# Patient Record
Sex: MALE | Race: BLACK OR AFRICAN AMERICAN | Employment: FULL TIME | URBAN - METROPOLITAN AREA
[De-identification: names, ages, dates, MRNs, and addresses within clinical notes are randomized per-mention and may not be internally consistent; named-entity substitution may affect disease eponyms.]

---

## 2017-08-19 LAB — DIABETIC RETINOPATHY: ABNORMAL

## 2018-02-14 ENCOUNTER — OFFICE VISIT (OUTPATIENT)
Dept: PRIMARY CARE CLINIC | Age: 53
End: 2018-02-14

## 2018-02-14 VITALS
BODY MASS INDEX: 37.73 KG/M2 | TEMPERATURE: 97.2 F | RESPIRATION RATE: 16 BRPM | DIASTOLIC BLOOD PRESSURE: 78 MMHG | WEIGHT: 286 LBS | HEART RATE: 96 BPM | SYSTOLIC BLOOD PRESSURE: 122 MMHG | OXYGEN SATURATION: 94 %

## 2018-02-14 DIAGNOSIS — G89.29 CHRONIC LEFT SHOULDER PAIN: ICD-10-CM

## 2018-02-14 DIAGNOSIS — M25.512 CHRONIC LEFT SHOULDER PAIN: ICD-10-CM

## 2018-02-14 DIAGNOSIS — Z12.11 SCREENING FOR COLON CANCER: ICD-10-CM

## 2018-02-14 DIAGNOSIS — G47.37 CENTRAL SLEEP APNEA DUE TO MEDICAL CONDITION: ICD-10-CM

## 2018-02-14 DIAGNOSIS — I10 ESSENTIAL HYPERTENSION: Primary | ICD-10-CM

## 2018-02-14 DIAGNOSIS — Z79.4 TYPE 2 DIABETES MELLITUS WITH OTHER CIRCULATORY COMPLICATION, WITH LONG-TERM CURRENT USE OF INSULIN (HCC): ICD-10-CM

## 2018-02-14 DIAGNOSIS — I69.30 LATE EFFECT OF CEREBROVASCULAR ACCIDENT (CVA): ICD-10-CM

## 2018-02-14 DIAGNOSIS — E11.59 TYPE 2 DIABETES MELLITUS WITH OTHER CIRCULATORY COMPLICATION, WITH LONG-TERM CURRENT USE OF INSULIN (HCC): ICD-10-CM

## 2018-02-14 PROBLEM — E11.9 DIABETES MELLITUS (HCC): Status: ACTIVE | Noted: 2018-02-14

## 2018-02-14 PROCEDURE — 99203 OFFICE O/P NEW LOW 30 MIN: CPT | Performed by: INTERNAL MEDICINE

## 2018-02-14 RX ORDER — LIRAGLUTIDE 6 MG/ML
INJECTION SUBCUTANEOUS
Qty: 9 PEN | Refills: 3 | Status: SHIPPED | OUTPATIENT
Start: 2018-02-14 | End: 2018-02-14 | Stop reason: SDUPTHER

## 2018-02-14 RX ORDER — PIOGLITAZONEHYDROCHLORIDE 30 MG/1
30 TABLET ORAL DAILY
COMMUNITY
End: 2018-02-19

## 2018-02-14 RX ORDER — HYDRALAZINE HYDROCHLORIDE 50 MG/1
TABLET, FILM COATED ORAL
Qty: 360 TABLET | Refills: 3 | Status: SHIPPED | OUTPATIENT
Start: 2018-02-14 | End: 2018-10-08 | Stop reason: DRUGHIGH

## 2018-02-14 RX ORDER — ATENOLOL 50 MG/1
50 TABLET ORAL DAILY
COMMUNITY
End: 2018-04-10 | Stop reason: SDUPTHER

## 2018-02-14 RX ORDER — AMLODIPINE BESYLATE 5 MG/1
5 TABLET ORAL DAILY
Qty: 90 TABLET | Refills: 3 | Status: SHIPPED | OUTPATIENT
Start: 2018-02-14 | End: 2019-02-19 | Stop reason: SDUPTHER

## 2018-02-14 RX ORDER — LIRAGLUTIDE 6 MG/ML
INJECTION SUBCUTANEOUS
Qty: 9 PEN | Refills: 3 | Status: SHIPPED | OUTPATIENT
Start: 2018-02-14 | End: 2018-10-08 | Stop reason: DRUGHIGH

## 2018-02-14 ASSESSMENT — ENCOUNTER SYMPTOMS
RESPIRATORY NEGATIVE: 1
EYE DISCHARGE: 0
GASTROINTESTINAL NEGATIVE: 1
EYES NEGATIVE: 1
BLURRED VISION: 0

## 2018-02-14 NOTE — PROGRESS NOTES
2/14/18 encounter (Office Visit) with Clif Garcia MD   Medication Sig Dispense Refill    pioglitazone (ACTOS) 30 MG tablet Take 30 mg by mouth daily      hydrALAZINE (APRESOLINE) 50 MG tablet One pill in the AM and at noon and 2 in the evening. 360 tablet 3    amLODIPine (NORVASC) 5 MG tablet Take 1 tablet by mouth daily 90 tablet 3    empagliflozin (JARDIANCE) 25 MG tablet Take 25 mg by mouth daily 90 tablet 2    atenolol (TENORMIN) 50 MG tablet Take 50 mg by mouth daily One pill every morning.  VICTOZA 18 MG/3ML SOPN SC injection 1.8 mg daily subcutaneously 9 pen 3    aspirin 81 MG chewable tablet Take 1 tablet by mouth daily 30 tablet 3    ibuprofen (ADVIL;MOTRIN) 400 MG tablet Take 1 tablet by mouth every 6 hours as needed for Pain 120 tablet 1    metFORMIN (GLUCOPHAGE) 1000 MG tablet Take 1 tablet by mouth 2 times daily (with meals) 60 tablet 3    insulin glargine (LANTUS) 100 UNIT/ML injection vial Inject 12 Units into the skin nightly 1 vial 3    insulin lispro (HUMALOG) 100 UNIT/ML injection vial Inject 4 Units into the skin 3 times daily (with meals) 1 vial 3    atorvastatin (LIPITOR) 80 MG tablet Take 1 tablet by mouth nightly 30 tablet 3    lisinopril (PRINIVIL;ZESTRIL) 40 MG tablet Take 1 tablet by mouth daily 30 tablet 3       Immunization History   Administered Date(s) Administered    Tdap (Boostrix, Adacel) 05/08/2015       Past Medical History:   Diagnosis Date    Diabetes mellitus ([facility] [address]     Hypertension      Past Surgical History:   Procedure Laterality Date    KNEE SURGERY      right knee      No family history on file. Review of Systems:  Review of Systems   Constitutional: Negative for activity change, appetite change and malaise/fatigue. HENT: Negative. Eyes: Negative. Negative for blurred vision and discharge. Respiratory: Negative. Gastrointestinal: Negative. Genitourinary: Negative. Negative for difficulty urinating.    Musculoskeletal: ACCU-CHEK     POC Glucose 09/09/2016 145*    Performed on 09/09/2016 ACCU-CHEK     POC Glucose 09/09/2016 111*    Performed on 09/09/2016 ACCU-CHEK     POC Glucose 09/09/2016 104*    Performed on 09/09/2016 ACCU-CHEK     POC Glucose 09/10/2016 217*    Performed on 09/10/2016 ACCU-CHEK     POC Glucose 09/10/2016 141*    Performed on 09/10/2016 ACCU-CHEK     POC Glucose 09/10/2016 143*    Performed on 09/10/2016 ACCU-CHEK     POC Glucose 09/10/2016 85     Performed on 09/10/2016 ACCU-CHEK     POC Glucose 09/10/2016 100*    Performed on 09/10/2016 ACCU-CHEK     POC Glucose 09/11/2016 123*    Performed on 09/11/2016 ACCU-CHEK     POC Glucose 09/11/2016 114*    Performed on 09/11/2016 ACCU-CHEK     POC Glucose 09/11/2016 141*    Performed on 09/11/2016 ACCU-CHEK     POC Glucose 09/11/2016 92     Performed on 09/11/2016 ACCU-CHEK     POC Glucose 09/11/2016 114*    Performed on 09/11/2016 ACCU-CHEK     POC Glucose 09/12/2016 166*    Performed on 09/12/2016 ACCU-CHEK     WBC 09/12/2016 4.7     RBC 09/12/2016 5.06     Hemoglobin 09/12/2016 13.6     Hematocrit 09/12/2016 41.0     MCV 09/12/2016 81.0     MCH 09/12/2016 27.0     MCHC 09/12/2016 33.3     RDW 09/12/2016 14.1     Platelets 77/15/2263 200     MPV 09/12/2016 9.0     Sodium 09/12/2016 136     Potassium 09/12/2016 3.5     Chloride 09/12/2016 98*    CO2 09/12/2016 24     Anion Gap 09/12/2016 14     Glucose 09/12/2016 170*    BUN 09/12/2016 26*    CREATININE 09/12/2016 1.4*    GFR Non- 09/12/2016 54*    GFR  09/12/2016 >60     Calcium 09/12/2016 9.5     POC Glucose 09/12/2016 91     Performed on 09/12/2016 ACCU-CHEK     POC Glucose 09/12/2016 72     Performed on 09/12/2016 ACCU-CHEK     POC Glucose 09/12/2016 178*    Performed on 09/12/2016 ACCU-CHEK     POC Glucose 09/13/2016 136*    Performed on 09/13/2016 ACCU-CHEK     POC Glucose 09/13/2016 142*    Performed on 09/13/2016 ACCU-CHEK          Health Maintenance   Topic Date Due    Diabetic foot exam  10/27/1977    Diabetic retinal exam  10/27/1977    HIV screen  10/27/1982    Diabetic microalbuminuria test  10/27/1985    Pneumococcal med risk (1 of 1 - PPSV23) 10/27/1986    A1C test (Diabetic or Prediabetic)  08/26/2017    Lipid screen  08/27/2017    Flu vaccine (1) 09/01/2017    Potassium monitoring  09/12/2017    Creatinine monitoring  09/12/2017    Colon cancer screen colonoscopy  10/27/2017    DTaP/Tdap/Td vaccine (2 - Td) 05/08/2025          Assessment/Plan:    Essential hypertension  Stable     Late effect of cerebrovascular accident (CVA)  Remote CVA. Still needs some assistance during the interview from his wife. Diabetes mellitus ([address]  Seen previously by endocrine and will refer to endocrine at [address] [facility] left shoulder pain  Refer to orthopedics. Central sleep apnea due to medical condition  Refer to sleep lab      1. Essential hypertension    - hydrALAZINE (APRESOLINE) 50 MG tablet; One pill in the AM and at noon and 2 in the evening. Dispense: 360 tablet; Refill: 3  - amLODIPine (NORVASC) 5 MG tablet; Take 1 tablet by mouth daily  Dispense: 90 tablet; Refill: 3    2. Type 2 diabetes mellitus with other circulatory complication, with long-term current use of insulin (HCC)    - pioglitazone (ACTOS) 30 MG tablet; Take 30 mg by mouth daily  - Yohan Brooks MD  - empagliflozin (JARDIANCE) 25 MG tablet; Take 25 mg by mouth daily  Dispense: 90 tablet; Refill: 2  - [facility] FOOT EXAM    3. Central sleep apnea due to medical condition    - [facility]    4. Chronic left shoulder pain    - Alison Gallegos MD (Orthopedic Surgery)    5. Screening for colon cancer    - [facility] - Chichi Quezada MD ([facility])    6. Late effect of cerebrovascular accident (CVA)         Return in about 3 months (around 5/14/2018).

## 2018-02-19 ENCOUNTER — OFFICE VISIT (OUTPATIENT)
Dept: ENDOCRINOLOGY | Age: 53
End: 2018-02-19

## 2018-02-19 VITALS
OXYGEN SATURATION: 97 % | HEIGHT: 73 IN | DIASTOLIC BLOOD PRESSURE: 90 MMHG | SYSTOLIC BLOOD PRESSURE: 127 MMHG | BODY MASS INDEX: 38.17 KG/M2 | WEIGHT: 288 LBS | HEART RATE: 86 BPM

## 2018-02-19 DIAGNOSIS — E11.69 DYSLIPIDEMIA ASSOCIATED WITH TYPE 2 DIABETES MELLITUS (HCC): ICD-10-CM

## 2018-02-19 DIAGNOSIS — I10 ESSENTIAL HYPERTENSION: ICD-10-CM

## 2018-02-19 DIAGNOSIS — E11.59 TYPE 2 DIABETES MELLITUS WITH OTHER CIRCULATORY COMPLICATION, WITH LONG-TERM CURRENT USE OF INSULIN (HCC): Primary | ICD-10-CM

## 2018-02-19 DIAGNOSIS — E13.319 RETINOPATHY DUE TO SECONDARY DIABETES (HCC): ICD-10-CM

## 2018-02-19 DIAGNOSIS — Z79.4 TYPE 2 DIABETES MELLITUS WITH OTHER CIRCULATORY COMPLICATION, WITH LONG-TERM CURRENT USE OF INSULIN (HCC): Primary | ICD-10-CM

## 2018-02-19 DIAGNOSIS — E78.5 DYSLIPIDEMIA ASSOCIATED WITH TYPE 2 DIABETES MELLITUS (HCC): ICD-10-CM

## 2018-02-19 PROBLEM — E11.9 DIABETES MELLITUS (HCC): Status: RESOLVED | Noted: 2018-02-14 | Resolved: 2018-02-19

## 2018-02-19 LAB
A/G RATIO: 1.6 (ref 1.1–2.2)
ALBUMIN SERPL-MCNC: 4.4 G/DL (ref 3.4–5)
ALP BLD-CCNC: 70 U/L (ref 40–129)
ALT SERPL-CCNC: 19 U/L (ref 10–40)
ANION GAP SERPL CALCULATED.3IONS-SCNC: 15 MMOL/L (ref 3–16)
AST SERPL-CCNC: 16 U/L (ref 15–37)
BILIRUB SERPL-MCNC: 0.5 MG/DL (ref 0–1)
BUN BLDV-MCNC: 27 MG/DL (ref 7–20)
CALCIUM SERPL-MCNC: 9.5 MG/DL (ref 8.3–10.6)
CHLORIDE BLD-SCNC: 100 MMOL/L (ref 99–110)
CHOLESTEROL, TOTAL: 114 MG/DL (ref 0–199)
CO2: 26 MMOL/L (ref 21–32)
CREAT SERPL-MCNC: 1.7 MG/DL (ref 0.9–1.3)
CREATININE URINE: 208 MG/DL (ref 39–259)
GFR AFRICAN AMERICAN: 52
GFR NON-AFRICAN AMERICAN: 43
GLOBULIN: 2.7 G/DL
GLUCOSE BLD-MCNC: 121 MG/DL (ref 70–99)
HDLC SERPL-MCNC: 49 MG/DL (ref 40–60)
LDL CHOLESTEROL CALCULATED: 54 MG/DL
MICROALBUMIN UR-MCNC: <1.2 MG/DL
MICROALBUMIN/CREAT UR-RTO: NORMAL MG/G (ref 0–30)
POTASSIUM SERPL-SCNC: 4.4 MMOL/L (ref 3.5–5.1)
SODIUM BLD-SCNC: 141 MMOL/L (ref 136–145)
TOTAL PROTEIN: 7.1 G/DL (ref 6.4–8.2)
TRIGL SERPL-MCNC: 55 MG/DL (ref 0–150)
TSH REFLEX FT4: 1.14 UIU/ML (ref 0.27–4.2)
VLDLC SERPL CALC-MCNC: 11 MG/DL

## 2018-02-19 PROCEDURE — 99244 OFF/OP CNSLTJ NEW/EST MOD 40: CPT | Performed by: INTERNAL MEDICINE

## 2018-02-19 ASSESSMENT — PATIENT HEALTH QUESTIONNAIRE - PHQ9
2. FEELING DOWN, DEPRESSED OR HOPELESS: 0
1. LITTLE INTEREST OR PLEASURE IN DOING THINGS: 0
SUM OF ALL RESPONSES TO PHQ9 QUESTIONS 1 & 2: 0
SUM OF ALL RESPONSES TO PHQ QUESTIONS 1-9: 0

## 2018-02-19 NOTE — PROGRESS NOTES
mellitus with diabetic nephropathy, with long-term current use of insulin (HCC)  -     insulin glargine (LANTUS SOLOSTAR) 100 UNIT/ML injection pen; Inject 18 Units into the skin nightly  -     empagliflozin (JARDIANCE) 25 MG tablet; Take 25 mg by mouth daily  -     TSH WITH REFLEX TO FT4; Future  -     Hemoglobin A1C; Future  -     Lipid Panel; Future  -     Comprehensive Metabolic Panel; Future  -     Microalbumin / Creatinine Urine Ratio; Future    Retinopathy due to secondary diabetes ([facility]          1: Type 2 DM complicated with macrovascular disease, nephropathy, retinopathy   Uncontrolled A1C 9.6% Oct 2017   Cr 1.44, GFR 66  Jan 2017   A1C of <8 would be acceptable because of microvascular and macrovascular complications     Stop Pioglitazone 30mg daily     Start Jardiance 25m daily in am     Metformin 1000mg bid   Victoza 1.8mg daily in am     Lantus 18 units daily     Humalog Not taking stop it. All instructions provided in written. Check Blood sugars 3 times per day. Log them along with insulin and send them every 2 weeks. Call for blood sugars less than 60 or more than 400. Eye exam: Last exam in Feb 2017. Reports retinopathy, due for eye exam.    Foot exam:  Due Feb 2019   Deformity/amputation: absent  Skin lesions/pre-ulcerative calluses: absent  Edema: right- negative, left- negative  Sensory exam: Monofilament sensation: normal  Pulses: normal, Vibration (128 Hz):  Intact    Renal screen: later     TSH screen: June 2013     2: HTN   Controlled     3: Hyperlipidemia   LDL: 46 , HDL: 40 , TGs: 46 Oct 2016    Atorvastatin 80mg     Check A1C, lipids, MACR, TSH     EDUCATION:   Greater than 50% of this follow-up visit was spent in general counseling regarding   obesity, diet, exercise, importance of adherence to insulin regime, recognition and treatment of hypo and hyperglycemia,  glucose logging, proper diabetes management, diabetic complications with poor management and the importance of

## 2018-02-20 LAB
ESTIMATED AVERAGE GLUCOSE: 240.3 MG/DL
HBA1C MFR BLD: 10 %

## 2018-03-02 ENCOUNTER — OFFICE VISIT (OUTPATIENT)
Dept: ORTHOPEDIC SURGERY | Age: 53
End: 2018-03-02

## 2018-03-02 VITALS — BODY MASS INDEX: 37.11 KG/M2 | RESPIRATION RATE: 16 BRPM | HEIGHT: 73 IN | WEIGHT: 280 LBS

## 2018-03-02 DIAGNOSIS — M75.02 ADHESIVE CAPSULITIS OF LEFT SHOULDER: Primary | ICD-10-CM

## 2018-03-02 DIAGNOSIS — M75.82 TENDINITIS OF LEFT ROTATOR CUFF: ICD-10-CM

## 2018-03-02 PROCEDURE — 20610 DRAIN/INJ JOINT/BURSA W/O US: CPT | Performed by: ORTHOPAEDIC SURGERY

## 2018-03-02 PROCEDURE — 99244 OFF/OP CNSLTJ NEW/EST MOD 40: CPT | Performed by: ORTHOPAEDIC SURGERY

## 2018-03-02 NOTE — PROGRESS NOTES
oriented to place, person, and situation  Cardiovascular  RRR, rad pulse intact  Respiratory  respirations even and unlabored  Gastrointestinal  protuberant belly  Skin  no rashes, wounds, or lesions seen  Neck/Spine - mildly decreased cervical ROM, no TTP of spinous processes, no TTP of paraspinal musculature,  equivocal Spurling's to left  Neurological - SILT M/U/R/A nerve distributions; AIN/PIN/IO intact  Right Upper Extremity:  Examination of the right upper extremity does not show any tenderness, deformity or injury. Range of motion is unremarkable. There is no gross instability. There are no rashes, ulcerations or lesions. Strength and tone are normal.  Left shoulder:   No obvious deformity/swelling/ecchymosis   No atrophy seen, of the infraspinatus fossa, of the supraspinatus fossa and deltoid    moderate TTP over bicipital groove, mild greater tuboersity   Range of Motion: Forward flexion:  180 dysrhythmia    Abduction:  100 with pain    External rotation with arm at side:  20 (50 on right)    Internal rotation:  T9   Strength:    Abduction:  5-/5     External rotation:  5-/5     Special tests:    negative lift-off sign    negative belly-press test    positive Hawkin's test    positive Speed's test    Imaging:  Images were personally reviewed by myself and discussed with the patient  Left shoulder 4 views performed today in clinic   - glenohumeral articulation is mildly to moderately narrowed with small osteophytes seen, there are no loose bodies appreciated. The humeral head is slightly elevated, with a minimally reduced acromiohumeral distance. On axillary view, the humeral head is well-centered within the glenoid. The acromioclavicular joint demonstrates significant degenerative changes. The tuberosities are sclerotic with chronic cystic changes. Calcific tendonitis is absent. Assessment & Plan:  50 y.o. male who presents with   1.  Adhesive capsulitis of left shoulder  XR SHOULDER

## 2018-03-06 ENCOUNTER — TELEPHONE (OUTPATIENT)
Dept: ENDOCRINOLOGY | Age: 53
End: 2018-03-06

## 2018-03-06 NOTE — TELEPHONE ENCOUNTER
Left a message for Mr. Adeel Warner to contact the office for message below:    Roel Jensen MD Physician Signed  Creation Time: 03/06/2018 1:30 PM         Please inform his blood sugars are good   Continue same meds and keep working on diet and exercise.    Thank you

## 2018-03-14 ENCOUNTER — HOSPITAL ENCOUNTER (OUTPATIENT)
Dept: PHYSICAL THERAPY | Age: 53
Discharge: HOME | End: 2018-03-31
Admitting: ORTHOPAEDIC SURGERY

## 2018-03-19 ENCOUNTER — HOSPITAL ENCOUNTER (OUTPATIENT)
Dept: PHYSICAL THERAPY | Age: 53
Discharge: HOME | End: 2018-03-20
Admitting: ORTHOPAEDIC SURGERY

## 2018-03-19 NOTE — FLOWSHEET NOTE
pain relief\"      Subjective:  Notes not feeling too bad today. Doing ok with HEP    Objective:    Test measurements:    ROM:  Date      Shldr flexion    Shldr abd  Shldr IR         Shldr ER   A P A P A P A P   Eval 3/14 112 165 w/pain at end range 72 165 w/pain at end range  60 78 85                Strength:  Date Shoulder flexion Shoulder abduction Shoulder IR Shoulder  ER Bicep   Eval 3/14 3-/5 3-/5 4/5 3+/5 4-/5               Exercises:  Exercise/Equipment Resistance/Repetitions Other comments   UBE f/b x3 mins     OH pulley flexion 10x    UE ranger flexion                    scaption 10x   10x         Tband rows             lat Add  add         Supine COOPER IR/ER at 30/60/90 5x 5 sec holds each Verbal cues to keep resistance pain free             Glenohumeral joint mobs A/P, Inf Grade II 5x each    Shoulder PROM 4-way 5x each          IFC with CP  x15 mins  L shoulder      Other Therapeutic Activities:      Home Exercise Program:  3/14/18: given shoulder isometrics (abd, IR, ER) and scap retraction. The patient demonstrated good tolerance to and understanding of the HEP. Written instructions have been issued.     Manual Treatments:      Modalities: estim with CP as stated    Timed Code Treatment Minutes:  30    Total Treatment Minutes:  45    Treatment/Activity Tolerance:  [] Patient tolerated treatment well [] Patient limited by fatigue  [x] Patient limited by pain  [] Patient limited by other medical complications  [] Other:     Prognosis: [] Good [x] Fair  [] Poor    Patient Requires Follow-up: [x] Yes  [] No    Plan:   [x] Continue per plan of care [] Alter current plan (see comments)  [] Plan of care initiated [] Hold pending MD visit [] Discharge     Plan for Next Session: add above as stated    Electronically signed by:  Adarsh Layne, [id number] [address]

## 2018-03-22 ENCOUNTER — HOSPITAL ENCOUNTER (OUTPATIENT)
Dept: OTHER | Age: 53
Discharge: HOME | End: 2018-03-29
Attending: ORTHOPAEDIC SURGERY | Admitting: ORTHOPAEDIC SURGERY

## 2018-03-24 LAB — DIABETIC RETINOPATHY: NORMAL

## 2018-03-26 ENCOUNTER — HOSPITAL ENCOUNTER (OUTPATIENT)
Dept: PHYSICAL THERAPY | Age: 53
Discharge: HOME | End: 2018-03-27
Admitting: ORTHOPAEDIC SURGERY

## 2018-03-26 ENCOUNTER — OFFICE VISIT (OUTPATIENT)
Dept: ENDOCRINOLOGY | Age: 53
End: 2018-03-26

## 2018-03-26 VITALS
HEIGHT: 73 IN | OXYGEN SATURATION: 95 % | BODY MASS INDEX: 36.84 KG/M2 | WEIGHT: 278 LBS | SYSTOLIC BLOOD PRESSURE: 137 MMHG | DIASTOLIC BLOOD PRESSURE: 96 MMHG | HEART RATE: 86 BPM

## 2018-03-26 DIAGNOSIS — I10 ESSENTIAL HYPERTENSION: ICD-10-CM

## 2018-03-26 DIAGNOSIS — E11.3299 TYPE 2 DIABETES MELLITUS WITH BACKGROUND RETINOPATHY (HCC): ICD-10-CM

## 2018-03-26 DIAGNOSIS — E11.69 DYSLIPIDEMIA ASSOCIATED WITH TYPE 2 DIABETES MELLITUS (HCC): ICD-10-CM

## 2018-03-26 DIAGNOSIS — I10 ESSENTIAL HYPERTENSION: Primary | ICD-10-CM

## 2018-03-26 DIAGNOSIS — E78.5 DYSLIPIDEMIA ASSOCIATED WITH TYPE 2 DIABETES MELLITUS (HCC): ICD-10-CM

## 2018-03-26 DIAGNOSIS — E11.59 TYPE 2 DIABETES MELLITUS WITH VASCULAR DISEASE (HCC): ICD-10-CM

## 2018-03-26 PROBLEM — Z79.4 TYPE 2 DIABETES MELLITUS WITHOUT COMPLICATION, WITH LONG-TERM CURRENT USE OF INSULIN (HCC): Status: ACTIVE | Noted: 2018-02-19

## 2018-03-26 PROBLEM — E11.9 TYPE 2 DIABETES MELLITUS WITHOUT COMPLICATION, WITH LONG-TERM CURRENT USE OF INSULIN (HCC): Status: ACTIVE | Noted: 2018-02-19

## 2018-03-26 PROBLEM — E13.319 RETINOPATHY DUE TO SECONDARY DIABETES (HCC): Status: RESOLVED | Noted: 2018-02-19 | Resolved: 2018-03-26

## 2018-03-26 LAB
ANION GAP SERPL CALCULATED.3IONS-SCNC: 19 MMOL/L (ref 3–16)
BUN BLDV-MCNC: 36 MG/DL (ref 7–20)
CALCIUM SERPL-MCNC: 9.9 MG/DL (ref 8.3–10.6)
CHLORIDE BLD-SCNC: 99 MMOL/L (ref 99–110)
CO2: 21 MMOL/L (ref 21–32)
CREAT SERPL-MCNC: 1.7 MG/DL (ref 0.9–1.3)
GFR AFRICAN AMERICAN: 52
GFR NON-AFRICAN AMERICAN: 43
GLUCOSE BLD-MCNC: 147 MG/DL (ref 70–99)
POTASSIUM SERPL-SCNC: 4.7 MMOL/L (ref 3.5–5.1)
SODIUM BLD-SCNC: 139 MMOL/L (ref 136–145)

## 2018-03-26 PROCEDURE — 99214 OFFICE O/P EST MOD 30 MIN: CPT | Performed by: INTERNAL MEDICINE

## 2018-03-26 RX ORDER — CHLORTHALIDONE 25 MG/1
25 TABLET ORAL DAILY
Qty: 30 TABLET | Refills: 5 | Status: SHIPPED | OUTPATIENT
Start: 2018-03-26 | End: 2018-08-15 | Stop reason: ALTCHOICE

## 2018-03-26 ASSESSMENT — PATIENT HEALTH QUESTIONNAIRE - PHQ9
SUM OF ALL RESPONSES TO PHQ9 QUESTIONS 1 & 2: 0
2. FEELING DOWN, DEPRESSED OR HOPELESS: 0
SUM OF ALL RESPONSES TO PHQ QUESTIONS 1-9: 0
1. LITTLE INTEREST OR PLEASURE IN DOING THINGS: 0

## 2018-03-26 NOTE — PATIENT INSTRUCTIONS
Patient Education        Diabetes Foot Health: Care Instructions  Your Care Instructions    When you have diabetes, your feet need extra care and attention. Diabetes can damage the nerve endings and blood vessels in your feet, making you less likely to notice when your feet are injured. Diabetes also limits your body's ability to fight infection and get blood to areas that need it. If you get a minor foot injury, it could become an ulcer or a serious infection. With good foot care, you can prevent most of these problems. Caring for your feet can be quick and easy. Most of the care can be done when you are bathing or getting ready for bed. Follow-up care is a key part of your treatment and safety. Be sure to make and go to all appointments, and call your doctor if you are having problems. It's also a good idea to know your test results and keep a list of the medicines you take. How can you care for yourself at home? · Keep your blood sugar close to normal by watching what and how much you eat, monitoring blood sugar, taking medicines if prescribed, and getting regular exercise. · Do not smoke. Smoking affects blood flow and can make foot problems worse. If you need help quitting, talk to your doctor about stop-smoking programs and medicines. These can increase your chances of quitting for good. · Eat a diet that is low in fats. High fat intake can cause fat to build up in your blood vessels and decrease blood flow. · Inspect your feet daily for blisters, cuts, cracks, or sores. If you cannot see well, use a mirror or have someone help you. · Take care of your feet:  [facility] your feet every day. Use warm (not hot) water. Check the water temperature with your wrists or other part of your body, not your feet. ¨ Dry your feet well. Pat them dry. Do not rub the skin on your feet too hard. Dry well between your toes.  If the skin on your feet stays moist, bacteria or a fungus can grow, which can lead to for early. When should you call for help? Call your doctor now or seek immediate medical care if:  ? · You have a foot sore, an ulcer or break in the skin that is not healing after 4 days, bleeding corns or calluses, or an ingrown toenail. ? · You have blue or black areas, which can mean bruising or blood flow problems. ? · You have peeling skin or tiny blisters between your toes or cracking or oozing of the skin. ? · You have a fever for more than 24 hours and a foot sore. ? · You have new numbness or tingling in your feet that does not go away after you move your feet or change positions. ? · You have unexplained or unusual swelling of the foot or ankle. ? Watch closely for changes in your health, and be sure to contact your doctor if:  ? · You cannot do proper foot care. Where can you learn more? Go to https://[employer]. org and sign in to your [employer] account. Enter A739 in the [employer] box to learn more about \"Diabetes Foot Health: Care Instructions. \"     If you do not have an account, please click on the \"Sign Up Now\" link. Current as of: March 13, 2017  Content Version: 11.5  © 9686-9121 Healthwise, Incorporated. Care instructions adapted under license by [facility] ([facility]). If you have questions about a medical condition or this instruction, always ask your healthcare professional. [place] [address] any warranty or liability for your use of this information.

## 2018-03-26 NOTE — PROGRESS NOTES
Foot exam:  Due Feb 2019   Deformity/amputation: absent  Skin lesions/pre-ulcerative calluses: absent  Edema: right- negative, left- negative  Sensory exam: Monofilament sensation: normal  Pulses: normal, Vibration (128 Hz): Intact    Renal screen: Feb 2018     TSH screen: Feb 2018     2: HTN   Slightly high   Did not take meds yet     3: Hyperlipidemia   LDL: 54 , HDL: 49, TGs: 55 Feb 2018     Atorvastatin 80mg     Check BMP today   RTC in 2 months     EDUCATION:   Greater than 50% of this follow-up visit was spent in general counseling regarding   obesity, diet, exercise, importance of adherence to insulin regime, recognition and treatment of hypo and hyperglycemia,  glucose logging, proper diabetes management, diabetic complications with poor management and the importance of glycemic control in order to avoid the complications of diabetes. Risks and potential complications of diabetes were reviewed with the patient. Diabetes health maintenance plan and follow-up were discussed and understood by the patient. We reviewed the importance of medication compliance and regular follow-up. Aggressive lifestyle modification was encouraged. Exercise Counselling: This patient is a candidate for regular physical exercise. Instructions to perform the following types of exercise:  Swimming or water aerobic exercise  Brisk walking  Playing tennis  Stationary bicycle or elliptical indoor  Low impact aerobic exercise    Instructions given to exercise for the following duration:  30 minutes a day for five-seven days per week.     Following instructions for being active throughout the day in addition to formal exercise:  Walk instead of drive whenever possible  Take the stairs instead of the elevator  Work in the garden  Park to the far end of the parking lot to add more walking steps to destination      Electronically signed by Sumi Hooker MD on 3/26/2018 at 4:53 PM

## 2018-03-29 ENCOUNTER — HOSPITAL ENCOUNTER (OUTPATIENT)
Dept: PHYSICAL THERAPY | Age: 53
Discharge: HOME | End: 2018-03-30
Admitting: ORTHOPAEDIC SURGERY

## 2018-03-30 RX ORDER — BLOOD-GLUCOSE METER
EACH MISCELLANEOUS
Qty: 1 DEVICE | Refills: 1 | Status: SHIPPED | OUTPATIENT
Start: 2018-03-30 | End: 2019-03-04

## 2018-03-30 RX ORDER — LANCETS 33 GAUGE
EACH MISCELLANEOUS
Qty: 200 EACH | Refills: 3 | Status: SHIPPED | OUTPATIENT
Start: 2018-03-30 | End: 2019-07-01

## 2018-03-30 NOTE — TELEPHONE ENCOUNTER
Mr. Witt Rekha is covered by [facility].  Plan that offers diabetic supplies at a zero co-pay for the following products:      [employer] Agamatrix Presto monitor  [employer] presto test strips  [employer] ultra thin lancets 33 gauge

## 2018-04-01 ENCOUNTER — HOSPITAL ENCOUNTER (OUTPATIENT)
Dept: OTHER | Age: 53
Discharge: HOME | End: 2018-04-30
Attending: ORTHOPAEDIC SURGERY | Admitting: ORTHOPAEDIC SURGERY

## 2018-04-02 ENCOUNTER — HOSPITAL ENCOUNTER (OUTPATIENT)
Dept: PHYSICAL THERAPY | Age: 53
Discharge: HOME | End: 2018-04-03
Admitting: ORTHOPAEDIC SURGERY

## 2018-04-02 NOTE — FLOWSHEET NOTE
pain relief\"    Subjective:   Shoulder is doing well. Only really having issues when trying to sleep at night. Pain at night can be 7-8/10 when sleeping on it. Objective:    Test measurements:    ROM:  Date      Shldr flexion    Shldr abd  Shldr IR         Shldr ER   A P A P A P A P   Eval 3/14 112 165 w/pain at end range 72 165 w/pain at end range  60 78 85                Strength:  Date Shoulder flexion Shoulder abduction Shoulder IR Shoulder  ER Bicep   Eval 3/14 3-/5 3-/5 4/5 3+/5 4-/5               Exercises:  Exercise/Equipment Resistance/Repetitions Other comments   UBE f/b x3 mins     OH pulley flexion 10x    UE ranger flexion                    scaption 10x   10x         Tband rows             Lat             IR             ER  Red x 10   Red x 10   Red x 10  Red x 10 Increase reps            Supine COOPER IR/ER at 30/60/90 5x 5 sec holds each Verbal cues to keep resistance pain free   Sleeper stretch 5x10 secs hold    Prone row             ext Add  add         Glenohumeral joint mobs A/P, Inf Grade II 5x each    Shoulder PROM 4-way 5x each          IFC with CP  x15 mins  L shoulder      Other Therapeutic Activities:      Home Exercise Program:  3/14/18: given shoulder isometrics (abd, IR, ER) and scap retraction. The patient demonstrated good tolerance to and understanding of the HEP. Written instructions have been issued.     Manual Treatments:      Modalities: estim with CP as stated    Timed Code Treatment Minutes:  25    Total Treatment Minutes:  40    Treatment/Activity Tolerance:  [x] Patient tolerated treatment well [] Patient limited by fatigue  [] Patient limited by pain  [] Patient limited by other medical complications  [] Other:     Prognosis: [] Good [x] Fair  [] Poor    Patient Requires Follow-up: [x] Yes  [] No    Plan:   [x] Continue per plan of care [] Alter current plan (see comments)   [] Plan of care initiated [] Hold pending MD visit [] Discharge     Plan for Next Session: add above

## 2018-04-05 ENCOUNTER — HOSPITAL ENCOUNTER (OUTPATIENT)
Dept: PHYSICAL THERAPY | Age: 53
Discharge: HOME | End: 2018-04-06
Admitting: ORTHOPAEDIC SURGERY

## 2018-04-05 NOTE — FLOWSHEET NOTE
Physical Therapy Daily Treatment Note  Date:  2018    Patient Name:  Fatimah Falk    :  1965  MRN: [id number]     Pertinent Medical History:type 2 diabetes, CVA 2015, HTN    Medical/Treatment Diagnosis Information:  · Diagnosis: Tendinitis of left rotator cuff (M75.82 ICD-10-.10 ICD-9-CM); Adhesive capsulitis of left shoulder (M75.02 ICD-10-.0 ICD-9-CM)  · Treatment Diagnosis: Left shoulder and UE dysfunction due to weakness and pain that correlate to rotator cuff injury    Insurance/Certification information:  PT Insurance Information: MMO  Physician Information:  Referring Practitioner: Dr Smith Servant of care signed (Y/N): routed 3/14/18 (received)    Visit# / total visits:   Pain level: 1-2/10     G-Code (if applicable):      Date / Visit # G-Code Applied:  /  PT G-Codes  Functional Assessment Tool Used: UE Functional scale  Score: 40  Functional Limitation: Carrying, moving and handling objects  Carrying, Moving and Handling Objects Current Status (): At least 40 percent but less than 60 percent impaired, limited or restricted  Carrying, Moving and Handling Objects Goal Status ():  At least 1 percent but less than 20 percent impaired, limited or restricted    Progress Note: []  Yes  [x]  No  Next due by: Visit #10      History of Injury:Pt reports on Oct. 27, 2017 he injured his left shoulder when attempting to climb on to a [employer] 50 year sign to celebrate is birthday, he did not fall, he works in an [employer] department full time, activities include working out in a gym and playing some  basketball, he is right hand dominant  C/O constant left shoulder pain and occasional neck stiffness, -9/10, Increased left shoulder pain with reaching and lifting with Left shoulder,  decreased with \" nothing\",  Sleep is disturbed nightly due to left shoulder pain, he reports oral meds and a cortizone injection have not helped to ease pain  Patient goals : \"Better rom and pain relief\"    Subjective:   Shoulder is doing well. Only really having issues when trying to sleep at night. Pain at night can be 7-8/10 when sleeping on it. Objective:    Test measurements:    ROM:  Date      Shldr flexion    Shldr abd  Shldr IR         Shldr ER   A P A P A P A P   Eval 3/14 112 165 w/pain at end range 72 165 w/pain at end range  60 78 85                Strength:  Date Shoulder flexion Shoulder abduction Shoulder IR Shoulder  ER Bicep   Eval 3/14 3-/5 3-/5 4/5 3+/5 4-/5               Exercises:  Exercise/Equipment Resistance/Repetitions Other comments   UBE f/b x3 mins  L shoulder   OH pulley flexion 10x    UE ranger flexion                    scaption 10x   10x         Tband rows             Lat             IR             ER  Red 2 x 10   Red 2 x 10   Red 2 x 10  Red 2 x 10             Supine COOPER IR/ER at 30/60/90  RS at 90 flex 3 x 15 sec Verbal cues to keep resistance pain free   SA punch  add   Sleeper stretch 5x10 secs hold    Prone row             ext 0# x 10   0# x 10          Glenohumeral joint mobs A/P, Inf Grade II 5x each    Shoulder PROM 4-way 5x each          IFC with CP  x15 mins  L shoulder      Other Therapeutic Activities:      Home Exercise Program:  3/14/18: given shoulder isometrics (abd, IR, ER) and scap retraction. The patient demonstrated good tolerance to and understanding of the HEP. Written instructions have been issued.     Manual Treatments:      Modalities: estim with CP as stated    Timed Code Treatment Minutes:  30    Total Treatment Minutes:  45    Treatment/Activity Tolerance:  [x] Patient tolerated treatment well [] Patient limited by fatigue  [] Patient limited by pain  [] Patient limited by other medical complications  [] Other:     Prognosis: [] Good [x] Fair  [] Poor    Patient Requires Follow-up: [x] Yes  [] No    Plan:   [x] Continue per plan of care [] Alter current plan (see comments)   [] Plan of care initiated [] Hold pending MD visit [] Discharge

## 2018-04-09 ENCOUNTER — HOSPITAL ENCOUNTER (OUTPATIENT)
Dept: PHYSICAL THERAPY | Age: 53
Discharge: HOME | End: 2018-04-10
Admitting: ORTHOPAEDIC SURGERY

## 2018-04-10 RX ORDER — ATENOLOL 50 MG/1
50 TABLET ORAL DAILY
Qty: 30 TABLET | Refills: 5 | Status: SHIPPED | OUTPATIENT
Start: 2018-04-10 | End: 2018-10-17 | Stop reason: SDUPTHER

## 2018-04-10 RX ORDER — LISINOPRIL 40 MG/1
40 TABLET ORAL DAILY
Qty: 30 TABLET | Refills: 5 | Status: SHIPPED | OUTPATIENT
Start: 2018-04-10 | End: 2018-10-17 | Stop reason: SDUPTHER

## 2018-04-10 RX ORDER — ATORVASTATIN CALCIUM 80 MG/1
80 TABLET, FILM COATED ORAL NIGHTLY
Qty: 30 TABLET | Refills: 5 | Status: SHIPPED | OUTPATIENT
Start: 2018-04-10 | End: 2018-10-17 | Stop reason: SDUPTHER

## 2018-04-23 ENCOUNTER — HOSPITAL ENCOUNTER (OUTPATIENT)
Dept: PHYSICAL THERAPY | Age: 53
Discharge: HOME | End: 2018-04-24
Admitting: ORTHOPAEDIC SURGERY

## 2018-04-23 NOTE — FLOWSHEET NOTE
pain  [] Patient limited by other medical complications  [] Other:     Prognosis: [] Good [x] Fair  [] Poor    Patient Requires Follow-up: [] Yes  [x] No    Plan:   [] Continue per plan of care [] Alter current plan (see comments)   [] Plan of care initiated [] Hold pending MD visit [x] Discharge     Plan for Next Session: D/C with HEP    Electronically signed by:  Anshul Farrar, [address]

## 2018-04-30 ENCOUNTER — CLINICAL DOCUMENTATION (OUTPATIENT)
Dept: PHARMACY | Facility: CLINIC | Age: 53
End: 2018-04-30

## 2018-05-24 ENCOUNTER — TELEPHONE (OUTPATIENT)
Dept: PHARMACY | Facility: CLINIC | Age: 53
End: 2018-05-24

## 2018-06-11 ENCOUNTER — OFFICE VISIT (OUTPATIENT)
Dept: ENDOCRINOLOGY | Age: 53
End: 2018-06-11

## 2018-06-11 VITALS
SYSTOLIC BLOOD PRESSURE: 156 MMHG | HEIGHT: 73 IN | HEART RATE: 70 BPM | DIASTOLIC BLOOD PRESSURE: 108 MMHG | WEIGHT: 267 LBS | OXYGEN SATURATION: 100 % | BODY MASS INDEX: 35.39 KG/M2

## 2018-06-11 DIAGNOSIS — E11.69 DYSLIPIDEMIA ASSOCIATED WITH TYPE 2 DIABETES MELLITUS (HCC): ICD-10-CM

## 2018-06-11 DIAGNOSIS — E11.59 TYPE 2 DIABETES MELLITUS WITH VASCULAR DISEASE (HCC): ICD-10-CM

## 2018-06-11 DIAGNOSIS — E11.3299 TYPE 2 DIABETES MELLITUS WITH BACKGROUND RETINOPATHY (HCC): ICD-10-CM

## 2018-06-11 DIAGNOSIS — I10 ESSENTIAL HYPERTENSION: ICD-10-CM

## 2018-06-11 DIAGNOSIS — E78.5 DYSLIPIDEMIA ASSOCIATED WITH TYPE 2 DIABETES MELLITUS (HCC): ICD-10-CM

## 2018-06-11 PROBLEM — E11.9 TYPE 2 DIABETES MELLITUS WITHOUT COMPLICATION, WITH LONG-TERM CURRENT USE OF INSULIN (HCC): Status: RESOLVED | Noted: 2018-02-19 | Resolved: 2018-06-11

## 2018-06-11 PROBLEM — Z79.4 TYPE 2 DIABETES MELLITUS WITHOUT COMPLICATION, WITH LONG-TERM CURRENT USE OF INSULIN (HCC): Status: RESOLVED | Noted: 2018-02-19 | Resolved: 2018-06-11

## 2018-06-11 PROCEDURE — 99214 OFFICE O/P EST MOD 30 MIN: CPT | Performed by: INTERNAL MEDICINE

## 2018-06-11 ASSESSMENT — PATIENT HEALTH QUESTIONNAIRE - PHQ9
1. LITTLE INTEREST OR PLEASURE IN DOING THINGS: 1
2. FEELING DOWN, DEPRESSED OR HOPELESS: 1
SUM OF ALL RESPONSES TO PHQ9 QUESTIONS 1 & 2: 2
SUM OF ALL RESPONSES TO PHQ QUESTIONS 1-9: 2

## 2018-06-20 DIAGNOSIS — E11.59 TYPE 2 DIABETES MELLITUS WITH VASCULAR DISEASE (HCC): ICD-10-CM

## 2018-06-20 LAB
ANION GAP SERPL CALCULATED.3IONS-SCNC: 17 MMOL/L (ref 3–16)
BUN BLDV-MCNC: 49 MG/DL (ref 7–20)
CALCIUM SERPL-MCNC: 10.2 MG/DL (ref 8.3–10.6)
CHLORIDE BLD-SCNC: 100 MMOL/L (ref 99–110)
CO2: 23 MMOL/L (ref 21–32)
CREAT SERPL-MCNC: 2.4 MG/DL (ref 0.9–1.3)
GFR AFRICAN AMERICAN: 35
GFR NON-AFRICAN AMERICAN: 29
GLUCOSE BLD-MCNC: 163 MG/DL (ref 70–99)
POTASSIUM SERPL-SCNC: 4.7 MMOL/L (ref 3.5–5.1)
SODIUM BLD-SCNC: 140 MMOL/L (ref 136–145)

## 2018-06-21 DIAGNOSIS — I10 ESSENTIAL HYPERTENSION: ICD-10-CM

## 2018-06-21 LAB
ESTIMATED AVERAGE GLUCOSE: 180 MG/DL
HBA1C MFR BLD: 7.9 %

## 2018-06-28 ENCOUNTER — CLINICAL DOCUMENTATION (OUTPATIENT)
Dept: PHARMACY | Facility: CLINIC | Age: 53
End: 2018-06-28

## 2018-06-28 ENCOUNTER — TELEPHONE (OUTPATIENT)
Dept: PHARMACY | Facility: CLINIC | Age: 53
End: 2018-06-28

## 2018-07-19 ENCOUNTER — TELEPHONE (OUTPATIENT)
Dept: PHARMACY | Facility: CLINIC | Age: 53
End: 2018-07-19

## 2018-08-15 DIAGNOSIS — I10 ESSENTIAL HYPERTENSION: ICD-10-CM

## 2018-08-15 RX ORDER — CHLORTHALIDONE 25 MG/1
25 TABLET ORAL DAILY
Qty: 30 TABLET | Refills: 5 | Status: SHIPPED | OUTPATIENT
Start: 2018-08-15 | End: 2019-02-18 | Stop reason: SDUPTHER

## 2018-09-12 ENCOUNTER — TELEPHONE (OUTPATIENT)
Dept: PHARMACY | Facility: CLINIC | Age: 53
End: 2018-09-12

## 2018-09-28 DIAGNOSIS — I10 ESSENTIAL HYPERTENSION: ICD-10-CM

## 2018-09-28 LAB
ANION GAP SERPL CALCULATED.3IONS-SCNC: 13 MMOL/L (ref 3–16)
BUN BLDV-MCNC: 27 MG/DL (ref 7–20)
CALCIUM SERPL-MCNC: 9.7 MG/DL (ref 8.3–10.6)
CHLORIDE BLD-SCNC: 99 MMOL/L (ref 99–110)
CO2: 25 MMOL/L (ref 21–32)
CREAT SERPL-MCNC: 1.7 MG/DL (ref 0.9–1.3)
GFR AFRICAN AMERICAN: 52
GFR NON-AFRICAN AMERICAN: 43
GLUCOSE BLD-MCNC: 127 MG/DL (ref 70–99)
POTASSIUM SERPL-SCNC: 4.3 MMOL/L (ref 3.5–5.1)
SODIUM BLD-SCNC: 137 MMOL/L (ref 136–145)

## 2018-09-29 LAB
ESTIMATED AVERAGE GLUCOSE: 200.1 MG/DL
HBA1C MFR BLD: 8.6 %

## 2018-10-01 ENCOUNTER — OFFICE VISIT (OUTPATIENT)
Dept: ENDOCRINOLOGY | Age: 53
End: 2018-10-01
Payer: COMMERCIAL

## 2018-10-01 VITALS
OXYGEN SATURATION: 94 % | HEIGHT: 73 IN | DIASTOLIC BLOOD PRESSURE: 89 MMHG | BODY MASS INDEX: 35.52 KG/M2 | SYSTOLIC BLOOD PRESSURE: 124 MMHG | WEIGHT: 268 LBS | HEART RATE: 88 BPM

## 2018-10-01 DIAGNOSIS — E11.3299 TYPE 2 DIABETES MELLITUS WITH BACKGROUND RETINOPATHY (HCC): ICD-10-CM

## 2018-10-01 DIAGNOSIS — E11.59 TYPE 2 DIABETES MELLITUS WITH VASCULAR DISEASE (HCC): ICD-10-CM

## 2018-10-01 DIAGNOSIS — E78.5 DYSLIPIDEMIA ASSOCIATED WITH TYPE 2 DIABETES MELLITUS (HCC): ICD-10-CM

## 2018-10-01 DIAGNOSIS — E11.69 DYSLIPIDEMIA ASSOCIATED WITH TYPE 2 DIABETES MELLITUS (HCC): ICD-10-CM

## 2018-10-01 DIAGNOSIS — I10 ESSENTIAL HYPERTENSION: ICD-10-CM

## 2018-10-01 PROCEDURE — 99214 OFFICE O/P EST MOD 30 MIN: CPT | Performed by: INTERNAL MEDICINE

## 2018-10-01 ASSESSMENT — PATIENT HEALTH QUESTIONNAIRE - PHQ9
SUM OF ALL RESPONSES TO PHQ9 QUESTIONS 1 & 2: 0
2. FEELING DOWN, DEPRESSED OR HOPELESS: 0
1. LITTLE INTEREST OR PLEASURE IN DOING THINGS: 0
SUM OF ALL RESPONSES TO PHQ QUESTIONS 1-9: 0
SUM OF ALL RESPONSES TO PHQ QUESTIONS 1-9: 0

## 2018-10-01 NOTE — PATIENT INSTRUCTIONS
Patient Education            Current as of: December 7, 2017  Content Version: 11.7  © 7895-1026 [employer]. Care instructions adapted under license by [facility] ([facility]). If you have questions about a medical condition or this instruction, always ask your healthcare professional. [address] any warranty or liability for your use of this information.

## 2018-10-01 NOTE — PROGRESS NOTES
Endocrine: Negative for cold intolerance, heat intolerance, polydipsia, polyphagia and polyuria. Genitourinary: Negative for dysuria, urgency, frequency, hematuria and flank pain. Musculoskeletal: Negative for myalgias, back pain, arthralgias and neck pain. Skin/Nail: Negative for rash, itching. Normal nails. Neurological: Negative for seizures, weakness, light-headedness, numbness and headaches. Hematological/ Lymph nodes: Negative for adenopathy. Does not bruise/bleed easily. Psychiatric/Behavioral: Negative for suicidal ideas, depression, anxiety, sleep disturbance and decreased concentration. Objective:   Physical Exam:  BP (!) 128/91 (Site: Left Upper Arm, Position: Sitting, Cuff Size: Large Adult)   Pulse 88   Ht 6' 1\" (1.854 m)   Wt 268 lb (121.6 kg)   SpO2 94%   BMI 35.36 kg/m²   Constitutional: Patient is oriented to person, place, and time. Patient appears well-developed and well-nourished. HENT:    Head: Normocephalic and atraumatic. Eyes: Conjunctivae and EOM are normal. Pupils are equal, round, and reactive to light. Neck: Normal range of motion. Cardiovascular: Normal rate, regular rhythm and normal heart sounds. Pulmonary/Chest: Effort normal and breath sounds normal.   Abdominal: Soft. Bowel sounds are normal.   Musculoskeletal: Normal range of motion. Neurological: Patient is alert and oriented to person, place, and time. Patient has normal reflexes. Skin: Skin is warm and dry. Psychiatric: Patient has a normal mood and affect.  Patient behavior is normal.     Lab Review:    Orders Only on 09/28/2018   Component Date Value Ref Range Status    Hemoglobin A1C 09/28/2018 8.6  See comment % Final    eAG 09/28/2018 200.1  mg/dL Final    Sodium 09/28/2018 137  136 - 145 mmol/L Final    Potassium 09/28/2018 4.3  3.5 - 5.1 mmol/L Final    Chloride 09/28/2018 99  99 - 110 mmol/L Final    CO2 09/28/2018 25  21 - 32 mmol/L Final    Anion Gap 09/28/2018 13

## 2018-10-08 ENCOUNTER — TELEPHONE (OUTPATIENT)
Dept: PHARMACY | Facility: CLINIC | Age: 53
End: 2018-10-08

## 2018-10-08 RX ORDER — IBUPROFEN 200 MG
200 TABLET ORAL EVERY 6 HOURS PRN
Status: ON HOLD | COMMUNITY
End: 2019-04-12 | Stop reason: HOSPADM

## 2018-10-08 RX ORDER — HYDRALAZINE HYDROCHLORIDE 50 MG/1
50 TABLET, FILM COATED ORAL 3 TIMES DAILY
COMMUNITY
End: 2019-05-06 | Stop reason: SDUPTHER

## 2018-10-08 RX ORDER — B-COMPLEX WITH VITAMIN C
1 TABLET ORAL DAILY
COMMUNITY
End: 2019-08-30 | Stop reason: ALTCHOICE

## 2018-10-10 RX ORDER — ASPIRIN 81 MG/1
TABLET ORAL
Qty: 100 TABLET | Refills: 3 | Status: SHIPPED | OUTPATIENT
Start: 2018-10-10 | End: 2019-03-04

## 2018-10-10 RX ORDER — ASPIRIN 81 MG/1
81 TABLET ORAL DAILY
COMMUNITY
End: 2019-10-07

## 2018-10-17 ENCOUNTER — OFFICE VISIT (OUTPATIENT)
Dept: PRIMARY CARE CLINIC | Age: 53
End: 2018-10-17
Payer: COMMERCIAL

## 2018-10-17 VITALS
RESPIRATION RATE: 18 BRPM | HEIGHT: 73 IN | DIASTOLIC BLOOD PRESSURE: 89 MMHG | OXYGEN SATURATION: 96 % | WEIGHT: 270.8 LBS | SYSTOLIC BLOOD PRESSURE: 138 MMHG | BODY MASS INDEX: 35.89 KG/M2 | HEART RATE: 84 BPM

## 2018-10-17 DIAGNOSIS — I10 ESSENTIAL HYPERTENSION: ICD-10-CM

## 2018-10-17 DIAGNOSIS — E78.5 HYPERLIPIDEMIA, UNSPECIFIED HYPERLIPIDEMIA TYPE: Primary | ICD-10-CM

## 2018-10-17 DIAGNOSIS — Z11.4 SCREENING FOR HIV (HUMAN IMMUNODEFICIENCY VIRUS): ICD-10-CM

## 2018-10-17 DIAGNOSIS — Z23 NEED FOR PROPHYLACTIC VACCINATION AGAINST STREPTOCOCCUS PNEUMONIAE (PNEUMOCOCCUS): ICD-10-CM

## 2018-10-17 DIAGNOSIS — Z23 NEED FOR PROPHYLACTIC VACCINATION AND INOCULATION AGAINST VARICELLA: ICD-10-CM

## 2018-10-17 DIAGNOSIS — Z12.11 SCREEN FOR COLON CANCER: ICD-10-CM

## 2018-10-17 DIAGNOSIS — E11.59 TYPE 2 DIABETES MELLITUS WITH VASCULAR DISEASE (HCC): ICD-10-CM

## 2018-10-17 DIAGNOSIS — Z12.11 SCREENING FOR COLON CANCER: ICD-10-CM

## 2018-10-17 PROCEDURE — 90471 IMMUNIZATION ADMIN: CPT | Performed by: INTERNAL MEDICINE

## 2018-10-17 PROCEDURE — 90472 IMMUNIZATION ADMIN EACH ADD: CPT | Performed by: INTERNAL MEDICINE

## 2018-10-17 PROCEDURE — 99214 OFFICE O/P EST MOD 30 MIN: CPT | Performed by: INTERNAL MEDICINE

## 2018-10-17 PROCEDURE — 90732 PPSV23 VACC 2 YRS+ SUBQ/IM: CPT | Performed by: INTERNAL MEDICINE

## 2018-10-17 PROCEDURE — 90686 IIV4 VACC NO PRSV 0.5 ML IM: CPT | Performed by: INTERNAL MEDICINE

## 2018-10-17 RX ORDER — LISINOPRIL 40 MG/1
40 TABLET ORAL DAILY
Qty: 30 TABLET | Refills: 5 | Status: SHIPPED | OUTPATIENT
Start: 2018-10-17 | End: 2019-04-08 | Stop reason: SDUPTHER

## 2018-10-17 RX ORDER — ATENOLOL 50 MG/1
50 TABLET ORAL DAILY
Qty: 30 TABLET | Refills: 5 | Status: SHIPPED | OUTPATIENT
Start: 2018-10-17 | End: 2019-05-20 | Stop reason: SDUPTHER

## 2018-10-17 RX ORDER — ATORVASTATIN CALCIUM 80 MG/1
80 TABLET, FILM COATED ORAL NIGHTLY
Qty: 30 TABLET | Refills: 5 | Status: SHIPPED | OUTPATIENT
Start: 2018-10-17 | End: 2019-04-22 | Stop reason: SDUPTHER

## 2018-10-17 ASSESSMENT — ENCOUNTER SYMPTOMS
EYE DISCHARGE: 0
EYES NEGATIVE: 1
RESPIRATORY NEGATIVE: 1

## 2018-10-17 NOTE — PROGRESS NOTES
patient is not nervous/anxious. All other systems reviewed and are negative. Vitals:    10/17/18 1447   BP: 138/89   Site: Left Upper Arm   Position: Sitting   Cuff Size: Medium Adult   Pulse: 84   Resp: 18   SpO2: 96%   Weight: 270 lb 12.8 oz (122.8 kg)   Height: 6' 1\" (1.854 m)     Body mass index is 35.73 kg/m². Wt Readings from Last 3 Encounters:   10/17/18 270 lb 12.8 oz (122.8 kg)   10/01/18 268 lb (121.6 kg)   06/11/18 267 lb (121.1 kg)     BP Readings from Last 3 Encounters:   10/17/18 138/89   10/01/18 124/89   06/11/18 (!) 156/108         Physical Exam   Constitutional: He is oriented to person, place, and time. He appears well-developed and well-nourished. HENT:   Head: Normocephalic and atraumatic. Eyes: Pupils are equal, round, and reactive to light. Conjunctivae and EOM are normal.   Neck: Normal range of motion. Neck supple. Cardiovascular: Normal rate, regular rhythm and normal heart sounds. Pulmonary/Chest: Effort normal and breath sounds normal.   Musculoskeletal: Normal range of motion. Neurological: He is alert and oriented to person, place, and time. Skin: Skin is warm and dry. Psychiatric: He has a normal mood and affect.  His behavior is normal. Thought content normal.       Lab Review   Orders Only on 09/28/2018   Component Date Value    Hemoglobin A1C 09/28/2018 8.6     eAG 09/28/2018 200.1     Sodium 09/28/2018 137     Potassium 09/28/2018 4.3     Chloride 09/28/2018 99     CO2 09/28/2018 25     Anion Gap 09/28/2018 13     Glucose 09/28/2018 127*    BUN 09/28/2018 27*    CREATININE 09/28/2018 1.7*    GFR Non- 09/28/2018 43*    GFR  09/28/2018 52*    Calcium 09/28/2018 9.7    Orders Only on 06/20/2018   Component Date Value    Sodium 06/20/2018 140     Potassium 06/20/2018 4.7     Chloride 06/20/2018 100     CO2 06/20/2018 23     Anion Gap 06/20/2018 17*    Glucose 06/20/2018 163*    BUN 06/20/2018 49*   

## 2018-10-18 LAB
HIV AG/AB: NORMAL
HIV ANTIGEN: NORMAL
HIV-1 ANTIBODY: NORMAL
HIV-2 AB: NORMAL

## 2018-11-26 ENCOUNTER — TELEPHONE (OUTPATIENT)
Dept: PHARMACY | Facility: CLINIC | Age: 53
End: 2018-11-26

## 2018-11-26 NOTE — TELEPHONE ENCOUNTER
Patient returned call. He would like to watch [employer] educational videos to complete the requirement.   Educational videos have been sent to [email or web address].

## 2018-12-18 RX ORDER — INSULIN GLARGINE 100 [IU]/ML
18 INJECTION, SOLUTION SUBCUTANEOUS NIGHTLY
Qty: 5 PEN | Refills: 0 | Status: SHIPPED | OUTPATIENT
Start: 2018-12-18 | End: 2019-02-18 | Stop reason: SDUPTHER

## 2018-12-21 ENCOUNTER — TELEPHONE (OUTPATIENT)
Dept: ENDOCRINOLOGY | Age: 53
End: 2018-12-21

## 2018-12-28 RX ORDER — BLOOD-GLUCOSE METER
EACH MISCELLANEOUS
Qty: 1 KIT | Refills: 1 | Status: SHIPPED | OUTPATIENT
Start: 2018-12-28 | End: 2018-12-31 | Stop reason: SDUPTHER

## 2018-12-28 RX ORDER — BLOOD-GLUCOSE CONTROL, LOW
3 EACH MISCELLANEOUS DAILY
Qty: 100 EACH | Refills: 3 | Status: SHIPPED | OUTPATIENT
Start: 2018-12-28 | End: 2018-12-31 | Stop reason: SDUPTHER

## 2018-12-31 RX ORDER — BLOOD-GLUCOSE CONTROL, LOW
3 EACH MISCELLANEOUS DAILY
Qty: 100 EACH | Refills: 3 | Status: SHIPPED | OUTPATIENT
Start: 2018-12-31

## 2018-12-31 RX ORDER — BLOOD-GLUCOSE METER
EACH MISCELLANEOUS
Qty: 1 KIT | Refills: 1 | Status: SHIPPED | OUTPATIENT
Start: 2018-12-31 | End: 2020-07-29 | Stop reason: SDUPTHER

## 2019-02-15 ENCOUNTER — TELEPHONE (OUTPATIENT)
Dept: PRIMARY CARE CLINIC | Age: 54
End: 2019-02-15

## 2019-02-16 ENCOUNTER — PATIENT MESSAGE (OUTPATIENT)
Dept: ENDOCRINOLOGY | Age: 54
End: 2019-02-16

## 2019-02-18 ENCOUNTER — TELEPHONE (OUTPATIENT)
Dept: PRIMARY CARE CLINIC | Age: 54
End: 2019-02-18

## 2019-02-18 DIAGNOSIS — I10 ESSENTIAL HYPERTENSION: ICD-10-CM

## 2019-02-19 ENCOUNTER — TELEPHONE (OUTPATIENT)
Dept: BARIATRICS/WEIGHT MGMT | Age: 54
End: 2019-02-19

## 2019-02-19 DIAGNOSIS — I10 ESSENTIAL HYPERTENSION: ICD-10-CM

## 2019-02-19 RX ORDER — INSULIN GLARGINE 100 [IU]/ML
18 INJECTION, SOLUTION SUBCUTANEOUS NIGHTLY
Qty: 15 ML | Refills: 1 | Status: SHIPPED | OUTPATIENT
Start: 2019-02-19 | End: 2019-04-15 | Stop reason: SDUPTHER

## 2019-02-19 RX ORDER — AMLODIPINE BESYLATE 5 MG/1
5 TABLET ORAL DAILY
Qty: 90 TABLET | Refills: 3 | Status: SHIPPED | OUTPATIENT
Start: 2019-02-19 | End: 2019-03-08 | Stop reason: SDUPTHER

## 2019-02-19 RX ORDER — CHLORTHALIDONE 25 MG/1
TABLET ORAL
Qty: 30 TABLET | Refills: 5 | Status: SHIPPED | OUTPATIENT
Start: 2019-02-19 | End: 2019-05-20 | Stop reason: SDUPTHER

## 2019-02-26 ENCOUNTER — OFFICE VISIT (OUTPATIENT)
Dept: BARIATRICS/WEIGHT MGMT | Age: 54
End: 2019-02-26
Payer: COMMERCIAL

## 2019-02-26 VITALS
SYSTOLIC BLOOD PRESSURE: 119 MMHG | HEIGHT: 73 IN | RESPIRATION RATE: 18 BRPM | OXYGEN SATURATION: 98 % | WEIGHT: 277.5 LBS | BODY MASS INDEX: 36.78 KG/M2 | HEART RATE: 84 BPM | DIASTOLIC BLOOD PRESSURE: 81 MMHG

## 2019-02-26 DIAGNOSIS — I63.02 CEREBROVASCULAR ACCIDENT (CVA) DUE TO THROMBOSIS OF BASILAR ARTERY (HCC): ICD-10-CM

## 2019-02-26 DIAGNOSIS — E66.01 SEVERE OBESITY (BMI 35.0-39.9) WITH COMORBIDITY (HCC): Primary | ICD-10-CM

## 2019-02-26 DIAGNOSIS — I10 ESSENTIAL HYPERTENSION: ICD-10-CM

## 2019-02-26 DIAGNOSIS — G47.37 CENTRAL SLEEP APNEA DUE TO MEDICAL CONDITION: ICD-10-CM

## 2019-02-26 DIAGNOSIS — K21.9 CHRONIC GERD: ICD-10-CM

## 2019-02-26 PROBLEM — E66.9 DIABETES MELLITUS TYPE 2 IN OBESE (HCC): Status: ACTIVE | Noted: 2018-06-11

## 2019-02-26 PROCEDURE — 99205 OFFICE O/P NEW HI 60 MIN: CPT | Performed by: SURGERY

## 2019-02-27 DIAGNOSIS — I10 ESSENTIAL HYPERTENSION: ICD-10-CM

## 2019-03-04 ENCOUNTER — OFFICE VISIT (OUTPATIENT)
Dept: ENDOCRINOLOGY | Age: 54
End: 2019-03-04
Payer: COMMERCIAL

## 2019-03-04 VITALS
HEIGHT: 73 IN | SYSTOLIC BLOOD PRESSURE: 176 MMHG | HEART RATE: 82 BPM | BODY MASS INDEX: 37.11 KG/M2 | WEIGHT: 280 LBS | DIASTOLIC BLOOD PRESSURE: 116 MMHG | OXYGEN SATURATION: 95 %

## 2019-03-04 DIAGNOSIS — E11.59 TYPE 2 DIABETES MELLITUS WITH VASCULAR DISEASE (HCC): ICD-10-CM

## 2019-03-04 DIAGNOSIS — E11.3299 TYPE 2 DIABETES MELLITUS WITH BACKGROUND RETINOPATHY (HCC): ICD-10-CM

## 2019-03-04 DIAGNOSIS — I10 ESSENTIAL HYPERTENSION: ICD-10-CM

## 2019-03-04 DIAGNOSIS — E66.01 CLASS 2 SEVERE OBESITY DUE TO EXCESS CALORIES WITH SERIOUS COMORBIDITY AND BODY MASS INDEX (BMI) OF 36.0 TO 36.9 IN ADULT (HCC): ICD-10-CM

## 2019-03-04 PROCEDURE — 99214 OFFICE O/P EST MOD 30 MIN: CPT | Performed by: INTERNAL MEDICINE

## 2019-03-07 ENCOUNTER — OFFICE VISIT (OUTPATIENT)
Dept: SLEEP MEDICINE | Age: 54
End: 2019-03-07
Payer: COMMERCIAL

## 2019-03-07 VITALS
DIASTOLIC BLOOD PRESSURE: 60 MMHG | SYSTOLIC BLOOD PRESSURE: 100 MMHG | WEIGHT: 272 LBS | BODY MASS INDEX: 36.05 KG/M2 | HEART RATE: 86 BPM | HEIGHT: 73 IN | TEMPERATURE: 98.8 F | OXYGEN SATURATION: 97 %

## 2019-03-07 DIAGNOSIS — Z86.73 H/O: STROKE: ICD-10-CM

## 2019-03-07 DIAGNOSIS — G47.33 OBSTRUCTIVE SLEEP APNEA: Primary | ICD-10-CM

## 2019-03-07 DIAGNOSIS — I10 ESSENTIAL HYPERTENSION: ICD-10-CM

## 2019-03-07 DIAGNOSIS — E66.01 SEVERE OBESITY (BMI 35.0-39.9) WITH COMORBIDITY (HCC): ICD-10-CM

## 2019-03-07 PROCEDURE — 99204 OFFICE O/P NEW MOD 45 MIN: CPT | Performed by: PSYCHIATRY & NEUROLOGY

## 2019-03-07 ASSESSMENT — SLEEP AND FATIGUE QUESTIONNAIRES
ESS TOTAL SCORE: 0
HOW LIKELY ARE YOU TO NOD OFF OR FALL ASLEEP IN A CAR, WHILE STOPPED FOR A FEW MINUTES IN TRAFFIC: 0
NECK CIRCUMFERENCE (INCHES): 18
HOW LIKELY ARE YOU TO NOD OFF OR FALL ASLEEP WHEN YOU ARE A PASSENGER IN A CAR FOR AN HOUR WITHOUT A BREAK: 0
HOW LIKELY ARE YOU TO NOD OFF OR FALL ASLEEP WHILE WATCHING TV: 0
HOW LIKELY ARE YOU TO NOD OFF OR FALL ASLEEP WHILE LYING DOWN TO REST IN THE AFTERNOON WHEN CIRCUMSTANCES PERMIT: 0
HOW LIKELY ARE YOU TO NOD OFF OR FALL ASLEEP WHILE SITTING INACTIVE IN A PUBLIC PLACE: 0
HOW LIKELY ARE YOU TO NOD OFF OR FALL ASLEEP WHILE SITTING AND READING: 0
HOW LIKELY ARE YOU TO NOD OFF OR FALL ASLEEP WHILE SITTING AND TALKING TO SOMEONE: 0
HOW LIKELY ARE YOU TO NOD OFF OR FALL ASLEEP WHILE SITTING QUIETLY AFTER LUNCH WITHOUT ALCOHOL: 0

## 2019-03-07 ASSESSMENT — ENCOUNTER SYMPTOMS
GASTROINTESTINAL NEGATIVE: 1
EYES NEGATIVE: 1
COUGH: 1
ALLERGIC/IMMUNOLOGIC NEGATIVE: 1

## 2019-03-08 ENCOUNTER — TELEPHONE (OUTPATIENT)
Dept: PHARMACY | Facility: CLINIC | Age: 54
End: 2019-03-08

## 2019-03-08 RX ORDER — AMLODIPINE BESYLATE 5 MG/1
5 TABLET ORAL DAILY
Qty: 90 TABLET | Refills: 3 | Status: SHIPPED | OUTPATIENT
Start: 2019-03-08 | End: 2019-08-18 | Stop reason: SDUPTHER

## 2019-03-10 ENCOUNTER — APPOINTMENT (OUTPATIENT)
Dept: CT IMAGING | Age: 54
End: 2019-03-10
Payer: COMMERCIAL

## 2019-03-10 ENCOUNTER — HOSPITAL ENCOUNTER (EMERGENCY)
Age: 54
Discharge: HOME | End: 2019-03-11
Attending: EMERGENCY MEDICINE
Payer: COMMERCIAL

## 2019-03-10 VITALS
TEMPERATURE: 98.3 F | OXYGEN SATURATION: 98 % | SYSTOLIC BLOOD PRESSURE: 176 MMHG | DIASTOLIC BLOOD PRESSURE: 112 MMHG | HEART RATE: 76 BPM

## 2019-03-10 DIAGNOSIS — M54.2 NECK PAIN: Primary | ICD-10-CM

## 2019-03-10 PROCEDURE — 72125 CT NECK SPINE W/O DYE: CPT

## 2019-03-10 PROCEDURE — 6370000000 HC RX 637 (ALT 250 FOR IP): Performed by: EMERGENCY MEDICINE

## 2019-03-10 PROCEDURE — 99284 EMERGENCY DEPT VISIT MOD MDM: CPT

## 2019-03-10 PROCEDURE — 70450 CT HEAD/BRAIN W/O DYE: CPT

## 2019-03-10 RX ORDER — CYCLOBENZAPRINE HCL 10 MG
10 TABLET ORAL 3 TIMES DAILY PRN
Qty: 20 TABLET | Refills: 0 | Status: SHIPPED | OUTPATIENT
Start: 2019-03-10 | End: 2019-08-30 | Stop reason: ALTCHOICE

## 2019-03-10 RX ORDER — OXYCODONE HYDROCHLORIDE AND ACETAMINOPHEN 5; 325 MG/1; MG/1
1 TABLET ORAL ONCE
Status: COMPLETED | OUTPATIENT
Start: 2019-03-10 | End: 2019-03-10

## 2019-03-10 RX ADMIN — OXYCODONE HYDROCHLORIDE AND ACETAMINOPHEN 1 TABLET: 5; 325 TABLET ORAL at 16:32

## 2019-03-10 ASSESSMENT — PAIN DESCRIPTION - PAIN TYPE: TYPE: ACUTE PAIN

## 2019-03-10 ASSESSMENT — PAIN DESCRIPTION - FREQUENCY: FREQUENCY: CONTINUOUS

## 2019-03-10 ASSESSMENT — PAIN DESCRIPTION - DESCRIPTORS: DESCRIPTORS: HEADACHE;TENDER

## 2019-03-10 ASSESSMENT — PAIN DESCRIPTION - LOCATION: LOCATION: NECK;HEAD

## 2019-03-10 ASSESSMENT — PAIN SCALES - GENERAL
PAINLEVEL_OUTOF10: 8
PAINLEVEL_OUTOF10: 7

## 2019-03-10 ASSESSMENT — PAIN DESCRIPTION - ONSET: ONSET: SUDDEN

## 2019-03-10 ASSESSMENT — PAIN DESCRIPTION - PROGRESSION: CLINICAL_PROGRESSION: NOT CHANGED

## 2019-03-10 ASSESSMENT — PAIN DESCRIPTION - ORIENTATION: ORIENTATION: ANTERIOR;POSTERIOR

## 2019-03-11 ENCOUNTER — HOSPITAL ENCOUNTER (OUTPATIENT)
Dept: SLEEP CENTER | Age: 54
Discharge: HOME | End: 2019-03-11
Payer: COMMERCIAL

## 2019-03-11 DIAGNOSIS — G47.33 OBSTRUCTIVE SLEEP APNEA: ICD-10-CM

## 2019-03-11 PROCEDURE — 95806 SLEEP STUDY UNATT&RESP EFFT: CPT

## 2019-03-12 PROCEDURE — 95806 SLEEP STUDY UNATT&RESP EFFT: CPT | Performed by: PSYCHIATRY & NEUROLOGY

## 2019-03-14 ENCOUNTER — TELEPHONE (OUTPATIENT)
Dept: PULMONOLOGY | Age: 54
End: 2019-03-14

## 2019-03-19 ENCOUNTER — OFFICE VISIT (OUTPATIENT)
Dept: BARIATRICS/WEIGHT MGMT | Age: 54
End: 2019-03-19
Payer: COMMERCIAL

## 2019-03-19 VITALS
RESPIRATION RATE: 18 BRPM | HEART RATE: 81 BPM | OXYGEN SATURATION: 98 % | BODY MASS INDEX: 36.45 KG/M2 | DIASTOLIC BLOOD PRESSURE: 103 MMHG | WEIGHT: 275 LBS | SYSTOLIC BLOOD PRESSURE: 147 MMHG | HEIGHT: 73 IN

## 2019-03-19 DIAGNOSIS — G47.37 CENTRAL SLEEP APNEA DUE TO MEDICAL CONDITION: ICD-10-CM

## 2019-03-19 DIAGNOSIS — I63.02 CEREBROVASCULAR ACCIDENT (CVA) DUE TO THROMBOSIS OF BASILAR ARTERY (HCC): ICD-10-CM

## 2019-03-19 DIAGNOSIS — I10 ESSENTIAL HYPERTENSION: ICD-10-CM

## 2019-03-19 DIAGNOSIS — K21.9 CHRONIC GERD: ICD-10-CM

## 2019-03-19 DIAGNOSIS — E66.01 SEVERE OBESITY (BMI 35.0-39.9) WITH COMORBIDITY (HCC): Primary | ICD-10-CM

## 2019-03-19 PROCEDURE — 99213 OFFICE O/P EST LOW 20 MIN: CPT | Performed by: SURGERY

## 2019-03-19 RX ORDER — THIAMINE MONONITRATE (VIT B1) 100 MG
100 TABLET ORAL DAILY
Qty: 30 TABLET | Refills: 3 | Status: SHIPPED | OUTPATIENT
Start: 2019-03-19 | End: 2019-08-15 | Stop reason: ALTCHOICE

## 2019-03-19 RX ORDER — FERROUS SULFATE 325(65) MG
325 TABLET ORAL 2 TIMES DAILY WITH MEALS
Qty: 60 TABLET | Refills: 3 | Status: SHIPPED | OUTPATIENT
Start: 2019-03-19 | End: 2019-07-22 | Stop reason: SDUPTHER

## 2019-03-20 ENCOUNTER — TELEPHONE (OUTPATIENT)
Dept: BARIATRICS/WEIGHT MGMT | Age: 54
End: 2019-03-20

## 2019-03-20 ENCOUNTER — OFFICE VISIT (OUTPATIENT)
Dept: PRIMARY CARE CLINIC | Age: 54
End: 2019-03-20
Payer: COMMERCIAL

## 2019-03-20 VITALS
HEART RATE: 93 BPM | BODY MASS INDEX: 36.71 KG/M2 | DIASTOLIC BLOOD PRESSURE: 87 MMHG | TEMPERATURE: 98.3 F | OXYGEN SATURATION: 98 % | HEIGHT: 73 IN | WEIGHT: 277 LBS | SYSTOLIC BLOOD PRESSURE: 132 MMHG

## 2019-03-20 DIAGNOSIS — E11.59 TYPE 2 DIABETES MELLITUS WITH VASCULAR DISEASE (HCC): ICD-10-CM

## 2019-03-20 DIAGNOSIS — I69.30 LATE EFFECT OF CEREBROVASCULAR ACCIDENT (CVA): ICD-10-CM

## 2019-03-20 DIAGNOSIS — I10 ESSENTIAL HYPERTENSION: ICD-10-CM

## 2019-03-20 DIAGNOSIS — E66.01 SEVERE OBESITY (BMI 35.0-39.9) WITH COMORBIDITY (HCC): ICD-10-CM

## 2019-03-20 PROCEDURE — 99214 OFFICE O/P EST MOD 30 MIN: CPT | Performed by: INTERNAL MEDICINE

## 2019-03-20 ASSESSMENT — PATIENT HEALTH QUESTIONNAIRE - PHQ9
SUM OF ALL RESPONSES TO PHQ QUESTIONS 1-9: 0
1. LITTLE INTEREST OR PLEASURE IN DOING THINGS: 0
SUM OF ALL RESPONSES TO PHQ9 QUESTIONS 1 & 2: 0
SUM OF ALL RESPONSES TO PHQ QUESTIONS 1-9: 0
2. FEELING DOWN, DEPRESSED OR HOPELESS: 0

## 2019-03-20 ASSESSMENT — ENCOUNTER SYMPTOMS
EYE DISCHARGE: 0
RESPIRATORY NEGATIVE: 1
EYES NEGATIVE: 1

## 2019-03-26 NOTE — PROGRESS NOTES
Patient not reached. Preop instructions left on voice mail.  [id number]_[phone]______________    -Date__4/12/19_masc____time__1130_____arrival__0930__________  -Nothing to eat or drink after midnight  -Responsible adult 25 or older to stay on site while you are here and drive you home and stay with you after  -Follow any instructions your doctors office has given you  -Bring a complete list of all your medications and supplements  -If you normally take the following medications in the morning please do so with a small    sip of water-heart,blood pressure,seizure,breathing or thyroid-avoid water pillls and any blood pressure medications ending in \"lakshmi\" or \"pril\"  -You may use your inhalers  -Take half of your normal dose of any long acting insulins the night before-do not take    any diabetic medications in the morning  -Follow your doctors instructions regarding blood thinners  -Any questions call your surgeons office  Anesthesia attempts to review all Endo charts prior to surgery and will place any PAT orders,Surgery patients will have orders placed based on history in chart which may not be complete

## 2019-03-27 ENCOUNTER — ANESTHESIA EVENT (OUTPATIENT)
Dept: ENDOSCOPY | Age: 54
End: 2019-03-27
Payer: COMMERCIAL

## 2019-04-01 ENCOUNTER — HOSPITAL ENCOUNTER (OUTPATIENT)
Dept: SLEEP CENTER | Age: 54
Discharge: HOME | End: 2019-04-01
Payer: COMMERCIAL

## 2019-04-01 DIAGNOSIS — G47.33 OBSTRUCTIVE SLEEP APNEA: ICD-10-CM

## 2019-04-01 PROCEDURE — 95811 POLYSOM 6/>YRS CPAP 4/> PARM: CPT

## 2019-04-01 PROCEDURE — 95811 POLYSOM 6/>YRS CPAP 4/> PARM: CPT | Performed by: PSYCHIATRY & NEUROLOGY

## 2019-04-04 ENCOUNTER — PATIENT MESSAGE (OUTPATIENT)
Dept: PHARMACY | Facility: CLINIC | Age: 54
End: 2019-04-04

## 2019-04-05 ENCOUNTER — TELEPHONE (OUTPATIENT)
Dept: PULMONOLOGY | Age: 54
End: 2019-04-05

## 2019-04-05 NOTE — TELEPHONE ENCOUNTER
Message left for patient to return our call regarding his titration study results. Cpap with pressure of 9 controlled his events. He will need to choose another DME since [facility]'s doesn't take [facility] [place].  [facility] does take it so that may be an option. Orders on Dr. Kevon Cartwright desk to sign.

## 2019-04-08 RX ORDER — LISINOPRIL 40 MG/1
TABLET ORAL
Qty: 30 TABLET | Refills: 5 | Status: ON HOLD | OUTPATIENT
Start: 2019-04-08 | End: 2019-09-26 | Stop reason: HOSPADM

## 2019-04-12 ENCOUNTER — ANESTHESIA (OUTPATIENT)
Dept: ENDOSCOPY | Age: 54
End: 2019-04-12
Payer: COMMERCIAL

## 2019-04-12 ENCOUNTER — HOSPITAL ENCOUNTER (OUTPATIENT)
Age: 54
Setting detail: OUTPATIENT SURGERY
Discharge: HOME | End: 2019-04-12
Attending: SURGERY | Admitting: SURGERY
Payer: COMMERCIAL

## 2019-04-12 VITALS
BODY MASS INDEX: 35.92 KG/M2 | DIASTOLIC BLOOD PRESSURE: 80 MMHG | WEIGHT: 271 LBS | OXYGEN SATURATION: 95 % | TEMPERATURE: 97.2 F | HEART RATE: 88 BPM | RESPIRATION RATE: 18 BRPM | HEIGHT: 73 IN | SYSTOLIC BLOOD PRESSURE: 97 MMHG

## 2019-04-12 VITALS — OXYGEN SATURATION: 99 % | DIASTOLIC BLOOD PRESSURE: 74 MMHG | SYSTOLIC BLOOD PRESSURE: 116 MMHG

## 2019-04-12 LAB
GLUCOSE BLD-MCNC: 96 MG/DL (ref 70–99)
GLUCOSE BLD-MCNC: 98 MG/DL (ref 70–99)
PERFORMED ON: NORMAL
PERFORMED ON: NORMAL

## 2019-04-12 PROCEDURE — 88342 IMHCHEM/IMCYTCHM 1ST ANTB: CPT

## 2019-04-12 PROCEDURE — 7100000001 HC PACU RECOVERY - ADDTL 15 MIN: Performed by: SURGERY

## 2019-04-12 PROCEDURE — 7100000011 HC PHASE II RECOVERY - ADDTL 15 MIN: Performed by: SURGERY

## 2019-04-12 PROCEDURE — 3700000001 HC ADD 15 MINUTES (ANESTHESIA): Performed by: SURGERY

## 2019-04-12 PROCEDURE — 3609012400 HC EGD TRANSORAL BIOPSY SINGLE/MULTIPLE: Performed by: SURGERY

## 2019-04-12 PROCEDURE — 6360000002 HC RX W HCPCS: Performed by: NURSE ANESTHETIST, CERTIFIED REGISTERED

## 2019-04-12 PROCEDURE — 7100000010 HC PHASE II RECOVERY - FIRST 15 MIN: Performed by: SURGERY

## 2019-04-12 PROCEDURE — 43239 EGD BIOPSY SINGLE/MULTIPLE: CPT | Performed by: SURGERY

## 2019-04-12 PROCEDURE — 2709999900 HC NON-CHARGEABLE SUPPLY: Performed by: SURGERY

## 2019-04-12 PROCEDURE — 3700000000 HC ANESTHESIA ATTENDED CARE: Performed by: SURGERY

## 2019-04-12 PROCEDURE — 7100000000 HC PACU RECOVERY - FIRST 15 MIN: Performed by: SURGERY

## 2019-04-12 PROCEDURE — 88305 TISSUE EXAM BY PATHOLOGIST: CPT

## 2019-04-12 PROCEDURE — 2580000003 HC RX 258: Performed by: ANESTHESIOLOGY

## 2019-04-12 PROCEDURE — 2500000003 HC RX 250 WO HCPCS: Performed by: NURSE ANESTHETIST, CERTIFIED REGISTERED

## 2019-04-12 RX ORDER — SODIUM CHLORIDE 0.9 % (FLUSH) 0.9 %
10 SYRINGE (ML) INJECTION EVERY 12 HOURS SCHEDULED
Status: CANCELLED | OUTPATIENT
Start: 2019-04-12

## 2019-04-12 RX ORDER — PROPOFOL 10 MG/ML
INJECTION, EMULSION INTRAVENOUS PRN
Status: DISCONTINUED | OUTPATIENT
Start: 2019-04-12 | End: 2019-04-12 | Stop reason: SDUPTHER

## 2019-04-12 RX ORDER — OMEPRAZOLE 20 MG/1
20 CAPSULE, DELAYED RELEASE ORAL DAILY
Qty: 30 CAPSULE | Refills: 3 | Status: SHIPPED | OUTPATIENT
Start: 2019-04-12 | End: 2019-09-04 | Stop reason: SDUPTHER

## 2019-04-12 RX ORDER — SODIUM CHLORIDE 9 MG/ML
INJECTION, SOLUTION INTRAVENOUS CONTINUOUS
Status: CANCELLED | OUTPATIENT
Start: 2019-04-12

## 2019-04-12 RX ORDER — SODIUM CHLORIDE 0.9 % (FLUSH) 0.9 %
10 SYRINGE (ML) INJECTION PRN
Status: CANCELLED | OUTPATIENT
Start: 2019-04-12

## 2019-04-12 RX ORDER — SODIUM CHLORIDE 9 MG/ML
INJECTION, SOLUTION INTRAVENOUS CONTINUOUS
Status: DISCONTINUED | OUTPATIENT
Start: 2019-04-12 | End: 2019-04-12 | Stop reason: HOSPADM

## 2019-04-12 RX ORDER — LIDOCAINE HYDROCHLORIDE 20 MG/ML
INJECTION, SOLUTION EPIDURAL; INFILTRATION; INTRACAUDAL; PERINEURAL PRN
Status: DISCONTINUED | OUTPATIENT
Start: 2019-04-12 | End: 2019-04-12 | Stop reason: SDUPTHER

## 2019-04-12 RX ADMIN — PROPOFOL 40 MG: 10 INJECTION, EMULSION INTRAVENOUS at 10:45

## 2019-04-12 RX ADMIN — PROPOFOL 20 MG: 10 INJECTION, EMULSION INTRAVENOUS at 10:50

## 2019-04-12 RX ADMIN — SODIUM CHLORIDE: 9 INJECTION, SOLUTION INTRAVENOUS at 10:41

## 2019-04-12 RX ADMIN — PROPOFOL 100 MG: 10 INJECTION, EMULSION INTRAVENOUS at 10:44

## 2019-04-12 RX ADMIN — LIDOCAINE HYDROCHLORIDE 100 MG: 20 INJECTION, SOLUTION EPIDURAL; INFILTRATION; INTRACAUDAL; PERINEURAL at 10:44

## 2019-04-12 RX ADMIN — SODIUM CHLORIDE: 9 INJECTION, SOLUTION INTRAVENOUS at 10:03

## 2019-04-12 RX ADMIN — PROPOFOL 20 MG: 10 INJECTION, EMULSION INTRAVENOUS at 10:54

## 2019-04-12 RX ADMIN — PROPOFOL 20 MG: 10 INJECTION, EMULSION INTRAVENOUS at 10:47

## 2019-04-12 ASSESSMENT — PULMONARY FUNCTION TESTS
PIF_VALUE: 2
PIF_VALUE: 1
PIF_VALUE: 54
PIF_VALUE: 24
PIF_VALUE: 33
PIF_VALUE: 1
PIF_VALUE: 2
PIF_VALUE: 1

## 2019-04-12 ASSESSMENT — PAIN - FUNCTIONAL ASSESSMENT: PAIN_FUNCTIONAL_ASSESSMENT: 0-10

## 2019-04-12 NOTE — PROGRESS NOTES
Pt arrived from ENDO to PACU via cart in stable condition. Drowsy, nasal trumpet in place. sats wnl on 10L simple mask. abd rotund, soft, non-distended. VSS. Report obtained from [address] [place] [address]. Will continue to monitor.      Electronically signed by DELFIN DesirN, RN, CAPA on 4/12/19 at [phone]

## 2019-04-12 NOTE — ANESTHESIA PRE PROCEDURE
tablet Take 1 tablet by mouth nightly 10/17/18  Yes Liborio Salas MD   aspirin 81 MG EC tablet Take 81 mg by mouth daily   Yes Historical Provider, MD   hydrALAZINE (APRESOLINE) 50 MG tablet Take 50 mg by mouth 3 times daily   Yes Historical Provider, MD   B Complex Vitamins (VITAMIN B COMPLEX) TABS Take 1 tablet by mouth daily   Yes Historical Provider, MD   Blood Glucose Monitoring Suppl (PRODIGY POCKET BLOOD GLUCOSE) w/Device KIT Use daily to monitor blood sugar level 12/31/18   Rose Asher MD   blood glucose test strips (ASCENSIA AUTODISC VI;ONE TOUCH ULTRA TEST VI) strip 3 each by In Vitro route daily As needed. 12/31/18   Rose Asher MD   PRODIGY LANCETS 28G MISC 3 each by Does not apply route daily 12/31/18   Rose Asher MD   zoster recombinant adjuvanted vaccine [facility]) 50 MCG SUSR injection 50 MCG IM then repeat 2-6 months. 10/17/18 10/17/19  Liborio Salas MD   ibuprofen (ADVIL;MOTRIN) 200 MG tablet Take 200 mg by mouth every 6 hours as needed for Pain    Historical Provider, MD David Mckeon ULTRA-THIN LANCETS MISC 33 gauge - test blood sugar 4 times daily 3/30/18   Rose Asher MD       Current medications:    No current facility-administered medications for this encounter. 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    Chronic left shoulder pain M25.512, G89.29    Central sleep apnea due to medical condition G47.37    Abnormal EKG R94.31    Cerebrovascular accident (CVA) due to thrombosis of basilar artery (HCC) I63.02    Contact dermatitis and other eczema, due to unspecified cause L25.9    Family history of malignant neoplasm of gastrointestinal tract Z80.0    Noncompliance Z91.19    Obesity due to excess calories E66.09 GLUCOSE 201 02/28/2019    PROT 7.4 02/28/2019    CALCIUM 9.8 02/28/2019    BILITOT 0.6 02/28/2019    ALKPHOS 94 02/28/2019    AST 15 02/28/2019    ALT 19 02/28/2019       POC Tests: No results for input(s): POCGLU, POCNA, POCK, POCCL, POCBUN, POCHEMO, POCHCT in the last 72 hours. Coags: No results found for: PROTIME, INR, APTT    HCG (If Applicable): No results found for: PREGTESTUR, PREGSERUM, HCG, HCGQUANT     ABGs: No results found for: PHART, PO2ART, SGK0MIX, PVQ3ZCV, BEART, L7DCUMPG     Type & Screen (If Applicable):  No results found for: LABABO, LABRH    Anesthesia Evaluation    Airway: Mallampati: II  TM distance: >3 FB   Neck ROM: full  Mouth opening: > = 3 FB Dental:          Pulmonary:   (+) sleep apnea:                             Cardiovascular:    (+) hypertension:,         Rhythm: regular  Rate: normal                    Neuro/Psych:   (+) CVA:, neuromuscular disease:,             GI/Hepatic/Renal:   (+) GERD:,           Endo/Other:    (+) Diabetes, . Abdominal:           Vascular:                                        Anesthesia Plan      MAC     ASA 3       Induction: intravenous. Anesthetic plan and risks discussed with patient. Plan discussed with CRNA.                   Barrington Mccormick MD   4/12/2019

## 2019-04-12 NOTE — PROGRESS NOTES
Pt resting in bed, alert and oriented. VSS. sats wnl on RA. Denies pain or needs. Pt has met criteria to be discharged from PACU phase 1, will prepare for discharge home in phase 2.      Electronically signed by JEMAL Vela, RN, CAPA on 4/12/19 at 9674

## 2019-04-12 NOTE — OP NOTE
[facility]) Physicians   [facility]  [address] [place] [address], [address] [place] [id number] . Phone: [phone]  Fax: [phone]         Esophagogastroduodenoscopy     Indications: Pre-op gastric Surgery, history of / rule out Gastroesophageal reflux disease. Pre-operative Diagnosis: Obesity/pre-op bariatric surgery . Post-operative Diagnosis: same. Surgeon: Farshad Argueta MD    Anesthesia: General endotracheal anesthesia    ASA Class: 3    Procedure Details   The patient was seen in the Holding Room. The risks, benefits, complications, treatment options, and expected outcomes were discussed with the patient. Pre-op Endoscopy recommended to rule any intragastric pathology that would interfere with proposed procedure /  And or due to current conditions. The possibilities of reaction to medication, pulmonary aspiration, perforation of viscus, bleeding, recurrent infection, the need for additional procedures, failure to diagnose a condition, and creating a complication requiring transfusion or operation were discussed with the patient. The patient concurred with the proposed plan, giving informed consent. The site of surgery properly noted/marked. The patient was taken to Endoscopy Suite, identified as Kojo Garza and the procedure verified as  Esophagogastroduodenoscopy  A Time Out was held and the above information confirmed. Upper Endoscopy: An endoscope was inserted through the oropharynx into the upper esophagus. The endoscope was passed into the stomach to the level of the pylorus and passed to the duodenum where the ampulla was visualized and duodenum was normal. Then the scope was retracted back to the stomach and it was abnormal , some bile reflux and mild gastritis biopsy of the antrum obtained, then retroflexed and the gastroesophageal junction was inspected . There was no hiatal hernia, no clear evidence of Low's.       Findings:  Esophageal findings include: Upper: normal Esophageal Mucosa             Mid: normal Esophageal Mucosa             Distal: normal Esophageal Mucosa    Gastric findings include: abnormal Gastric Mucosa       Duodenal Findings: normal Duodenal Mucosa      Estimated Blood Loss:  Minimal      Biopsy:  Antrum     Complications:  None; patient tolerated the procedure well. Disposition: PACU - hemodynamically stable. Condition: stable    Attending Attestation: I was present and scrubbed for the entire procedure.

## 2019-04-12 NOTE — PROGRESS NOTES
Pt and wife given discharge instructions, verbalized understanding. IV removed, site wnl. Pt home with wife in stable condition with all belongings.      Electronically signed by Robert Kawasaki, BSN, RN, CAPA on 4/12/19 at 6048

## 2019-04-18 ENCOUNTER — TELEPHONE (OUTPATIENT)
Dept: ENDOCRINOLOGY | Age: 54
End: 2019-04-18

## 2019-04-18 NOTE — TELEPHONE ENCOUNTER
Left a message for Mr. Trav Lira to contact the office to discuss recent BS log received. Per Dr. Abimbola Garcia cut down lantus from 24 units to 22 units. Continue to send in BS logs.

## 2019-04-22 DIAGNOSIS — E78.5 HYPERLIPIDEMIA, UNSPECIFIED HYPERLIPIDEMIA TYPE: ICD-10-CM

## 2019-04-22 RX ORDER — ATORVASTATIN CALCIUM 80 MG/1
TABLET, FILM COATED ORAL
Qty: 30 TABLET | Refills: 5 | Status: SHIPPED | OUTPATIENT
Start: 2019-04-22 | End: 2019-11-04 | Stop reason: SDUPTHER

## 2019-04-25 ENCOUNTER — OFFICE VISIT (OUTPATIENT)
Dept: BARIATRICS/WEIGHT MGMT | Age: 54
End: 2019-04-25
Payer: COMMERCIAL

## 2019-04-25 VITALS
HEIGHT: 73 IN | SYSTOLIC BLOOD PRESSURE: 135 MMHG | OXYGEN SATURATION: 98 % | HEART RATE: 79 BPM | DIASTOLIC BLOOD PRESSURE: 81 MMHG | WEIGHT: 280.2 LBS | RESPIRATION RATE: 18 BRPM | BODY MASS INDEX: 37.14 KG/M2

## 2019-04-25 DIAGNOSIS — G47.37 CENTRAL SLEEP APNEA DUE TO MEDICAL CONDITION: ICD-10-CM

## 2019-04-25 DIAGNOSIS — N18.30 CKD (CHRONIC KIDNEY DISEASE) STAGE 3, GFR 30-59 ML/MIN (HCC): ICD-10-CM

## 2019-04-25 DIAGNOSIS — I63.02 CEREBROVASCULAR ACCIDENT (CVA) DUE TO THROMBOSIS OF BASILAR ARTERY (HCC): ICD-10-CM

## 2019-04-25 DIAGNOSIS — I10 ESSENTIAL HYPERTENSION: ICD-10-CM

## 2019-04-25 DIAGNOSIS — E66.01 SEVERE OBESITY (BMI 35.0-39.9) WITH COMORBIDITY (HCC): Primary | ICD-10-CM

## 2019-04-25 DIAGNOSIS — K21.9 CHRONIC GERD: ICD-10-CM

## 2019-04-25 DIAGNOSIS — A04.8 HELICOBACTER PYLORI (H. PYLORI) INFECTION: ICD-10-CM

## 2019-04-25 PROCEDURE — 99214 OFFICE O/P EST MOD 30 MIN: CPT | Performed by: SURGERY

## 2019-04-25 RX ORDER — TETRACYCLINE HYDROCHLORIDE 500 MG/1
500 CAPSULE ORAL 4 TIMES DAILY
Qty: 56 CAPSULE | Refills: 0 | Status: SHIPPED | OUTPATIENT
Start: 2019-04-25 | End: 2019-05-09

## 2019-04-25 RX ORDER — METRONIDAZOLE 250 MG/1
250 TABLET ORAL 4 TIMES DAILY
Qty: 56 TABLET | Refills: 0 | Status: SHIPPED | OUTPATIENT
Start: 2019-04-25 | End: 2019-05-09

## 2019-04-25 NOTE — PROGRESS NOTES
Shanique Garcia gained 5.2 lbs over past 5 weeks. Pt is allergic to coconut. Breakfast: slimfast advanced/ whey protein isolate shake made with bananas/light yogurt, strawberries with almond/diet cranberry juice (5 kcal)     Snack: nothing    Lunch: salad with neville/carrots/sometimes peas or corn/cheese/salad dressing with chicken/ham OR turkey/ham sandwich with small bag of chips     Snack: pure protein bar     Dinner: lightly fried fish with small amount of potatoes and green beans     Snack: nothing     Fluids: water with water flavorings, sobe water zero kcal, vitamin water zero, occasional soda     Is pt consuming smaller portions? yes    Is pt consuming at least 64 oz of fluids per day? yes    Is pt consuming carbonated, caffeinated, or sugary beverages? Yes- soda     Has pt sampled Unjury and/or Nectar protein? Tried and tolerated but did not like flavors     Exercise: Walking but nothing structured     Plan/Recommendations:   Eliminate chips   Avoid fried foods - discussed utilizing air fryer  Eliminate soda     During visit, pt expressed that he is not consuming these foods that often. Informed pt that the goal is to not consume these foods in smaller portions, but to be eliminated. Try protein powder to find additional flavors that he likes - Pt asking if he can use the protein powder he is currently using for pre-op diet. Explained to patient that he can use current protein shake until pre-op diet, but that he needs to use either Nectar/Unjury during pre-op diet. Pt inquiring why he cannot use his protein shakes and asking what our parameters are for our shakes. Reviewed parameters of protein shakes, and then pt reports his protein shakes meet requirement and is frustrated he cannot use his shakes. Re-iterated that is our protocol that he needs to use the protein shakes in the office.      Handouts: none     Tan Malhotra

## 2019-04-25 NOTE — PROGRESS NOTES
[facility]) Physicians   [facility]  Herson Barnard MD, [address] [facility], [address] [place] [address]    [place]  [address] . Phone: [phone]  Fax: [phone]          Chief Complaint   Patient presents with    Obesity     3rd presurg weigh         HPI:     Mando Koch is a very pleasant 48 y.o. male with Body mass index is 36.97 kg/m². / Chronic Obesity. Regina Warner has been struggling for several years now with obesity. Regina Warner feels the weight is an obstacle to achieve and perform things in daily living as well risk on health. Patient  is very determined to lose weight and be healthy, and is working towards  surgical weight loss to achieve this goal. Pre-operative clearance and work up pending. Working hard to keep good dietary habits as well level of activity. Patient denies any nausea, vomiting, fevers, chills, shortness of breath, chest pain, cough, constipation or difficulty urinating. Pain Assessment   Denies any abdominal pain     Past Medical History:   Diagnosis Date    Diabetes mellitus ([address]     GERD (gastroesophageal reflux disease)     Hypertension      Past Surgical History:   Procedure Laterality Date    KNEE SURGERY      right knee     UPPER GASTROINTESTINAL ENDOSCOPY N/A 4/12/2019    EGD BIOPSY performed by Silvana Mendoza MD at [address] [place] [address]     Family History   Problem Relation Age of Onset    Heart Attack Mother     Diabetes Mother     High Blood Pressure Mother     Colon Cancer Sister      Social History     Tobacco Use    Smoking status: Never Smoker    Smokeless tobacco: Never Used   Substance Use Topics    Alcohol use: Yes     Alcohol/week: 0.6 oz     Types: 1 Cans of beer per week     Comment: socially      I counseled the patient on the importance of not smoking and risks of ETOH.    Allergies   Allergen Reactions    Hctz [Hydrochlorothiazide]     Penicillins      Vitals:    04/25/19 0838   BP: 135/81   Pulse: 79   Resp: 18   SpO2: 98%   Weight: 280 tablet, TAKE ONE TABLET BY MOUTH NIGHTLY, Disp: 30 tablet, Rfl: 5    metFORMIN (GLUCOPHAGE) 1000 MG tablet, TAKE ONE TABLET BY MOUTH TWICE A DAY WITH MEALS, Disp: 60 tablet, Rfl: 4    insulin glargine (LANTUS SOLOSTAR) 100 UNIT/ML injection pen, Inject 24 Units into the skin nightly, Disp: 3 pen, Rfl: 3    omeprazole (PRILOSEC) 20 MG delayed release capsule, Take 1 capsule by mouth Daily, Disp: 30 capsule, Rfl: 3    lisinopril (PRINIVIL;ZESTRIL) 40 MG tablet, TAKE 1 TABLET BY MOUTH ONE TIME A DAY, Disp: 30 tablet, Rfl: 5    ferrous sulfate 325 (65 Fe) MG tablet, Take 1 tablet by mouth 2 times daily (with meals), Disp: 60 tablet, Rfl: 3    vitamin B-1 (THIAMINE) 100 MG tablet, Take 1 tablet by mouth daily, Disp: 30 tablet, Rfl: 3    vitamin D (CHOLECALCIFEROL) 83400 UNIT CAPS, Take 1 capsule by mouth once a week, Disp: 12 capsule, Rfl: 0    Cholecalciferol 2000 units TABS, Take 1 tablet by mouth daily Take 1 tablet by mouth daily. Start this medication after you finish the weekly regimen, Disp: 90 tablet, Rfl: 2    cyclobenzaprine (FLEXERIL) 10 MG tablet, Take 1 tablet by mouth 3 times daily as needed for Muscle spasms, Disp: 20 tablet, Rfl: 0    amLODIPine (NORVASC) 5 MG tablet, Take 1 tablet by mouth daily, Disp: 90 tablet, Rfl: 3    chlorthalidone (HYGROTON) 25 MG tablet, TAKE 1 TABLET BY MOUTH ONE TIME A DAY, Disp: 30 tablet, Rfl: 5    Blood Glucose Monitoring Suppl (PRODIGY POCKET BLOOD GLUCOSE) w/Device KIT, Use daily to monitor blood sugar level, Disp: 1 kit, Rfl: 1    blood glucose test strips (ASCENSIA AUTODISC VI;ONE TOUCH ULTRA TEST VI) strip, 3 each by In Vitro route daily As needed. , Disp: 100 each, Rfl: 3    PRODIGY LANCETS 28G MISC, 3 each by Does not apply route daily, Disp: 100 each, Rfl: 3    Liraglutide (VICTOZA) 18 MG/3ML SOPN SC injection, Inject 1.8 mg into the skin daily, Disp: 3 pen, Rfl: 5    zoster recombinant adjuvanted vaccine (SHINGRIX) 50 MCG SUSR injection, 50 MCG IM then repeat 2-6 months., Disp: 0.5 mL, Rfl: 1    atenolol (TENORMIN) 50 MG tablet, Take 1 tablet by mouth daily One pill every morning., Disp: 30 tablet, Rfl: 5    aspirin 81 MG EC tablet, Take 81 mg by mouth daily, Disp: , Rfl:     hydrALAZINE (APRESOLINE) 50 MG tablet, Take 50 mg by mouth 3 times daily, Disp: , Rfl:     B Complex Vitamins (VITAMIN B COMPLEX) TABS, Take 1 tablet by mouth daily, Disp: , Rfl:     AGAMATRIX ULTRA-THIN LANCETS MISC, 33 gauge - test blood sugar 4 times daily, Disp: 200 each, Rfl: 3    Review of Systems - History obtained from the patient  General ROS: negative  Psychological ROS: negative  Ophthalmic ROS: negative  Neurological ROS: negative  ENT ROS: negative  Allergy and Immunology ROS: negative  Hematological and Lymphatic ROS: negative  Endocrine ROS: negative  Breast ROS: negative  Respiratory ROS: negative  Cardiovascular ROS: negative  Gastrointestinal ROS:negative  Genito-Urinary ROS: negative  Musculoskeletal ROS: negative   Skin ROS: negative    Physical Exam   Vitals Reviewed   Constitutional: Patient is oriented to person, place, and time. Patient appears well-developed and well-nourished. Patient is active and cooperative. Non-toxic appearance. No distress. HENT:   Head: Normocephalic and atraumatic. Head is without abrasion and without laceration. Hair is normal.   Right Ear: External ear normal. No lacerations. No drainage, swelling . Left Ear: External ear normal. No lacerations. No drainage, swelling. Nose: Nose normal. No nose lacerations or nasal deformity. Eyes: Conjunctivae, EOM and lids are normal. Right eye exhibits no discharge. No foreign body present in the right eye. Left eye exhibits no discharge. No foreign body present in the left eye. No scleral icterus. Neck: Trachea normal and normal range of motion. No JVD present. Pulmonary/Chest: Effort normal. No accessory muscle usage or stridor. No apnea. No respiratory distress.    Cardiovascular:

## 2019-05-06 ENCOUNTER — OFFICE VISIT (OUTPATIENT)
Dept: PRIMARY CARE CLINIC | Age: 54
End: 2019-05-06
Payer: COMMERCIAL

## 2019-05-06 VITALS
TEMPERATURE: 98.5 F | WEIGHT: 280 LBS | HEART RATE: 84 BPM | BODY MASS INDEX: 37.11 KG/M2 | SYSTOLIC BLOOD PRESSURE: 142 MMHG | HEIGHT: 73 IN | OXYGEN SATURATION: 96 % | DIASTOLIC BLOOD PRESSURE: 92 MMHG

## 2019-05-06 DIAGNOSIS — N18.30 CKD (CHRONIC KIDNEY DISEASE) STAGE 3, GFR 30-59 ML/MIN (HCC): ICD-10-CM

## 2019-05-06 DIAGNOSIS — E66.09 OBESITY DUE TO EXCESS CALORIES, UNSPECIFIED CLASSIFICATION, UNSPECIFIED WHETHER SERIOUS COMORBIDITY PRESENT: ICD-10-CM

## 2019-05-06 DIAGNOSIS — Z12.11 SCREENING FOR COLON CANCER: ICD-10-CM

## 2019-05-06 DIAGNOSIS — I10 ESSENTIAL HYPERTENSION: Primary | ICD-10-CM

## 2019-05-06 DIAGNOSIS — Z23 NEED FOR PROPHYLACTIC VACCINATION AND INOCULATION AGAINST VARICELLA: ICD-10-CM

## 2019-05-06 DIAGNOSIS — E11.3299 TYPE 2 DIABETES MELLITUS WITH BACKGROUND RETINOPATHY (HCC): ICD-10-CM

## 2019-05-06 PROCEDURE — 99214 OFFICE O/P EST MOD 30 MIN: CPT | Performed by: INTERNAL MEDICINE

## 2019-05-06 RX ORDER — HYDRALAZINE HYDROCHLORIDE 50 MG/1
50 TABLET, FILM COATED ORAL 3 TIMES DAILY
Qty: 270 TABLET | Refills: 0 | Status: SHIPPED | OUTPATIENT
Start: 2019-05-06 | End: 2019-07-08 | Stop reason: SDUPTHER

## 2019-05-06 ASSESSMENT — ENCOUNTER SYMPTOMS
RESPIRATORY NEGATIVE: 1
EYES NEGATIVE: 1
EYE DISCHARGE: 0

## 2019-05-06 NOTE — PROGRESS NOTES
medication after you finish the weekly regimen 90 tablet 2    cyclobenzaprine (FLEXERIL) 10 MG tablet Take 1 tablet by mouth 3 times daily as needed for Muscle spasms 20 tablet 0    amLODIPine (NORVASC) 5 MG tablet Take 1 tablet by mouth daily 90 tablet 3    chlorthalidone (HYGROTON) 25 MG tablet TAKE 1 TABLET BY MOUTH ONE TIME A DAY 30 tablet 5    Blood Glucose Monitoring Suppl (PRODIGY POCKET BLOOD GLUCOSE) w/Device KIT Use daily to monitor blood sugar level 1 kit 1    blood glucose test strips (ASCENSIA AUTODISC VI;ONE TOUCH ULTRA TEST VI) strip 3 each by In Vitro route daily As needed. 100 each 3    PRODIGY LANCETS 28G MISC 3 each by Does not apply route daily 100 each 3    Liraglutide (VICTOZA) 18 MG/3ML SOPN SC injection Inject 1.8 mg into the skin daily 3 pen 5    zoster recombinant adjuvanted vaccine (SHINGRIX) 50 MCG SUSR injection 50 MCG IM then repeat 2-6 months. 0.5 mL 1    atenolol (TENORMIN) 50 MG tablet Take 1 tablet by mouth daily One pill every morning.  30 tablet 5    aspirin 81 MG EC tablet Take 81 mg by mouth daily      B Complex Vitamins (VITAMIN B COMPLEX) TABS Take 1 tablet by mouth daily      AGAMATRIX ULTRA-THIN LANCETS MISC 33 gauge - test blood sugar 4 times daily 200 each 3       Immunization History   Administered Date(s) Administered    Influenza, Quadv, 3 yrs and older, IM, PF (Fluzone 3 yrs and older or Afluria 5 yrs and older) 10/17/2018    Pneumococcal Polysaccharide ([id number]) 10/17/2018    Tdap (Boostrix, Adacel) 05/08/2015       Past Medical History:   Diagnosis Date    Diabetes mellitus (HCC)     GERD (gastroesophageal reflux disease)     Hypertension      Past Surgical History:   Procedure Laterality Date    KNEE SURGERY      right knee     UPPER GASTROINTESTINAL ENDOSCOPY N/A 4/12/2019    EGD BIOPSY performed by Katy Chambers MD at [address] [place] [address]     Family History   Problem Relation Age of Onset    Heart Attack Mother     Diabetes Mother    Elissa Arriola High Blood Pressure Mother     Colon Cancer Sister        Review of Systems:  Review of Systems   Constitutional: Negative for activity change and appetite change. HENT: Negative. Eyes: Negative. Negative for discharge. Respiratory: Negative. Genitourinary: Negative for difficulty urinating. Musculoskeletal: Negative for arthralgias. Skin: Negative for rash. Neurological: Negative. Psychiatric/Behavioral: Negative. Negative for agitation and confusion. The patient is not nervous/anxious. All other systems reviewed and are negative. Vitals:    05/06/19 1557 05/06/19 1605   BP: (!) 143/93 (!) 142/92   Pulse: 84    Temp: 98.5 °F (36.9 °C)    TempSrc: Oral    SpO2: 96%    Weight: 280 lb (127 kg)    Height: 6' 1\" (1.854 m)      Body mass index is 36.94 kg/m². Wt Readings from Last 3 Encounters:   05/06/19 280 lb (127 kg)   04/25/19 280 lb 3.2 oz (127.1 kg)   04/12/19 271 lb (122.9 kg)     BP Readings from Last 3 Encounters:   05/06/19 (!) 142/92   04/25/19 135/81   04/12/19 116/74         Physical Exam   Constitutional: He is oriented to person, place, and time. He appears well-developed and well-nourished. HENT:   Head: Normocephalic and atraumatic. Eyes: Pupils are equal, round, and reactive to light. Conjunctivae and EOM are normal.   Neck: Normal range of motion. Neck supple. Cardiovascular: Normal rate, regular rhythm and normal heart sounds. Pulmonary/Chest: Effort normal and breath sounds normal.   Musculoskeletal: Normal range of motion. Neurological: He is alert and oriented to person, place, and time. Skin: Skin is warm and dry. Psychiatric: He has a normal mood and affect.  His behavior is normal. Thought content normal.       Lab Review   Admission on 04/12/2019, Discharged on 04/12/2019   Component Date Value    POC Glucose 04/12/2019 98     Performed on 04/12/2019 ACCU-CHEK     POC Glucose 04/12/2019 96     Performed on 04/12/2019 ACCU-CHEK      Admission on 04/12/2019, Discharged on 04/12/2019   Component Date Value    POC Glucose 04/12/2019 98     Performed on 04/12/2019 ACCU-CHEK     POC Glucose 04/12/2019 96     Performed on 04/12/2019 ACCU-CHEK    Orders Only on 02/28/2019   Component Date Value    Hemoglobin A1C 02/28/2019 11.2     eAG 02/28/2019 274.7     WBC 02/28/2019 6.1     RBC 02/28/2019 4.95     Hemoglobin 02/28/2019 12.8*    Hematocrit 02/28/2019 40.6     MCV 02/28/2019 81.9     MCH 02/28/2019 25.8*    MCHC 02/28/2019 31.5     RDW 02/28/2019 15.6*    Platelets 74/69/4016 234     MPV 02/28/2019 9.5     Neutrophils % 02/28/2019 42.7     Lymphocytes % 02/28/2019 49.4     Monocytes % 02/28/2019 6.1     Eosinophils % 02/28/2019 1.1     Basophils % 02/28/2019 0.7     Neutrophils # 02/28/2019 2.6     Lymphocytes # 02/28/2019 3.0     Monocytes # 02/28/2019 0.4     Eosinophils # 02/28/2019 0.1     Basophils # 02/28/2019 0.0     Sodium 02/28/2019 136     Potassium 02/28/2019 4.3     Chloride 02/28/2019 99     CO2 02/28/2019 22     Anion Gap 02/28/2019 15     Glucose 02/28/2019 201*    BUN 02/28/2019 38*    CREATININE 02/28/2019 1.4*    GFR Non- 02/28/2019 53*    GFR  02/28/2019 >60     Calcium 02/28/2019 9.8     Total Protein 02/28/2019 7.4     Alb 02/28/2019 4.3     Albumin/Globulin Ratio 02/28/2019 1.4     Total Bilirubin 02/28/2019 0.6     Alkaline Phosphatase 02/28/2019 94     ALT 02/28/2019 19     AST 02/28/2019 15     Globulin 02/28/2019 3.1     Iron 02/28/2019 58*    TIBC 02/28/2019 258*    Iron Saturation 02/28/2019 22     Cholesterol, Total 02/28/2019 96     Triglycerides 02/28/2019 77     HDL 02/28/2019 32*    LDL Calculated 02/28/2019 49     VLDL Cholesterol Calcula* 02/28/2019 15     VITAMIN K1 02/28/2019 0.62     Alpha-Tocopherol 02/28/2019 5.9     Gamma-Tocopherol 02/28/2019 1.2     Vit D, 25-Hydroxy 02/28/2019 13.8*    Vitamin B-12 02/28/2019 >2000*    Folate 02/28/2019 6.75     Vitamin B1,Whole Blood 02/28/2019 62*    Vitamin A 02/28/2019 0.70     Vitamin A, Interp 02/28/2019 Normal     RETINYL PALMITATE 02/28/2019 <0.02     TSH 02/28/2019 9.81      notapplicable      Health Maintenance   Topic Date Due    Hepatitis B Vaccine (1 of 3 - Risk 3-dose series) 10/27/1986    Shingles Vaccine (1 of 2) 10/27/2017    Colon cancer screen colonoscopy  10/27/2017    Diabetic retinal exam  03/24/2019    A1C test (Diabetic or Prediabetic)  05/28/2019    Lipid screen  02/28/2020    Potassium monitoring  02/28/2020    Creatinine monitoring  02/28/2020    Diabetic foot exam  03/04/2020    DTaP/Tdap/Td vaccine (2 - Td) 05/08/2025    Flu vaccine  Completed    Pneumococcal 0-64 years Vaccine  Completed    HIV screen  Completed          Assessment/Plan:    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 (HCC)  Stable     CKD (chronic kidney disease) stage 3, GFR 30-59 ml/min (HCC)  Stable     Obesity due to excess calories  Referred to cardiology for required follow up prior to gastric sleeve     Screening for colon cancer  Referred for colonoscopy      1. Essential hypertension    - hydrALAZINE (APRESOLINE) 50 MG tablet; Take 1 tablet by mouth 3 times daily  Dispense: 270 tablet; Refill: 0    2. Obesity due to excess calories, unspecified classification, unspecified whether serious comorbidity present    - Cyndie Bumpers, MD, Cardiology, [facility]    3. Screening for colon cancer    - Emilie Davalos MD, Gastroenterology, [facility]    4. 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 ([facility] [address]      5. CKD (chronic kidney disease) stage 3, GFR 30-59 ml/min (HCC)         No follow-ups on file.

## 2019-05-06 NOTE — PATIENT INSTRUCTIONS
See the gastroenterologist for the colonoscopy. See the cardiologist as discussed prior to the gastric sleeve. Continue your current medications. See me in 3 months.

## 2019-05-17 ENCOUNTER — NURSE TRIAGE (OUTPATIENT)
Dept: OTHER | Facility: CLINIC | Age: 54
End: 2019-05-17

## 2019-05-17 NOTE — TELEPHONE ENCOUNTER
Reason for Disposition   General information question, no triage required and triager able to answer question    Protocols used: INFORMATION ONLY CALL-ADULT-    Caller asking for Urgent Care locations close to her home. Her  is having back pain. [facility] located under 2 miles from home address. If any further needs, please call back.

## 2019-05-20 DIAGNOSIS — I10 ESSENTIAL HYPERTENSION: ICD-10-CM

## 2019-05-20 RX ORDER — ATENOLOL 50 MG/1
TABLET ORAL
Qty: 30 TABLET | Refills: 5 | Status: ON HOLD | OUTPATIENT
Start: 2019-05-20 | End: 2019-09-26 | Stop reason: HOSPADM

## 2019-05-20 RX ORDER — CHLORTHALIDONE 25 MG/1
TABLET ORAL
Qty: 30 TABLET | Refills: 2 | Status: SHIPPED | OUTPATIENT
Start: 2019-05-20 | End: 2019-08-18 | Stop reason: SDUPTHER

## 2019-05-21 ASSESSMENT — ENCOUNTER SYMPTOMS
RESPIRATORY NEGATIVE: 1
ALLERGIC/IMMUNOLOGIC NEGATIVE: 1
EYES NEGATIVE: 1
GASTROINTESTINAL NEGATIVE: 1

## 2019-05-21 NOTE — PROGRESS NOTES
[facility]) Physicians   [employer]    Subjective:      Patient ID: Shanique Garcia is a 48 y.o. male    HPI    The patient is here for their bariatric surgery presurgical visit. He has made several attempts at weight loss in the past without success and now wishes to pursue bariatric surgery. He is working to change his dietary behaviors and lose weight to improve comorbid conditions such as hypertension, diabetes mellitus, obstructive sleep apnea and GERD. Shanique Garcia is a very pleasant 48 y.o. male with Body mass index is 36.18 kg/m². Past Medical History:   Diagnosis Date    Diabetes mellitus ([address]     GERD (gastroesophageal reflux disease)     Hypertension      Past Surgical History:   Procedure Laterality Date    KNEE SURGERY      right knee     UPPER GASTROINTESTINAL ENDOSCOPY N/A 4/12/2019    EGD BIOPSY performed by Jessee Reyes MD at [address]     Family History   Problem Relation Age of Onset    Heart Attack Mother     Diabetes Mother     High Blood Pressure Mother     Colon Cancer Sister      Social History     Tobacco Use    Smoking status: Never Smoker    Smokeless tobacco: Never Used   Substance Use Topics    Alcohol use: Yes     Alcohol/week: 0.6 oz     Types: 1 Cans of beer per week     Comment: socially      I counseled the patient on the importance of not smoking and risks of ETOH. Allergies   Allergen Reactions    Hctz [Hydrochlorothiazide]     Penicillins      Vitals:    05/28/19 0811 05/28/19 0832   BP: (!) 156/113 (!) 148/104   Pulse: 99 101   SpO2: 98%    Weight: 274 lb 3.2 oz (124.4 kg)    Height: 6' 1\" (1.854 m)      Body mass index is 36.18 kg/m².     Current Outpatient Medications:     atenolol (TENORMIN) 50 MG tablet, TAKE 1 TABLET BY MOUTH ONE TIME A DAY IN THE MORNING, Disp: 30 tablet, Rfl: 5    chlorthalidone (HYGROTON) 25 MG tablet, TAKE 1 TABLET BY MOUTH ONE TIME A DAY, Disp: 30 tablet, Rfl: 2    hydrALAZINE (APRESOLINE) 50 MG tablet, Take 1 tablet by mouth 3 times daily, Disp: 270 tablet, Rfl: 0    atorvastatin (LIPITOR) 80 MG tablet, TAKE ONE TABLET BY MOUTH NIGHTLY, Disp: 30 tablet, Rfl: 5    metFORMIN (GLUCOPHAGE) 1000 MG tablet, TAKE ONE TABLET BY MOUTH TWICE A DAY WITH MEALS, Disp: 60 tablet, Rfl: 4    insulin glargine (LANTUS SOLOSTAR) 100 UNIT/ML injection pen, Inject 24 Units into the skin nightly, Disp: 3 pen, Rfl: 3    omeprazole (PRILOSEC) 20 MG delayed release capsule, Take 1 capsule by mouth Daily, Disp: 30 capsule, Rfl: 3    lisinopril (PRINIVIL;ZESTRIL) 40 MG tablet, TAKE 1 TABLET BY MOUTH ONE TIME A DAY, Disp: 30 tablet, Rfl: 5    ferrous sulfate 325 (65 Fe) MG tablet, Take 1 tablet by mouth 2 times daily (with meals), Disp: 60 tablet, Rfl: 3    vitamin B-1 (THIAMINE) 100 MG tablet, Take 1 tablet by mouth daily, Disp: 30 tablet, Rfl: 3    vitamin D (CHOLECALCIFEROL) 76970 UNIT CAPS, Take 1 capsule by mouth once a week, Disp: 12 capsule, Rfl: 0    Cholecalciferol 2000 units TABS, Take 1 tablet by mouth daily Take 1 tablet by mouth daily. Start this medication after you finish the weekly regimen, Disp: 90 tablet, Rfl: 2    cyclobenzaprine (FLEXERIL) 10 MG tablet, Take 1 tablet by mouth 3 times daily as needed for Muscle spasms, Disp: 20 tablet, Rfl: 0    amLODIPine (NORVASC) 5 MG tablet, Take 1 tablet by mouth daily, Disp: 90 tablet, Rfl: 3    Blood Glucose Monitoring Suppl (PRODIGY POCKET BLOOD GLUCOSE) w/Device KIT, Use daily to monitor blood sugar level, Disp: 1 kit, Rfl: 1    blood glucose test strips (ASCENSIA AUTODISC VI;ONE TOUCH ULTRA TEST VI) strip, 3 each by In Vitro route daily As needed. , Disp: 100 each, Rfl: 3    PRODIGY LANCETS 28G MISC, 3 each by Does not apply route daily, Disp: 100 each, Rfl: 3    Liraglutide (VICTOZA) 18 MG/3ML SOPN SC injection, Inject 1.8 mg into the skin daily, Disp: 3 pen, Rfl: 5    zoster recombinant adjuvanted vaccine (SHINGRIX) 50 MCG SUSR injection, 50 MCG IM then repeat 2-6 months., Disp: 0.5 mL, Rfl: 1    aspirin 81 MG EC tablet, Take 81 mg by mouth daily, Disp: , Rfl:     B Complex Vitamins (VITAMIN B COMPLEX) TABS, Take 1 tablet by mouth daily, Disp: , Rfl:     AGAMATRIX ULTRA-THIN LANCETS MISC, 33 gauge - test blood sugar 4 times daily, Disp: 200 each, Rfl: 3    Lab Results   Component Value Date    WBC 6.1 02/28/2019    RBC 4.95 02/28/2019    HGB 12.8 02/28/2019    HCT 40.6 02/28/2019    MCV 81.9 02/28/2019    MCH 25.8 02/28/2019    MCHC 31.5 02/28/2019    MPV 9.5 02/28/2019    NEUTOPHILPCT 42.7 02/28/2019    LYMPHOPCT 49.4 02/28/2019    MONOPCT 6.1 02/28/2019    EOSRELPCT 1.1 02/28/2019    BASOPCT 0.7 02/28/2019    NEUTROABS 2.6 02/28/2019    LYMPHSABS 3.0 02/28/2019    MONOSABS 0.4 02/28/2019    EOSABS 0.1 02/28/2019     Lab Results   Component Value Date     02/28/2019    K 4.3 02/28/2019    CL 99 02/28/2019    CO2 22 02/28/2019    ANIONGAP 15 02/28/2019    GLUCOSE 201 02/28/2019    BUN 38 02/28/2019    CREATININE 1.4 02/28/2019    LABGLOM 53 02/28/2019    GFRAA >60 02/28/2019    CALCIUM 9.8 02/28/2019    PROT 7.4 02/28/2019    LABALBU 4.3 02/28/2019    AGRATIO 1.4 02/28/2019    BILITOT 0.6 02/28/2019    ALKPHOS 94 02/28/2019    ALT 19 02/28/2019    AST 15 02/28/2019    GLOB 3.1 02/28/2019     Lab Results   Component Value Date    CHOL 96 02/28/2019    TRIG 77 02/28/2019    HDL 32 02/28/2019    LDLCALC 49 02/28/2019    LABVLDL 15 02/28/2019     Lab Results   Component Value Date    TSHREFLEX 0.75 02/28/2019     Lab Results   Component Value Date    IRON 58 02/28/2019    TIBC 258 02/28/2019    LABIRON 22 02/28/2019     Lab Results   Component Value Date    [id number] >2000 02/28/2019    FOLATE 6.75 02/28/2019     Lab Results   Component Value Date    VITD25 13.8 02/28/2019     Lab Results   Component Value Date    LABA1C 11.2 02/28/2019    .7 02/28/2019     Review of Systems   Constitutional: Negative. HENT: Negative. Eyes: Negative.     Respiratory:

## 2019-05-28 ENCOUNTER — OFFICE VISIT (OUTPATIENT)
Dept: BARIATRICS/WEIGHT MGMT | Age: 54
End: 2019-05-28
Payer: COMMERCIAL

## 2019-05-28 VITALS
DIASTOLIC BLOOD PRESSURE: 104 MMHG | HEART RATE: 101 BPM | OXYGEN SATURATION: 98 % | BODY MASS INDEX: 36.34 KG/M2 | SYSTOLIC BLOOD PRESSURE: 148 MMHG | WEIGHT: 274.2 LBS | HEIGHT: 73 IN

## 2019-05-28 DIAGNOSIS — K21.9 CHRONIC GERD: ICD-10-CM

## 2019-05-28 DIAGNOSIS — I10 ESSENTIAL HYPERTENSION: Primary | ICD-10-CM

## 2019-05-28 DIAGNOSIS — E11.3299 TYPE 2 DIABETES MELLITUS WITH BACKGROUND RETINOPATHY (HCC): ICD-10-CM

## 2019-05-28 DIAGNOSIS — E66.01 SEVERE OBESITY (BMI 35.0-39.9) WITH COMORBIDITY (HCC): ICD-10-CM

## 2019-05-28 DIAGNOSIS — G47.33 OBSTRUCTIVE SLEEP APNEA: ICD-10-CM

## 2019-05-28 PROCEDURE — 99213 OFFICE O/P EST LOW 20 MIN: CPT | Performed by: NURSE PRACTITIONER

## 2019-05-28 NOTE — PROGRESS NOTES
Daxa Gross lost 6 lb over the past month. States he completely stopped pop. Pt is allergic to coconut.      Breakfast: protein shake (mott whey OR slimfast advanced)     Snack: protein bar (pure protein)     Lunch: salad with neville/carrots/sometimes peas or corn/cheese/ lite ranch dressing with chicken/ham or turkey    Snack: pure protein bar      Dinner: grilled chicken & broccoli      Snack: none     Fluids: water w/ cassie / gatorade zero / vitamin water zero      Is pt consuming smaller portions? yes - mindful & listening to his body when he is satisfied     Is pt consuming at least 64 oz of fluids per day? yes     Is pt consuming carbonated, caffeinated, or sugary beverages?  no      Has pt sampled Unjury and/or Nectar protein? tried & tolerated / didn't love     Exercise: a lot of walking     Plan/Recommendations:   Continue plan  Try additional protein powder flavors     Handouts: none     Kuldip Rosales

## 2019-05-28 NOTE — PATIENT INSTRUCTIONS
Goals in preparing for bariatric surgery  You should be giving up all beverages that have carbonation, sugar, and caffeine. (Refer to the approved liquids list provided at initial visit)   You should be drinking 64 ounces of calorie free fluids per day. Suggestions include:  o Water (you may add fresh lemon or lime)  o Crystal Light  o [place] Liquid Water Enhancer  o Propel Zero  o Powerade Zero  o Isopure  o [id number]  o SOBE Lifewater Zero  o Vitamin Water Zero  o Sugar Free Zach-Aid  You should be eating 4-6 times per day.  Three small meals plus 1-2 snacks per day is your goal. This balances your calories and nutrients evenly throughout the day and helps to boost your metabolism. Snacking is a good thing if you are choosing healthful options. Refer to the snack list provided at your initial visit. Aim for a protein at every snack, plus a fruit, vegetable or starch. You should be eating protein at every meal and snack.  Protein is typically found in animal sources, i.e. chicken, beef, pork, fish and seafood, eggs. It is also found in low-fat dairy sources such as skim or 1% milk, low-fat yogurt, low-fat cheese, and low-fat cottage cheese. Plant based sources of protein include peanut butter, beans, and soy. You should be utilizing the 9-inch plate method.  Eating on a smaller plate will help you control portion size. But what you put on your plate counts also. Make ¼ of your plate protein, ¼ starch and ½ the plate non-starchy vegetables. You should be eliminating caffeine.  Caffeine is dehydrating. After surgery, its very important to stay hydrated. Giving up caffeine before surgery will help you focus on the changes necessary to be successful after surgery. There are many decaffeinated coffee and tea products available in grocery stores. You should be reducing added fat and sugar in your diet.  Frying foods adds too much fat and calories.  Baking, broiling, or grilling meats add flavor without unhealthy fats. Using cooking oil spray and spray butter products are also healthful changes that will aid in your weight loss. Foods high in sugar are often also high in calories and low in nutrients. Eating habits after surgery will have to be a permanent and long-term change. Eating habits are so ingrained that it can be difficult to change. It is important to practice new eating habits prior to surgery to mentally prepare yourself for the challenge ahead. Also remember that overall health, age, and genetics make each persons weight loss progress different. Do not compare your progress (pre or post-operatively), the amount you eat, or exercise to other patients. In addition, it is the responsibility of the patient to schedule and follow up on labs and tests completed during the presurgical period. Results will be reviewed at each visit. Patient received dietary handouts and education.     Plan/Recommendations:   Continue plan  Try additional protein powder flavors

## 2019-05-29 PROBLEM — H33.21 SEROUS RETINAL DETACHMENT, RIGHT EYE: Status: ACTIVE | Noted: 2019-05-29

## 2019-05-29 PROBLEM — H25.013 CORTICAL SENILE CATARACT, BILATERAL: Status: ACTIVE | Noted: 2019-05-29

## 2019-05-29 PROBLEM — H25.13 NUCLEAR SCLEROTIC CATARACT, BILATERAL: Status: ACTIVE | Noted: 2019-05-29

## 2019-05-29 PROBLEM — H35.411 LATTICE DEGENERATION, RIGHT: Status: ACTIVE | Noted: 2019-05-29

## 2019-05-29 PROBLEM — H33.321: Status: ACTIVE | Noted: 2019-05-29

## 2019-05-29 PROBLEM — H35.033 HYPERTENSIVE RETINOPATHY, BILATERAL: Status: ACTIVE | Noted: 2019-05-29

## 2019-06-05 ENCOUNTER — TELEPHONE (OUTPATIENT)
Dept: PHARMACY | Facility: CLINIC | Age: 54
End: 2019-06-05

## 2019-06-05 PROBLEM — Z12.11 SCREENING FOR COLON CANCER: Status: RESOLVED | Noted: 2019-05-06 | Resolved: 2019-06-05

## 2019-06-05 NOTE — LETTER
[address]  [facility] [address]           06/05/19     Dear Amilcar Mohr,    Thanks so much for taking the first step towards better health. According to our records, you are missing the following requirement that must be completed by July 1st, 2019:     Meet with a [facility]) clinical pharmacist by phone   Please call [phone] and select option #7 to schedule a telephone appointment. Telephone appointments are available Monday thru Friday from 7:30 AM till 5:30 PM.     **This is a courtesy reminder. If you have your appointment scheduled prior to the July 1st dead-line please disregard the above information**       You will have to submit documentation of completion of requirements if your Physician does not use the [employer] charting system or if you have your lab/urine tests done outside of [facility]. Return the documentation to [email or web address] or by fax at [phone]       Just a reminder of the requirements to be completed between July 1 2019 and Dec. 31 2019   Diabetes Visit with your physician in 2019 (Second yearly visit)   Second A1C in 2019   Flu vaccination (once yearly)   Take diabetes medication as prescribed as well as cholesterol (Statin) or high blood pressure (ACE/ARB) medications, if needed. 70% adherence is required of diabetes medications   Provide documentation if cholesterol and/or high blood pressure medications are not needed. Lipid panel (once yearly)   Urine albumin (once yearly)   Pneumonia Vaccination (once or as indicated by physician)     Requirements if A1C is greater than 8 percent:   Engage with a [facility] diabetes educator at one of our hospital locations or an ambulatory care coordinator by phone. If requirements(s) are not met by the date listed above you will be disqualified from the program and the credit valued at $600 towards your diabetic medications and supplies will be revoked.  You will be able to reapply the following calendar year. [address] [place] [address],[address] Team   [phone] Option #7   Email: [email or web address]   Fax Number: [phone]

## 2019-06-07 ENCOUNTER — HOSPITAL ENCOUNTER (OUTPATIENT)
Dept: ULTRASOUND IMAGING | Age: 54
Discharge: HOME | End: 2019-06-07
Payer: COMMERCIAL

## 2019-06-07 DIAGNOSIS — K21.9 CHRONIC GERD: ICD-10-CM

## 2019-06-07 DIAGNOSIS — I10 ESSENTIAL HYPERTENSION: ICD-10-CM

## 2019-06-07 DIAGNOSIS — I63.02 CEREBROVASCULAR ACCIDENT (CVA) DUE TO THROMBOSIS OF BASILAR ARTERY (HCC): ICD-10-CM

## 2019-06-07 DIAGNOSIS — G47.37 CENTRAL SLEEP APNEA DUE TO MEDICAL CONDITION: ICD-10-CM

## 2019-06-07 DIAGNOSIS — E66.01 SEVERE OBESITY (BMI 35.0-39.9) WITH COMORBIDITY (HCC): ICD-10-CM

## 2019-06-07 PROCEDURE — 76705 ECHO EXAM OF ABDOMEN: CPT

## 2019-06-13 ENCOUNTER — TELEPHONE (OUTPATIENT)
Dept: PHARMACY | Facility: CLINIC | Age: 54
End: 2019-06-13

## 2019-06-13 NOTE — TELEPHONE ENCOUNTER
[facility]) Employee Diabetes Program    According to our records, you are missing one or more of the following requirements that must be completed by July 1st, 2019:     Patient is still missing the following requirement for the DM Program that is due by July 1st, 2019:  Meet with a [facility]) clinical pharmacist in person at a hospital location or by phone     Called patient regarding missing requirements for the Diabetes Management Program.   No answer. Left VM on  TAD: Please call back at [phone] Option #7 to retrieve the above message. Letter mailed     [facility] Team   [phone] Option #7   Email: [email or web address]   Fax Number: [phone]

## 2019-06-21 PROBLEM — K76.0 FATTY LIVER: Status: ACTIVE | Noted: 2019-06-21

## 2019-06-21 ASSESSMENT — ENCOUNTER SYMPTOMS
RESPIRATORY NEGATIVE: 1
GASTROINTESTINAL NEGATIVE: 1
ALLERGIC/IMMUNOLOGIC NEGATIVE: 1
EYES NEGATIVE: 1

## 2019-06-21 NOTE — PROGRESS NOTES
[facility]) Physicians   [employer]    Subjective:      Patient ID: Brenda Barry is a 48 y.o. male    HPI    The patient is here for their bariatric surgery presurgical visit. He has made several attempts at weight loss in the past without success and now wishes to pursue bariatric surgery. He is working to change his dietary behaviors and lose weight to improve comorbid conditions such as hypertension, diabetes mellitus, fatty liver, obstructive sleep apnea and GERD. Brenda Barry is a very pleasant 48 y.o. male with Body mass index is 36.07 kg/m². Past Medical History:   Diagnosis Date    Diabetes mellitus ([address]     GERD (gastroesophageal reflux disease)     Hypertension      Past Surgical History:   Procedure Laterality Date    KNEE SURGERY      right knee     UPPER GASTROINTESTINAL ENDOSCOPY N/A 4/12/2019    EGD BIOPSY performed by Adamaris Garces MD at [address] [place] [address]     Family History   Problem Relation Age of Onset    Heart Attack Mother     Diabetes Mother     High Blood Pressure Mother     Colon Cancer Sister      Social History     Tobacco Use    Smoking status: Never Smoker    Smokeless tobacco: Never Used   Substance Use Topics    Alcohol use: Yes     Alcohol/week: 0.6 oz     Types: 1 Cans of beer per week     Comment: socially      I counseled the patient on the importance of not smoking and risks of ETOH. Allergies   Allergen Reactions    Hctz [Hydrochlorothiazide]     Penicillins      Vitals:    06/26/19 0713 06/26/19 0731   BP: (!) 160/100 (!) 140/38   Site:  Left Upper Arm   Position:  Sitting   Cuff Size:  Large Adult   Pulse: 86    Resp: 16    SpO2: 96%    Weight: 273 lb 6.4 oz (124 kg)    Height: 6' 1\" (1.854 m)      Body mass index is 36.07 kg/m².     Current Outpatient Medications:     atenolol (TENORMIN) 50 MG tablet, TAKE 1 TABLET BY MOUTH ONE TIME A DAY IN THE MORNING, Disp: 30 tablet, Rfl: 5    chlorthalidone (HYGROTON) 25 MG tablet, TAKE 1 TABLET BY MOUTH ONE TIME A DAY, Disp: 30 tablet, Rfl: 2    hydrALAZINE (APRESOLINE) 50 MG tablet, Take 1 tablet by mouth 3 times daily, Disp: 270 tablet, Rfl: 0    atorvastatin (LIPITOR) 80 MG tablet, TAKE ONE TABLET BY MOUTH NIGHTLY, Disp: 30 tablet, Rfl: 5    metFORMIN (GLUCOPHAGE) 1000 MG tablet, TAKE ONE TABLET BY MOUTH TWICE A DAY WITH MEALS, Disp: 60 tablet, Rfl: 4    insulin glargine (LANTUS SOLOSTAR) 100 UNIT/ML injection pen, Inject 24 Units into the skin nightly, Disp: 3 pen, Rfl: 3    omeprazole (PRILOSEC) 20 MG delayed release capsule, Take 1 capsule by mouth Daily, Disp: 30 capsule, Rfl: 3    lisinopril (PRINIVIL;ZESTRIL) 40 MG tablet, TAKE 1 TABLET BY MOUTH ONE TIME A DAY, Disp: 30 tablet, Rfl: 5    ferrous sulfate 325 (65 Fe) MG tablet, Take 1 tablet by mouth 2 times daily (with meals), Disp: 60 tablet, Rfl: 3    vitamin B-1 (THIAMINE) 100 MG tablet, Take 1 tablet by mouth daily, Disp: 30 tablet, Rfl: 3    Cholecalciferol 2000 units TABS, Take 1 tablet by mouth daily Take 1 tablet by mouth daily. Start this medication after you finish the weekly regimen, Disp: 90 tablet, Rfl: 2    cyclobenzaprine (FLEXERIL) 10 MG tablet, Take 1 tablet by mouth 3 times daily as needed for Muscle spasms, Disp: 20 tablet, Rfl: 0    amLODIPine (NORVASC) 5 MG tablet, Take 1 tablet by mouth daily, Disp: 90 tablet, Rfl: 3    Blood Glucose Monitoring Suppl (PRODIGY POCKET BLOOD GLUCOSE) w/Device KIT, Use daily to monitor blood sugar level, Disp: 1 kit, Rfl: 1    blood glucose test strips (ASCENSIA AUTODISC VI;ONE TOUCH ULTRA TEST VI) strip, 3 each by In Vitro route daily As needed. , Disp: 100 each, Rfl: 3    PRODIGY LANCETS 28G MISC, 3 each by Does not apply route daily, Disp: 100 each, Rfl: 3    Liraglutide (VICTOZA) 18 MG/3ML SOPN SC injection, Inject 1.8 mg into the skin daily, Disp: 3 pen, Rfl: 5    zoster recombinant adjuvanted vaccine (SHINGRIX) 50 MCG SUSR injection, 50 MCG IM then repeat 2-6 months., Disp: 0.5 mL, Rfl: 1    aspirin 81 MG EC tablet, Take 81 mg by mouth daily, Disp: , Rfl:     B Complex Vitamins (VITAMIN B COMPLEX) TABS, Take 1 tablet by mouth daily, Disp: , Rfl:     AGAMATRIX ULTRA-THIN LANCETS MISC, 33 gauge - test blood sugar 4 times daily, Disp: 200 each, Rfl: 3    vitamin D (CHOLECALCIFEROL) 98049 UNIT CAPS, Take 1 capsule by mouth once a week, Disp: 12 capsule, Rfl: 0    Lab Results   Component Value Date    WBC 6.1 02/28/2019    RBC 4.95 02/28/2019    HGB 12.8 02/28/2019    HCT 40.6 02/28/2019    MCV 81.9 02/28/2019    MCH 25.8 02/28/2019    MCHC 31.5 02/28/2019    MPV 9.5 02/28/2019    NEUTOPHILPCT 42.7 02/28/2019    LYMPHOPCT 49.4 02/28/2019    MONOPCT 6.1 02/28/2019    EOSRELPCT 1.1 02/28/2019    BASOPCT 0.7 02/28/2019    NEUTROABS 2.6 02/28/2019    LYMPHSABS 3.0 02/28/2019    MONOSABS 0.4 02/28/2019    EOSABS 0.1 02/28/2019     Lab Results   Component Value Date     02/28/2019    K 4.3 02/28/2019    CL 99 02/28/2019    CO2 22 02/28/2019    ANIONGAP 15 02/28/2019    GLUCOSE 201 02/28/2019    BUN 38 02/28/2019    CREATININE 1.4 02/28/2019    LABGLOM 53 02/28/2019    GFRAA >60 02/28/2019    CALCIUM 9.8 02/28/2019    PROT 7.4 02/28/2019    LABALBU 4.3 02/28/2019    AGRATIO 1.4 02/28/2019    BILITOT 0.6 02/28/2019    ALKPHOS 94 02/28/2019    ALT 19 02/28/2019    AST 15 02/28/2019    GLOB 3.1 02/28/2019     Lab Results   Component Value Date    CHOL 96 02/28/2019    TRIG 77 02/28/2019    HDL 32 02/28/2019    LDLCALC 49 02/28/2019    LABVLDL 15 02/28/2019     Lab Results   Component Value Date    TSHREFLEX 0.75 02/28/2019     Lab Results   Component Value Date    IRON 58 02/28/2019    TIBC 258 02/28/2019    LABIRON 22 02/28/2019     Lab Results   Component Value Date    WOUZCYTY18 >2000 02/28/2019    FOLATE 6.75 02/28/2019     Lab Results   Component Value Date    VITD25 13.8 02/28/2019     Lab Results   Component Value Date    LABA1C 11.2 02/28/2019    .7 02/28/2019     Review of Systems   Constitutional: Negative. HENT: Negative. Eyes: Negative. Respiratory: Negative. Cardiovascular: Negative. Gastrointestinal: Negative. Endocrine: Negative. Genitourinary: Negative. Musculoskeletal: Negative. Skin: Negative. Allergic/Immunologic: Negative. Neurological: Negative. Hematological: Negative. Psychiatric/Behavioral: Negative. Objective:   Physical Exam   Constitutional: He is oriented to person, place, and time. He appears well-developed and well-nourished. HENT:   Head: Normocephalic and atraumatic. Eyes: Pupils are equal, round, and reactive to light. Conjunctivae are normal.   Neck: Normal range of motion. Neck supple. Cardiovascular: Normal rate. Pulmonary/Chest: Effort normal.   Abdominal: Soft. Musculoskeletal: Normal range of motion. Neurological: He is alert and oriented to person, place, and time. Skin: Skin is warm and dry. Psychiatric: He has a normal mood and affect. His behavior is normal. Judgment and thought content normal.   Vitals reviewed. Assessment and Plan:   Patient is here for their 5th presurgery visit for sleeve, down 0.8 lbs. The patient's current Body mass index is 36.07 kg/m². (6/26/19). He is making much better dietary and behavior modifications. He did meet with the registered dietitian for continued follow up. I agree with recommendations and plan. He is exercising with some walking. Encouraged continued physical activity. Spoke with patient about his GBUS. Discussed that their gallbladder was normal, but there was some fatty liver disease noted by the radiologist. Explained that with dietary changes and weight loss she should see improvement in that regard. Based on the size and appearance of the liver during their bariatric surgery Dr. Christal Stoddard may elect to do a liver biopsy to evaluate the liver.  Discussed preop work up which still needs sleep clearance (mellisaled and did have letter in media from company that he has met compliance per insurance requirements), cardiac clearance (scheduled), HgbA1c <10 (reprinted order from Dr. Lillie Narvaez and patient to have drawn today) and support group. If the patient is able to maintain consistency with his dietary behaviors and has completed the remainder of his preoperative requirements by his next visit he should be ready for a surgery date. We will see him back in 1 month for continued follow up. A total of 15 minutes was spent face-to-face with the patient and over half of that time was spent counseling the patient on proper dietary behaviors, exercise and preoperative work-up.

## 2019-06-26 ENCOUNTER — TELEPHONE (OUTPATIENT)
Dept: PHARMACY | Facility: CLINIC | Age: 54
End: 2019-06-26

## 2019-06-26 ENCOUNTER — OFFICE VISIT (OUTPATIENT)
Dept: BARIATRICS/WEIGHT MGMT | Age: 54
End: 2019-06-26
Payer: COMMERCIAL

## 2019-06-26 VITALS
DIASTOLIC BLOOD PRESSURE: 38 MMHG | HEIGHT: 73 IN | HEART RATE: 86 BPM | OXYGEN SATURATION: 96 % | WEIGHT: 273.4 LBS | BODY MASS INDEX: 36.23 KG/M2 | RESPIRATION RATE: 16 BRPM | SYSTOLIC BLOOD PRESSURE: 140 MMHG

## 2019-06-26 DIAGNOSIS — E66.01 SEVERE OBESITY (BMI 35.0-39.9) WITH COMORBIDITY (HCC): ICD-10-CM

## 2019-06-26 DIAGNOSIS — K21.9 CHRONIC GERD: ICD-10-CM

## 2019-06-26 DIAGNOSIS — K76.0 FATTY LIVER: ICD-10-CM

## 2019-06-26 DIAGNOSIS — G47.33 OBSTRUCTIVE SLEEP APNEA: ICD-10-CM

## 2019-06-26 DIAGNOSIS — I10 ESSENTIAL HYPERTENSION: Primary | ICD-10-CM

## 2019-06-26 PROCEDURE — 99213 OFFICE O/P EST LOW 20 MIN: CPT | Performed by: NURSE PRACTITIONER

## 2019-06-26 NOTE — TELEPHONE ENCOUNTER
Patient is still missing the following requirement for the DM Program that is due by July 1st, 2019:  Meet with a [facility] clinical pharmacist by phone     Called patient regarding missing requirements for the Diabetes Management Program.   No answer. Left VM on home/cell TAD: Please call back at [phone] Option #7 to retrieve the above message.     Sergio Hood, [id number] [address]   Department, toll free: [phone], option 7

## 2019-06-26 NOTE — PATIENT INSTRUCTIONS
Renaldo has been febrile within the past 24 hours after being exposed to meningitis in a  setting. He is fully immunized to preventable causes of meningitis.   He needs to be seen by pediatrician today.     grilling meats add flavor without unhealthy fats. Using cooking oil spray and spray butter products are also healthy options that will aid in your weight loss. Foods high in added sugars are often also high in calories and low in nutrients. Eating habits after surgery need to be a long-term change. Eating habits are often so ingrained that it can be difficult to change. It is important to practice new eating habits prior to surgery to mentally prepare yourself for the challenge ahead. Also, remember that overall health, age, and genetics make each person's weight loss progress different. Do not compare your progress (pre- or post-operatively), the amount you eat, or your exercise to other patients. In addition, it is the responsibility of the patient to schedule and follow up on labs and tests completed during the pre-surgical period. Results will be reviewed at each visit. **IT IS IMPORTANT TO KNOW THAT YOU MUST COMPLETE ALL REQUIRED DIETARY CHANGES, TESTS, CLEARANCES AND ACTIVITIES BEFORE A SURGERY DATE CAN BE GIVEN. IF YOU HAVE NOT MET ANY OF THESE REQUIREMENTS THEN YOU WILL NOT BE GIVEN A SURGERY DATE UNTIL THEY HAVE BEEN MET TO OUR SATISFACTION. THIS IS NOT ONLY DONE TO HELP YOU BE SUCCESSFUL AFTER SURGERY, BUT TO BE SAFE AS WELL.**    Patient received dietary handouts and education.       Plan/Recommendations: Continue with healthy eating, Increase activity by adding in more walking at work

## 2019-06-27 LAB
ESTIMATED AVERAGE GLUCOSE: 200.1 MG/DL
HBA1C MFR BLD: 8.6 %

## 2019-07-01 ENCOUNTER — TELEPHONE (OUTPATIENT)
Dept: PHARMACY | Facility: CLINIC | Age: 54
End: 2019-07-01

## 2019-07-01 ENCOUNTER — OFFICE VISIT (OUTPATIENT)
Dept: ENDOCRINOLOGY | Age: 54
End: 2019-07-01
Payer: COMMERCIAL

## 2019-07-01 VITALS
DIASTOLIC BLOOD PRESSURE: 107 MMHG | HEIGHT: 73 IN | OXYGEN SATURATION: 98 % | WEIGHT: 274 LBS | BODY MASS INDEX: 36.31 KG/M2 | SYSTOLIC BLOOD PRESSURE: 157 MMHG | HEART RATE: 88 BPM

## 2019-07-01 DIAGNOSIS — E66.01 SEVERE OBESITY (BMI 35.0-39.9) WITH COMORBIDITY (HCC): ICD-10-CM

## 2019-07-01 DIAGNOSIS — E78.5 DYSLIPIDEMIA ASSOCIATED WITH TYPE 2 DIABETES MELLITUS (HCC): ICD-10-CM

## 2019-07-01 DIAGNOSIS — I10 ESSENTIAL HYPERTENSION: ICD-10-CM

## 2019-07-01 DIAGNOSIS — E11.59 TYPE 2 DIABETES MELLITUS WITH VASCULAR DISEASE (HCC): ICD-10-CM

## 2019-07-01 DIAGNOSIS — E11.69 DYSLIPIDEMIA ASSOCIATED WITH TYPE 2 DIABETES MELLITUS (HCC): ICD-10-CM

## 2019-07-01 DIAGNOSIS — E11.3299 TYPE 2 DIABETES MELLITUS WITH BACKGROUND RETINOPATHY (HCC): ICD-10-CM

## 2019-07-01 PROCEDURE — 99214 OFFICE O/P EST MOD 30 MIN: CPT | Performed by: INTERNAL MEDICINE

## 2019-07-01 RX ORDER — ERGOCALCIFEROL (VITAMIN D2) 1250 MCG
50000 CAPSULE ORAL WEEKLY
COMMUNITY
End: 2019-10-07

## 2019-07-01 NOTE — PATIENT INSTRUCTIONS
good, quick way to make sure that you have a balanced meal. It also helps you spread carbs throughout the day. ? Divide your plate by types of foods. Put non-starchy vegetables on half the plate, meat or other protein food on one-quarter of the plate, and a grain or starchy vegetable in the final quarter of the plate. To this you can add a small piece of fruit and 1 cup of milk or yogurt, depending on how many carbs you are supposed to eat at a meal.  · Try to eat about the same amount of carbs at each meal. Do not \"save up\" your daily allowance of carbs to eat at one meal.  · Proteins have very little or no carbs per serving. Examples of proteins are beef, chicken, turkey, fish, eggs, tofu, cheese, cottage cheese, and peanut butter. A serving size of meat is 3 ounces, which is about the size of a deck of cards. Examples of meat substitute serving sizes (equal to 1 ounce of meat) are 1/4 cup of cottage cheese, 1 egg, 1 tablespoon of peanut butter, and ½ cup of tofu. How can you eat out and still eat healthy? · Learn to estimate the serving sizes of foods that have carbohydrate. If you measure food at home, it will be easier to estimate the amount in a serving of restaurant food. · If the meal you order has too much carbohydrate (such as potatoes, corn, or baked beans), ask to have a low-carbohydrate food instead. Ask for a salad or green vegetables. · If you use insulin, check your blood sugar before and after eating out to help you plan how much to eat in the future. · If you eat more carbohydrate at a meal than you had planned, take a walk or do other exercise. This will help lower your blood sugar. What else should you know? · Limit saturated fat, such as the fat from meat and dairy products. This is a healthy choice because people who have diabetes are at higher risk of heart disease. So choose lean cuts of meat and nonfat or low-fat dairy products.  Use olive or canola oil instead of butter or shortening when cooking. · Don't skip meals. Your blood sugar may drop too low if you skip meals and take insulin or certain medicines for diabetes. · Check with your doctor before you drink alcohol. Alcohol can cause your blood sugar to drop too low. Alcohol can also cause a bad reaction if you take certain diabetes medicines. Follow-up care is a key part of your treatment and safety. Be sure to make and go to all appointments, and call your doctor if you are having problems. It's also a good idea to know your test results and keep a list of the medicines you take. Where can you learn more? Go to https://Divvyshotpepiceweb.[email or web address]. org and sign in to your [employer]fm account. Enter W914 in the Newforma box to learn more about \"Learning About Diabetes Food Guidelines. \"     If you do not have an account, please click on the \"Sign Up Now\" link. Current as of: July 25, 2018  Content Version: 12.0  © 5387-9384 Healthwise, Incorporated. Care instructions adapted under license by [facility] ([facility]). If you have questions about a medical condition or this instruction, always ask your healthcare professional. [place] [address] any warranty or liability for your use of this information.

## 2019-07-01 NOTE — PROGRESS NOTES
Genitourinary: Negative for dysuria, urgency, frequency, hematuria and flank pain. Musculoskeletal: Negative for myalgias, back pain, arthralgias and neck pain. Skin/Nail: Negative for rash, itching. Normal nails. Neurological: Negative for seizures, weakness, light-headedness, numbness and headaches. Hematological/ Lymph nodes: Negative for adenopathy. Does not bruise/bleed easily. Psychiatric/Behavioral: Negative for suicidal ideas, depression, anxiety, sleep disturbance and decreased concentration. Objective:   Physical Exam:  BP (!) 149/106 (Site: Right Upper Arm, Position: Sitting, Cuff Size: Large Adult)   Pulse 88   Ht 6' 1\" (1.854 m)   Wt 274 lb (124.3 kg)   SpO2 98%   BMI 36.15 kg/m²   Constitutional: Patient is oriented to person, place, and time. Patient appears well-developed and well-nourished. HENT:    Head: Normocephalic and atraumatic. Eyes: Conjunctivae and EOM are normal.     Neck: Normal range of motion. Cardiovascular: Normal rate, regular rhythm and normal heart sounds. Pulmonary/Chest: Effort normal and breath sounds normal.   Abdominal: Soft. Bowel sounds are normal.   Musculoskeletal: Normal range of motion. Neurological: Patient is alert and oriented to person, place, and time. Patient has normal reflexes. Skin: Skin is warm and dry. Psychiatric: Patient has a normal mood and affect.  Patient behavior is normal.     Lab Review:    Orders Only on 06/26/2019   Component Date Value Ref Range Status    Hemoglobin A1C 06/26/2019 8.6  See comment % Final    eAG 06/26/2019 200.1  mg/dL Final   Admission on 04/12/2019, Discharged on 04/12/2019   Component Date Value Ref Range Status    POC Glucose 04/12/2019 98  70 - 99 mg/dl Final    Performed on 04/12/2019 ACCU-CHEK   Final    POC Glucose 04/12/2019 96  70 - 99 mg/dl Final    Performed on 04/12/2019 ACCU-CHEK   Final   Orders Only on 02/28/2019   Component Date Value Ref Range Status    Hemoglobin

## 2019-07-01 NOTE — TELEPHONE ENCOUNTER
supplies/frequency: [employer] - tests twice daily. Pen needles: generic Leader    Allergies: Allergies   Allergen Reactions    Hctz [Hydrochlorothiazide]     Penicillins       Vitals/Labs:  BP Readings from Last 3 Encounters:   06/26/19 (!) 140/38   05/28/19 (!) 148/104   05/06/19 (!) 142/92     Lab Results   Component Value Date    LABMICR <1.20 02/19/2018     Lab Results   Component Value Date    LABA1C 8.6 06/26/2019    LABA1C 11.2 02/28/2019    LABA1C 8.6 09/28/2018     Lab Results   Component Value Date    CHOL 96 02/28/2019    TRIG 77 02/28/2019    HDL 32 (L) 02/28/2019    LDLCALC 49 02/28/2019     ALT   Date Value Ref Range Status   02/28/2019 19 10 - 40 U/L Final     AST   Date Value Ref Range Status   02/28/2019 15 15 - 37 U/L Final     The ASCVD Risk score (Harini Burk, et al., 2013) failed to calculate for the following reasons: The patient has a prior MI or stroke diagnosis     Lab Results   Component Value Date    CREATININE 1.4 (H) 02/28/2019     CrCl cannot be calculated (Patient's most recent lab result is older than the maximum 120 days allowed. ). eGFR: > 60 mL/min/1.73 m^2    Immunizations:  Immunization History   Administered Date(s) Administered    Influenza, Quadv, 3 yrs and older, IM, PF (Fluzone 3 yrs and older or Afluria 5 yrs and older) 10/17/2018    Pneumococcal Polysaccharide ([id number]) 10/17/2018    Tdap (Boostrix, Adacel) 05/08/2015      Social History:  Social History     Tobacco Use    Smoking status: Never Smoker    Smokeless tobacco: Never Used   Substance Use Topics    Alcohol use: Yes     Alcohol/week: 0.6 oz     Types: 1 Cans of beer per week     Comment: socially      ASSESSMENT:  Initial Program Requirements (Y indicates has completed for the year, N indicates needs to be completed by 7/1/19):   Yes - OV with provider for DM (1st) - 2/26/19  Yes - A1c (1st) - 2/28/19  Yes - On statin - atorvastatin   Yes - On ACEi/ARB - lisinopril      Ongoing Program Requirements

## 2019-07-08 ENCOUNTER — OFFICE VISIT (OUTPATIENT)
Dept: CARDIOLOGY CLINIC | Age: 54
End: 2019-07-08
Payer: COMMERCIAL

## 2019-07-08 VITALS
SYSTOLIC BLOOD PRESSURE: 119 MMHG | BODY MASS INDEX: 35.97 KG/M2 | DIASTOLIC BLOOD PRESSURE: 80 MMHG | HEART RATE: 98 BPM | WEIGHT: 272.6 LBS

## 2019-07-08 DIAGNOSIS — R06.02 SOB (SHORTNESS OF BREATH): ICD-10-CM

## 2019-07-08 DIAGNOSIS — R94.31 EKG ABNORMALITIES: ICD-10-CM

## 2019-07-08 DIAGNOSIS — I10 ESSENTIAL HYPERTENSION: ICD-10-CM

## 2019-07-08 DIAGNOSIS — I10 ESSENTIAL HYPERTENSION: Primary | ICD-10-CM

## 2019-07-08 DIAGNOSIS — R07.9 CHEST PAIN, UNSPECIFIED TYPE: ICD-10-CM

## 2019-07-08 PROCEDURE — 93000 ELECTROCARDIOGRAM COMPLETE: CPT | Performed by: INTERNAL MEDICINE

## 2019-07-08 PROCEDURE — 99204 OFFICE O/P NEW MOD 45 MIN: CPT | Performed by: NURSE PRACTITIONER

## 2019-07-08 NOTE — PROGRESS NOTES
[address]  Office Visit           Sue Miranda MD,  [address] APRN [address] [facility]       Cardiology           Louisa Parekh  1965    July 8, 2019    CC: cc for gastric sleeve / Dr. Dov Milligan    HPI:  The patient is 48 y.o. male with no c/o cp sob edema   Hx DMT2 / hx CVA 3 years denies any residual / HTN / CRUZITO wears CPAP / HLD on statin   denies tobacco use / illicit drugs     8014 neg stress test     EKG 7/8/19 NSR /  Diffuse T wave changes   Reviewed most recent test with pt. Review of Systems:  Constitutional: Denies  fatigue, weakness, night sweats or fever. HEENT: Denies new visual changes, ringing in ears, nosebleeds,nasal congestion  Respiratory: Denies new or change in SOB, PND, orthopnea or cough. Cardiovascular: see HPI  GI: Denies N/V, diarrhea, constipation, abdominal pain, change in bowel habits, melena or hematochezia  : Denies urinary frequency, urgency, incontinence, hematuria or dysuria. Skin: Denies rash, hives, or cyanosis  Musculoskeletal: Denies joint or muscle aches/pain  Neurological: Denies syncope or TIA-like symptoms. Psychiatric: Denies anxiety, insomnia or depression     Past Medical History:   Diagnosis Date    Diabetes mellitus ([address]     GERD (gastroesophageal reflux disease)     Hypertension      Past Surgical History:   Procedure Laterality Date    KNEE SURGERY      right knee     UPPER GASTROINTESTINAL ENDOSCOPY N/A 4/12/2019    EGD BIOPSY performed by Teodoro Figueroa MD at [address] [place] [address]     Family History   Problem Relation Age of Onset    Heart Attack Mother     Diabetes Mother     High Blood Pressure Mother     Colon Cancer Sister      Social History     Tobacco Use    Smoking status: Never Smoker    Smokeless tobacco: Never Used   Substance Use Topics    Alcohol use:  Yes     Alcohol/week: 0.6 oz     Types: 1 Cans of beer per week     Comment: socially     Drug use: No       Allergies   Allergen 02/28/2019    HCT 40.6 02/28/2019    MCV 81.9 02/28/2019     02/28/2019       I have reviewed any available labs, images, any stress test, [facility] on this pt       Assessment:    CC for gastric sleeve   no c/o cp sob edema     2013 stress test   Resting EKG:  Sinus rhythm, non-specific ST-T changes IVCD. ^ The exercise test was stopped due to Hypertension; short of breath, crap in leg. ^ Symptoms/Comments:  No chest pain. ^ EKG Response:  Sinus tachycardia, incomplete left   bundle branch block, premature ventricular contractions, ST changes less  than 1 mm. ^ Duke Score:  7.57-0-0=7 ^  ^ EKG Interpretation:  NORMAL STRESS TEST. ^    Hx DMT2   Controlled     hx CVA 3 years   denies any residua    HTN  optimal    CRUZITO   wears CPAP     HLD  on statin   denies tobacco use / illicit drugs     EKG 7/8/31 NSR /  Diffuse T wave changes     Pl  cc for gastric sleeve / Dr. Fallon Denton  GXT nuc / echo    Fu in 2-3 weeks     Thanks for allowing me to participate in the care of this patient.       Von HENSON,CVNP

## 2019-07-09 RX ORDER — HYDRALAZINE HYDROCHLORIDE 50 MG/1
50 TABLET, FILM COATED ORAL 3 TIMES DAILY
Qty: 270 TABLET | Refills: 0 | Status: SHIPPED | OUTPATIENT
Start: 2019-07-09 | End: 2019-09-19 | Stop reason: SDUPTHER

## 2019-07-11 ENCOUNTER — OFFICE VISIT (OUTPATIENT)
Dept: SLEEP MEDICINE | Age: 54
End: 2019-07-11
Payer: COMMERCIAL

## 2019-07-11 VITALS
WEIGHT: 272 LBS | SYSTOLIC BLOOD PRESSURE: 125 MMHG | RESPIRATION RATE: 16 BRPM | BODY MASS INDEX: 36.05 KG/M2 | OXYGEN SATURATION: 94 % | HEART RATE: 93 BPM | HEIGHT: 73 IN | DIASTOLIC BLOOD PRESSURE: 89 MMHG

## 2019-07-11 DIAGNOSIS — Z99.89 OSA ON CPAP: Primary | ICD-10-CM

## 2019-07-11 DIAGNOSIS — Z99.89 DEPENDENCE ON OTHER ENABLING MACHINES AND DEVICES: ICD-10-CM

## 2019-07-11 DIAGNOSIS — G47.39 TREATMENT-EMERGENT CENTRAL SLEEP APNEA: ICD-10-CM

## 2019-07-11 DIAGNOSIS — G47.33 OSA ON CPAP: Primary | ICD-10-CM

## 2019-07-11 PROCEDURE — 99213 OFFICE O/P EST LOW 20 MIN: CPT | Performed by: PSYCHIATRY & NEUROLOGY

## 2019-07-11 ASSESSMENT — ENCOUNTER SYMPTOMS
CHOKING: 0
APNEA: 0

## 2019-07-11 ASSESSMENT — SLEEP AND FATIGUE QUESTIONNAIRES
HOW LIKELY ARE YOU TO NOD OFF OR FALL ASLEEP IN A CAR, WHILE STOPPED FOR A FEW MINUTES IN TRAFFIC: 0
HOW LIKELY ARE YOU TO NOD OFF OR FALL ASLEEP WHILE SITTING QUIETLY AFTER LUNCH WITHOUT ALCOHOL: 0
HOW LIKELY ARE YOU TO NOD OFF OR FALL ASLEEP WHILE SITTING AND TALKING TO SOMEONE: 0
HOW LIKELY ARE YOU TO NOD OFF OR FALL ASLEEP WHEN YOU ARE A PASSENGER IN A CAR FOR AN HOUR WITHOUT A BREAK: 1
HOW LIKELY ARE YOU TO NOD OFF OR FALL ASLEEP WHILE WATCHING TV: 1
HOW LIKELY ARE YOU TO NOD OFF OR FALL ASLEEP WHILE SITTING INACTIVE IN A PUBLIC PLACE: 1
ESS TOTAL SCORE: 5
HOW LIKELY ARE YOU TO NOD OFF OR FALL ASLEEP WHILE SITTING AND READING: 1
HOW LIKELY ARE YOU TO NOD OFF OR FALL ASLEEP WHILE LYING DOWN TO REST IN THE AFTERNOON WHEN CIRCUMSTANCES PERMIT: 1

## 2019-07-11 NOTE — PROGRESS NOTES
TAKE ONE TABLET BY MOUTH NIGHTLY 4/22/19   Darleen Medel MD   metFORMIN (GLUCOPHAGE) 1000 MG tablet TAKE ONE TABLET BY MOUTH TWICE A DAY WITH MEALS 4/15/19   Milly Maddox MD   omeprazole (PRILOSEC) 20 MG delayed release capsule Take 1 capsule by mouth Daily 4/12/19   Margarita Raymond MD   lisinopril (PRINIVIL;ZESTRIL) 40 MG tablet TAKE 1 TABLET BY MOUTH ONE TIME A DAY 4/8/19   Darleen Medel MD   ferrous sulfate 325 (65 Fe) MG tablet Take 1 tablet by mouth 2 times daily (with meals) 3/19/19   Margarita Raymond MD   vitamin B-1 (THIAMINE) 100 MG tablet Take 1 tablet by mouth daily 3/19/19   Margarita Raymond MD   cyclobenzaprine (FLEXERIL) 10 MG tablet Take 1 tablet by mouth 3 times daily as needed for Muscle spasms 3/10/19   Jasno Everett MD   amLODIPine (NORVASC) 5 MG tablet Take 1 tablet by mouth daily 3/8/19   Darleen Medel MD   Blood Glucose Monitoring Suppl (PRODIGY POCKET BLOOD GLUCOSE) w/Device KIT Use daily to monitor blood sugar level 12/31/18   Milly Maddox MD   blood glucose test strips (ASCENSIA AUTODISC VI;ONE TOUCH ULTRA TEST VI) strip 3 each by In Vitro route daily As needed.  12/31/18   MD ANGELLA SilvaIGY LANCETS 28G MISC 3 each by Does not apply route daily 12/31/18   Milly Maddox MD   Liraglutide (VICTOZA) 18 MG/3ML SOPN SC injection Inject 1.8 mg into the skin daily 10/22/18   Milly Maddox MD   aspirin 81 MG EC tablet Take 81 mg by mouth daily    Historical Provider, MD   B Complex Vitamins (VITAMIN B COMPLEX) TABS Take 1 tablet by mouth daily    Historical Provider, MD       Allergies as of 07/11/2019 - Review Complete 07/11/2019   Allergen Reaction Noted    Hctz [hydrochlorothiazide]  02/14/2018    Penicillins  03/10/2019       Patient Active Problem List   Diagnosis    Essential hypertension    Late effect of cerebrovascular accident (CVA)    Chronic left shoulder pain    Central sleep apnea due to 07/08/19 272 lb 9.6 oz (123.7 kg)   07/01/19 274 lb (124.3 kg)    Body mass index is 35.89 kg/m². BP HR SaO2   BP Readings from Last 3 Encounters:   07/11/19 125/89   07/08/19 119/80   07/01/19 (!) 157/107    Pulse Readings from Last 3 Encounters:   07/11/19 93   07/08/19 98   07/01/19 88    SpO2 Readings from Last 3 Encounters:   07/11/19 94%   07/01/19 98%   06/26/19 96%      Themandibular molar Class :   [x]1 []2 []3      Mallampati I Airway Classification:   []1 []2 []3 [x]4      Physical Exam   Constitutional: No distress. HENT:   Nose: Nose normal.   Eyes: EOM are normal.   Neck: Neck supple. Cardiovascular: Normal rate, regular rhythm and normal heart sounds. Pulmonary/Chest: Effort normal and breath sounds normal.   Musculoskeletal: He exhibits edema (trace LE). Neurological: He is alert. Skin: Skin is warm. Psychiatric: He has a normal mood and affect. Nursing note and vitals reviewed. Assessment:   Severe Obstructive Sleep Apnea/Hypopnea Syndrome under good control on PAP at 9 cmwp. Diagnosis Orders   1. CRUZITO on CPAP     2. Dependence on other enabling machines and devices     3. Treatment-emergent central sleep apnea       Plan:       I adjusted the PAP pressure to the APAP pressure between 8 and 12 cmwp with C-Check  I will order PAP supplies, mask, filters. ... Will consider ASV titration as needed next visit. Most likely the patient will benefit from the gastric sleeve surgery in treating of the obstructive sleep apnea. In 2013, in a study published in Obesity Surgery Journal  A total of 69 studies with 13,900 patients were included and revealed that all the procedures achieved profound effects on CRUZITO, as over 75 % of patients saw at least an improvement in their sleep apnea, bariatric surgery is a definitive treatment for obstructive sleep apnea, regardless of the specific type of surgery. We discussed the proportionality between weight and AHI.   With 10% weight change, the AHI has a 27% proportionate change. With 20% weight change, the AHI has a 45-50% proportionate change. Patient is cleared for gastric sleeve surgery from CRUZITO standpoint       No orders of the defined types were placed in this encounter. Return in about 3 months (around 10/11/2019) for Reveiwing CPAP usage and compliance report and tro, will consider ASV titration as needed. Brooklyn Barbosa MD  Medical Director - [facility]

## 2019-07-12 PROBLEM — E51.9 THIAMIN DEFICIENCY: Status: ACTIVE | Noted: 2019-07-12

## 2019-07-12 ASSESSMENT — ENCOUNTER SYMPTOMS
EYES NEGATIVE: 1
ALLERGIC/IMMUNOLOGIC NEGATIVE: 1
RESPIRATORY NEGATIVE: 1
GASTROINTESTINAL NEGATIVE: 1

## 2019-07-13 ENCOUNTER — HOSPITAL ENCOUNTER (OUTPATIENT)
Dept: NON INVASIVE DIAGNOSTICS | Age: 54
Discharge: HOME | End: 2019-07-13
Payer: COMMERCIAL

## 2019-07-13 DIAGNOSIS — R06.02 SOB (SHORTNESS OF BREATH): ICD-10-CM

## 2019-07-13 DIAGNOSIS — R07.9 CHEST PAIN, UNSPECIFIED TYPE: ICD-10-CM

## 2019-07-13 DIAGNOSIS — I10 ESSENTIAL HYPERTENSION: ICD-10-CM

## 2019-07-13 DIAGNOSIS — R94.31 EKG ABNORMALITIES: ICD-10-CM

## 2019-07-13 LAB
LV EF: 63 %
LV EF: 71 %
LVEF MODALITY: NORMAL
LVEF MODALITY: NORMAL

## 2019-07-13 PROCEDURE — 3430000000 HC RX DIAGNOSTIC RADIOPHARMACEUTICAL: Performed by: NURSE PRACTITIONER

## 2019-07-13 PROCEDURE — 93017 CV STRESS TEST TRACING ONLY: CPT

## 2019-07-13 PROCEDURE — 2580000003 HC RX 258: Performed by: NURSE PRACTITIONER

## 2019-07-13 PROCEDURE — A9502 TC99M TETROFOSMIN: HCPCS | Performed by: NURSE PRACTITIONER

## 2019-07-13 PROCEDURE — 78452 HT MUSCLE IMAGE SPECT MULT: CPT

## 2019-07-13 PROCEDURE — 6360000002 HC RX W HCPCS: Performed by: INTERNAL MEDICINE

## 2019-07-13 PROCEDURE — 93306 TTE W/DOPPLER COMPLETE: CPT

## 2019-07-13 RX ORDER — SODIUM CHLORIDE 0.9 % (FLUSH) 0.9 %
10 SYRINGE (ML) INJECTION PRN
Status: DISCONTINUED | OUTPATIENT
Start: 2019-07-13 | End: 2019-07-14 | Stop reason: HOSPADM

## 2019-07-13 RX ADMIN — TETROFOSMIN 10 MILLICURIE: 1.38 INJECTION, POWDER, LYOPHILIZED, FOR SOLUTION INTRAVENOUS at 08:42

## 2019-07-13 RX ADMIN — Medication 10 ML: at 08:42

## 2019-07-13 RX ADMIN — REGADENOSON 0.4 MG: 0.08 INJECTION, SOLUTION INTRAVENOUS at 10:09

## 2019-07-13 RX ADMIN — TETROFOSMIN 30 MILLICURIE: 1.38 INJECTION, POWDER, LYOPHILIZED, FOR SOLUTION INTRAVENOUS at 10:09

## 2019-07-13 RX ADMIN — Medication 10 ML: at 10:09

## 2019-07-15 ENCOUNTER — OFFICE VISIT (OUTPATIENT)
Dept: CARDIOLOGY CLINIC | Age: 54
End: 2019-07-15
Payer: COMMERCIAL

## 2019-07-15 VITALS
HEART RATE: 88 BPM | WEIGHT: 272 LBS | BODY MASS INDEX: 35.89 KG/M2 | DIASTOLIC BLOOD PRESSURE: 74 MMHG | SYSTOLIC BLOOD PRESSURE: 116 MMHG

## 2019-07-15 DIAGNOSIS — R07.9 CHEST PAIN, UNSPECIFIED TYPE: ICD-10-CM

## 2019-07-15 DIAGNOSIS — I10 ESSENTIAL HYPERTENSION: Primary | ICD-10-CM

## 2019-07-15 PROCEDURE — 99214 OFFICE O/P EST MOD 30 MIN: CPT | Performed by: INTERNAL MEDICINE

## 2019-07-15 NOTE — PROGRESS NOTES
[address]   Dr Deandre Solomon. Agustin Lo MD, [address] [facility]    Outpatient Follow Up Note    7/15/2019,3:06 PM  Subjective:   CHIEF COMPLAINT / HPI:  Follow Up secondary to was recently in office for evaluation. Studies have been offered and results available and stable. Bernita Spurling is 48 y.o. male who presents today for a recent results and need for cardiac clearance for a bariatric gastric sleeve procedure. The patient is clinically stable and not having any current chest pains. He did have a work-up including a stress test that was negative. Echocardiogram was unremarkable. Past Medical History:    Past Medical History:   Diagnosis Date    Diabetes mellitus ([address]     GERD (gastroesophageal reflux disease)     Hypertension      Past Surgical History  Past Surgical History:   Procedure Laterality Date    KNEE SURGERY      right knee     UPPER GASTROINTESTINAL ENDOSCOPY N/A 4/12/2019    EGD BIOPSY performed by John Payne MD at [address],  [place] History:       Social History     Tobacco Use   Smoking Status Never Smoker   Smokeless Tobacco Never Used     Current Medications:  Prior to Visit Medications    Medication Sig Taking?  Authorizing Provider   hydrALAZINE (APRESOLINE) 50 MG tablet Take 1 tablet by mouth 3 times daily Yes David Raines MD   ergocalciferol (ERGOCALCIFEROL) 74136 units capsule Take 50,000 Units by mouth once a week Takes on Monday Yes Historical Provider, MD   insulin glargine (LANTUS SOLOSTAR) 100 UNIT/ML injection pen Inject 24 Units into the skin every morning Yes Jamia Schultz MD   atenolol (TENORMIN) 50 MG tablet TAKE 1 TABLET BY MOUTH ONE TIME A DAY IN THE MORNING Yes Annemarie Craig MD   chlorthalidone (HYGROTON) 25 MG tablet TAKE 1 TABLET BY MOUTH ONE TIME A DAY Yes Annemarie Craig MD   atorvastatin (LIPITOR) 80 MG tablet TAKE ONE TABLET BY MOUTH NIGHTLY Yes David Raines MD   metFORMIN (GLUCOPHAGE) 1000 vomiting, diarrhea, constipation, abdominal pain or changes in bowel habits. : No urinary frequency, urgency, incontinence hematuria or dysuria. SKIN: No cyanosis or skin lesions. MUSCULOSKELETAL: No new muscle or joint pain. NEUROLOGICAL: No syncope or TIA-like symptoms. PSYCHIATRIC: No anxiety, pain, insomnia or depression    Objective:   PHYSICAL EXAM:        VITALS:    Wt Readings from Last 3 Encounters:   07/15/19 272 lb (123.4 kg)   07/11/19 272 lb (123.4 kg)   07/08/19 272 lb 9.6 oz (123.7 kg)     BP Readings from Last 3 Encounters:   07/15/19 116/74   07/11/19 125/89   07/08/19 119/80     Pulse Readings from Last 3 Encounters:   07/15/19 88   07/11/19 93   07/08/19 98       CONSTITUTIONAL: Cooperative, no apparent distress, and appears well nourished / developed  NEUROLOGIC:  Awake and orientated to person, place and time. PSYCH: Calm affect. SKIN: Warm and dry. HEENT: Sclera non-icteric, normocephalic, neck supple, no elevation of JVP, normal carotid pulses with no bruits and thyroid normal size. LUNGS:  No increased work of breathing and clear to auscultation, no crackles or wheezing  CARDIOVASCULAR:  Regular rate and rhythm with no murmurs, gallops, rubs, or abnormal heart sounds, normal PMI. The apical impulses not displaced  Heart tones are crisp and normal  Cervical veins are not engorged  The carotid upstroke is normal in amplitude and contour without delay or bruit  JVP is not elevated  ABDOMEN:  Normal bowel sounds, non-distended and non-tender to palpation  EXT: No edema, no calf tenderness. Pulses are present bilaterally.     DATA:    Lab Results   Component Value Date    ALT 19 02/28/2019    AST 15 02/28/2019    ALKPHOS 94 02/28/2019    BILITOT 0.6 02/28/2019     Lab Results   Component Value Date    CREATININE 1.4 (H) 02/28/2019    BUN 38 (H) 02/28/2019     02/28/2019    K 4.3 02/28/2019    CL 99 02/28/2019    CO2 22 02/28/2019     No results found for: TSH, [id number], Q6PWERH,

## 2019-07-17 ENCOUNTER — OFFICE VISIT (OUTPATIENT)
Dept: BARIATRICS/WEIGHT MGMT | Age: 54
End: 2019-07-17
Payer: COMMERCIAL

## 2019-07-17 VITALS
OXYGEN SATURATION: 99 % | WEIGHT: 272.2 LBS | HEART RATE: 88 BPM | DIASTOLIC BLOOD PRESSURE: 90 MMHG | HEIGHT: 73 IN | BODY MASS INDEX: 36.08 KG/M2 | SYSTOLIC BLOOD PRESSURE: 140 MMHG | RESPIRATION RATE: 16 BRPM

## 2019-07-17 DIAGNOSIS — E51.9 THIAMIN DEFICIENCY: ICD-10-CM

## 2019-07-17 DIAGNOSIS — G47.33 OBSTRUCTIVE SLEEP APNEA: ICD-10-CM

## 2019-07-17 DIAGNOSIS — I10 ESSENTIAL HYPERTENSION: Primary | ICD-10-CM

## 2019-07-17 DIAGNOSIS — K21.9 CHRONIC GERD: ICD-10-CM

## 2019-07-17 DIAGNOSIS — E66.01 SEVERE OBESITY (BMI 35.0-39.9) WITH COMORBIDITY (HCC): ICD-10-CM

## 2019-07-17 PROCEDURE — 99213 OFFICE O/P EST LOW 20 MIN: CPT | Performed by: NURSE PRACTITIONER

## 2019-07-17 NOTE — PATIENT INSTRUCTIONS
Goals in preparing for bariatric surgery  You should be giving up all beverages that have carbonation, sugar, and caffeine (Refer to the approved liquids list provided at initial visit).  You should be drinking 64 ounces of low calorie (5 calories or less per serving) fluids per day. Suggestions include:  o Water (you may add fresh lemon or lime)  o Crystal Light  o Esequiel Liquid Water Enhancer  o Propel Zero  o Powerade Zero/Gatorade Zero  o Isopure  o [id number]  o SOBE Lifewater Zero  o Vitamin Water Zero  o Sugar Free Zach-Aid  You should be eating 4-6 times per day.  Three small meals plus 1-2 snacks per day is your goal. This balances your calories and nutrients evenly throughout the day and helps to boost your metabolism. Refer to the snack list provided at your initial visit. Aim for a protein at every snack, plus a fruit, vegetable or starch. You should be eating protein at every meal and snack.  Protein is typically found in animal sources, i.e. chicken, lean beef, lean pork, fish and seafood, eggs. It is also found in low-fat dairy sources such as skim or 1% milk, low-fat yogurt, low-fat cheese, and low-fat cottage cheese. Plant based sources of protein include peanut butter, beans, and soy. You should be utilizing the 9-inch plate method.  Eating on a smaller plate will help you control portion size, but what you put on your plate counts also. Make ¼ of your plate lean protein, ¼ carbohydrate (fruit, grain or starchy vegetables) and ½ the plate non-starchy vegetables. You should eliminate caffeine.  Caffeine is dehydrating. After surgery, it's very important to stay hydrated. Giving up caffeine before surgery will help you focus on the changes necessary to be successful after surgery. There are many decaffeinated coffee and tea products available in grocery stores. You should be reducing added fat and sugar in your diet.  Frying foods adds too much fat and calories.  Baking, broiling, or

## 2019-07-22 RX ORDER — FERROUS SULFATE 325(65) MG
TABLET ORAL
Qty: 60 TABLET | Refills: 3 | Status: SHIPPED | OUTPATIENT
Start: 2019-07-22 | End: 2019-10-07

## 2019-07-23 ENCOUNTER — TELEPHONE (OUTPATIENT)
Dept: CARDIOLOGY CLINIC | Age: 54
End: 2019-07-23

## 2019-08-08 DIAGNOSIS — E51.9 THIAMIN DEFICIENCY: ICD-10-CM

## 2019-08-12 ENCOUNTER — TELEPHONE (OUTPATIENT)
Dept: PHARMACY | Facility: CLINIC | Age: 54
End: 2019-08-12

## 2019-08-12 NOTE — TELEPHONE ENCOUNTER
[facility]) Employee Diabetes Program  =================================================================  Vinnie Chino is a 48 y.o. male enrolled in the [address] [place] [address] Diabetes Program.    Patient identified for medication follow up - A1c > 8%    Current diabetes regimen:   Metformin 1000 mg BID   Victoza 1.8 mg daily   Lantus 24 units qAM    A1c results:  Component      Latest Ref Rng & Units 6/26/2019 2/28/2019 9/28/2018 6/20/2018           7:54 AM  1:23 PM  4:17 PM  7:54 AM   Hemoglobin A1C      See comment % 8.6 11.2 8.6 7.9     Per 7/1/19 [facility] visit:  - Glycemic Goal: <7.0%. Is not at blood glucose goal but is working with bariatric surgeon to get approved for Lap Band procedure. .Patient's A1c dropped from 11.2 to 8.6 in 4 months. Patient states that he is dieting and working to reduce his sugar/carbohydrate consumption. Last [facility] visit (Dr. Chris Mcadams) 7/1/2019:  Previously followed by  endocrine and physician moved out. Records reviewed from care everywhere   Pioglitazone 30mg daily - stopped in Feb 2018   Jeannine Vineet stopped in summer 2018 for worsening CKD   Metformin 1000mg bid   Victoza 1.8mg daily in am   Lantus 22 units daily at night   . .. A1C of <8 would be acceptable because of microvascular and macrovascular complications   Reported blood sugars good in am and higher in the morning    Metformin 1000mg bid   Victoza 1.8mg daily in am   Change Lantus 24 units daily in am    All instructions provided in written. Check Blood sugars 3 times per day. Log them along with insulin and send them every 2 weeks. Call for blood sugars less than 60 or more than 400.      Program Requirements that need to be completed by 12/31/19 to remain eligible for program:  [] Urine microalbumin - has active order  [] Influenza vaccination for [id number]  [x] Medication adherence over 70% - n/a since is on insulin   [x] On statin or contraindication(s) atorvastatin (confirmed fill in 2019)  [x] On ACEi/ARB or

## 2019-08-13 ENCOUNTER — OFFICE VISIT (OUTPATIENT)
Dept: BARIATRICS/WEIGHT MGMT | Age: 54
End: 2019-08-13
Payer: COMMERCIAL

## 2019-08-13 VITALS
HEIGHT: 73 IN | SYSTOLIC BLOOD PRESSURE: 120 MMHG | RESPIRATION RATE: 18 BRPM | HEART RATE: 64 BPM | WEIGHT: 272.2 LBS | DIASTOLIC BLOOD PRESSURE: 60 MMHG | BODY MASS INDEX: 36.08 KG/M2

## 2019-08-13 DIAGNOSIS — G47.37 CENTRAL SLEEP APNEA DUE TO MEDICAL CONDITION: ICD-10-CM

## 2019-08-13 DIAGNOSIS — E66.01 SEVERE OBESITY (BMI 35.0-39.9) WITH COMORBIDITY (HCC): ICD-10-CM

## 2019-08-13 DIAGNOSIS — K21.9 CHRONIC GERD: ICD-10-CM

## 2019-08-13 DIAGNOSIS — A04.8 HELICOBACTER PYLORI (H. PYLORI) INFECTION: ICD-10-CM

## 2019-08-13 DIAGNOSIS — I10 ESSENTIAL HYPERTENSION: ICD-10-CM

## 2019-08-13 DIAGNOSIS — I63.02 CEREBROVASCULAR ACCIDENT (CVA) DUE TO THROMBOSIS OF BASILAR ARTERY (HCC): ICD-10-CM

## 2019-08-13 PROCEDURE — 99213 OFFICE O/P EST LOW 20 MIN: CPT | Performed by: SURGERY

## 2019-08-13 NOTE — PATIENT INSTRUCTIONS
Patient received dietary handouts and education.       Plan/Recommendations: Continue with healthy eating and aim 3 walks a week at least 15 min

## 2019-08-15 LAB — VITAMIN B1 WHOLE BLOOD: 115 NMOL/L (ref 70–180)

## 2019-08-15 ASSESSMENT — ENCOUNTER SYMPTOMS
RESPIRATORY NEGATIVE: 1
ALLERGIC/IMMUNOLOGIC NEGATIVE: 1
GASTROINTESTINAL NEGATIVE: 1
EYES NEGATIVE: 1

## 2019-08-15 NOTE — PROGRESS NOTES
62590 units capsule, Take 50,000 Units by mouth once a week Takes on Monday, Disp: , Rfl:     insulin glargine (LANTUS SOLOSTAR) 100 UNIT/ML injection pen, Inject 24 Units into the skin every morning, Disp: 5 pen, Rfl: 3    atenolol (TENORMIN) 50 MG tablet, TAKE 1 TABLET BY MOUTH ONE TIME A DAY IN THE MORNING, Disp: 30 tablet, Rfl: 5    chlorthalidone (HYGROTON) 25 MG tablet, TAKE 1 TABLET BY MOUTH ONE TIME A DAY, Disp: 30 tablet, Rfl: 2    atorvastatin (LIPITOR) 80 MG tablet, TAKE ONE TABLET BY MOUTH NIGHTLY, Disp: 30 tablet, Rfl: 5    metFORMIN (GLUCOPHAGE) 1000 MG tablet, TAKE ONE TABLET BY MOUTH TWICE A DAY WITH MEALS, Disp: 60 tablet, Rfl: 4    omeprazole (PRILOSEC) 20 MG delayed release capsule, Take 1 capsule by mouth Daily, Disp: 30 capsule, Rfl: 3    lisinopril (PRINIVIL;ZESTRIL) 40 MG tablet, TAKE 1 TABLET BY MOUTH ONE TIME A DAY, Disp: 30 tablet, Rfl: 5    vitamin B-1 (THIAMINE) 100 MG tablet, Take 1 tablet by mouth daily, Disp: 30 tablet, Rfl: 3    cyclobenzaprine (FLEXERIL) 10 MG tablet, Take 1 tablet by mouth 3 times daily as needed for Muscle spasms, Disp: 20 tablet, Rfl: 0    amLODIPine (NORVASC) 5 MG tablet, Take 1 tablet by mouth daily, Disp: 90 tablet, Rfl: 3    Blood Glucose Monitoring Suppl (PRODIGY POCKET BLOOD GLUCOSE) w/Device KIT, Use daily to monitor blood sugar level, Disp: 1 kit, Rfl: 1    blood glucose test strips (ASCENSIA AUTODISC VI;ONE TOUCH ULTRA TEST VI) strip, 3 each by In Vitro route daily As needed. , Disp: 100 each, Rfl: 3    PRODIGY LANCETS 28G MISC, 3 each by Does not apply route daily, Disp: 100 each, Rfl: 3    Liraglutide (VICTOZA) 18 MG/3ML SOPN SC injection, Inject 1.8 mg into the skin daily, Disp: 3 pen, Rfl: 5    aspirin 81 MG EC tablet, Take 81 mg by mouth daily, Disp: , Rfl:     B Complex Vitamins (VITAMIN B COMPLEX) TABS, Take 1 tablet by mouth daily, Disp: , Rfl:       Review of Systems - History obtained from the patient  General ROS: negative  Psychological ROS: negative  Ophthalmic ROS: negative  Neurological ROS: negative  ENT ROS: negative  Allergy and Immunology ROS: negative  Hematological and Lymphatic ROS: negative  Endocrine ROS: negative  Breast ROS: negative  Respiratory ROS: negative  Cardiovascular ROS: negative  Gastrointestinal ROS:negative  Genito-Urinary ROS: negative  Musculoskeletal ROS: negative   Skin ROS: negative    Physical Exam   Vitals Reviewed   Constitutional: Patient is oriented to person, place, and time. Patient appears well-developed and well-nourished. Patient is active and cooperative. Non-toxic appearance. No distress. HENT:   Head: Normocephalic and atraumatic. Head is without abrasion and without laceration. Hair is normal.   Right Ear: External ear normal. No lacerations. No drainage, swelling . Left Ear: External ear normal. No lacerations. No drainage, swelling. Nose: Nose normal. No nose lacerations or nasal deformity. Eyes: Conjunctivae, EOM and lids are normal. Right eye exhibits no discharge. No foreign body present in the right eye. Left eye exhibits no discharge. No foreign body present in the left eye. No scleral icterus. Neck: Trachea normal and normal range of motion. No JVD present. Pulmonary/Chest: Effort normal. No accessory muscle usage or stridor. No apnea. No respiratory distress. Cardiovascular: Normal rate and no JVD. Abdominal: Normal appearance. Patient exhibits no distension. Abdomen is soft, obese, non tender. Musculoskeletal: Normal range of motion. Patient exhibits no edema. Neurological: Patient is alert and oriented to person, place, and time. Patient has normal strength. GCS eye subscore is 4. GCS verbal subscore is 5. GCS motor subscore is 6. Skin: Skin is warm and dry. No abrasion and no rash noted. Patient is not diaphoretic. No cyanosis or erythema. Psychiatric: Patient has a normal mood and affect.  Speech is normal and behavior is normal. Cognition and memory are normal.       A/P       Riya Black is 48 y.o. male, Body mass index is 35.91 kg/m². pre surgery. The patient underwent dietary counseling with registered dietician. I have reviewed, discussed and agree with the dietary plan. Patient is trying hard to keep good dietary and behavior modifications. Patient is monitoring portion sizes, food choices and liquid calories. Patient is trying to exercise regularly as much as possible. We discussed how his weight affects his overall health including:  Patient Active Problem List   Diagnosis    Essential hypertension    Late effect of cerebrovascular accident (CVA)    Chronic left shoulder pain    Central sleep apnea due to medical condition    Abnormal EKG    Cerebrovascular accident (CVA) due to thrombosis of basilar artery ([address]    Contact dermatitis and other eczema, due to unspecified cause    Family history of malignant neoplasm of gastrointestinal tract    Noncompliance    Obesity due to excess calories    Sciatica    Uncontrolled type 2 diabetes mellitus with diabetic nephropathy, with long-term current use of insulin (HCC)    Type 2 diabetes mellitus with vascular disease (HCC)    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 ([address]    Severe obesity (BMI 35.0-39. 9) with comorbidity ([address]    Chronic GERD    Obstructive sleep apnea    Helicobacter pylori (H. pylori) infection    CKD (chronic kidney disease) stage 3, GFR 30-59 ml/min ([facility])    Cortical senile cataract, bilateral    Hypertensive retinopathy, bilateral    Lattice degeneration, right    Nuclear sclerotic cataract, bilateral    Round hole, right    Serous retinal detachment, right eye    Fatty liver    Dyslipidemia associated with type 2 diabetes mellitus ([address]    EKG abnormalities    Thiamin deficiency    and importance of weight loss to alleviate those co morbid conditions.    I encouraged the patient to continue exercise and keeping healthy eating habits. Discussed pre-op labs and work up till now. Also counseled the patient extensively on Surgery. I spent a total of 15 minutes face to face with the patient and greater than 50% of the time was spent in counseling, answering questions, addressing concerns, reviewing labs and/or other studies with the patient as well as coordinating care. Gerson Jason is a 48 y.o. male with Body mass index is 35.91 kg/m². ,  patient is deemed appropriate candidate for weight loss surgery by our multidisciplinary team.     Both open and laparoscopic approach were explained in details. Benefits and risks explained. Informed consent obtained. Risks including but not limited to; Infection, bleeding, gastric leak or obstruction, persistent nausea, vomiting, or reflux, injury to surrounding structures, risks of anesthesia, stricture, delayed gastric emptying, staple line leak, incisional hernia, malnutrition , hair loss, and/ or Conversion to Open surgery may be necessary. Failure to lose or maintain weight loss, Gallstones or Kidney Stones, Deep Venous Thrombosis , pulmonary embolism and / or death. With obesity and/or Fatty liver , I may elect to perform liver core biopsy to have  Baseline idea on liver pathology if there is abnormal Liver Functions or if there is hepatomegaly &/or lesion, risks include but not limited to bile leak, liver hematoma or failure to diagnose a condition. I did explain thoroughly to the patient that compliance with pre- and post op diet and other recommendations are integral part to improve the chances of successful weight loss and also not following it could end with serious health complications. We discussed that our goal is to ameliorate the medical problems and not to obtain a specific body mass index. Patient understands the risks and benefits and wishes to proceed with the procedure.   Also understands if BMI is lower than 40 without significant co morbid conditions, concerns

## 2019-08-15 NOTE — PROGRESS NOTES
Objective:     Physical Exam   Constitutional: He is oriented to person, place, and time. He appears well-developed and well-nourished. HENT:   Head: Normocephalic and atraumatic. Eyes: Pupils are equal, round, and reactive to light. Conjunctivae are normal.   Neck: Normal range of motion. Neck supple. Cardiovascular: Normal rate and normal heart sounds. Pulmonary/Chest: Effort normal and breath sounds normal. No stridor. No respiratory distress. He has no wheezes. Abdominal: Soft. Bowel sounds are normal. He exhibits no distension. There is no tenderness. There is no guarding. Musculoskeletal: Normal range of motion. Neurological: He is alert and oriented to person, place, and time. Skin: Skin is warm and dry. Psychiatric: He has a normal mood and affect. His behavior is normal. Judgment and thought content normal.   Vitals reviewed. Assessment and Plan:   Patient is here for their preoperative education group visit for sleeve gastrectomy. The patient is 3.4 lbs today. The patient's current Body mass index is 36.36 kg/m². (8/20/19). He is making good dietary and behavior modifications and is considered to be a good surgical candidate. Patient has a diagnosis of hypertension. Reviewed the importance of checking blood pressure preoperatively and postoperatively. In addition, following up with their PCP for medication adjustments as needed. Discussed the fact that they will be required to crush or open their medications for the first two weeks after surgery and reviewed those medications that can not be crushed. Patient has a diagnosis of diabetes. Reviewed the importance of checking blood sugars preoperatively and postoperatively. In addition, following up with their PCP or endocrinologist for medication adjustments as needed.  Discussed the fact that they will be required to crush or open their medications for the first two weeks after surgery and reviewed those medications that can

## 2019-08-16 NOTE — TELEPHONE ENCOUNTER
Another attempt made to reach patient. No answer, LM. Will also send [employer] message.     CLINICAL PHARMACY CONSULT: MED RECONCILIATION/REVIEW ADDENDUM    For Pharmacy Admin Tracking Only    PHSO: Yes  Total # of Interventions Recommended: 0  Recommended intervention potential cost savings: 0  Total Interventions Accepted: 0  Time Spent (min): Dana 22, PharmD  [address]

## 2019-08-18 DIAGNOSIS — I10 ESSENTIAL HYPERTENSION: ICD-10-CM

## 2019-08-20 ENCOUNTER — OFFICE VISIT (OUTPATIENT)
Dept: BARIATRICS/WEIGHT MGMT | Age: 54
End: 2019-08-20
Payer: COMMERCIAL

## 2019-08-20 ENCOUNTER — TELEPHONE (OUTPATIENT)
Dept: BARIATRICS/WEIGHT MGMT | Age: 54
End: 2019-08-20

## 2019-08-20 VITALS
WEIGHT: 275.6 LBS | DIASTOLIC BLOOD PRESSURE: 87 MMHG | HEART RATE: 88 BPM | SYSTOLIC BLOOD PRESSURE: 131 MMHG | RESPIRATION RATE: 18 BRPM | BODY MASS INDEX: 36.53 KG/M2 | HEIGHT: 73 IN | OXYGEN SATURATION: 98 %

## 2019-08-20 DIAGNOSIS — I10 ESSENTIAL HYPERTENSION: Primary | ICD-10-CM

## 2019-08-20 DIAGNOSIS — G47.33 OBSTRUCTIVE SLEEP APNEA: ICD-10-CM

## 2019-08-20 DIAGNOSIS — E66.01 SEVERE OBESITY (BMI 35.0-39.9) WITH COMORBIDITY (HCC): ICD-10-CM

## 2019-08-20 DIAGNOSIS — K76.0 FATTY LIVER: ICD-10-CM

## 2019-08-20 DIAGNOSIS — K21.9 CHRONIC GERD: ICD-10-CM

## 2019-08-20 PROCEDURE — 99213 OFFICE O/P EST LOW 20 MIN: CPT | Performed by: NURSE PRACTITIONER

## 2019-08-20 RX ORDER — AMLODIPINE BESYLATE 5 MG/1
5 TABLET ORAL DAILY
Qty: 90 TABLET | Refills: 3 | Status: ON HOLD | OUTPATIENT
Start: 2019-08-20 | End: 2019-09-27 | Stop reason: HOSPADM

## 2019-08-20 ASSESSMENT — ENCOUNTER SYMPTOMS
SHORTNESS OF BREATH: 0
NAUSEA: 0
CONSTIPATION: 0
DIARRHEA: 0
COUGH: 0

## 2019-08-21 RX ORDER — CHLORTHALIDONE 25 MG/1
25 TABLET ORAL DAILY
Qty: 30 TABLET | Refills: 5 | Status: ON HOLD | OUTPATIENT
Start: 2019-08-21 | End: 2019-09-26 | Stop reason: HOSPADM

## 2019-08-27 ENCOUNTER — OFFICE VISIT (OUTPATIENT)
Dept: PRIMARY CARE CLINIC | Age: 54
End: 2019-08-27
Payer: COMMERCIAL

## 2019-08-27 VITALS
WEIGHT: 277 LBS | RESPIRATION RATE: 18 BRPM | HEIGHT: 73 IN | DIASTOLIC BLOOD PRESSURE: 90 MMHG | SYSTOLIC BLOOD PRESSURE: 148 MMHG | HEART RATE: 89 BPM | TEMPERATURE: 98 F | BODY MASS INDEX: 36.71 KG/M2

## 2019-08-27 DIAGNOSIS — E11.59 TYPE 2 DIABETES MELLITUS WITH VASCULAR DISEASE (HCC): ICD-10-CM

## 2019-08-27 DIAGNOSIS — Z01.818 PRE-OP EXAM: Primary | ICD-10-CM

## 2019-08-27 DIAGNOSIS — I10 ESSENTIAL HYPERTENSION: ICD-10-CM

## 2019-08-27 PROCEDURE — 99243 OFF/OP CNSLTJ NEW/EST LOW 30: CPT | Performed by: INTERNAL MEDICINE

## 2019-08-27 NOTE — PROGRESS NOTES
difficulty urinating. Musculoskeletal: Negative for arthralgias. Skin: Negative for rash. Neurological: Negative. Psychiatric/Behavioral: Negative. Negative for agitation and confusion. The patient is not nervous/anxious. All other systems reviewed and are negative. Vitals:    08/27/19 1612   BP: (!) 151/102   Pulse: 89   Resp: 18   Temp: 98 °F (36.7 °C)   TempSrc: Oral   Weight: 277 lb (125.6 kg)   Height: 6' 1\" (1.854 m)     Body mass index is 36.55 kg/m². Wt Readings from Last 3 Encounters:   08/27/19 277 lb (125.6 kg)   08/20/19 275 lb 9.6 oz (125 kg)   08/13/19 272 lb 3.2 oz (123.5 kg)     BP Readings from Last 3 Encounters:   08/27/19 (!) 151/102   08/20/19 131/87   08/13/19 120/60         Physical Exam   Constitutional: He is oriented to person, place, and time. He appears well-developed and well-nourished. HENT:   Head: Normocephalic and atraumatic. Eyes: Pupils are equal, round, and reactive to light. Conjunctivae and EOM are normal.   Neck: Normal range of motion. Neck supple. Cardiovascular: Normal rate, regular rhythm and normal heart sounds. Exam reveals no gallop and no friction rub. No murmur heard. Pulmonary/Chest: Effort normal and breath sounds normal. No stridor. No respiratory distress. He has no wheezes. He has no rales. He exhibits no tenderness. Musculoskeletal: Normal range of motion. Neurological: He is alert and oriented to person, place, and time. Skin: Skin is warm and dry. Psychiatric: He has a normal mood and affect.  His behavior is normal. Judgment and thought content normal.       Lab Review   Orders Only on 08/08/2019   Component Date Value    Vitamin B1,Whole Blood 08/08/2019 [facility] Outpatient Visit on 07/13/2019   Component Date Value    Left Ventricular Ejectio* 07/13/2019 71     LVEF MODALITY 07/13/2019 [facility] Outpatient Visit on 07/13/2019   Component Date Value    Left Ventricular Ejectio* 07/13/2019 63     LVEF

## 2019-08-28 PROBLEM — Z01.818 PRE-OP EXAM: Status: ACTIVE | Noted: 2019-08-28

## 2019-08-28 ASSESSMENT — ENCOUNTER SYMPTOMS
RESPIRATORY NEGATIVE: 1
EYES NEGATIVE: 1
EYE DISCHARGE: 0

## 2019-09-04 RX ORDER — OMEPRAZOLE 20 MG/1
CAPSULE, DELAYED RELEASE ORAL
Qty: 30 CAPSULE | Refills: 3 | Status: SHIPPED | OUTPATIENT
Start: 2019-09-04 | End: 2019-12-02 | Stop reason: SDUPTHER

## 2019-09-07 ENCOUNTER — HOSPITAL ENCOUNTER (OUTPATIENT)
Age: 54
Discharge: HOME | End: 2019-09-07
Payer: COMMERCIAL

## 2019-09-07 DIAGNOSIS — Z01.818 PRE-OP EXAM: ICD-10-CM

## 2019-09-07 DIAGNOSIS — Z01.818 PREOP TESTING: ICD-10-CM

## 2019-09-07 LAB
A/G RATIO: 1.3 (ref 1.1–2.2)
ABO/RH: NORMAL
ALBUMIN SERPL-MCNC: 4 G/DL (ref 3.4–5)
ALP BLD-CCNC: 76 U/L (ref 40–129)
ALT SERPL-CCNC: 25 U/L (ref 10–40)
ANION GAP SERPL CALCULATED.3IONS-SCNC: 12 MMOL/L (ref 3–16)
ANTIBODY SCREEN: NORMAL
AST SERPL-CCNC: 22 U/L (ref 15–37)
BASOPHILS ABSOLUTE: 0.1 K/UL (ref 0–0.2)
BASOPHILS RELATIVE PERCENT: 1 %
BILIRUB SERPL-MCNC: 0.3 MG/DL (ref 0–1)
BUN BLDV-MCNC: 37 MG/DL (ref 7–20)
CALCIUM SERPL-MCNC: 9.8 MG/DL (ref 8.3–10.6)
CHLORIDE BLD-SCNC: 106 MMOL/L (ref 99–110)
CO2: 23 MMOL/L (ref 21–32)
CREAT SERPL-MCNC: 1.6 MG/DL (ref 0.9–1.3)
EOSINOPHILS ABSOLUTE: 0.1 K/UL (ref 0–0.6)
EOSINOPHILS RELATIVE PERCENT: 1.3 %
GFR AFRICAN AMERICAN: 55
GFR NON-AFRICAN AMERICAN: 46
GLOBULIN: 3.1 G/DL
GLUCOSE BLD-MCNC: 208 MG/DL (ref 70–99)
HCT VFR BLD CALC: 39.8 % (ref 40.5–52.5)
HEMOGLOBIN: 12.3 G/DL (ref 13.5–17.5)
LYMPHOCYTES ABSOLUTE: 2.3 K/UL (ref 1–5.1)
LYMPHOCYTES RELATIVE PERCENT: 40.4 %
MCH RBC QN AUTO: 26.8 PG (ref 26–34)
MCHC RBC AUTO-ENTMCNC: 31 G/DL (ref 31–36)
MCV RBC AUTO: 86.4 FL (ref 80–100)
MONOCYTES ABSOLUTE: 0.4 K/UL (ref 0–1.3)
MONOCYTES RELATIVE PERCENT: 6.4 %
NEUTROPHILS ABSOLUTE: 3 K/UL (ref 1.7–7.7)
NEUTROPHILS RELATIVE PERCENT: 50.9 %
PDW BLD-RTO: 16.2 % (ref 12.4–15.4)
PLATELET # BLD: 188 K/UL (ref 135–450)
PMV BLD AUTO: 9.7 FL (ref 5–10.5)
POTASSIUM SERPL-SCNC: 4.8 MMOL/L (ref 3.5–5.1)
RBC # BLD: 4.6 M/UL (ref 4.2–5.9)
SODIUM BLD-SCNC: 141 MMOL/L (ref 136–145)
TOTAL PROTEIN: 7.1 G/DL (ref 6.4–8.2)
WBC # BLD: 5.8 K/UL (ref 4–11)

## 2019-09-07 PROCEDURE — 36415 COLL VENOUS BLD VENIPUNCTURE: CPT

## 2019-09-07 PROCEDURE — 86900 BLOOD TYPING SEROLOGIC ABO: CPT

## 2019-09-07 PROCEDURE — 86901 BLOOD TYPING SEROLOGIC RH(D): CPT

## 2019-09-07 PROCEDURE — 85025 COMPLETE CBC W/AUTO DIFF WBC: CPT

## 2019-09-07 PROCEDURE — 80053 COMPREHEN METABOLIC PANEL: CPT

## 2019-09-07 PROCEDURE — 86850 RBC ANTIBODY SCREEN: CPT

## 2019-09-10 ENCOUNTER — TELEPHONE (OUTPATIENT)
Dept: PRIMARY CARE CLINIC | Age: 54
End: 2019-09-10

## 2019-09-16 ENCOUNTER — TELEPHONE (OUTPATIENT)
Dept: BARIATRICS/WEIGHT MGMT | Age: 54
End: 2019-09-16

## 2019-09-18 ENCOUNTER — PATIENT MESSAGE (OUTPATIENT)
Dept: PRIMARY CARE CLINIC | Age: 54
End: 2019-09-18

## 2019-09-19 DIAGNOSIS — I10 ESSENTIAL HYPERTENSION: ICD-10-CM

## 2019-09-19 RX ORDER — HYDRALAZINE HYDROCHLORIDE 50 MG/1
TABLET, FILM COATED ORAL
Qty: 270 TABLET | Refills: 0 | Status: ON HOLD | OUTPATIENT
Start: 2019-09-19 | End: 2019-09-26 | Stop reason: HOSPADM

## 2019-09-19 NOTE — TELEPHONE ENCOUNTER
Called patient left message to reduce chlorthalidone to one half pill daily increase fluids repeat test before surgery

## 2019-09-25 ENCOUNTER — ANESTHESIA EVENT (OUTPATIENT)
Dept: OPERATING ROOM | Age: 54
DRG: 620 | End: 2019-09-25
Payer: COMMERCIAL

## 2019-09-25 ENCOUNTER — HOSPITAL ENCOUNTER (INPATIENT)
Age: 54
LOS: 1 days | Discharge: HOME | DRG: 620 | End: 2019-09-27
Attending: SURGERY | Admitting: SURGERY
Payer: COMMERCIAL

## 2019-09-25 ENCOUNTER — APPOINTMENT (OUTPATIENT)
Dept: ULTRASOUND IMAGING | Age: 54
DRG: 620 | End: 2019-09-25
Attending: SURGERY
Payer: COMMERCIAL

## 2019-09-25 ENCOUNTER — ANESTHESIA (OUTPATIENT)
Dept: OPERATING ROOM | Age: 54
DRG: 620 | End: 2019-09-25
Payer: COMMERCIAL

## 2019-09-25 VITALS
OXYGEN SATURATION: 100 % | RESPIRATION RATE: 22 BRPM | SYSTOLIC BLOOD PRESSURE: 130 MMHG | TEMPERATURE: 96.1 F | DIASTOLIC BLOOD PRESSURE: 66 MMHG

## 2019-09-25 DIAGNOSIS — Z98.84 S/P LAPAROSCOPIC SLEEVE GASTRECTOMY: ICD-10-CM

## 2019-09-25 DIAGNOSIS — Z01.818 PREOP TESTING: Primary | ICD-10-CM

## 2019-09-25 PROBLEM — N17.9 ACUTE RENAL FAILURE SUPERIMPOSED ON STAGE 4 CHRONIC KIDNEY DISEASE (HCC): Status: ACTIVE | Noted: 2019-09-25

## 2019-09-25 PROBLEM — N18.4 CKD (CHRONIC KIDNEY DISEASE) STAGE 4, GFR 15-29 ML/MIN (HCC): Status: ACTIVE | Noted: 2019-09-25

## 2019-09-25 PROBLEM — N18.4 ACUTE RENAL FAILURE SUPERIMPOSED ON STAGE 4 CHRONIC KIDNEY DISEASE (HCC): Status: ACTIVE | Noted: 2019-09-25

## 2019-09-25 LAB
ABO/RH: NORMAL
ANION GAP SERPL CALCULATED.3IONS-SCNC: 16 MMOL/L (ref 3–16)
ANION GAP SERPL CALCULATED.3IONS-SCNC: 17 MMOL/L (ref 3–16)
ANTIBODY SCREEN: NORMAL
BASE EXCESS VENOUS: -18 (ref -3–3)
BASE EXCESS VENOUS: -8 (ref -3–3)
BETA-HYDROXYBUTYRATE: 3.2 MMOL/L (ref 0–0.27)
BILIRUBIN URINE: NEGATIVE
BLOOD, URINE: ABNORMAL
BUN BLDV-MCNC: 51 MG/DL (ref 7–20)
BUN BLDV-MCNC: 52 MG/DL (ref 7–20)
CALCIUM IONIZED: 1.13 MMOL/L (ref 1.12–1.32)
CALCIUM IONIZED: 1.15 MMOL/L (ref 1.12–1.32)
CALCIUM SERPL-MCNC: 10 MG/DL (ref 8.3–10.6)
CALCIUM SERPL-MCNC: 8.6 MG/DL (ref 8.3–10.6)
CHLORIDE BLD-SCNC: 100 MMOL/L (ref 99–110)
CHLORIDE BLD-SCNC: 102 MMOL/L (ref 99–110)
CLARITY: ABNORMAL
CO2: 20 MMOL/L (ref 21–32)
CO2: 22 MMOL/L (ref 21–32)
COLOR: YELLOW
CREAT SERPL-MCNC: 2.3 MG/DL (ref 0.9–1.3)
CREAT SERPL-MCNC: 2.4 MG/DL (ref 0.9–1.3)
CREATININE URINE: 62.7 MG/DL (ref 39–259)
EPITHELIAL CELLS, UA: 1 /HPF (ref 0–5)
GFR AFRICAN AMERICAN: 35
GFR AFRICAN AMERICAN: 36
GFR NON-AFRICAN AMERICAN: 29
GFR NON-AFRICAN AMERICAN: 30
GLUCOSE BLD-MCNC: 154 MG/DL (ref 70–99)
GLUCOSE BLD-MCNC: 160 MG/DL (ref 70–99)
GLUCOSE BLD-MCNC: 164 MG/DL (ref 70–99)
GLUCOSE BLD-MCNC: 175 MG/DL (ref 70–99)
GLUCOSE BLD-MCNC: 70 MG/DL (ref 70–99)
GLUCOSE BLD-MCNC: 75 MG/DL (ref 70–99)
GLUCOSE BLD-MCNC: 81 MG/DL (ref 70–99)
GLUCOSE URINE: NEGATIVE MG/DL
HCO3 VENOUS: 19.1 MMOL/L (ref 23–29)
HCO3 VENOUS: 9.6 MMOL/L (ref 23–29)
HEMOGLOBIN: 12.1 GM/DL (ref 13.5–17.5)
HEMOGLOBIN: 4.7 GM/DL (ref 13.5–17.5)
HYALINE CASTS: 4 /LPF (ref 0–8)
KETONES, URINE: 15 MG/DL
LACTATE: 1.2 MMOL/L (ref 0.4–2)
LACTATE: 12.32 MMOL/L (ref 0.4–2)
LEUKOCYTE ESTERASE, URINE: NEGATIVE
MICROSCOPIC EXAMINATION: YES
NITRITE, URINE: NEGATIVE
O2 SAT, VEN: 71 %
O2 SAT, VEN: 74 %
PCO2, VEN: 22.3 MM HG (ref 40–50)
PCO2, VEN: 44 MM HG (ref 40–50)
PERFORMED ON: ABNORMAL
PERFORMED ON: NORMAL
PH UA: 5.5 (ref 5–8)
PH VENOUS: 7.24 (ref 7.35–7.45)
PH VENOUS: 7.25 (ref 7.35–7.45)
PO2, VEN: 43 MM HG
PO2, VEN: 46 MM HG
POC ANION GAP: 11
POC ANION GAP: 18
POC CHLORIDE: 104 MMOL/L (ref 99–110)
POC CHLORIDE: 106 MMOL/L (ref 99–110)
POC HEMATOCRIT: 14 % (ref 40.5–52.5)
POC HEMATOCRIT: 36 % (ref 40.5–52.5)
POC POTASSIUM: 3.8 MMOL/L (ref 3.5–5.1)
POC POTASSIUM: 3.8 MMOL/L (ref 3.5–5.1)
POC SAMPLE TYPE: ABNORMAL
POC SAMPLE TYPE: ABNORMAL
POC SODIUM: 132 MMOL/L (ref 136–145)
POC SODIUM: 136 MMOL/L (ref 136–145)
POTASSIUM SERPL-SCNC: 3.9 MMOL/L (ref 3.5–5.1)
POTASSIUM SERPL-SCNC: 4 MMOL/L (ref 3.5–5.1)
PROTEIN UA: NEGATIVE MG/DL
RBC UA: 424 /HPF (ref 0–4)
SODIUM BLD-SCNC: 138 MMOL/L (ref 136–145)
SODIUM BLD-SCNC: 139 MMOL/L (ref 136–145)
SODIUM URINE: 88 MMOL/L
SPECIFIC GRAVITY UA: 1.01 (ref 1–1.03)
TCO2 CALC VENOUS: 10 MMOL/L
TCO2 CALC VENOUS: 21 MMOL/L
URINE REFLEX TO CULTURE: ABNORMAL
URINE TYPE: ABNORMAL
UROBILINOGEN, URINE: 0.2 E.U./DL
WBC UA: 4 /HPF (ref 0–5)

## 2019-09-25 PROCEDURE — 7100000000 HC PACU RECOVERY - FIRST 15 MIN: Performed by: SURGERY

## 2019-09-25 PROCEDURE — 6360000002 HC RX W HCPCS: Performed by: SURGERY

## 2019-09-25 PROCEDURE — 85014 HEMATOCRIT: CPT

## 2019-09-25 PROCEDURE — 82435 ASSAY OF BLOOD CHLORIDE: CPT

## 2019-09-25 PROCEDURE — 43775 LAP SLEEVE GASTRECTOMY: CPT | Performed by: SURGERY

## 2019-09-25 PROCEDURE — 7100000001 HC PACU RECOVERY - ADDTL 15 MIN: Performed by: SURGERY

## 2019-09-25 PROCEDURE — 94761 N-INVAS EAR/PLS OXIMETRY MLT: CPT

## 2019-09-25 PROCEDURE — P9045 ALBUMIN (HUMAN), 5%, 250 ML: HCPCS

## 2019-09-25 PROCEDURE — 3600000005 HC SURGERY LEVEL 5 BASE: Performed by: SURGERY

## 2019-09-25 PROCEDURE — 86900 BLOOD TYPING SEROLOGIC ABO: CPT

## 2019-09-25 PROCEDURE — 6370000000 HC RX 637 (ALT 250 FOR IP): Performed by: SURGERY

## 2019-09-25 PROCEDURE — 3700000001 HC ADD 15 MINUTES (ANESTHESIA): Performed by: SURGERY

## 2019-09-25 PROCEDURE — 88307 TISSUE EXAM BY PATHOLOGIST: CPT

## 2019-09-25 PROCEDURE — 83605 ASSAY OF LACTIC ACID: CPT

## 2019-09-25 PROCEDURE — 6370000000 HC RX 637 (ALT 250 FOR IP)

## 2019-09-25 PROCEDURE — 0DB64Z3 EXCISION OF STOMACH, PERCUTANEOUS ENDOSCOPIC APPROACH, VERTICAL: ICD-10-PCS | Performed by: SURGERY

## 2019-09-25 PROCEDURE — 2580000003 HC RX 258: Performed by: NURSE ANESTHETIST, CERTIFIED REGISTERED

## 2019-09-25 PROCEDURE — 82947 ASSAY GLUCOSE BLOOD QUANT: CPT

## 2019-09-25 PROCEDURE — 6370000000 HC RX 637 (ALT 250 FOR IP): Performed by: NURSE ANESTHETIST, CERTIFIED REGISTERED

## 2019-09-25 PROCEDURE — 94770 HC ETCO2 MONITOR DAILY: CPT

## 2019-09-25 PROCEDURE — 80048 BASIC METABOLIC PNL TOTAL CA: CPT

## 2019-09-25 PROCEDURE — 2500000003 HC RX 250 WO HCPCS: Performed by: SURGERY

## 2019-09-25 PROCEDURE — 86850 RBC ANTIBODY SCREEN: CPT

## 2019-09-25 PROCEDURE — 84300 ASSAY OF URINE SODIUM: CPT

## 2019-09-25 PROCEDURE — P9045 ALBUMIN (HUMAN), 5%, 250 ML: HCPCS | Performed by: NURSE ANESTHETIST, CERTIFIED REGISTERED

## 2019-09-25 PROCEDURE — 2709999900 HC NON-CHARGEABLE SUPPLY: Performed by: SURGERY

## 2019-09-25 PROCEDURE — 0T9B80Z DRAINAGE OF BLADDER WITH DRAINAGE DEVICE, VIA NATURAL OR ARTIFICIAL OPENING ENDOSCOPIC: ICD-10-PCS | Performed by: UROLOGY

## 2019-09-25 PROCEDURE — 82010 KETONE BODYS QUAN: CPT

## 2019-09-25 PROCEDURE — 2500000003 HC RX 250 WO HCPCS: Performed by: ANESTHESIOLOGY

## 2019-09-25 PROCEDURE — 0T7D8ZZ DILATION OF URETHRA, VIA NATURAL OR ARTIFICIAL OPENING ENDOSCOPIC: ICD-10-PCS | Performed by: UROLOGY

## 2019-09-25 PROCEDURE — 81001 URINALYSIS AUTO W/SCOPE: CPT

## 2019-09-25 PROCEDURE — C1769 GUIDE WIRE: HCPCS | Performed by: SURGERY

## 2019-09-25 PROCEDURE — 84295 ASSAY OF SERUM SODIUM: CPT

## 2019-09-25 PROCEDURE — 94150 VITAL CAPACITY TEST: CPT

## 2019-09-25 PROCEDURE — 6360000002 HC RX W HCPCS

## 2019-09-25 PROCEDURE — 2580000003 HC RX 258: Performed by: SURGERY

## 2019-09-25 PROCEDURE — 2500000003 HC RX 250 WO HCPCS

## 2019-09-25 PROCEDURE — 6360000002 HC RX W HCPCS: Performed by: NURSE ANESTHETIST, CERTIFIED REGISTERED

## 2019-09-25 PROCEDURE — 2500000003 HC RX 250 WO HCPCS: Performed by: NURSE ANESTHETIST, CERTIFIED REGISTERED

## 2019-09-25 PROCEDURE — 82330 ASSAY OF CALCIUM: CPT

## 2019-09-25 PROCEDURE — 86901 BLOOD TYPING SEROLOGIC RH(D): CPT

## 2019-09-25 PROCEDURE — 36415 COLL VENOUS BLD VENIPUNCTURE: CPT

## 2019-09-25 PROCEDURE — 82803 BLOOD GASES ANY COMBINATION: CPT

## 2019-09-25 PROCEDURE — 84132 ASSAY OF SERUM POTASSIUM: CPT

## 2019-09-25 PROCEDURE — 2720000010 HC SURG SUPPLY STERILE: Performed by: SURGERY

## 2019-09-25 PROCEDURE — 82570 ASSAY OF URINE CREATININE: CPT

## 2019-09-25 PROCEDURE — 76770 US EXAM ABDO BACK WALL COMP: CPT

## 2019-09-25 PROCEDURE — 3700000000 HC ANESTHESIA ATTENDED CARE: Performed by: SURGERY

## 2019-09-25 PROCEDURE — 2580000003 HC RX 258

## 2019-09-25 PROCEDURE — 3600000015 HC SURGERY LEVEL 5 ADDTL 15MIN: Performed by: SURGERY

## 2019-09-25 RX ORDER — HYDROMORPHONE HCL 110MG/55ML
0.5 PATIENT CONTROLLED ANALGESIA SYRINGE INTRAVENOUS EVERY 5 MIN PRN
Status: DISCONTINUED | OUTPATIENT
Start: 2019-09-25 | End: 2019-09-25 | Stop reason: HOSPADM

## 2019-09-25 RX ORDER — GLYCOPYRROLATE 0.2 MG/ML
INJECTION INTRAMUSCULAR; INTRAVENOUS PRN
Status: DISCONTINUED | OUTPATIENT
Start: 2019-09-25 | End: 2019-09-25 | Stop reason: SDUPTHER

## 2019-09-25 RX ORDER — EPHEDRINE SULFATE 50 MG/ML
10 INJECTION, SOLUTION INTRAVENOUS ONCE
Status: COMPLETED | OUTPATIENT
Start: 2019-09-25 | End: 2019-09-25

## 2019-09-25 RX ORDER — PROPOFOL 10 MG/ML
INJECTION, EMULSION INTRAVENOUS PRN
Status: DISCONTINUED | OUTPATIENT
Start: 2019-09-25 | End: 2019-09-25 | Stop reason: SDUPTHER

## 2019-09-25 RX ORDER — APREPITANT 80 MG/1
80 CAPSULE ORAL ONCE
Status: COMPLETED | OUTPATIENT
Start: 2019-09-25 | End: 2019-09-25

## 2019-09-25 RX ORDER — BUPIVACAINE HYDROCHLORIDE AND EPINEPHRINE 2.5; 5 MG/ML; UG/ML
INJECTION, SOLUTION EPIDURAL; INFILTRATION; INTRACAUDAL; PERINEURAL
Status: COMPLETED | OUTPATIENT
Start: 2019-09-25 | End: 2019-09-25

## 2019-09-25 RX ORDER — ALBUMIN, HUMAN INJ 5% 5 %
SOLUTION INTRAVENOUS
Status: COMPLETED
Start: 2019-09-25 | End: 2019-09-25

## 2019-09-25 RX ORDER — DEXTROSE MONOHYDRATE 25 G/50ML
INJECTION, SOLUTION INTRAVENOUS
Status: COMPLETED
Start: 2019-09-25 | End: 2019-09-25

## 2019-09-25 RX ORDER — ONDANSETRON 2 MG/ML
4 INJECTION INTRAMUSCULAR; INTRAVENOUS EVERY 6 HOURS PRN
Status: DISCONTINUED | OUTPATIENT
Start: 2019-09-25 | End: 2019-09-27 | Stop reason: HOSPADM

## 2019-09-25 RX ORDER — SODIUM CHLORIDE 0.9 % (FLUSH) 0.9 %
10 SYRINGE (ML) INJECTION EVERY 12 HOURS SCHEDULED
Status: DISCONTINUED | OUTPATIENT
Start: 2019-09-25 | End: 2019-09-27 | Stop reason: HOSPADM

## 2019-09-25 RX ORDER — DEXAMETHASONE SODIUM PHOSPHATE 4 MG/ML
INJECTION, SOLUTION INTRA-ARTICULAR; INTRALESIONAL; INTRAMUSCULAR; INTRAVENOUS; SOFT TISSUE PRN
Status: DISCONTINUED | OUTPATIENT
Start: 2019-09-25 | End: 2019-09-25 | Stop reason: SDUPTHER

## 2019-09-25 RX ORDER — MAGNESIUM SULFATE HEPTAHYDRATE 500 MG/ML
INJECTION, SOLUTION INTRAMUSCULAR; INTRAVENOUS PRN
Status: DISCONTINUED | OUTPATIENT
Start: 2019-09-25 | End: 2019-09-25 | Stop reason: SDUPTHER

## 2019-09-25 RX ORDER — LIDOCAINE HYDROCHLORIDE 20 MG/ML
INJECTION, SOLUTION EPIDURAL; INFILTRATION; INTRACAUDAL; PERINEURAL PRN
Status: DISCONTINUED | OUTPATIENT
Start: 2019-09-25 | End: 2019-09-25 | Stop reason: SDUPTHER

## 2019-09-25 RX ORDER — SODIUM CHLORIDE 9 MG/ML
INJECTION, SOLUTION INTRAVENOUS CONTINUOUS
Status: DISCONTINUED | OUTPATIENT
Start: 2019-09-25 | End: 2019-09-25

## 2019-09-25 RX ORDER — DEXTROSE MONOHYDRATE 25 G/50ML
12.5 INJECTION, SOLUTION INTRAVENOUS PRN
Status: DISCONTINUED | OUTPATIENT
Start: 2019-09-25 | End: 2019-09-25

## 2019-09-25 RX ORDER — HYDROMORPHONE HCL 110MG/55ML
0.25 PATIENT CONTROLLED ANALGESIA SYRINGE INTRAVENOUS EVERY 5 MIN PRN
Status: DISCONTINUED | OUTPATIENT
Start: 2019-09-25 | End: 2019-09-25 | Stop reason: HOSPADM

## 2019-09-25 RX ORDER — 0.9 % SODIUM CHLORIDE 0.9 %
10 VIAL (ML) INJECTION DAILY
Status: DISCONTINUED | OUTPATIENT
Start: 2019-09-25 | End: 2019-09-27 | Stop reason: HOSPADM

## 2019-09-25 RX ORDER — SCOLOPAMINE TRANSDERMAL SYSTEM 1 MG/1
1 PATCH, EXTENDED RELEASE TRANSDERMAL
Status: DISCONTINUED | OUTPATIENT
Start: 2019-09-25 | End: 2019-09-27 | Stop reason: HOSPADM

## 2019-09-25 RX ORDER — LIDOCAINE HYDROCHLORIDE 10 MG/ML
0.5 INJECTION, SOLUTION EPIDURAL; INFILTRATION; INTRACAUDAL; PERINEURAL ONCE
Status: DISCONTINUED | OUTPATIENT
Start: 2019-09-25 | End: 2019-09-25 | Stop reason: HOSPADM

## 2019-09-25 RX ORDER — SODIUM CHLORIDE 0.9 % (FLUSH) 0.9 %
10 SYRINGE (ML) INJECTION PRN
Status: DISCONTINUED | OUTPATIENT
Start: 2019-09-25 | End: 2019-09-27 | Stop reason: HOSPADM

## 2019-09-25 RX ORDER — METOPROLOL TARTRATE 5 MG/5ML
2.5 INJECTION INTRAVENOUS EVERY 6 HOURS
Status: DISCONTINUED | OUTPATIENT
Start: 2019-09-25 | End: 2019-09-26

## 2019-09-25 RX ORDER — HYDRALAZINE HYDROCHLORIDE 20 MG/ML
10 INJECTION INTRAMUSCULAR; INTRAVENOUS
Status: DISCONTINUED | OUTPATIENT
Start: 2019-09-25 | End: 2019-09-27

## 2019-09-25 RX ORDER — LIDOCAINE HYDROCHLORIDE 40 MG/ML
SOLUTION TOPICAL PRN
Status: DISCONTINUED | OUTPATIENT
Start: 2019-09-25 | End: 2019-09-25 | Stop reason: SDUPTHER

## 2019-09-25 RX ORDER — SODIUM CHLORIDE, SODIUM LACTATE, POTASSIUM CHLORIDE, CALCIUM CHLORIDE 600; 310; 30; 20 MG/100ML; MG/100ML; MG/100ML; MG/100ML
INJECTION, SOLUTION INTRAVENOUS CONTINUOUS
Status: DISCONTINUED | OUTPATIENT
Start: 2019-09-25 | End: 2019-09-27 | Stop reason: HOSPADM

## 2019-09-25 RX ORDER — PROMETHAZINE HYDROCHLORIDE 25 MG/ML
6.25 INJECTION, SOLUTION INTRAMUSCULAR; INTRAVENOUS EVERY 6 HOURS PRN
Status: DISCONTINUED | OUTPATIENT
Start: 2019-09-25 | End: 2019-09-27 | Stop reason: HOSPADM

## 2019-09-25 RX ORDER — FENTANYL CITRATE 50 UG/ML
25 INJECTION, SOLUTION INTRAMUSCULAR; INTRAVENOUS EVERY 5 MIN PRN
Status: DISCONTINUED | OUTPATIENT
Start: 2019-09-25 | End: 2019-09-25 | Stop reason: HOSPADM

## 2019-09-25 RX ORDER — LIDOCAINE 4 G/G
1 PATCH TOPICAL DAILY
Status: DISCONTINUED | OUTPATIENT
Start: 2019-09-25 | End: 2019-09-27 | Stop reason: HOSPADM

## 2019-09-25 RX ORDER — LABETALOL HYDROCHLORIDE 5 MG/ML
10 INJECTION, SOLUTION INTRAVENOUS
Status: DISCONTINUED | OUTPATIENT
Start: 2019-09-25 | End: 2019-09-27 | Stop reason: HOSPADM

## 2019-09-25 RX ORDER — MAGNESIUM HYDROXIDE 1200 MG/15ML
LIQUID ORAL CONTINUOUS PRN
Status: COMPLETED | OUTPATIENT
Start: 2019-09-25 | End: 2019-09-25

## 2019-09-25 RX ORDER — EPHEDRINE SULFATE 50 MG/ML
25 INJECTION, SOLUTION INTRAVENOUS ONCE
Status: COMPLETED | OUTPATIENT
Start: 2019-09-25 | End: 2019-09-25

## 2019-09-25 RX ORDER — METOCLOPRAMIDE HYDROCHLORIDE 5 MG/ML
10 INJECTION INTRAMUSCULAR; INTRAVENOUS EVERY 6 HOURS PRN
Status: DISCONTINUED | OUTPATIENT
Start: 2019-09-25 | End: 2019-09-27 | Stop reason: HOSPADM

## 2019-09-25 RX ORDER — CISATRACURIUM BESYLATE 2 MG/ML
INJECTION, SOLUTION INTRAVENOUS PRN
Status: DISCONTINUED | OUTPATIENT
Start: 2019-09-25 | End: 2019-09-25 | Stop reason: SDUPTHER

## 2019-09-25 RX ORDER — NALOXONE HYDROCHLORIDE 0.4 MG/ML
0.4 INJECTION, SOLUTION INTRAMUSCULAR; INTRAVENOUS; SUBCUTANEOUS PRN
Status: DISCONTINUED | OUTPATIENT
Start: 2019-09-25 | End: 2019-09-26

## 2019-09-25 RX ORDER — METHYLENE BLUE 10 MG/ML
INJECTION INTRAVENOUS
Status: COMPLETED | OUTPATIENT
Start: 2019-09-25 | End: 2019-09-25

## 2019-09-25 RX ORDER — SCOLOPAMINE TRANSDERMAL SYSTEM 1 MG/1
1 PATCH, EXTENDED RELEASE TRANSDERMAL ONCE
Status: DISCONTINUED | OUTPATIENT
Start: 2019-09-25 | End: 2019-09-26

## 2019-09-25 RX ORDER — EPHEDRINE SULFATE/0.9% NACL/PF 50 MG/5 ML
SYRINGE (ML) INTRAVENOUS
Status: DISPENSED
Start: 2019-09-25 | End: 2019-09-25

## 2019-09-25 RX ORDER — ONDANSETRON 2 MG/ML
4 INJECTION INTRAMUSCULAR; INTRAVENOUS
Status: COMPLETED | OUTPATIENT
Start: 2019-09-25 | End: 2019-09-25

## 2019-09-25 RX ORDER — FENTANYL CITRATE 50 UG/ML
50 INJECTION, SOLUTION INTRAMUSCULAR; INTRAVENOUS EVERY 5 MIN PRN
Status: DISCONTINUED | OUTPATIENT
Start: 2019-09-25 | End: 2019-09-25 | Stop reason: HOSPADM

## 2019-09-25 RX ORDER — PHENYLEPHRINE HCL IN 0.9% NACL 1 MG/10 ML
SYRINGE (ML) INTRAVENOUS PRN
Status: DISCONTINUED | OUTPATIENT
Start: 2019-09-25 | End: 2019-09-25 | Stop reason: SDUPTHER

## 2019-09-25 RX ORDER — DIPHENHYDRAMINE HYDROCHLORIDE 50 MG/ML
12.5 INJECTION INTRAMUSCULAR; INTRAVENOUS EVERY 6 HOURS PRN
Status: DISCONTINUED | OUTPATIENT
Start: 2019-09-25 | End: 2019-09-27 | Stop reason: HOSPADM

## 2019-09-25 RX ORDER — HYDROMORPHONE HYDROCHLORIDE 1 MG/ML
0.5 INJECTION, SOLUTION INTRAMUSCULAR; INTRAVENOUS; SUBCUTANEOUS
Status: DISCONTINUED | OUTPATIENT
Start: 2019-09-25 | End: 2019-09-27 | Stop reason: HOSPADM

## 2019-09-25 RX ORDER — ONDANSETRON 2 MG/ML
INJECTION INTRAMUSCULAR; INTRAVENOUS PRN
Status: DISCONTINUED | OUTPATIENT
Start: 2019-09-25 | End: 2019-09-25 | Stop reason: SDUPTHER

## 2019-09-25 RX ORDER — PANTOPRAZOLE SODIUM 40 MG/10ML
40 INJECTION, POWDER, LYOPHILIZED, FOR SOLUTION INTRAVENOUS DAILY
Status: DISCONTINUED | OUTPATIENT
Start: 2019-09-25 | End: 2019-09-27 | Stop reason: HOSPADM

## 2019-09-25 RX ORDER — SUCCINYLCHOLINE/SOD CL,ISO/PF 200MG/10ML
SYRINGE (ML) INTRAVENOUS PRN
Status: DISCONTINUED | OUTPATIENT
Start: 2019-09-25 | End: 2019-09-25 | Stop reason: SDUPTHER

## 2019-09-25 RX ORDER — ACETAMINOPHEN 10 MG/ML
1000 INJECTION, SOLUTION INTRAVENOUS EVERY 8 HOURS
Status: COMPLETED | OUTPATIENT
Start: 2019-09-25 | End: 2019-09-25

## 2019-09-25 RX ORDER — HYDROMORPHONE HCL 110MG/55ML
PATIENT CONTROLLED ANALGESIA SYRINGE INTRAVENOUS
Status: COMPLETED
Start: 2019-09-25 | End: 2019-09-25

## 2019-09-25 RX ORDER — EPHEDRINE SULFATE/0.9% NACL/PF 50 MG/5 ML
10 SYRINGE (ML) INTRAVENOUS ONCE
Status: COMPLETED | OUTPATIENT
Start: 2019-09-25 | End: 2019-09-25

## 2019-09-25 RX ORDER — ALBUMIN, HUMAN INJ 5% 5 %
12.5 SOLUTION INTRAVENOUS ONCE
Status: COMPLETED | OUTPATIENT
Start: 2019-09-25 | End: 2019-09-25

## 2019-09-25 RX ORDER — LABETALOL HYDROCHLORIDE 5 MG/ML
5 INJECTION, SOLUTION INTRAVENOUS EVERY 10 MIN PRN
Status: DISCONTINUED | OUTPATIENT
Start: 2019-09-25 | End: 2019-09-25 | Stop reason: HOSPADM

## 2019-09-25 RX ORDER — NEOSTIGMINE METHYLSULFATE 5 MG/5 ML
SYRINGE (ML) INTRAVENOUS PRN
Status: DISCONTINUED | OUTPATIENT
Start: 2019-09-25 | End: 2019-09-25 | Stop reason: SDUPTHER

## 2019-09-25 RX ORDER — SODIUM CHLORIDE, SODIUM LACTATE, POTASSIUM CHLORIDE, CALCIUM CHLORIDE 600; 310; 30; 20 MG/100ML; MG/100ML; MG/100ML; MG/100ML
INJECTION, SOLUTION INTRAVENOUS CONTINUOUS
Status: ACTIVE | OUTPATIENT
Start: 2019-09-25 | End: 2019-09-27

## 2019-09-25 RX ORDER — KETAMINE HCL IN NACL, ISO-OSM 100MG/10ML
SYRINGE (ML) INJECTION PRN
Status: DISCONTINUED | OUTPATIENT
Start: 2019-09-25 | End: 2019-09-25 | Stop reason: SDUPTHER

## 2019-09-25 RX ORDER — FENTANYL CITRATE 50 UG/ML
INJECTION, SOLUTION INTRAMUSCULAR; INTRAVENOUS PRN
Status: DISCONTINUED | OUTPATIENT
Start: 2019-09-25 | End: 2019-09-25 | Stop reason: SDUPTHER

## 2019-09-25 RX ORDER — ALBUMIN, HUMAN INJ 5% 5 %
SOLUTION INTRAVENOUS PRN
Status: DISCONTINUED | OUTPATIENT
Start: 2019-09-25 | End: 2019-09-25 | Stop reason: SDUPTHER

## 2019-09-25 RX ORDER — SODIUM CHLORIDE 9 MG/ML
INJECTION, SOLUTION INTRAVENOUS CONTINUOUS
Status: DISCONTINUED | OUTPATIENT
Start: 2019-09-25 | End: 2019-09-27 | Stop reason: HOSPADM

## 2019-09-25 RX ORDER — ONDANSETRON 2 MG/ML
INJECTION INTRAMUSCULAR; INTRAVENOUS
Status: COMPLETED
Start: 2019-09-25 | End: 2019-09-25

## 2019-09-25 RX ADMIN — Medication 4 MG: at 09:10

## 2019-09-25 RX ADMIN — Medication 10 MG: at 10:55

## 2019-09-25 RX ADMIN — ALBUMIN (HUMAN) 250 ML: 12.5 INJECTION, SOLUTION INTRAVENOUS at 08:16

## 2019-09-25 RX ADMIN — Medication 10 MG: at 08:19

## 2019-09-25 RX ADMIN — Medication 100 MCG: at 08:32

## 2019-09-25 RX ADMIN — HYDROMORPHONE HYDROCHLORIDE 0.5 MG: 2 INJECTION INTRAMUSCULAR; INTRAVENOUS; SUBCUTANEOUS at 09:50

## 2019-09-25 RX ADMIN — Medication 180 MG: at 07:38

## 2019-09-25 RX ADMIN — DEXTROSE MONOHYDRATE 12.5 G: 500 INJECTION PARENTERAL at 06:52

## 2019-09-25 RX ADMIN — Medication 100 MCG: at 08:33

## 2019-09-25 RX ADMIN — Medication 100 MCG: at 07:52

## 2019-09-25 RX ADMIN — GLYCOPYRROLATE 0.6 MG: 0.2 INJECTION, SOLUTION INTRAMUSCULAR; INTRAVENOUS at 09:10

## 2019-09-25 RX ADMIN — Medication 100 MCG: at 07:41

## 2019-09-25 RX ADMIN — Medication 100 MCG: at 07:47

## 2019-09-25 RX ADMIN — ACETAMINOPHEN 1000 MG: 10 INJECTION, SOLUTION INTRAVENOUS at 09:59

## 2019-09-25 RX ADMIN — Medication 3 G: at 07:31

## 2019-09-25 RX ADMIN — SODIUM BICARBONATE 50 MEQ: 84 INJECTION, SOLUTION INTRAVENOUS at 11:59

## 2019-09-25 RX ADMIN — ONDANSETRON 4 MG: 2 INJECTION INTRAMUSCULAR; INTRAVENOUS at 09:48

## 2019-09-25 RX ADMIN — SODIUM CHLORIDE: 9 INJECTION, SOLUTION INTRAVENOUS at 06:33

## 2019-09-25 RX ADMIN — PHENYLEPHRINE HYDROCHLORIDE 50 MCG/MIN: 10 INJECTION INTRAVENOUS at 08:01

## 2019-09-25 RX ADMIN — Medication 10 MG: at 09:03

## 2019-09-25 RX ADMIN — ALBUMIN (HUMAN) 250 ML: 12.5 INJECTION, SOLUTION INTRAVENOUS at 08:39

## 2019-09-25 RX ADMIN — SODIUM CHLORIDE, POTASSIUM CHLORIDE, SODIUM LACTATE AND CALCIUM CHLORIDE: 600; 310; 30; 20 INJECTION, SOLUTION INTRAVENOUS at 09:54

## 2019-09-25 RX ADMIN — EPHEDRINE SULFATE 10 MG: 50 INJECTION, SOLUTION INTRAVENOUS at 11:29

## 2019-09-25 RX ADMIN — SODIUM CHLORIDE: 9 INJECTION, SOLUTION INTRAVENOUS at 08:39

## 2019-09-25 RX ADMIN — ONDANSETRON 4 MG: 2 INJECTION INTRAMUSCULAR; INTRAVENOUS at 08:42

## 2019-09-25 RX ADMIN — DEXTROSE MONOHYDRATE 3 G: 50 INJECTION, SOLUTION INTRAVENOUS at 15:59

## 2019-09-25 RX ADMIN — FENTANYL CITRATE 50 MCG: 50 INJECTION, SOLUTION INTRAMUSCULAR; INTRAVENOUS at 09:22

## 2019-09-25 RX ADMIN — SODIUM BICARBONATE 50 MEQ: 84 INJECTION, SOLUTION INTRAVENOUS at 11:48

## 2019-09-25 RX ADMIN — MAGNESIUM SULFATE HEPTAHYDRATE 2 G: 500 INJECTION, SOLUTION INTRAMUSCULAR; INTRAVENOUS at 08:21

## 2019-09-25 RX ADMIN — DEXAMETHASONE SODIUM PHOSPHATE 4 MG: 4 INJECTION, SOLUTION INTRAMUSCULAR; INTRAVENOUS at 07:54

## 2019-09-25 RX ADMIN — LIDOCAINE HYDROCHLORIDE 100 MG: 20 INJECTION, SOLUTION EPIDURAL; INFILTRATION; INTRACAUDAL; PERINEURAL at 07:37

## 2019-09-25 RX ADMIN — ENOXAPARIN SODIUM 30 MG: 30 INJECTION SUBCUTANEOUS at 06:34

## 2019-09-25 RX ADMIN — Medication 100 MCG: at 07:56

## 2019-09-25 RX ADMIN — Medication 10 MG: at 10:02

## 2019-09-25 RX ADMIN — APREPITANT 80 MG: 80 CAPSULE ORAL at 06:34

## 2019-09-25 RX ADMIN — METOPROLOL TARTRATE 2.5 MG: 1 INJECTION, SOLUTION INTRAVENOUS at 19:52

## 2019-09-25 RX ADMIN — ALBUMIN (HUMAN) 12.5 G: 12.5 INJECTION, SOLUTION INTRAVENOUS at 10:36

## 2019-09-25 RX ADMIN — SODIUM BICARBONATE 50 MEQ: 84 INJECTION, SOLUTION INTRAVENOUS at 11:36

## 2019-09-25 RX ADMIN — EPHEDRINE SULFATE 25 MG: 50 INJECTION, SOLUTION INTRAVENOUS at 11:16

## 2019-09-25 RX ADMIN — LIDOCAINE HYDROCHLORIDE 4 ML: 40 SOLUTION TOPICAL at 07:38

## 2019-09-25 RX ADMIN — FENTANYL CITRATE 50 MCG: 50 INJECTION, SOLUTION INTRAMUSCULAR; INTRAVENOUS at 09:28

## 2019-09-25 RX ADMIN — Medication 50 MEQ: at 11:48

## 2019-09-25 RX ADMIN — Medication 50 MEQ: at 11:36

## 2019-09-25 RX ADMIN — ACETAMINOPHEN 1000 MG: 10 INJECTION, SOLUTION INTRAVENOUS at 17:55

## 2019-09-25 RX ADMIN — PROPOFOL 100 MG: 10 INJECTION, EMULSION INTRAVENOUS at 07:37

## 2019-09-25 RX ADMIN — Medication 100 MCG: at 07:37

## 2019-09-25 RX ADMIN — FENTANYL CITRATE 50 MCG: 50 INJECTION, SOLUTION INTRAMUSCULAR; INTRAVENOUS at 07:37

## 2019-09-25 RX ADMIN — Medication 0.5 MG: at 09:50

## 2019-09-25 RX ADMIN — GLUCAGON HYDROCHLORIDE 1 MG: KIT at 08:30

## 2019-09-25 RX ADMIN — ACETAMINOPHEN 1000 MG: 10 INJECTION, SOLUTION INTRAVENOUS at 23:47

## 2019-09-25 RX ADMIN — CISATRACURIUM BESYLATE 4 MG: 2 INJECTION INTRAVENOUS at 08:12

## 2019-09-25 RX ADMIN — DEXTROSE MONOHYDRATE 12.5 G: 25 INJECTION, SOLUTION INTRAVENOUS at 06:52

## 2019-09-25 RX ADMIN — ALBUMIN, HUMAN INJ 5% 12.5 G: 5 SOLUTION at 10:36

## 2019-09-25 RX ADMIN — Medication 50 MEQ: at 11:59

## 2019-09-25 ASSESSMENT — PULMONARY FUNCTION TESTS
PIF_VALUE: 24
PIF_VALUE: 25
PIF_VALUE: 17
PIF_VALUE: 1
PIF_VALUE: 17
PIF_VALUE: 19
PIF_VALUE: 17
PIF_VALUE: 25
PIF_VALUE: 25
PIF_VALUE: 23
PIF_VALUE: 17
PIF_VALUE: 25
PIF_VALUE: 23
PIF_VALUE: 25
PIF_VALUE: 24
PIF_VALUE: 24
PIF_VALUE: 1
PIF_VALUE: 17
PIF_VALUE: 25
PIF_VALUE: 26
PIF_VALUE: 25
PIF_VALUE: 24
PIF_VALUE: 23
PIF_VALUE: 25
PIF_VALUE: 25
PIF_VALUE: 17
PIF_VALUE: 24
PIF_VALUE: 23
PIF_VALUE: 19
PIF_VALUE: 23
PIF_VALUE: 17
PIF_VALUE: 25
PIF_VALUE: 3
PIF_VALUE: 30
PIF_VALUE: 19
PIF_VALUE: 23
PIF_VALUE: 17
PIF_VALUE: 21
PIF_VALUE: 17
PIF_VALUE: 19
PIF_VALUE: 17
PIF_VALUE: 24
PIF_VALUE: 7
PIF_VALUE: 25
PIF_VALUE: 21
PIF_VALUE: 18
PIF_VALUE: 24
PIF_VALUE: 17
PIF_VALUE: 18
PIF_VALUE: 17
PIF_VALUE: 11
PIF_VALUE: 24
PIF_VALUE: 17
PIF_VALUE: 21
PIF_VALUE: 17
PIF_VALUE: 25
PIF_VALUE: 23
PIF_VALUE: 22
PIF_VALUE: 19
PIF_VALUE: 1
PIF_VALUE: 17
PIF_VALUE: 0
PIF_VALUE: 23
PIF_VALUE: 24
PIF_VALUE: 22
PIF_VALUE: 17
PIF_VALUE: 1
PIF_VALUE: 17
PIF_VALUE: 25
PIF_VALUE: 17
PIF_VALUE: 17
PIF_VALUE: 20
PIF_VALUE: 17
PIF_VALUE: 21
PIF_VALUE: 17
PIF_VALUE: 23
PIF_VALUE: 25
PIF_VALUE: 19
PIF_VALUE: 1
PIF_VALUE: 24
PIF_VALUE: 24
PIF_VALUE: 23
PIF_VALUE: 17
PIF_VALUE: 17
PIF_VALUE: 0
PIF_VALUE: 25
PIF_VALUE: 17
PIF_VALUE: 24
PIF_VALUE: 18
PIF_VALUE: 24
PIF_VALUE: 22
PIF_VALUE: 22
PIF_VALUE: 8
PIF_VALUE: 1
PIF_VALUE: 17
PIF_VALUE: 23
PIF_VALUE: 17
PIF_VALUE: 25
PIF_VALUE: 17

## 2019-09-25 ASSESSMENT — PAIN DESCRIPTION - LOCATION
LOCATION: ABDOMEN

## 2019-09-25 ASSESSMENT — PAIN SCALES - GENERAL
PAINLEVEL_OUTOF10: 0
PAINLEVEL_OUTOF10: 0
PAINLEVEL_OUTOF10: 8
PAINLEVEL_OUTOF10: 0
PAINLEVEL_OUTOF10: 4
PAINLEVEL_OUTOF10: 0
PAINLEVEL_OUTOF10: 0
PAINLEVEL_OUTOF10: 6

## 2019-09-25 ASSESSMENT — PAIN DESCRIPTION - PAIN TYPE
TYPE: SURGICAL PAIN

## 2019-09-25 ASSESSMENT — PAIN DESCRIPTION - DESCRIPTORS: DESCRIPTORS: ACHING

## 2019-09-25 ASSESSMENT — PAIN DESCRIPTION - FREQUENCY: FREQUENCY: INTERMITTENT

## 2019-09-25 ASSESSMENT — PAIN DESCRIPTION - ORIENTATION: ORIENTATION: MID

## 2019-09-25 ASSESSMENT — PAIN DESCRIPTION - PROGRESSION: CLINICAL_PROGRESSION: GRADUALLY IMPROVING

## 2019-09-25 NOTE — CONSULTS
ergocalciferol (ERGOCALCIFEROL) 44311 units capsule Take 50,000 Units by mouth once a week Takes on Monday      Blood Glucose Monitoring Suppl (PRODIGY POCKET BLOOD GLUCOSE) w/Device KIT Use daily to monitor blood sugar level 1 kit 1    blood glucose test strips (ASCENSIA AUTODISC VI;ONE TOUCH ULTRA TEST VI) strip 3 each by In Vitro route daily As needed. 100 each 3    PRODIGY LANCETS 28G MISC 3 each by Does not apply route daily 100 each 3    aspirin 81 MG EC tablet Take 81 mg by mouth daily         Allergies:  Hctz [hydrochlorothiazide] and Penicillins    Social History:    Social History     Socioeconomic History    Marital status:      Spouse name: Not on file    Number of children: Not on file    Years of education: Not on file    Highest education level: Not on file   Occupational History    Not on file   Social Needs    Financial resource strain: Not on file    Food insecurity:     Worry: Not on file     Inability: Not on file    Transportation needs:     Medical: Not on file     Non-medical: Not on file   Tobacco Use    Smoking status: Never Smoker    Smokeless tobacco: Never Used   Substance and Sexual Activity    Alcohol use:  Yes     Alcohol/week: 1.0 standard drinks     Types: 1 Cans of beer per week     Comment: socially     Drug use: No    Sexual activity: Yes     Partners: Female   Lifestyle    Physical activity:     Days per week: Not on file     Minutes per session: Not on file    Stress: Not on file   Relationships    Social connections:     Talks on phone: Not on file     Gets together: Not on file     Attends [employer] service: Not on file     Active member of club or organization: Not on file     Attends meetings of clubs or organizations: Not on file     Relationship status: Not on file    Intimate partner violence:     Fear of current or ex partner: Not on file     Emotionally abused: Not on file     Physically abused: Not on file     Forced sexual activity: Not on

## 2019-09-25 NOTE — CONSULTS
Helicobacter pylori (H. pylori) infection    Dyslipidemia associated with type 2 diabetes mellitus (HCC)    CKD (chronic kidney disease) stage 4, GFR 15-29 ml/min (HCC)    Acute renal failure superimposed on stage 4 chronic kidney disease ([facility]  Resolved Problems:    * No resolved hospital problems. *      Past Surgical History:  He has a past surgical history that includes knee surgery and Upper gastrointestinal endoscopy (N/A, 4/12/2019). Social History:  He reports that he has never smoked. He has never used smokeless tobacco. He reports that he drinks about 1.0 standard drinks of alcohol per week. He reports that he does not use drugs. Family History:  family history includes Colon Cancer in his sister; Diabetes in his mother; Heart Attack in his mother; High Blood Pressure in his mother. Allergies: Allergies   Allergen Reactions    Hctz [Hydrochlorothiazide]      cramps    Penicillins Hives       Medications:  Scheduled Meds:   lidocaine PF  0.5 mL Intradermal Once    scopolamine  1 patch Transdermal Once     Continuous Infusions:   sodium chloride 50 mL/hr at 09/25/19 5409    sodium chloride       PRN Meds:dextrose, sodium chloride    Review of Systems:  Pertinent positives/negatives reviewed in HPI. All other systems reviewed and negative, unless noted below. Constitutional: Negative  Genitourinary: +dribbling see HPI  HEENT: Negative   Cardiovascular: Negative   Respiratory: Negative   Gastrointestinal: Negative   Musculoskeletal: Negative   Neurological: Negative   Psychiatric: Negative   Integumentary: Negative     PHYSICAL EXAM     Vitals:    09/25/19 0940   BP: (!) 106/58   Pulse: 74   Resp: 15   Temp: 97.2 °F (36.2 °C)   SpO2: 100%     CONSTITUTIONAL: The patient is well nourished/developed, with no distress noted. Recovering from anesthesia in PACU   NEUROLOGICAL/PSYCHIATRIC: Oriented to place and time, normal affected noted.    NECK: The neck is symmetrical and supple, with no

## 2019-09-25 NOTE — H&P
Glucose Monitoring Suppl (PRODIGY POCKET BLOOD GLUCOSE) w/Device KIT, Use daily to monitor blood sugar level  blood glucose test strips (ASCENSIA AUTODISC VI;ONE TOUCH ULTRA TEST VI) strip, 3 each by In Vitro route daily As needed. PRODIGY LANCETS 28G MISC, 3 each by Does not apply route daily  aspirin 81 MG EC tablet, Take 81 mg by mouth daily    Allergies:  Hctz [hydrochlorothiazide] and Penicillins    Social History:   TOBACCO:   reports that he has never smoked. He has never used smokeless tobacco.  ETOH:   reports that he drinks about 1.0 standard drinks of alcohol per week. Family History:       Problem Relation Age of Onset    Heart Attack Mother     Diabetes Mother     High Blood Pressure Mother     Colon Cancer Sister          REVIEW OF SYSTEMS:    Review of Systems - History obtained from the patient  General ROS: obesity  Psychological ROS: negative  Ophthalmic ROS: negative  Neurological ROS: negative  ENT ROS: negative  Allergy and Immunology ROS: negative  Hematological and Lymphatic ROS: negative  Endocrine ROS: negative  Breast ROS: negative  Respiratory ROS: negative  Cardiovascular ROS: negative  Gastrointestinal ROS:negative  Genito-Urinary ROS: negative  Musculoskeletal ROS: negative   Skin ROS: negative      PHYSICAL EXAM:      /71   Pulse 80   Temp 97.9 °F (36.6 °C) (Temporal)   Resp 20   Ht 6' 1\" (1.854 m)   Wt 265 lb (120.2 kg)   SpO2 98%   BMI 34.96 kg/m²  I      Physical Exam   Vitals Reviewed   Constitutional: Patient is oriented to person, place, and time. Patient appears well-developed and well-nourished. Patient is active and cooperative. Non-toxic appearance. No distress. HENT:   Head: Normocephalic and atraumatic. Head is without abrasion and without laceration. Hair is normal.   Right Ear: External ear normal. No lacerations. No drainage, swelling . Left Ear: External ear normal. No lacerations. No drainage, swelling.    Nose: Nose normal. No nose lacerations understands the risks. Clearly BMI over 35 does impose very serious health risks as well chances of losing weight on diet only is very limited and sustaining weight loss is even less, thus surgery is certainly recommended for long term weight loss and better health overall given compliance. I advised the patient that we can't guarantee final insurance approval.        1.  Plan for Laparoscopic Sleeve Gastrectomy with General Anesthesia. if ok with Anesthesia given elevated Creatinine.  Will discuss with team.           Los Reyes MD

## 2019-09-25 NOTE — ANESTHESIA PRE PROCEDURE
Department of Anesthesiology  Preprocedure Note       Name:  Louisa Parekh   Age:  48 y.o.  :  1965                                          MRN:  [id number]         Date:  2019      Surgeon: Sudha Tobias): Teodoro Figueroa MD    Procedure: LAPAROSCOPIC SLEEVE GASTRECTOMY-ETHICON (N/A Abdomen)  POSSIBLE LIVER BIOPSY (N/A Abdomen)    Medications prior to admission:   Prior to Admission medications    Medication Sig Start Date End Date Taking? Authorizing Provider   hydrALAZINE (APRESOLINE) 50 MG tablet Take 1 tablet in the morning and afternoon. 2 tablets in the evening.  19   Catherine Randolph MD   metFORMIN (GLUCOPHAGE) 1000 MG tablet TAKE 1 TABLET BY MOUTH 2 TIMES A DAY WITH MEALS 19   Bebo Roberto MD   omeprazole (PRILOSEC) 20 MG delayed release capsule TAKE 1 CAPSULE BY MOUTH ONE TIME A DAY 19   Alayna Memory, APRN - CNP   chlorthalidone (HYGROTON) 25 MG tablet Take 1 tablet by mouth daily 19   Catherine Randolph MD   amLODIPine (NORVASC) 5 MG tablet Take 1 tablet by mouth daily 19   Catherine Randolph MD   ferrous sulfate 325 (65 Fe) MG tablet TAKE 1 TABLET BY MOUTH 2 TIMES A DAY WITH MEALS 19   Alayna Memory, APRN - CNP   ergocalciferol (ERGOCALCIFEROL) 44427 units capsule Take 50,000 Units by mouth once a week Takes on Monday    Historical Provider, MD   insulin glargine (LANTUS SOLOSTAR) 100 UNIT/ML injection pen Inject 24 Units into the skin every morning 19   Bebo Roberto MD   atenolol (TENORMIN) 50 MG tablet TAKE 1 TABLET BY MOUTH ONE TIME A DAY IN THE MORNING 19   Laurie Zuñiga MD   atorvastatin (LIPITOR) 80 MG tablet TAKE ONE TABLET BY MOUTH NIGHTLY 19   Catherine Randolph MD   lisinopril (PRINIVIL;ZESTRIL) 40 MG tablet TAKE 1 TABLET BY MOUTH ONE TIME A DAY 19   Catherine Randolph MD   Blood Glucose Monitoring Suppl (PRODIGY POCKET BLOOD GLUCOSE) w/Device KIT Use daily to monitor blood sugar level BMI:   Wt Readings from Last 3 Encounters:   09/25/19 265 lb (120.2 kg)   08/27/19 277 lb (125.6 kg)   08/20/19 275 lb 9.6 oz (125 kg)     There is no height or weight on file to calculate BMI.    CBC:   Lab Results   Component Value Date    WBC 5.8 09/07/2019    RBC 4.60 09/07/2019    HGB 12.3 09/07/2019    HCT 39.8 09/07/2019    MCV 86.4 09/07/2019    RDW 16.2 09/07/2019     09/07/2019       CMP:   Lab Results   Component Value Date     09/07/2019    K 4.8 09/07/2019     09/07/2019    CO2 23 09/07/2019    BUN 37 09/07/2019    CREATININE 1.6 09/07/2019    GFRAA 55 09/07/2019    AGRATIO 1.3 09/07/2019    LABGLOM 46 09/07/2019    GLUCOSE 208 09/07/2019    PROT 7.1 09/07/2019    CALCIUM 9.8 09/07/2019    BILITOT 0.3 09/07/2019    ALKPHOS 76 09/07/2019    AST 22 09/07/2019    ALT 25 09/07/2019       POC Tests:   Recent Labs     09/25/19  0629   POCGLU 70       Coags: No results found for: PROTIME, INR, APTT    HCG (If Applicable): No results found for: PREGTESTUR, PREGSERUM, HCG, HCGQUANT     ABGs: No results found for: PHART, PO2ART, PXB4LSG, DTV4FFZ, BEART, T5RLOKKT     Type & Screen (If Applicable):  No results found for: LABABO, [address]    Anesthesia Evaluation  Patient summary reviewed and Nursing notes reviewed  Airway: Mallampati: II  TM distance: >3 FB   Neck ROM: full  Mouth opening: > = 3 FB Dental:          Pulmonary:   (+) sleep apnea:                             Cardiovascular:  Exercise tolerance: good (>4 METS),   (+) hypertension:, hyperlipidemia      ECG reviewed  Rhythm: regular  Rate: normal  Echocardiogram reviewed               ROS comment: EF 65%     Neuro/Psych:   (+) CVA:, neuromuscular disease:,             GI/Hepatic/Renal:   (+) GERD:, renal disease: CRI, morbid obesity          Endo/Other:    (+) DiabetesType II DM, using insulin, .                  Abdominal:   (+) obese,         Vascular:

## 2019-09-25 NOTE — OP NOTE
Urology Operative Note  [facility]     Patient: Stephanie Knutson MRN: [id number]  Room/Bed: OR/NONE   YOB: 1965  Age/Sex: 48 y.o.male  Admission Date: 9/25/2019     Date of Operation: 9/25/2019    Preoperative Diagnosis: Difficulty tucker catheter    Postoperative Diagnosis: same with bulbar urethral stricture (soft, short 2-3 mm in length, 5 [place] diameter)    Procedure:    1. Flexible cystoscopy with urethral dilation  2. Complex tucker catheter placement over wire ([address] [place] [place] tip)    Surgeon:   Dominica Dakins, MD, DENA    Anesthesia: General    Indications: Stephanie Knutson is a 48 y.o. male undergoing gastric sleeve with Dr. Brianne Stanton. Reported difficulty with catheter, intraoperative consult requested. Unable to obtain informed consent based on emergent nature of procedure with patient already under general anesthesia. Details of Procedure:  A flexible cystoscope was advance via a normal appearing anterior urethra. I gently advanced the flexible cystoscope I encountered a pinpoint bulbar urethral stricture. It was very short in length based on what I was able to visualize and approximately 5 [place] in diameter. I was able to place a sensor wire without resistance through the stricture into the bladder. The cystoscope was then removed. I then used the S shaped urethral dilators to gently dilate this soft stricture to 25 [address] [place], after which I placed a 12 [address] [place] tip catheter without resistance into the bladder over the wire. 10 cc of sterile water were used to fill the balloon. There was really minimal bleeding and the catheter was draining clear yellow urine in the procedure. The case was then turned back over to Dr. Brianne Stanton.      Findings: Bulbar urethral stricture    Estimated Blood Loss: Minimal                 Drains: None          Specimens: None    Complications: None apparent           Disposition:  PACU - stable    Plan: Continue catheter per AUA stricture
consult/proecdure)        Surgeon: Margarita Raymond MD    Anesthesia: General endotracheal anesthesia        Procedure Details   The patient was seen again in the Holding Room. The risks, benefits, complications, treatment options, and expected outcomes were discussed with the patient and/or family. The possibilities of reaction to medication, pulmonary aspiration, perforation of viscus, bleeding, recurrent infection, strictures, leaks, failure to lose weight, regaining weight,  the need for additional procedures, failure to diagnose a condition, and creating a complication requiring transfusion or operation were discussed. There was concurrence with the proposed plan and informed consent was obtained. The site of surgery was properly noted/marked. The patient was taken to Operating Room, identified as Brock Han and the procedure verified as Laparoscopic Sleeve Gastrectomy. A Time Out was held and the above information confirmed. The patient was placed in the supine position and general anesthesia was induced, along with placement of orogastric tube, Venodyne boots, and a Tucker catheter (please refer to urology intra-op consult for cysto , urethral dilatation and tucker placement) . Lovenox SQ given pre-operatively. IV Antibiotics given pre-operatively. All pressure points were padded properly. A left upper quadrant incision was made and a veres needle was inserted after confirming with saline drop test.  After adequate pneumoperitoneum was obtained, a 5 mm trocar was inserted. This was followed by a endoscope which confirmed intra-abdominal placement and there was no injury on initial trocar placement. Additional ports were placed in the standard positions under direct vision. The abdomen was initially explored and no abnormalities were identified. A liver retractor was inserted through a small incision in the upper midline, lifted the liver upward, and was then secured to the OR table.       The

## 2019-09-26 ENCOUNTER — APPOINTMENT (OUTPATIENT)
Dept: GENERAL RADIOLOGY | Age: 54
DRG: 620 | End: 2019-09-26
Attending: SURGERY
Payer: COMMERCIAL

## 2019-09-26 PROBLEM — Z98.84 S/P LAPAROSCOPIC SLEEVE GASTRECTOMY: Status: ACTIVE | Noted: 2019-09-26

## 2019-09-26 LAB
ANION GAP SERPL CALCULATED.3IONS-SCNC: 13 MMOL/L (ref 3–16)
BETA-HYDROXYBUTYRATE: 0.4 MMOL/L (ref 0–0.27)
BUN BLDV-MCNC: 38 MG/DL (ref 7–20)
CALCIUM SERPL-MCNC: 9.2 MG/DL (ref 8.3–10.6)
CHLORIDE BLD-SCNC: 103 MMOL/L (ref 99–110)
CO2: 24 MMOL/L (ref 21–32)
CREAT SERPL-MCNC: 1.9 MG/DL (ref 0.9–1.3)
GFR AFRICAN AMERICAN: 45
GFR NON-AFRICAN AMERICAN: 37
GLUCOSE BLD-MCNC: 129 MG/DL (ref 70–99)
GLUCOSE BLD-MCNC: 138 MG/DL (ref 70–99)
GLUCOSE BLD-MCNC: 145 MG/DL (ref 70–99)
HCT VFR BLD CALC: 38.4 % (ref 40.5–52.5)
HEMOGLOBIN: 12.2 G/DL (ref 13.5–17.5)
MCH RBC QN AUTO: 26.2 PG (ref 26–34)
MCHC RBC AUTO-ENTMCNC: 31.8 G/DL (ref 31–36)
MCV RBC AUTO: 82.5 FL (ref 80–100)
PARATHYROID HORMONE INTACT: 59.2 PG/ML (ref 14–72)
PDW BLD-RTO: 15.9 % (ref 12.4–15.4)
PERFORMED ON: ABNORMAL
PERFORMED ON: ABNORMAL
PHOSPHORUS: 3.3 MG/DL (ref 2.5–4.9)
PLATELET # BLD: 220 K/UL (ref 135–450)
PMV BLD AUTO: 8.5 FL (ref 5–10.5)
POTASSIUM REFLEX MAGNESIUM: 4.3 MMOL/L (ref 3.5–5.1)
RBC # BLD: 4.65 M/UL (ref 4.2–5.9)
SODIUM BLD-SCNC: 140 MMOL/L (ref 136–145)
TOTAL CK: 791 U/L (ref 39–308)
VITAMIN D 25-HYDROXY: 55.7 NG/ML
WBC # BLD: 7.3 K/UL (ref 4–11)

## 2019-09-26 PROCEDURE — APPSS180 APP SPLIT SHARED TIME > 60 MINUTES: Performed by: NURSE PRACTITIONER

## 2019-09-26 PROCEDURE — 2580000003 HC RX 258: Performed by: SURGERY

## 2019-09-26 PROCEDURE — 84100 ASSAY OF PHOSPHORUS: CPT

## 2019-09-26 PROCEDURE — 6370000000 HC RX 637 (ALT 250 FOR IP): Performed by: NURSE PRACTITIONER

## 2019-09-26 PROCEDURE — 99024 POSTOP FOLLOW-UP VISIT: CPT | Performed by: NURSE PRACTITIONER

## 2019-09-26 PROCEDURE — 6370000000 HC RX 637 (ALT 250 FOR IP): Performed by: SURGERY

## 2019-09-26 PROCEDURE — 2580000003 HC RX 258: Performed by: INTERNAL MEDICINE

## 2019-09-26 PROCEDURE — 83970 ASSAY OF PARATHORMONE: CPT

## 2019-09-26 PROCEDURE — 82306 VITAMIN D 25 HYDROXY: CPT

## 2019-09-26 PROCEDURE — 6360000002 HC RX W HCPCS: Performed by: SURGERY

## 2019-09-26 PROCEDURE — 6360000004 HC RX CONTRAST MEDICATION

## 2019-09-26 PROCEDURE — 2500000003 HC RX 250 WO HCPCS: Performed by: SURGERY

## 2019-09-26 PROCEDURE — 80048 BASIC METABOLIC PNL TOTAL CA: CPT

## 2019-09-26 PROCEDURE — C9113 INJ PANTOPRAZOLE SODIUM, VIA: HCPCS | Performed by: SURGERY

## 2019-09-26 PROCEDURE — 1200000000 HC SEMI PRIVATE

## 2019-09-26 PROCEDURE — 74240 X-RAY XM UPR GI TRC 1CNTRST: CPT

## 2019-09-26 PROCEDURE — 82550 ASSAY OF CK (CPK): CPT

## 2019-09-26 PROCEDURE — 85027 COMPLETE CBC AUTOMATED: CPT

## 2019-09-26 RX ORDER — ONDANSETRON 8 MG/1
8 TABLET, ORALLY DISINTEGRATING ORAL EVERY 8 HOURS PRN
Status: DISCONTINUED | OUTPATIENT
Start: 2019-09-26 | End: 2019-09-27 | Stop reason: HOSPADM

## 2019-09-26 RX ORDER — OXYCODONE HYDROCHLORIDE AND ACETAMINOPHEN 5; 325 MG/1; MG/1
2 TABLET ORAL EVERY 4 HOURS PRN
Status: DISCONTINUED | OUTPATIENT
Start: 2019-09-26 | End: 2019-09-27 | Stop reason: HOSPADM

## 2019-09-26 RX ORDER — ONDANSETRON 4 MG/1
8 TABLET, ORALLY DISINTEGRATING ORAL EVERY 8 HOURS PRN
Qty: 30 TABLET | Refills: 1 | Status: SHIPPED | OUTPATIENT
Start: 2019-09-26 | End: 2019-10-07

## 2019-09-26 RX ORDER — PROMETHAZINE HYDROCHLORIDE 25 MG/1
12.5 TABLET ORAL EVERY 6 HOURS PRN
Status: DISCONTINUED | OUTPATIENT
Start: 2019-09-26 | End: 2019-09-27 | Stop reason: HOSPADM

## 2019-09-26 RX ORDER — METOPROLOL TARTRATE 50 MG/1
50 TABLET, FILM COATED ORAL 2 TIMES DAILY
Qty: 60 TABLET | Refills: 0 | Status: SHIPPED | OUTPATIENT
Start: 2019-09-26 | End: 2022-06-27 | Stop reason: SDUPTHER

## 2019-09-26 RX ORDER — ONDANSETRON 4 MG/1
8 TABLET, ORALLY DISINTEGRATING ORAL EVERY 8 HOURS PRN
Qty: 30 TABLET | Refills: 0 | Status: SHIPPED | OUTPATIENT
Start: 2019-09-26 | End: 2020-03-09 | Stop reason: ALTCHOICE

## 2019-09-26 RX ORDER — OXYCODONE HYDROCHLORIDE AND ACETAMINOPHEN 5; 325 MG/1; MG/1
1 TABLET ORAL EVERY 4 HOURS PRN
Status: DISCONTINUED | OUTPATIENT
Start: 2019-09-26 | End: 2019-09-27 | Stop reason: HOSPADM

## 2019-09-26 RX ORDER — METOPROLOL TARTRATE 50 MG/1
50 TABLET, FILM COATED ORAL 2 TIMES DAILY
Status: DISCONTINUED | OUTPATIENT
Start: 2019-09-26 | End: 2019-09-27 | Stop reason: HOSPADM

## 2019-09-26 RX ORDER — OXYCODONE HYDROCHLORIDE AND ACETAMINOPHEN 5; 325 MG/1; MG/1
1 TABLET ORAL EVERY 6 HOURS PRN
Qty: 28 TABLET | Refills: 0 | Status: SHIPPED | OUTPATIENT
Start: 2019-09-26 | End: 2019-10-03

## 2019-09-26 RX ADMIN — HYDRALAZINE HYDROCHLORIDE 10 MG: 20 INJECTION INTRAMUSCULAR; INTRAVENOUS at 18:20

## 2019-09-26 RX ADMIN — ENOXAPARIN SODIUM 30 MG: 30 INJECTION SUBCUTANEOUS at 07:45

## 2019-09-26 RX ADMIN — PANTOPRAZOLE SODIUM 40 MG: 40 INJECTION, POWDER, LYOPHILIZED, FOR SOLUTION INTRAVENOUS at 07:45

## 2019-09-26 RX ADMIN — METOPROLOL TARTRATE 2.5 MG: 1 INJECTION, SOLUTION INTRAVENOUS at 07:45

## 2019-09-26 RX ADMIN — OXYCODONE HYDROCHLORIDE AND ACETAMINOPHEN 2 TABLET: 5; 325 TABLET ORAL at 15:40

## 2019-09-26 RX ADMIN — DEXTROSE MONOHYDRATE 3 G: 50 INJECTION, SOLUTION INTRAVENOUS at 00:36

## 2019-09-26 RX ADMIN — METOPROLOL TARTRATE 2.5 MG: 1 INJECTION, SOLUTION INTRAVENOUS at 01:29

## 2019-09-26 RX ADMIN — HYDRALAZINE HYDROCHLORIDE 10 MG: 20 INJECTION INTRAMUSCULAR; INTRAVENOUS at 22:09

## 2019-09-26 RX ADMIN — IOHEXOL 50 ML: 350 INJECTION, SOLUTION INTRAVENOUS at 08:49

## 2019-09-26 RX ADMIN — SODIUM CHLORIDE, POTASSIUM CHLORIDE, SODIUM LACTATE AND CALCIUM CHLORIDE: 600; 310; 30; 20 INJECTION, SOLUTION INTRAVENOUS at 01:27

## 2019-09-26 RX ADMIN — METOPROLOL TARTRATE 50 MG: 50 TABLET, FILM COATED ORAL at 21:12

## 2019-09-26 ASSESSMENT — PAIN SCALES - GENERAL
PAINLEVEL_OUTOF10: 3
PAINLEVEL_OUTOF10: 7
PAINLEVEL_OUTOF10: 3
PAINLEVEL_OUTOF10: 2
PAINLEVEL_OUTOF10: 4

## 2019-09-26 ASSESSMENT — PAIN DESCRIPTION - LOCATION
LOCATION: ABDOMEN

## 2019-09-26 ASSESSMENT — PAIN DESCRIPTION - DESCRIPTORS
DESCRIPTORS: ACHING

## 2019-09-26 ASSESSMENT — PAIN DESCRIPTION - FREQUENCY
FREQUENCY: INTERMITTENT

## 2019-09-26 ASSESSMENT — PAIN DESCRIPTION - ORIENTATION
ORIENTATION: MID

## 2019-09-26 ASSESSMENT — PAIN DESCRIPTION - PAIN TYPE
TYPE: SURGICAL PAIN

## 2019-09-26 ASSESSMENT — PAIN DESCRIPTION - PROGRESSION
CLINICAL_PROGRESSION: GRADUALLY IMPROVING

## 2019-09-26 NOTE — PLAN OF CARE
Problem: Pain:  Description  Pain management should include both nonpharmacologic and pharmacologic interventions. Goal: Pain level will decrease  Description  Pain level will decrease  Outcome: Ongoing  Goal: Control of acute pain  Description  Control of acute pain  Outcome: Ongoing  Goal: Control of chronic pain  Description  Control of chronic pain  Outcome: Ongoing     Problem: Falls - Risk of:  Goal: Will remain free from falls  Description  Will remain free from falls  Outcome: Ongoing  Goal: Absence of physical injury  Description  Absence of physical injury  Outcome: Ongoing     Problem: Discharge Planning:  Goal: Discharged to appropriate level of care  Description  Discharged to appropriate level of care  Outcome: Ongoing     Problem: Bowel Function - Altered:  Goal: Bowel elimination is within specified parameters  Description  Bowel elimination is within specified parameters  Outcome: Ongoing     Problem: Fluid Volume - Imbalance:  Goal: Ability to achieve a balanced intake and output will improve  Description  Ability to achieve a balanced intake and output will improve  Outcome: Ongoing     Problem: Infection - Surgical Site:  Goal: Will show no infection signs and symptoms  Description  Will show no infection signs and symptoms  Outcome: Ongoing  Goal: Ability to maintain a body temperature in the normal range will improve  Description  Ability to maintain a body temperature in the normal range will improve  Outcome: Ongoing     Problem: Nutrition Deficit:  Goal: Ability to identify appropriate dietary choices will improve  Description  Ability to identify appropriate dietary choices will improve  Outcome: Ongoing     Problem: Pain:  Description  Pain management should include both nonpharmacologic and pharmacologic interventions.   Goal: Pain level will decrease  Description  Pain level will decrease  Outcome: Ongoing  Goal: Control of acute pain  Description  Control of acute pain  Outcome:

## 2019-09-26 NOTE — DISCHARGE SUMMARY
background retinopathy ([facility]    Severe obesity (BMI 35.0-39. 9) with comorbidity (HCC)    Chronic GERD    Obstructive sleep apnea    Helicobacter pylori (H. pylori) infection    CKD (chronic kidney disease) stage 3, GFR 30-59 ml/min (HCC)    Cortical senile cataract, bilateral    Hypertensive retinopathy, bilateral    Lattice degeneration, right    Nuclear sclerotic cataract, bilateral    Round hole, right    Serous retinal detachment, right eye    Fatty liver    Dyslipidemia associated with type 2 diabetes mellitus (HCC)    EKG abnormalities    Thiamin deficiency    Pre-op exam    CKD (chronic kidney disease) stage 4, GFR 15-29 ml/min (HCC)    Acute renal failure superimposed on stage 4 chronic kidney disease (HCC)    S/P laparoscopic sleeve gastrectomy

## 2019-09-27 VITALS
TEMPERATURE: 98.7 F | WEIGHT: 265 LBS | BODY MASS INDEX: 35.12 KG/M2 | HEART RATE: 77 BPM | OXYGEN SATURATION: 98 % | SYSTOLIC BLOOD PRESSURE: 138 MMHG | RESPIRATION RATE: 16 BRPM | DIASTOLIC BLOOD PRESSURE: 89 MMHG | HEIGHT: 73 IN

## 2019-09-27 PROBLEM — Z01.818 PRE-OP EXAM: Status: RESOLVED | Noted: 2019-08-28 | Resolved: 2019-09-27

## 2019-09-27 LAB
ANION GAP SERPL CALCULATED.3IONS-SCNC: 14 MMOL/L (ref 3–16)
BUN BLDV-MCNC: 21 MG/DL (ref 7–20)
CALCIUM SERPL-MCNC: 9.4 MG/DL (ref 8.3–10.6)
CHLORIDE BLD-SCNC: 102 MMOL/L (ref 99–110)
CO2: 24 MMOL/L (ref 21–32)
CREAT SERPL-MCNC: 1.3 MG/DL (ref 0.9–1.3)
GFR AFRICAN AMERICAN: >60
GFR NON-AFRICAN AMERICAN: 58
GLUCOSE BLD-MCNC: 139 MG/DL (ref 70–99)
GLUCOSE BLD-MCNC: 153 MG/DL (ref 70–99)
GLUCOSE BLD-MCNC: 189 MG/DL (ref 70–99)
PERFORMED ON: ABNORMAL
PERFORMED ON: ABNORMAL
POTASSIUM SERPL-SCNC: 3.9 MMOL/L (ref 3.5–5.1)
SODIUM BLD-SCNC: 140 MMOL/L (ref 136–145)

## 2019-09-27 PROCEDURE — 6370000000 HC RX 637 (ALT 250 FOR IP): Performed by: SURGERY

## 2019-09-27 PROCEDURE — 6370000000 HC RX 637 (ALT 250 FOR IP): Performed by: NURSE PRACTITIONER

## 2019-09-27 PROCEDURE — 6360000002 HC RX W HCPCS: Performed by: SURGERY

## 2019-09-27 PROCEDURE — 99024 POSTOP FOLLOW-UP VISIT: CPT | Performed by: NURSE PRACTITIONER

## 2019-09-27 PROCEDURE — 80048 BASIC METABOLIC PNL TOTAL CA: CPT

## 2019-09-27 PROCEDURE — APPSS180 APP SPLIT SHARED TIME > 60 MINUTES: Performed by: NURSE PRACTITIONER

## 2019-09-27 PROCEDURE — 2500000003 HC RX 250 WO HCPCS: Performed by: SURGERY

## 2019-09-27 PROCEDURE — C9113 INJ PANTOPRAZOLE SODIUM, VIA: HCPCS | Performed by: SURGERY

## 2019-09-27 RX ORDER — HYDRALAZINE HYDROCHLORIDE 50 MG/1
50 TABLET, FILM COATED ORAL EVERY 8 HOURS PRN
Qty: 90 TABLET | Refills: 0
Start: 2019-09-27 | End: 2019-10-16 | Stop reason: SDUPTHER

## 2019-09-27 RX ORDER — HYDRALAZINE HYDROCHLORIDE 25 MG/1
50 TABLET, FILM COATED ORAL ONCE
Status: COMPLETED | OUTPATIENT
Start: 2019-09-27 | End: 2019-09-27

## 2019-09-27 RX ORDER — AMLODIPINE BESYLATE 5 MG/1
5 TABLET ORAL DAILY
Status: DISCONTINUED | OUTPATIENT
Start: 2019-09-27 | End: 2019-09-27

## 2019-09-27 RX ORDER — NIFEDIPINE 30 MG/1
30 TABLET, EXTENDED RELEASE ORAL 2 TIMES DAILY
Qty: 60 TABLET | Refills: 0 | Status: SHIPPED | OUTPATIENT
Start: 2019-09-27 | End: 2022-09-29 | Stop reason: ALTCHOICE

## 2019-09-27 RX ORDER — NIFEDIPINE 30 MG/1
30 TABLET, EXTENDED RELEASE ORAL 2 TIMES DAILY
Status: DISCONTINUED | OUTPATIENT
Start: 2019-09-27 | End: 2019-09-27 | Stop reason: HOSPADM

## 2019-09-27 RX ORDER — HYDRALAZINE HYDROCHLORIDE 25 MG/1
50 TABLET, FILM COATED ORAL EVERY 8 HOURS PRN
Status: DISCONTINUED | OUTPATIENT
Start: 2019-09-27 | End: 2019-09-27 | Stop reason: HOSPADM

## 2019-09-27 RX ADMIN — LABETALOL HYDROCHLORIDE 10 MG: 5 INJECTION INTRAVENOUS at 04:11

## 2019-09-27 RX ADMIN — PANTOPRAZOLE SODIUM 40 MG: 40 INJECTION, POWDER, LYOPHILIZED, FOR SOLUTION INTRAVENOUS at 08:58

## 2019-09-27 RX ADMIN — HYDRALAZINE HYDROCHLORIDE 50 MG: 25 TABLET, FILM COATED ORAL at 09:04

## 2019-09-27 RX ADMIN — NIFEDIPINE 30 MG: 30 TABLET, FILM COATED, EXTENDED RELEASE ORAL at 09:04

## 2019-09-27 RX ADMIN — ENOXAPARIN SODIUM 40 MG: 40 INJECTION SUBCUTANEOUS at 08:58

## 2019-09-27 RX ADMIN — METOPROLOL TARTRATE 50 MG: 50 TABLET, FILM COATED ORAL at 08:58

## 2019-09-27 RX ADMIN — LABETALOL HYDROCHLORIDE 10 MG: 5 INJECTION INTRAVENOUS at 02:09

## 2019-09-27 NOTE — PLAN OF CARE
symptoms  Description  Will show no infection signs and symptoms  9/27/2019 0203 by David Bagley RN  Outcome: Ongoing  9/26/2019 1835 by Charmayne Salter, RN  Outcome: Ongoing  Goal: Ability to maintain a body temperature in the normal range will improve  Description  Ability to maintain a body temperature in the normal range will improve  9/27/2019 0203 by David Bagley RN  Outcome: Ongoing  9/26/2019 1835 by Charmayne Salter, RN  Outcome: Ongoing     Problem: Nutrition Deficit:  Goal: Ability to identify appropriate dietary choices will improve  Description  Ability to identify appropriate dietary choices will improve  9/27/2019 0203 by David Bagley RN  Outcome: Ongoing  9/26/2019 1835 by Charmayne Salter, RN  Outcome: Ongoing     Problem: Pain:  Description  Pain management should include both nonpharmacologic and pharmacologic interventions. Goal: Pain level will decrease  Description  Pain level will decrease  9/27/2019 0203 by David Bagley RN  Outcome: Ongoing  9/26/2019 1835 by Charmayne Salter, RN  Outcome: Ongoing  Goal: Control of acute pain  Description  Control of acute pain  9/27/2019 0203 by David Bagley RN  Outcome: Ongoing  9/26/2019 1835 by Charmayne Salter, RN  Outcome: Ongoing  Goal: Control of chronic pain  Description  Control of chronic pain  9/27/2019 0203 by David Bagley RN  Outcome: Ongoing  9/26/2019 1835 by Charmayne Salter, RN  Outcome: Ongoing     Problem: Venous Thromboembolism:  Goal: Will show no signs or symptoms of venous thromboembolism  Description  Will show no signs or symptoms of venous thromboembolism  9/27/2019 0203 by David Bagley RN  Outcome: Ongoing  9/26/2019 1835 by Charmayne Salter, RN  Outcome: Ongoing  Goal: Absence of signs or symptoms of impaired coagulation  Description  Absence of signs or symptoms of impaired coagulation  9/27/2019 0203 by David Bagley RN  Outcome: Ongoing  9/26/2019 1835 by Charmayne Salter, RN  Outcome: Ongoing     Problem:  Activity:  Goal: Ability to tolerate increased activity will improve  Description  Ability to tolerate increased activity will improve  9/27/2019 0203 by Bradly Palmer RN  Outcome: Ongoing  9/26/2019 1835 by Vince Hardwick RN  Outcome: Ongoing     Problem: Cardiac:  Goal: Hemodynamic stability will improve  Description  Hemodynamic stability will improve  9/27/2019 0203 by Bradly Palmer RN  Outcome: Ongoing  9/26/2019 1835 by Vince Hardwick RN  Outcome: Ongoing  Goal: Complications related to the disease process, condition or treatment will be avoided or minimized  Description  Complications related to the disease process, condition or treatment will be avoided or minimized  9/27/2019 0203 by Bradly Palmer RN  Outcome: Ongoing  9/26/2019 1835 by Vince Hardwick RN  Outcome: Ongoing  Goal: Cerebral tissue perfusion will improve  Description  Cerebral tissue perfusion will improve  9/27/2019 0203 by Bradly Palmer RN  Outcome: Ongoing  9/26/2019 1835 by Vince Hardwick RN  Outcome: Ongoing     Problem: Coping:  Goal: Ability to identify and develop effective coping behavior will improve  Description  Ability to identify and develop effective coping behavior will improve  9/27/2019 0203 by Bradly Palmer RN  Outcome: Ongoing  9/26/2019 1835 by Vince Hardwick RN  Outcome: Ongoing     Problem: Health Behavior:  Goal: Identification of resources available to assist in meeting health care needs will improve  Description  Identification of resources available to assist in meeting health care needs will improve  9/27/2019 0203 by Bradly Palmer RN  Outcome: Ongoing  9/26/2019 1835 by Vince Hardwick RN  Outcome: Ongoing     Problem: Nutritional:  Goal: Ability to identify appropriate dietary choices will improve  Description  Ability to identify appropriate dietary choices will improve  9/27/2019 0203 by Bradly Palmer RN  Outcome: Ongoing  9/26/2019 1835 by Vince Hardwick RN  Outcome: Ongoing

## 2019-09-27 NOTE — PROGRESS NOTES
CLINICAL PHARMACY NOTE: MEDS TO [address] [facility] Patient?: No  Total # of Prescriptions Filled: 3   The following medications were delivered to the patient:  · Oxycodone 5/325mg  · Ondansetron ODT 4mg  · Metoprolol tartrate 50mg  Total # of Interventions Completed: 0  Time Spent (min): 30    Additional Documentation:  Medications delivered- patient signed  Dawood Barnard
[facility]) Physicians   General & Laparoscopic Surgery  [employer]       Pt seen and examined    S/p laparoscopic sleeve gastrectomy    The patient is ambulating in hern[place]. Pain is well controlled. There is no nausea and/or vomiting. Patient had urethral dilation with catheter placement per urology completed in the OR. Urology managing catheter. Consult placed to nephrology based on elevated creatine prior to surgery and above patient's baseline. There are no other complaints or questions. Vitals:    09/26/19 0140 09/26/19 0315 09/26/19 0515 09/26/19 0730   BP: 127/87 131/89 127/88 (!) 139/94   Pulse: 72 77 82 71   Resp:  13 14 16   Temp:  98.7 °F (37.1 °C) 98 °F (36.7 °C) 98.4 °F (36.9 °C)   TempSrc:  Oral Oral Oral   SpO2:  99% 98% 97%   Weight:       Height:         Abdomen is soft, appropriately tender, incisions stable with no erythema. Breath sounds are clear bilaterally. Bowel sounds are active in all quadrants.     Data  CBC:   Lab Results   Component Value Date    WBC 7.3 09/26/2019    RBC 4.65 09/26/2019    HGB 12.2 09/26/2019    HCT 38.4 09/26/2019    MCV 82.5 09/26/2019    MCH 26.2 09/26/2019    MCHC 31.8 09/26/2019    RDW 15.9 09/26/2019     09/26/2019    MPV 8.5 09/26/2019     BMP:    Lab Results   Component Value Date     09/26/2019    K 4.3 09/26/2019     09/26/2019    CO2 24 09/26/2019    BUN 38 09/26/2019    LABALBU 4.0 09/07/2019    CREATININE 1.9 09/26/2019    CALCIUM 9.2 09/26/2019    GFRAA 45 09/26/2019    LABGLOM 37 09/26/2019    GLUCOSE 138 09/26/2019     Current Inpatient Medications    Current Facility-Administered Medications: 0.9 % sodium chloride infusion, , Intravenous, Continuous  lactated ringers infusion, , Intravenous, Continuous  sodium chloride flush 0.9 % injection 10 mL, 10 mL, Intravenous, 2 times per day  sodium chloride flush 0.9 % injection 10 mL, 10 mL, Intravenous, PRN  promethazine (PHENERGAN) injection 6.25 mg, 6.25 mg,
Family in waiting room updated on pt status in PACU. Will monitor.
Family in waiting room updated on pt status in PACU. Will monitor.
Morning med pass and assessment completed. Lap sites to abd clean, dry, and intact. Pt not complaining of nausea/vomiting at this time and states pain is at a tolerable level. Care plan and education were reviewed with patient and mutually agreed upon. Reviewed potential for falls and safety measures. Patient verbalized understanding and denies any need at this time. Will continue to monitor. 1:54 PM  Encouraged ambulation, pt stated he would go for a walk \"a little later. \" Will continue to encourage ambulation. 4:35 PM  Pt ambulated one lap in hallway. Given 2 percocet for 7/10 abdominal pain. 6:55 PM  Pt's BP elevated, 152/101. Given PRN hydralazine.  151/95 on recheck
Patient provided with discharge instructions, discussed in detail, new medications reviewed including use and side effects. Patient verbalized understanding. Prescriptions filled per outpatient pharmacy. All questions answered, family at the bedside to transport home.   Patient discharged home with all belongings, educated pt on leg back and catheter care
Pt arrived from OR to PACU, awakens to voice. VSS, O2 sats 94% on room air. Incisions on abdomen dry and intact, abdomen soft. Herrera in place draining clear yellow urine. Will monitor.
Pts BP 80s-90s with MAP in 50s prior to transferring pt to 4T. Order from Dr. Emy Villalba for 10 mg ephedrine IV and 25 mg ephedrine IM. Will implement and continue to monitor.
Renal consult started to follow pt post op    Also patient required urology consult for urethra stricture and tucker placement
Shift assessment complete am medications given, pt denies pain at this time, lap sites to abd CDI without drainage, Karleee Sis NP at bedside to answer families questions regarding new BP meds, all questions answered, The care plan and education has been reviewed and mutually agreed upon with the patient.  Call light within reach
Shift assessment complete. Patient alert and oriented x4. Blood sugar checked qHS per patient request.  Blood pressures slightly high, scheduled IV Metoprolol given per orders see eMAR. Declined to ambulate in halls so far this shift stating that he had already walked today and would resume in morning. Herrera patent and draining clear yellow urine. Pain well controlled. The care plan and education has been reviewed and mutually agreed upon with the patient. Patient remains free from falls. All fall precautions in place. Yellow blanket at bedside, yellow bracelet on patient. SAFE sign on door. Bed and chair alarms being used. Bed in lowest position. Will monitor.      Electronically signed by Pawel Jesus RN on 9/26/2019 at 3:09 AM
Shift assessment complete. Patient blood pressure elevated, attempted to give PRN medication and IV disconnected at hub. Once new IV placed PRN pain medication given. Mildly effective. Will continue to monitor. Patient has declined ambulation so far this shift, will continue to encourage ambulation. Patient is using incentive spirometer. Urinary output good. Patient is tolerating sips of water and crushed oral metoprolol. Patient has denied pain so far through shift. The care plan and education has been reviewed and mutually agreed upon with the patient. Patient remains free from falls. All fall precautions in place. Yellow blanket at bedside, yellow bracelet on patient. SAFE sign on door. Bed and chair alarms being used. Bed in lowest position. Will monitor.      Electronically signed by Bradly Palmer RN on 9/27/2019 at 2:03 AM
Teaching / education initiated regarding perioperative experience, expectations, and pain management during stay. Patient verbalized understanding.
Verbal order from Dr. Adam Horta to give 250 mL 5% albumin IV. Will implement and monitor.
(PERCOCET) 5-325 MG per tablet 2 tablet, 2 tablet, Oral, Q4H PRN  metoprolol tartrate (LOPRESSOR) tablet 50 mg, 50 mg, Oral, BID  enoxaparin (LOVENOX) injection 40 mg, 40 mg, Subcutaneous, Daily  0.9 % sodium chloride infusion, , Intravenous, Continuous  lactated ringers infusion, , Intravenous, Continuous  sodium chloride flush 0.9 % injection 10 mL, 10 mL, Intravenous, 2 times per day  sodium chloride flush 0.9 % injection 10 mL, 10 mL, Intravenous, PRN  promethazine (PHENERGAN) injection 6.25 mg, 6.25 mg, Intravenous, Q6H PRN  pantoprazole (PROTONIX) injection 40 mg, 40 mg, Intravenous, Daily **AND** sodium chloride (PF) 0.9 % injection 10 mL, 10 mL, Intravenous, Daily  metoclopramide (REGLAN) injection 10 mg, 10 mg, Intravenous, Q6H PRN  scopolamine (TRANSDERM-SCOP) transdermal patch 1 patch, 1 patch, Transdermal, Q72H  lidocaine 4 % external patch 1 patch, 1 patch, Transdermal, Daily  labetalol (NORMODYNE;TRANDATE) injection 10 mg, 10 mg, Intravenous, Q2H PRN  hydrALAZINE (APRESOLINE) injection 10 mg, 10 mg, Intravenous, Q2H PRN  diphenhydrAMINE (BENADRYL) injection 12.5 mg, 12.5 mg, Intravenous, Q6H PRN  lactated ringers infusion, , Intravenous, Continuous  sodium chloride flush 0.9 % injection 10 mL, 10 mL, Intravenous, 2 times per day  sodium chloride flush 0.9 % injection 10 mL, 10 mL, Intravenous, PRN  HYDROmorphone HCl PF (DILAUDID) injection 0.5 mg, 0.5 mg, Intravenous, Q3H PRN  ondansetron (ZOFRAN) injection 4 mg, 4 mg, Intravenous, Q6H PRN    Patient Active Problem List   Diagnosis    Essential hypertension    Late effect of cerebrovascular accident (CVA)    Chronic left shoulder pain    Central sleep apnea due to medical condition    Abnormal EKG    Cerebrovascular accident (CVA) due to thrombosis of basilar artery (HCC)    Contact dermatitis and other eczema, due to unspecified cause    Family history of malignant neoplasm of gastrointestinal tract    Noncompliance    Obesity due to excess
09/27/2019    GFRAA >60 09/27/2019    LABGLOM 58 09/27/2019    GLUCOSE 139 09/27/2019       IMPRESSION/RECOMMENDATIONS:    GAB on CKD: baseline Scr is 1.8-adm Scr 2.4-difficult tucker placement in OR-? Obstructivel/pre-renal exacerbated by ACEI. No recent NSAIds  -non-oliguric  -renal US normal  -UA is positive for hematuria-due traumatic tucker insertion  -continue to hold ACEI for now  -SCr is improved  -off IVF  -continue ot hold metformin  -avoid hypotension    OK to discharge.  Follow up with me will be arranged    CKD stage 3: baseline Scr 1.8-presumed DN/HTN NS  -will evaluate for proteinuria once SCr stable-did not have albuminuria in the past  -continue BS control/BP control  -Scr is back to baseline  -follow up with me will be arranged    HTN: hold ACEI  -start nifedipine 30mg bid  -continue metoprolol  -prn HDLZ for SBp >150    Obesity: s/p gastric sleeve    DM2: hold metformin  -continue insulin    Anemia: no ALCIRA indicated    CKD-MBD: phos is 3.3, vitamin D is 55, PTH is 59          Virgen Weathers MD
LABGLOM 37 09/26/2019    GLUCOSE 138 09/26/2019       IMPRESSION/RECOMMENDATIONS:    GAB on CKD: baseline Scr is 1.8-adm Scr 2.4-difficult tucker placement in OR-? Obstructivel/pre-renal exacerbated by ACEI.   No recent NSAIds  -non-oliguric  -renal US normal  -UA is positive for hematuria-due traumatic tucker insertion  -continue to hold ACEI for now  -SCr is improved  -continue IVF til taking PO  -continue to hold ACEI  -continue ot hold metformin  -avoid hypotension    CKD stage 3: baseline Scr 1.8-presumed DN/HTN NS  -will evaluate for proteinuria once SCr stable-did not have albuminuria in the past  -continue BS control/BP control  -follow up with me will be arranged    HTN: hold ACEI  -at discharge can start mteoprolol 50mg bid and continue amlodipine    Obesity: s/p gastric sleeve    DM2: hold metformin  -continue insulin    Anemia: no ALCIRA indicated    CKD-MBD: check phos/PTH/vitamin D          Bren Varner MD
pharmacy on site to fill your prescriptions. 24. If you use oxygen and have a portable tank please bring it  with you the DOS             25. Bring a complete list of all your medications with name and dose include any supplements. 26. Other__PAT labs by 9/16 patient is aware he needs to tell them at  that he has labs ordered under both [facility] and [facility]________________________________________   *Please call pre admission testing if you any further questions   Manan Nixon [address]    [phone]  [phone]   [address] [place] [address]       All above information reviewed with patient in person or by phone. Patient verbalizes understanding. All questions and concerns addressed.                                                                                                  Patient/Rep____________________                                                                                                                       Per phone/pt             PRE OP INSTRUCTIONS
unremarkable. There is no free pelvic fluid. There is some limitation due to patient size. Unremarkable renal ultrasound.             Electronically signed by: Richmond Tam, 9/26/2019 8:07 AM  [facility]  Office Contact: [phone]

## 2019-09-28 ENCOUNTER — CARE COORDINATION (OUTPATIENT)
Dept: CASE MANAGEMENT | Age: 54
End: 2019-09-28

## 2019-09-29 ENCOUNTER — CARE COORDINATION (OUTPATIENT)
Dept: CASE MANAGEMENT | Age: 54
End: 2019-09-29

## 2019-09-29 NOTE — CARE COORDINATION
[facility]    Call within 2 business days of discharge: Yes    Patient: Dia Renee  Patient : 1965   MRN: [id number]   Reason for Admission:  gastrectomy  Discharge Date: 19 RARS: Readmission Risk Score: 15      Last Discharge [facility]       Complaint Diagnosis Description Type Department Provider    19  Preop testing . .. Admission (Discharged) Werner Gasca MD           Spoke with: [id number]: [id number] [facility]    Summary  Pt states he is not having any issue and only some soreness. Pt declined to review meds and call was ended.      Follow Up  Future Appointments   Date Time Provider Miguel A [place]   10/7/2019  4:00 PM Atilio Reyes MD [address] [place] [address]   10/8/2019  8:45 AM Angi Oh MD [facility]   10/10/2019  3:45 PM Brittni Leach MD [facility]       Hero Celis RN

## 2019-10-01 ENCOUNTER — TELEPHONE (OUTPATIENT)
Dept: BARIATRICS/WEIGHT MGMT | Age: 54
End: 2019-10-01

## 2019-10-02 ENCOUNTER — CARE COORDINATION (OUTPATIENT)
Dept: CASE MANAGEMENT | Age: 54
End: 2019-10-02

## 2019-10-04 ENCOUNTER — CARE COORDINATION (OUTPATIENT)
Dept: CASE MANAGEMENT | Age: 54
End: 2019-10-04

## 2019-10-04 DIAGNOSIS — E11.3299 TYPE 2 DIABETES MELLITUS WITH BACKGROUND RETINOPATHY (HCC): ICD-10-CM

## 2019-10-04 DIAGNOSIS — E11.59 TYPE 2 DIABETES MELLITUS WITH VASCULAR DISEASE (HCC): ICD-10-CM

## 2019-10-04 LAB
ANION GAP SERPL CALCULATED.3IONS-SCNC: 13 MMOL/L (ref 3–16)
BUN BLDV-MCNC: 23 MG/DL (ref 7–20)
CALCIUM SERPL-MCNC: 9.7 MG/DL (ref 8.3–10.6)
CHLORIDE BLD-SCNC: 100 MMOL/L (ref 99–110)
CO2: 28 MMOL/L (ref 21–32)
CREAT SERPL-MCNC: 1.4 MG/DL (ref 0.9–1.3)
CREATININE URINE: 189 MG/DL (ref 39–259)
ESTIMATED AVERAGE GLUCOSE: 177.2 MG/DL
GFR AFRICAN AMERICAN: >60
GFR NON-AFRICAN AMERICAN: 53
GLUCOSE BLD-MCNC: 152 MG/DL (ref 70–99)
HBA1C MFR BLD: 7.8 %
MICROALBUMIN UR-MCNC: 34.8 MG/DL
MICROALBUMIN/CREAT UR-RTO: 184.1 MG/G (ref 0–30)
POTASSIUM SERPL-SCNC: 5.4 MMOL/L (ref 3.5–5.1)
SODIUM BLD-SCNC: 141 MMOL/L (ref 136–145)

## 2019-10-07 ENCOUNTER — OFFICE VISIT (OUTPATIENT)
Dept: ENDOCRINOLOGY | Age: 54
End: 2019-10-07
Payer: COMMERCIAL

## 2019-10-07 VITALS
SYSTOLIC BLOOD PRESSURE: 143 MMHG | OXYGEN SATURATION: 99 % | WEIGHT: 262 LBS | DIASTOLIC BLOOD PRESSURE: 99 MMHG | HEIGHT: 73 IN | HEART RATE: 98 BPM | BODY MASS INDEX: 34.72 KG/M2

## 2019-10-07 DIAGNOSIS — E11.69 DYSLIPIDEMIA ASSOCIATED WITH TYPE 2 DIABETES MELLITUS (HCC): ICD-10-CM

## 2019-10-07 DIAGNOSIS — E11.3299 TYPE 2 DIABETES MELLITUS WITH BACKGROUND RETINOPATHY (HCC): ICD-10-CM

## 2019-10-07 DIAGNOSIS — E78.5 DYSLIPIDEMIA ASSOCIATED WITH TYPE 2 DIABETES MELLITUS (HCC): ICD-10-CM

## 2019-10-07 DIAGNOSIS — E11.59 TYPE 2 DIABETES MELLITUS WITH VASCULAR DISEASE (HCC): Primary | ICD-10-CM

## 2019-10-07 DIAGNOSIS — E66.01 MORBID OBESITY DUE TO EXCESS CALORIES (HCC): ICD-10-CM

## 2019-10-07 DIAGNOSIS — I10 ESSENTIAL HYPERTENSION: ICD-10-CM

## 2019-10-07 PROCEDURE — 99214 OFFICE O/P EST MOD 30 MIN: CPT | Performed by: INTERNAL MEDICINE

## 2019-10-07 RX ORDER — METFORMIN HYDROCHLORIDE 500 MG/1
500 TABLET, EXTENDED RELEASE ORAL 2 TIMES DAILY WITH MEALS
Qty: 60 TABLET | Refills: 5 | Status: SHIPPED | OUTPATIENT
Start: 2019-10-07 | End: 2020-02-24 | Stop reason: SDUPTHER

## 2019-10-08 ENCOUNTER — OFFICE VISIT (OUTPATIENT)
Dept: BARIATRICS/WEIGHT MGMT | Age: 54
End: 2019-10-08

## 2019-10-08 VITALS
OXYGEN SATURATION: 98 % | BODY MASS INDEX: 34.64 KG/M2 | SYSTOLIC BLOOD PRESSURE: 125 MMHG | RESPIRATION RATE: 18 BRPM | WEIGHT: 261.4 LBS | HEART RATE: 98 BPM | DIASTOLIC BLOOD PRESSURE: 80 MMHG | HEIGHT: 73 IN

## 2019-10-08 DIAGNOSIS — N18.4 CKD (CHRONIC KIDNEY DISEASE) STAGE 4, GFR 15-29 ML/MIN (HCC): ICD-10-CM

## 2019-10-08 DIAGNOSIS — A04.8 HELICOBACTER PYLORI (H. PYLORI) INFECTION: ICD-10-CM

## 2019-10-08 DIAGNOSIS — G47.37 CENTRAL SLEEP APNEA DUE TO MEDICAL CONDITION: ICD-10-CM

## 2019-10-08 DIAGNOSIS — I10 ESSENTIAL HYPERTENSION: ICD-10-CM

## 2019-10-08 DIAGNOSIS — E66.01 SEVERE OBESITY (BMI 35.0-39.9) WITH COMORBIDITY (HCC): ICD-10-CM

## 2019-10-08 DIAGNOSIS — E11.69 DYSLIPIDEMIA ASSOCIATED WITH TYPE 2 DIABETES MELLITUS (HCC): ICD-10-CM

## 2019-10-08 DIAGNOSIS — E78.5 DYSLIPIDEMIA ASSOCIATED WITH TYPE 2 DIABETES MELLITUS (HCC): ICD-10-CM

## 2019-10-08 DIAGNOSIS — I63.02 CEREBROVASCULAR ACCIDENT (CVA) DUE TO THROMBOSIS OF BASILAR ARTERY (HCC): ICD-10-CM

## 2019-10-08 DIAGNOSIS — K21.9 CHRONIC GERD: ICD-10-CM

## 2019-10-08 DIAGNOSIS — Z98.84 S/P LAPAROSCOPIC SLEEVE GASTRECTOMY: ICD-10-CM

## 2019-10-08 PROCEDURE — 99024 POSTOP FOLLOW-UP VISIT: CPT | Performed by: SURGERY

## 2019-10-10 ENCOUNTER — OFFICE VISIT (OUTPATIENT)
Dept: SLEEP MEDICINE | Age: 54
End: 2019-10-10
Payer: COMMERCIAL

## 2019-10-10 VITALS
SYSTOLIC BLOOD PRESSURE: 145 MMHG | BODY MASS INDEX: 34.33 KG/M2 | RESPIRATION RATE: 16 BRPM | OXYGEN SATURATION: 100 % | WEIGHT: 259 LBS | DIASTOLIC BLOOD PRESSURE: 105 MMHG | HEART RATE: 90 BPM | HEIGHT: 73 IN

## 2019-10-10 DIAGNOSIS — G47.33 OSA ON CPAP: ICD-10-CM

## 2019-10-10 DIAGNOSIS — G47.39 TREATMENT-EMERGENT CENTRAL SLEEP APNEA: Primary | ICD-10-CM

## 2019-10-10 DIAGNOSIS — Z99.89 OSA ON CPAP: ICD-10-CM

## 2019-10-10 DIAGNOSIS — Z99.89 DEPENDENCE ON OTHER ENABLING MACHINES AND DEVICES: ICD-10-CM

## 2019-10-10 PROCEDURE — 99213 OFFICE O/P EST LOW 20 MIN: CPT | Performed by: PSYCHIATRY & NEUROLOGY

## 2019-10-10 ASSESSMENT — SLEEP AND FATIGUE QUESTIONNAIRES
HOW LIKELY ARE YOU TO NOD OFF OR FALL ASLEEP WHILE SITTING AND READING: 1
HOW LIKELY ARE YOU TO NOD OFF OR FALL ASLEEP WHILE LYING DOWN TO REST IN THE AFTERNOON WHEN CIRCUMSTANCES PERMIT: 2
ESS TOTAL SCORE: 9
HOW LIKELY ARE YOU TO NOD OFF OR FALL ASLEEP WHEN YOU ARE A PASSENGER IN A CAR FOR AN HOUR WITHOUT A BREAK: 1
HOW LIKELY ARE YOU TO NOD OFF OR FALL ASLEEP WHILE SITTING INACTIVE IN A PUBLIC PLACE: 1
HOW LIKELY ARE YOU TO NOD OFF OR FALL ASLEEP WHILE WATCHING TV: 1
HOW LIKELY ARE YOU TO NOD OFF OR FALL ASLEEP IN A CAR, WHILE STOPPED FOR A FEW MINUTES IN TRAFFIC: 1
HOW LIKELY ARE YOU TO NOD OFF OR FALL ASLEEP WHILE SITTING AND TALKING TO SOMEONE: 1
HOW LIKELY ARE YOU TO NOD OFF OR FALL ASLEEP WHILE SITTING QUIETLY AFTER LUNCH WITHOUT ALCOHOL: 1

## 2019-10-10 ASSESSMENT — ENCOUNTER SYMPTOMS
CHOKING: 0
APNEA: 0

## 2019-10-16 RX ORDER — HYDRALAZINE HYDROCHLORIDE 50 MG/1
TABLET, FILM COATED ORAL
Qty: 120 TABLET | Refills: 0 | Status: SHIPPED | OUTPATIENT
Start: 2019-10-16 | End: 2019-12-09

## 2019-11-05 ENCOUNTER — OFFICE VISIT (OUTPATIENT)
Dept: BARIATRICS/WEIGHT MGMT | Age: 54
End: 2019-11-05

## 2019-11-05 VITALS
WEIGHT: 258.6 LBS | SYSTOLIC BLOOD PRESSURE: 114 MMHG | BODY MASS INDEX: 34.27 KG/M2 | HEIGHT: 73 IN | DIASTOLIC BLOOD PRESSURE: 72 MMHG | HEART RATE: 80 BPM

## 2019-11-05 DIAGNOSIS — I63.02 CEREBROVASCULAR ACCIDENT (CVA) DUE TO THROMBOSIS OF BASILAR ARTERY (HCC): ICD-10-CM

## 2019-11-05 DIAGNOSIS — I10 ESSENTIAL HYPERTENSION: ICD-10-CM

## 2019-11-05 DIAGNOSIS — E78.5 DYSLIPIDEMIA ASSOCIATED WITH TYPE 2 DIABETES MELLITUS (HCC): ICD-10-CM

## 2019-11-05 DIAGNOSIS — G47.37 CENTRAL SLEEP APNEA DUE TO MEDICAL CONDITION: ICD-10-CM

## 2019-11-05 DIAGNOSIS — A04.8 HELICOBACTER PYLORI (H. PYLORI) INFECTION: ICD-10-CM

## 2019-11-05 DIAGNOSIS — N18.4 CKD (CHRONIC KIDNEY DISEASE) STAGE 4, GFR 15-29 ML/MIN (HCC): ICD-10-CM

## 2019-11-05 DIAGNOSIS — E66.01 SEVERE OBESITY (BMI 35.0-39.9) WITH COMORBIDITY (HCC): Primary | ICD-10-CM

## 2019-11-05 DIAGNOSIS — E11.69 DYSLIPIDEMIA ASSOCIATED WITH TYPE 2 DIABETES MELLITUS (HCC): ICD-10-CM

## 2019-11-05 DIAGNOSIS — Z98.84 S/P LAPAROSCOPIC SLEEVE GASTRECTOMY: ICD-10-CM

## 2019-11-05 DIAGNOSIS — K21.9 CHRONIC GERD: ICD-10-CM

## 2019-11-05 PROCEDURE — 99024 POSTOP FOLLOW-UP VISIT: CPT | Performed by: SURGERY

## 2019-11-27 ENCOUNTER — TELEPHONE (OUTPATIENT)
Dept: PHARMACY | Facility: CLINIC | Age: 54
End: 2019-11-27

## 2019-12-02 DIAGNOSIS — E78.5 HYPERLIPIDEMIA, UNSPECIFIED HYPERLIPIDEMIA TYPE: ICD-10-CM

## 2019-12-02 RX ORDER — OMEPRAZOLE 20 MG/1
CAPSULE, DELAYED RELEASE ORAL
Qty: 30 CAPSULE | Refills: 3 | Status: SHIPPED | OUTPATIENT
Start: 2019-12-02 | End: 2020-03-04

## 2019-12-03 RX ORDER — ATORVASTATIN CALCIUM 80 MG/1
TABLET, FILM COATED ORAL
Qty: 30 TABLET | Refills: 0 | Status: SHIPPED | OUTPATIENT
Start: 2019-12-03 | End: 2020-02-03

## 2019-12-04 PROBLEM — E66.811 OBESITY (BMI 30.0-34.9): Status: ACTIVE | Noted: 2019-12-04

## 2019-12-04 PROBLEM — E66.9 OBESITY (BMI 30.0-34.9): Status: ACTIVE | Noted: 2019-12-04

## 2019-12-09 ENCOUNTER — OFFICE VISIT (OUTPATIENT)
Dept: BARIATRICS/WEIGHT MGMT | Age: 54
End: 2019-12-09

## 2019-12-09 VITALS
BODY MASS INDEX: 34.01 KG/M2 | WEIGHT: 256.6 LBS | HEIGHT: 73 IN | HEART RATE: 71 BPM | SYSTOLIC BLOOD PRESSURE: 179 MMHG | DIASTOLIC BLOOD PRESSURE: 104 MMHG

## 2019-12-09 DIAGNOSIS — I10 ESSENTIAL HYPERTENSION: Primary | ICD-10-CM

## 2019-12-09 DIAGNOSIS — E66.9 OBESITY (BMI 30.0-34.9): ICD-10-CM

## 2019-12-09 DIAGNOSIS — Z98.84 S/P LAPAROSCOPIC SLEEVE GASTRECTOMY: ICD-10-CM

## 2019-12-09 DIAGNOSIS — K21.9 CHRONIC GERD: ICD-10-CM

## 2019-12-09 DIAGNOSIS — K76.0 FATTY LIVER: ICD-10-CM

## 2019-12-09 PROCEDURE — 99024 POSTOP FOLLOW-UP VISIT: CPT | Performed by: NURSE PRACTITIONER

## 2019-12-09 RX ORDER — LISINOPRIL 40 MG/1
40 TABLET ORAL DAILY
Qty: 30 TABLET | Refills: 0
Start: 2019-12-09 | End: 2020-08-03 | Stop reason: SDUPTHER

## 2019-12-30 ENCOUNTER — NURSE ONLY (OUTPATIENT)
Dept: PRIMARY CARE CLINIC | Age: 54
End: 2019-12-30
Payer: COMMERCIAL

## 2019-12-30 PROCEDURE — 90471 IMMUNIZATION ADMIN: CPT | Performed by: INTERNAL MEDICINE

## 2019-12-30 PROCEDURE — 90686 IIV4 VACC NO PRSV 0.5 ML IM: CPT | Performed by: INTERNAL MEDICINE

## 2020-01-27 ENCOUNTER — TELEPHONE (OUTPATIENT)
Dept: PHARMACY | Facility: CLINIC | Age: 55
End: 2020-01-27

## 2020-01-27 NOTE — TELEPHONE ENCOUNTER
Called patient to schedule pharmacist appointment to discuss medications for Diabetes Management Program.     No answer. Left VM on home/cell TAD: Please call back at [phone] Option #7 to retrieve the above message. Mailed letter to home. Jacklyn Veliz CPhT.   Pharmacy [facility] [address]  Department, toll free: [phone], option 7

## 2020-01-27 NOTE — LETTER
Yeni Pedro [address] [place] [id number]           01/27/20     Dear Darlene Villalba! You have completed the 2019 requirements for the [address] will automatically be re-enrolled into the [facility]) Diabetes Management Program for 2020. One of the requirements to participate in the [facility] Diabetes Management Program is to complete a Clinical Pharmacist Telephone appointment yearly. We would like to work with you and your doctor to:  - Review your medications, including over-the-counter and herbal medications  - Answer questions about your medications and how to get the most benefit from them  - Identify potential drug interactions or side effects and help fix them  - Identify preferred medications that are equally effective, but available at a lower cost to you  - Help you reach the necessary requirements to remain enrolled in the Diabetes Management Program offered by [facility]     Please call [phone] and select option #7 to schedule this appointment to take advantage of this service. Telephone appointments are available Monday thru Friday from 7:30 AM till 5:30 PM.     This is a courtesy reminder. If you have this appointment already scheduled for your 2020 enrollment in the program, please disregard this message. If you have not scheduled this appointment yet, please contact us at the above number to schedule.      Sincerely,      [address]  Phone: [phone], option 7

## 2020-01-30 RX ORDER — LIRAGLUTIDE 6 MG/ML
INJECTION SUBCUTANEOUS
Qty: 27 ML | Refills: 1 | Status: SHIPPED | OUTPATIENT
Start: 2020-01-30 | End: 2020-09-02

## 2020-02-01 LAB
ESTIMATED AVERAGE GLUCOSE: 243.2 MG/DL
HBA1C MFR BLD: 10.1 %

## 2020-02-03 ENCOUNTER — OFFICE VISIT (OUTPATIENT)
Dept: ENDOCRINOLOGY | Age: 55
End: 2020-02-03
Payer: COMMERCIAL

## 2020-02-03 VITALS
OXYGEN SATURATION: 95 % | DIASTOLIC BLOOD PRESSURE: 101 MMHG | WEIGHT: 251.6 LBS | SYSTOLIC BLOOD PRESSURE: 154 MMHG | BODY MASS INDEX: 33.34 KG/M2 | RESPIRATION RATE: 18 BRPM | HEIGHT: 73 IN | HEART RATE: 85 BPM

## 2020-02-03 PROBLEM — E66.01 MORBID OBESITY DUE TO EXCESS CALORIES (HCC): Status: ACTIVE | Noted: 2020-02-03

## 2020-02-03 PROBLEM — E78.5 HYPERLIPIDEMIA: Status: ACTIVE | Noted: 2020-02-03

## 2020-02-03 PROCEDURE — 99214 OFFICE O/P EST MOD 30 MIN: CPT | Performed by: INTERNAL MEDICINE

## 2020-02-03 RX ORDER — ATORVASTATIN CALCIUM 80 MG/1
80 TABLET, FILM COATED ORAL DAILY
Qty: 90 TABLET | Refills: 3 | Status: SHIPPED | OUTPATIENT
Start: 2020-02-03 | End: 2020-07-27 | Stop reason: SDUPTHER

## 2020-02-03 NOTE — PROGRESS NOTES
Patient ID:   Sachin Almendarez is a 48 y.o. male    Chief Complaint:   Sachin Almendarez presents for an evaluation of Type 2 Diabetes Mellitus , Hyperlipidemia and hypertension. Subjective:   Type 2 Diabetes Mellitus diagnosed in 1998  On insulin since 2016     Previously followed by  endocrine and physician moved out. Records reviewed from care everywhere   Pioglitazone 30mg daily - stopped in Feb 2018   Nereida Nestle stopped in summer 2018 for worsening CKD     S/p gastric sleeve in Sep 2019. Metformin 1000mg bid was stopped   Off Lantus since Sep 2019      Metformin 500mg bid   Victoza 1.8mg daily in am     Checks blood sugars 2 times per day. Reported  AM:   Lunch:   Supper:   HS: [id number]    Hypoglycemias: None     Meals: Three, dinner is big. No snacks, diet soda once a day   Exercise: work out at [facility].     Denies chest pain, exertional dyspnea. CVA in Aug 2016. No residual deficits. Denies smoking. Currently on ASA 81 mg daily     The following portions of the patient's history were reviewed and updated as appropriate:       Family History   Problem Relation Age of Onset    Heart Attack Mother     Diabetes Mother     High Blood Pressure Mother     Colon Cancer Sister          Social History     Socioeconomic History    Marital status:      Spouse name: Not on file    Number of children: Not on file    Years of education: Not on file    Highest education level: Not on file   Occupational History    Not on file   Social Needs    Financial resource strain: Not on file    Food insecurity:     Worry: Not on file     Inability: Not on file    Transportation needs:     Medical: Not on file     Non-medical: Not on file   Tobacco Use    Smoking status: Never Smoker    Smokeless tobacco: Never Used   Substance and Sexual Activity    Alcohol use:  Yes     Alcohol/week: 1.0 standard drinks     Types: 1 Cans of beer per week     Comment: socially     Drug use: No    Sexual activity: Yes     Partners: Female   Lifestyle    Physical activity:     Days per week: Not on file     Minutes per session: Not on file    Stress: Not on file   Relationships    Social connections:     Talks on phone: Not on file     Gets together: Not on file     Attends Confucianism service: Not on file     Active member of club or organization: Not on file     Attends meetings of clubs or organizations: Not on file     Relationship status: Not on file    Intimate partner violence:     Fear of current or ex partner: Not on file     Emotionally abused: Not on file     Physically abused: Not on file     Forced sexual activity: Not on file   Other Topics Concern    Not on file   Social History Narrative    Not on file       Past Medical History:   Diagnosis Date    Diabetes mellitus ([address]     GERD (gastroesophageal reflux disease)     Hypertension     Sleep apnea     cpap       Past Surgical History:   Procedure Laterality Date    CYSTOSCOPY  9/25/2019    FLEXIBLE CYSTOSCOPY, DILATION, POTTS CATHETER INSERTION performed by Xenia Jesus MD at [place] [address].      right knee     SLEEVE GASTRECTOMY N/A 9/25/2019    LAPAROSCOPIC SLEEVE GASTRECTOMY-ETHICON performed by Breann Stock MD at [facility] 4/12/2019    EGD BIOPSY performed by Breann Stock MD at [address] [facility] [address] [facility] [address]         Allergies   Allergen Reactions    Hctz [Hydrochlorothiazide]      cramps    Penicillins Hives         Current Outpatient Medications:     atorvastatin (LIPITOR) 80 MG tablet, Take 1 tablet by mouth daily, Disp: 90 tablet, Rfl: 3    VICTOZA 18 MG/3ML SOPN SC injection, INJECT 1.8 MG INTO THE SKIN DAILY, Disp: 27 mL, Rfl: 1    blood glucose test strips (ASCENSIA AUTODISC VI;ONE TOUCH ULTRA TEST VI) strip, USE THREE TIMES A DAY AS NEEDED, Disp: 100 each, Rfl: 3    lisinopril (PRINIVIL;ZESTRIL) 40 MG tablet, Take 1 tablet by mouth daily, Disp: 30 tablet, Rfl: 0    omeprazole (PRILOSEC) 20 MG delayed release capsule, TAKE 1 CAPSULE BY MOUTH ONE TIME A DAY, Disp: 30 capsule, Rfl: 3    metFORMIN (GLUCOPHAGE-XR) 500 MG extended release tablet, Take 1 tablet by mouth 2 times daily (with meals), Disp: 60 tablet, Rfl: 5    NIFEdipine (PROCARDIA XL) 30 MG extended release tablet, Take 1 tablet by mouth 2 times daily, Disp: 60 tablet, Rfl: 0    ondansetron (ZOFRAN ODT) 4 MG disintegrating tablet, Take 2 tablets by mouth every 8 hours as needed for Nausea, Disp: 30 tablet, Rfl: 0    metoprolol tartrate (LOPRESSOR) 50 MG tablet, Take 1 tablet by mouth 2 times daily, Disp: 60 tablet, Rfl: 0    Blood Glucose Monitoring Suppl (PRODIGY POCKET BLOOD GLUCOSE) w/Device KIT, Use daily to monitor blood sugar level, Disp: 1 kit, Rfl: 1    PRODIGY LANCETS 28G MISC, 3 each by Does not apply route daily, Disp: 100 each, Rfl: 3      Review of Systems:    Constitutional: Negative for fever, chills, and unexpected weight change. HENT: Negative for congestion, ear pain, rhinorrhea,  sore throat and trouble swallowing. Eyes: Negative for photophobia, redness, itching. Respiratory: Negative for cough, shortness of breath and sputum. Cardiovascular: Negative for chest pain, palpitations and leg swelling. Gastrointestinal: Negative for nausea, vomiting, abdominal pain, diarrhea, constipation. Endocrine: Negative for cold intolerance, heat intolerance, polydipsia, polyphagia and polyuria. Genitourinary: Negative for dysuria, urgency, frequency, hematuria and flank pain. Musculoskeletal: Negative for myalgias, back pain, arthralgias and neck pain. Skin/Nail: Negative for rash, itching. Normal nails. Neurological: Negative for seizures, weakness, light-headedness, numbness and headaches. Hematological/ Lymph nodes: Negative for adenopathy. Does not bruise/bleed easily. Psychiatric/Behavioral: Negative for suicidal ideas, depression, anxiety, sleep disturbance and decreased concentration. Objective:   Physical Exam:  BP (!) 162/98 (Site: Left Upper Arm, Position: Sitting, Cuff Size: Medium Adult)   Pulse 85   Resp 18   Ht 6' 1\" (1.854 m)   Wt 251 lb 9.6 oz (114.1 kg)   SpO2 95%   BMI 33.19 kg/m²   Constitutional: Patient is oriented to person, place, and time. Patient appears well-developed and well-nourished. HENT:    Head: Normocephalic and atraumatic. Eyes: Conjunctivae and EOM are normal.     Neck: Normal range of motion. Cardiovascular: Normal rate, regular rhythm and normal heart sounds. Pulmonary/Chest: Effort normal and breath sounds normal.   Abdominal: Soft. Bowel sounds are normal.   Musculoskeletal: Normal range of motion. Neurological: Patient is alert and oriented to person, place, and time. Patient has normal reflexes. Skin: Skin is warm and dry. Psychiatric: Patient has a normal mood and affect.  Patient behavior is normal.     Lab Review:    Orders Only on 01/31/2020   Component Date Value Ref Range Status    Hemoglobin A1C 01/31/2020 10.1  See comment % Final    eAG 01/31/2020 243.2  mg/dL Final   Orders Only on 10/29/2019   Component Date Value Ref Range Status    Protein, Ur 10/29/2019 12.00* <12 mg/dL Final    Creatinine, Ur 10/29/2019 125.4  39.0 - 259.0 mg/dL Final    Protein/Creat Ratio 10/29/2019 0.1  mg/dL Final   Orders Only on 10/29/2019   Component Date Value Ref Range Status    Vit D, 25-Hydroxy 10/29/2019 58.3  >=30 ng/mL Final   Orders Only on 10/29/2019   Component Date Value Ref Range Status    PTH 10/29/2019 71.1  14.0 - 72.0 pg/mL Final   Orders Only on 10/29/2019   Component Date Value Ref Range Status    Sodium 10/29/2019 142  136 - 145 mmol/L Final    Potassium 10/29/2019 4.3  3.5 - 5.1 mmol/L Final    Chloride 10/29/2019 102  99 - 110 mmol/L Final    CO2 10/29/2019 25  21 - 32 mmol/L Final    Anion Gap 10/29/2019 15  3 - 16 Final    Glucose 10/29/2019 96  70 - 99 mg/dL Final    BUN 10/29/2019 17  7 - 20 mg/dL Final 09/25/2019 12.32* 0.40 - 2.00 mmol/L Final    Hemoglobin 09/25/2019 4.7* 13.5 - 17.5 gm/dL Final    POC Hematocrit 09/25/2019 14.0* 40.5 - 52.5 % Final    Sample Type 09/25/2019 CARMEN   Final    Performed on 09/25/2019 SEE BELOW   Final    POC Sodium 09/25/2019 136  136 - 145 mmol/L Final    POC Potassium 09/25/2019 3.8  3.5 - 5.1 mmol/L Final    POC Chloride 09/25/2019 106  99 - 110 mmol/L Final    POC Anion Gap 09/25/2019 11   Final    POC Glucose 09/25/2019 154* 70 - 99 mg/dl Final    Calcium, Ion 09/25/2019 1.15  1.12 - 1.32 mmol/L Final    pH, Carmen 09/25/2019 7.245* 7.350 - 7.450 Final    pCO2, Carmen 09/25/2019 44.0  40.0 - 50.0 mm Hg Final    pO2, Carmen 09/25/2019 46  Not Established mm Hg Final    HCO3, Venous 09/25/2019 19.1* 23.0 - 29.0 mmol/L Final    Base Excess, Carmen 09/25/2019 -8* -3 - 3 Final    O2 Sat, Carmen 09/25/2019 74  Not Established % Final    TC02 (Calc), Carmen 09/25/2019 21  Not Established mmol/L Final    Lactate 09/25/2019 1.20  0.40 - 2.00 mmol/L Final    Hemoglobin 09/25/2019 12.1* 13.5 - 17.5 gm/dL Final    POC Hematocrit 09/25/2019 36.0* 40.5 - 52.5 % Final    Sample Type 09/25/2019 CARMEN   Final    Performed on 09/25/2019 SEE BELOW   Final    Sodium, Ur 09/25/2019 88  Not Established mmol/L Final    Creatinine, Ur 09/25/2019 62.7  39.0 - 259.0 mg/dL Final    Sodium 09/25/2019 139  136 - 145 mmol/L Final    Potassium 09/25/2019 3.9  3.5 - 5.1 mmol/L Final    Chloride 09/25/2019 102  99 - 110 mmol/L Final    CO2 09/25/2019 20* 21 - 32 mmol/L Final    Anion Gap 09/25/2019 17* 3 - 16 Final    Glucose 09/25/2019 175* 70 - 99 mg/dL Final    BUN 09/25/2019 51* 7 - 20 mg/dL Final    CREATININE 09/25/2019 2.3* 0.9 - 1.3 mg/dL Final    GFR Non- 09/25/2019 30* >60 Final    GFR  09/25/2019 36* >60 Final    Calcium 09/25/2019 8.6  8.3 - 10.6 mg/dL Final    Color, UA 09/25/2019 YELLOW  Straw/Yellow Final    Clarity, UA 09/25/2019 CLOUDY* Clear 220  135 - 450 K/uL Final    MPV 09/26/2019 8.5  5.0 - 10.5 fL Final    POC Glucose 09/25/2019 160* 70 - 99 mg/dl Final    Performed on 09/25/2019 ACCU-CHEK   Final    POC Glucose 09/26/2019 129* 70 - 99 mg/dl Final    Performed on 09/26/2019 ACCU-CHEK   Final    Beta-Hydroxybutyrate 09/25/2019 0.40* 0.00 - 0.27 mmol/L Final    Total CK 09/26/2019 791* 39 - 308 U/L Final    Phosphorus 09/26/2019 3.3  2.5 - 4.9 mg/dL Final    PTH 09/26/2019 59.2  14.0 - 72.0 pg/mL Final    Vit D, 25-Hydroxy 09/26/2019 55.7  >=30 ng/mL Final    Sodium 09/27/2019 140  136 - 145 mmol/L Final    Potassium 09/27/2019 3.9  3.5 - 5.1 mmol/L Final    Chloride 09/27/2019 102  99 - 110 mmol/L Final    CO2 09/27/2019 24  21 - 32 mmol/L Final    Anion Gap 09/27/2019 14  3 - 16 Final    Glucose 09/27/2019 139* 70 - 99 mg/dL Final    BUN 09/27/2019 21* 7 - 20 mg/dL Final    CREATININE 09/27/2019 1.3  0.9 - 1.3 mg/dL Final    GFR Non- 09/27/2019 58* >60 Final    GFR  09/27/2019 >60  >60 Final    Calcium 09/27/2019 9.4  8.3 - 10.6 mg/dL Final    POC Glucose 09/26/2019 145* 70 - 99 mg/dl Final    Performed on 09/26/2019 ACCU-CHEK   Final    POC Glucose 09/27/2019 153* 70 - 99 mg/dl Final    Performed on 09/27/2019 ACCU-CHEK   Final    POC Glucose 09/27/2019 189* 70 - 99 mg/dl Final    Performed on 09/27/2019 ACCU-CHEK   Final   Hospital Outpatient Visit on 09/07/2019   Component Date Value Ref Range Status    ABO/Rh 09/07/2019 AB POS   Final    Antibody Screen 09/07/2019 NEG   Final    WBC 09/07/2019 5.8  4.0 - 11.0 K/uL Final    RBC 09/07/2019 4.60  4.20 - 5.90 M/uL Final    Hemoglobin 09/07/2019 12.3* 13.5 - 17.5 g/dL Final    Hematocrit 09/07/2019 39.8* 40.5 - 52.5 % Final    MCV 09/07/2019 86.4  80.0 - 100.0 fL Final    MCH 09/07/2019 26.8  26.0 - 34.0 pg Final    MCHC 09/07/2019 31.0  31.0 - 36.0 g/dL Final    RDW 09/07/2019 16.2* 12.4 - 15.4 % Final    Platelets 16/95/1708 188  135 - 450 K/uL Final    MPV 09/07/2019 9.7  5.0 - 10.5 fL Final    Neutrophils % 09/07/2019 50.9  % Final    Lymphocytes % 09/07/2019 40.4  % Final    Monocytes % 09/07/2019 6.4  % Final    Eosinophils % 09/07/2019 1.3  % Final    Basophils % 09/07/2019 1.0  % Final    Neutrophils Absolute 09/07/2019 3.0  1.7 - 7.7 K/uL Final    Lymphocytes Absolute 09/07/2019 2.3  1.0 - 5.1 K/uL Final    Monocytes Absolute 09/07/2019 0.4  0.0 - 1.3 K/uL Final    Eosinophils Absolute 09/07/2019 0.1  0.0 - 0.6 K/uL Final    Basophils Absolute 09/07/2019 0.1  0.0 - 0.2 K/uL Final    Sodium 09/07/2019 141  136 - 145 mmol/L Final    Potassium 09/07/2019 4.8  3.5 - 5.1 mmol/L Final    Chloride 09/07/2019 106  99 - 110 mmol/L Final    CO2 09/07/2019 23  21 - 32 mmol/L Final    Anion Gap 09/07/2019 12  3 - 16 Final    Glucose 09/07/2019 208* 70 - 99 mg/dL Final    BUN 09/07/2019 37* 7 - 20 mg/dL Final    CREATININE 09/07/2019 1.6* 0.9 - 1.3 mg/dL Final    GFR Non- 09/07/2019 46* >60 Final    GFR  09/07/2019 55* >60 Final    Calcium 09/07/2019 9.8  8.3 - 10.6 mg/dL Final    Total Protein 09/07/2019 7.1  6.4 - 8.2 g/dL Final    Alb 09/07/2019 4.0  3.4 - 5.0 g/dL Final    Albumin/Globulin Ratio 09/07/2019 1.3  1.1 - 2.2 Final    Total Bilirubin 09/07/2019 0.3  0.0 - 1.0 mg/dL Final    Alkaline Phosphatase 09/07/2019 76  40 - 129 U/L Final    ALT 09/07/2019 25  10 - 40 U/L Final    AST 09/07/2019 22  15 - 37 U/L Final    Globulin 09/07/2019 3.1  g/dL Final   Orders Only on 08/08/2019   Component Date Value Ref Range Status    Vitamin B1,Whole Blood 08/08/2019 115  70 - 180 nmol/L Final   There may be more visits with results that are not included. Assessment and Plan     Lauren Vasquez was seen today for diabetes. Diagnoses and all orders for this visit:    Type 2 diabetes mellitus with vascular disease ([facility]  -     atorvastatin (LIPITOR) 80 MG tablet;  Take 1 tablet by mouth daily  -     Hemoglobin A1C; Future  -     Lipid, Fasting; Future  -     Comprehensive Metabolic Panel; Future  -     Microalbumin / Creatinine Urine Ratio; Future  -     Urinalysis Reflex to Culture; Future    Uncontrolled type 2 diabetes mellitus with diabetic nephropathy, with long-term current use of insulin (HCC)  -     atorvastatin (LIPITOR) 80 MG tablet; Take 1 tablet by mouth daily  -     Hemoglobin A1C; Future  -     Lipid, Fasting; Future  -     Comprehensive Metabolic Panel; Future  -     Microalbumin / Creatinine Urine Ratio; Future  -     Urinalysis Reflex to Culture; Future    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 (HCC)  -     atorvastatin (LIPITOR) 80 MG tablet; Take 1 tablet by mouth daily  -     Hemoglobin A1C; Future  -     Lipid, Fasting; Future  -     Comprehensive Metabolic Panel; Future  -     Microalbumin / Creatinine Urine Ratio; Future  -     Urinalysis Reflex to Culture; Future    Dyslipidemia associated with type 2 diabetes mellitus (HCC)  -     atorvastatin (LIPITOR) 80 MG tablet; Take 1 tablet by mouth daily  -     Hemoglobin A1C; Future  -     Lipid, Fasting; Future  -     Comprehensive Metabolic Panel; Future  -     Microalbumin / Creatinine Urine Ratio; Future  -     Urinalysis Reflex to Culture; Future    Essential hypertension    Morbid obesity due to excess calories ([facility]          1: Type 2 DM complicated with macrovascular disease, nephropathy, retinopathy, nephropathy    Uncontrolled A1C 10.1% < 7.8% <  8.6%  < 11.2% <  8.6% < 7.9% < 10%    Cr 1.1, GFR 60 Oct 2019     A1C of <8 would be acceptable because of microvascular and macrovascular complications     Blood sugars have improved in last week after going up on metformin 500mg bid     Increase Metformin Er 500mg to TWO TABS TWICE A DAY     Victoza 1.8mg daily in am     If blood sugars rise, will add basal insulin   He has Lantus at home     All instructions provided in written.    Check Blood sugars 1-2 times per day. Log them along with insulin and send them every 2 weeks. Call for blood sugars less than 60 or more than 400. Eye exam: Last exam in fall 2019, has background  Foot exam:  March 2019   Deformity/amputation: absent  Skin lesions/pre-ulcerative calluses: absent  Edema: right- negative, left- negative  Sensory exam: Monofilament sensation: normal  Pulses: normal, Vibration (128 Hz): Intact    Renal screen: high 184 Oct 2019    TSH screen: Feb 2018     2: HTN   High again   He will discuss with his nephrologist   Runs <120/<85 at home     3: Hyperlipidemia   LDL: 49 , HDL: 32, TGs: 77  Feb 2019     Atorvastatin 80mg     4: Morbid obesity   S/p sleeve   Gradually losing weight     RTC in 3 months with A1C, MACR, UA, lipids, CMP     EDUCATION:   Greater than 50% of this follow-up visit was spent in general counseling regarding   obesity, diet, exercise, importance of adherence to insulin regime, recognition and treatment of hypo and hyperglycemia,  glucose logging, proper diabetes management, diabetic complications with poor management and the importance of glycemic control in order to avoid the complications of diabetes. Risks and potential complications of diabetes were reviewed with the patient. Diabetes health maintenance plan and follow-up were discussed and understood by the patient. We reviewed the importance of medication compliance and regular follow-up. Aggressive lifestyle modification was encouraged. Exercise Counselling: This patient is a candidate for regular physical exercise. Instructions to perform the following types of exercise:  Swimming or water aerobic exercise  Brisk walking  Playing tennis  Stationary bicycle or elliptical indoor  Low impact aerobic exercise    Instructions given to exercise for the following duration:  30 minutes a day for five-seven days per week.     Following instructions for being active throughout the day in addition to formal

## 2020-02-24 ENCOUNTER — PATIENT MESSAGE (OUTPATIENT)
Dept: ENDOCRINOLOGY | Age: 55
End: 2020-02-24

## 2020-02-24 RX ORDER — METFORMIN HYDROCHLORIDE 500 MG/1
1000 TABLET, EXTENDED RELEASE ORAL 2 TIMES DAILY WITH MEALS
Qty: 124 TABLET | Refills: 5 | Status: SHIPPED | OUTPATIENT
Start: 2020-02-24 | End: 2020-08-31

## 2020-02-24 NOTE — TELEPHONE ENCOUNTER
Okay to send?     Requested Prescriptions     Pending Prescriptions Disp Refills    metFORMIN (GLUCOPHAGE-XR) 500 MG extended release tablet 60 tablet 5     Sig: Take 1 tablet by mouth 2 times daily (with meals)         Last OV 2/3/20  Next OV 5/4/20

## 2020-03-04 RX ORDER — OMEPRAZOLE 20 MG/1
CAPSULE, DELAYED RELEASE ORAL
Qty: 30 CAPSULE | Refills: 3 | Status: SHIPPED | OUTPATIENT
Start: 2020-03-04 | End: 2020-06-01

## 2020-03-06 PROBLEM — E66.9 DIABETES MELLITUS TYPE 2 IN OBESE (HCC): Status: ACTIVE | Noted: 2020-03-06

## 2020-03-06 PROBLEM — E11.69 DIABETES MELLITUS TYPE 2 IN OBESE (HCC): Status: ACTIVE | Noted: 2020-03-06

## 2020-03-06 ASSESSMENT — ENCOUNTER SYMPTOMS
EYES NEGATIVE: 1
RESPIRATORY NEGATIVE: 1
ALLERGIC/IMMUNOLOGIC NEGATIVE: 1
GASTROINTESTINAL NEGATIVE: 1

## 2020-03-06 NOTE — PROGRESS NOTES
[facility]) Physicians   [employer]    Subjective:      Patient ID: Viet Quiroga is a 48 y.o. male    HPI    6 months s/p sleeve gastrectomy    Viet Quiroga is a 48 y.o. obese male , Body mass index is 32.49 kg/m². Patient denies any nausea, vomiting, fevers, chills, shortness of breath, chest pain, constipation or urinary symptoms. Denies any heartburn nor dysphagia. Past Medical History:   Diagnosis Date    Diabetes mellitus ([address]     GERD (gastroesophageal reflux disease)     Hypertension     Sleep apnea     cpap     Past Surgical History:   Procedure Laterality Date    CYSTOSCOPY  9/25/2019    FLEXIBLE CYSTOSCOPY, DILATION, POTTS CATHETER INSERTION performed by Papi Fitch MD at [place] [address].      right knee     SLEEVE GASTRECTOMY N/A 9/25/2019    LAPAROSCOPIC SLEEVE GASTRECTOMY-ETHICON performed by Nicolasa Paz MD at [address] [place] [address] N/A 4/12/2019    EGD BIOPSY performed by Nicolasa Paz MD at [address] [place] [address]     Family History   Problem Relation Age of Onset    Heart Attack Mother     Diabetes Mother     High Blood Pressure Mother     Colon Cancer Sister      Social History     Tobacco Use    Smoking status: Never Smoker    Smokeless tobacco: Never Used   Substance Use Topics    Alcohol use: Yes     Alcohol/week: 1.0 standard drinks     Types: 1 Cans of beer per week     Comment: socially      I counseled the patient on the importance of not smoking and risks of ETOH. Allergies   Allergen Reactions    Hctz [Hydrochlorothiazide]      cramps    Penicillins Hives     Vitals:    03/09/20 1519   BP: 118/72   Pulse: 87   SpO2: 98%   Weight: 246 lb 4 oz (111.7 kg)   Height: 6' 1\" (1.854 m)     Body mass index is 32.49 kg/m².     Current Outpatient Medications:     omeprazole (PRILOSEC) 20 MG delayed release capsule, TAKE 1 CAPSULE BY MOUTH ONE TIME A DAY, Disp: 30 capsule, Rfl: 3    metFORMIN (GLUCOPHAGE-XR) 500 MG extended release tablet, Take 2 tablets by mouth 2 times daily (with meals), Disp: 124 tablet, Rfl: 5    atorvastatin (LIPITOR) 80 MG tablet, Take 1 tablet by mouth daily, Disp: 90 tablet, Rfl: 3    VICTOZA 18 MG/3ML SOPN SC injection, INJECT 1.8 MG INTO THE SKIN DAILY, Disp: 27 mL, Rfl: 1    blood glucose test strips (ASCENSIA AUTODISC VI;ONE TOUCH ULTRA TEST VI) strip, USE THREE TIMES A DAY AS NEEDED, Disp: 100 each, Rfl: 3    lisinopril (PRINIVIL;ZESTRIL) 40 MG tablet, Take 1 tablet by mouth daily, Disp: 30 tablet, Rfl: 0    NIFEdipine (PROCARDIA XL) 30 MG extended release tablet, Take 1 tablet by mouth 2 times daily, Disp: 60 tablet, Rfl: 0    ondansetron (ZOFRAN ODT) 4 MG disintegrating tablet, Take 2 tablets by mouth every 8 hours as needed for Nausea, Disp: 30 tablet, Rfl: 0    metoprolol tartrate (LOPRESSOR) 50 MG tablet, Take 1 tablet by mouth 2 times daily, Disp: 60 tablet, Rfl: 0    Blood Glucose Monitoring Suppl (PRODIGY POCKET BLOOD GLUCOSE) w/Device KIT, Use daily to monitor blood sugar level, Disp: 1 kit, Rfl: 1    PRODIGY LANCETS 28G MISC, 3 each by Does not apply route daily, Disp: 100 each, Rfl: 3    Lab Results   Component Value Date    WBC 5.8 10/29/2019    RBC 4.75 10/29/2019    HGB 12.6 10/29/2019    HCT 38.9 10/29/2019    MCV 81.9 10/29/2019    MCH 26.6 10/29/2019    MCHC 32.4 10/29/2019    MPV 9.3 10/29/2019    NEUTOPHILPCT 48.3 10/29/2019    LYMPHOPCT 42.1 10/29/2019    MONOPCT 7.3 10/29/2019    EOSRELPCT 1.7 10/29/2019    BASOPCT 0.6 10/29/2019    NEUTROABS 2.8 10/29/2019    LYMPHSABS 2.4 10/29/2019    MONOSABS 0.4 10/29/2019    EOSABS 0.1 10/29/2019     Lab Results   Component Value Date     10/29/2019    K 4.3 10/29/2019    K 4.3 09/26/2019     10/29/2019    CO2 25 10/29/2019    ANIONGAP 15 10/29/2019    GLUCOSE 96 10/29/2019    BUN 17 10/29/2019    CREATININE 1.1 10/29/2019    LABGLOM >60 10/29/2019    GFRAA >60 10/29/2019    CALCIUM 9.5 10/29/2019    PROT 7.1 09/07/2019 Judgment normal.       Assessment and Plan:   Patient is 6 months s/p sleeve gastrectomy, down 10 lbs with a total weight loss of 30.9 lbs. The patient's current Body mass index is 32.49 kg/m². (3/9/20). He is doing well, denies n/v/dysphagia or reflux. He is tolerating diet, getting adequate fluids and protein. He is exercising three days per week doing cardio. Encouraged continued physical activity. He is not taking vitamins as instructed. Alternative multivitamin handout provided and reviewed. He did meet with the registered dietitian for continued follow up. I agree with recommendations and plan. We will see him back in 2 months for continued follow up. Follow up labs ordered and patient is responsible for completing. Patient gives verbal consent for me to leave message about results at phone number in chart. A total of 15 minutes was spent face-to-face with the patient and over half of that time was spent counseling the patient on proper dietary behaviors, exercise and post-op progress.

## 2020-03-09 ENCOUNTER — OFFICE VISIT (OUTPATIENT)
Dept: BARIATRICS/WEIGHT MGMT | Age: 55
End: 2020-03-09
Payer: COMMERCIAL

## 2020-03-09 VITALS
HEART RATE: 87 BPM | HEIGHT: 73 IN | DIASTOLIC BLOOD PRESSURE: 72 MMHG | WEIGHT: 246.25 LBS | SYSTOLIC BLOOD PRESSURE: 118 MMHG | BODY MASS INDEX: 32.64 KG/M2 | OXYGEN SATURATION: 98 %

## 2020-03-09 PROCEDURE — 99213 OFFICE O/P EST LOW 20 MIN: CPT | Performed by: NURSE PRACTITIONER

## 2020-04-16 ENCOUNTER — TELEPHONE (OUTPATIENT)
Dept: PHARMACY | Facility: CLINIC | Age: 55
End: 2020-04-16

## 2020-05-04 ENCOUNTER — TELEMEDICINE (OUTPATIENT)
Dept: ENDOCRINOLOGY | Age: 55
End: 2020-05-04
Payer: COMMERCIAL

## 2020-05-04 PROCEDURE — 99214 OFFICE O/P EST MOD 30 MIN: CPT | Performed by: INTERNAL MEDICINE

## 2020-05-04 RX ORDER — INSULIN GLARGINE 100 [IU]/ML
24 INJECTION, SOLUTION SUBCUTANEOUS NIGHTLY
COMMUNITY
Start: 2020-03-27 | End: 2020-08-03 | Stop reason: SDUPTHER

## 2020-05-04 NOTE — PATIENT INSTRUCTIONS
Patient Education         Diabetes: Daily Foot Care (01:46)  Your health professional recommends that you watch this short online health video. Learn why checking your feet is important and how to do it. How to watch the video    Scan the QR code   OR Visit the website    [email or web address]   Current as of: December 19, 2019Content Version: 12.4  © 1885-4648 [employer]. Care instructions adapted under license by [facility] [address] If you have questions about a medical condition or this instruction, always ask your healthcare professional. [place] [address] any warranty or liability for your use of this information.

## 2020-05-04 NOTE — PROGRESS NOTES
[facility]:   [facility] 48 [facility]    Chief Complaint:   [facility] presents for an evaluation of Type 2 Diabetes Mellitus , Hyperlipidemia and hypertension. Pursuant to the emergency declaration under the [facility] waiver authority and the [employer] Act this visit was conducted after obtaining verbal consent, with the use of [employer] interactive audio and video telecommunications system that permits real time communication between the patient and provider to substitute for an in-person clinic visit to reduce the patient's risk of exposure to COVID-19 and provide necessary medical care. Because this was a Telehealth visit, evaluation of the following organ systems was limited: Vitals, Constitutional, EENT, Resp, CV, GI, , MS, Neuro, Skin. Heme. Lymph, Imm. [facility] was at home. Provider was at home. No one else was involved. The patient has also been advised to contact this office for worsening conditions or problems, and seek emergency medical treatment and/or call 911 if deemed necessary. Subjective:   Type 2 Diabetes Mellitus diagnosed in 1998  On insulin since 2016     Previously followed by  endocrine and physician moved out. Records reviewed from care everywhere   Pioglitazone 30mg daily - stopped in Feb 2018   Sharran  stopped in summer 2018 for worsening CKD     S/p gastric sleeve in Sep 2019. Metformin 1000mg bid was stopped   Off Lantus since Sep 2019      Metformin ER 500mg, two tabs twice a day    Victoza 1.8mg daily in am     Checks blood sugars 2 times per day. Reportedly . Under 100 in the morning, mid 100's in the evening     AM:   Lunch:   Supper:   HS:     Hypoglycemias: None     Meals: Three, dinner is big. No snacks, diet soda once a day   [facility]: [facility]     Denies chest pain, exertional dyspnea. CVA in Aug 2016.  No residual on 10/29/2019   Component Date Value Ref Range Status    Sodium 10/29/2019 142  136 - 145 mmol/L Final    Potassium 10/29/2019 4.3  3.5 - 5.1 mmol/L Final    Chloride 10/29/2019 102  99 - 110 mmol/L Final    CO2 10/29/2019 25  21 - 32 mmol/L Final    Anion Gap 10/29/2019 15  3 - 16 Final    Glucose 10/29/2019 96  70 - 99 mg/dL Final    BUN 10/29/2019 17  7 - 20 mg/dL Final    CREATININE 10/29/2019 1.1  0.9 - 1.3 mg/dL Final    GFR Non- 10/29/2019 >60  >60 Final    GFR  10/29/2019 >60  >60 Final    Calcium 10/29/2019 9.5  8.3 - 10.6 mg/dL Final    Phosphorus 10/29/2019 3.5  2.5 - 4.9 mg/dL Final    Alb 10/29/2019 4.3  3.4 - 5.0 g/dL Final   Orders Only on 10/29/2019   Component Date Value Ref Range Status    WBC 10/29/2019 5.8  4.0 - 11.0 K/uL Final    RBC 10/29/2019 4.75  4.20 - 5.90 M/uL Final    Hemoglobin 10/29/2019 12.6* 13.5 - 17.5 g/dL Final    Hematocrit 10/29/2019 38.9* 40.5 - 52.5 % Final    MCV 10/29/2019 81.9  80.0 - 100.0 fL Final    MCH 10/29/2019 26.6  26.0 - 34.0 pg Final    MCHC 10/29/2019 32.4  31.0 - 36.0 g/dL Final    RDW 10/29/2019 15.6* 12.4 - 15.4 % Final    Platelets 79/68/4461 190  135 - 450 K/uL Final    MPV 10/29/2019 9.3  5.0 - 10.5 fL Final    Neutrophils % 10/29/2019 48.3  % Final    Lymphocytes % 10/29/2019 42.1  % Final    Monocytes % 10/29/2019 7.3  % Final    Eosinophils % 10/29/2019 1.7  % Final    Basophils % 10/29/2019 0.6  % Final    Neutrophils Absolute 10/29/2019 2.8  1.7 - 7.7 K/uL Final    Lymphocytes Absolute 10/29/2019 2.4  1.0 - 5.1 K/uL Final    Monocytes Absolute 10/29/2019 0.4  0.0 - 1.3 K/uL Final    Eosinophils Absolute 10/29/2019 0.1  0.0 - 0.6 K/uL Final    Basophils Absolute 10/29/2019 0.0  0.0 - 0.2 K/uL Final   Orders Only on 10/04/2019   Component Date Value Ref Range Status    Microalbumin, Random Urine 10/04/2019 34.80* <2.0 mg/dL Final    Creatinine, Ur 10/04/2019 189.0  39.0 - 259.0 mg/dL 09/25/2019 7.241* 7.350 - 7.450 Final    pCO2, Carmen 09/25/2019 22.3* 40.0 - 50.0 mm Hg Final    pO2, Carmen 09/25/2019 43  Not Established mm Hg Final    HCO3, Venous 09/25/2019 9.6* 23.0 - 29.0 mmol/L Final    Base Excess, Carmen 09/25/2019 -18* -3 - 3 Final    O2 Sat, Carmen 09/25/2019 71  Not Established % Final    TC02 (Calc), Carmen 09/25/2019 10  Not Established mmol/L Final    Lactate 09/25/2019 12.32* 0.40 - 2.00 mmol/L Final    Hemoglobin 09/25/2019 4.7* 13.5 - 17.5 gm/dL Final    POC Hematocrit 09/25/2019 14.0* 40.5 - 52.5 % Final    Sample Type 09/25/2019 CARMEN   Final    Performed on 09/25/2019 SEE BELOW   Final    POC Sodium 09/25/2019 136  136 - 145 mmol/L Final    POC Potassium 09/25/2019 3.8  3.5 - 5.1 mmol/L Final    POC Chloride 09/25/2019 106  99 - 110 mmol/L Final    POC Anion Gap 09/25/2019 11   Final    POC Glucose 09/25/2019 154* 70 - 99 mg/dl Final    Calcium, Ion 09/25/2019 1.15  1.12 - 1.32 mmol/L Final    pH, Carmen 09/25/2019 7.245* 7.350 - 7.450 Final    pCO2, Carmen 09/25/2019 44.0  40.0 - 50.0 mm Hg Final    pO2, Carmen 09/25/2019 46  Not Established mm Hg Final    HCO3, Venous 09/25/2019 19.1* 23.0 - 29.0 mmol/L Final    Base Excess, Carmen 09/25/2019 -8* -3 - 3 Final    O2 Sat, Carmen 09/25/2019 74  Not Established % Final    TC02 (Calc), Carmen 09/25/2019 21  Not Established mmol/L Final    Lactate 09/25/2019 1.20  0.40 - 2.00 mmol/L Final    Hemoglobin 09/25/2019 12.1* 13.5 - 17.5 gm/dL Final    POC Hematocrit 09/25/2019 36.0* 40.5 - 52.5 % Final    Sample Type 09/25/2019 CARMEN   Final    Performed on 09/25/2019 SEE BELOW   Final    Sodium, Ur 09/25/2019 88  Not Established mmol/L Final    Creatinine, Ur 09/25/2019 62.7  39.0 - 259.0 mg/dL Final    Sodium 09/25/2019 139  136 - 145 mmol/L Final    Potassium 09/25/2019 3.9  3.5 - 5.1 mmol/L Final    Chloride 09/25/2019 102  99 - 110 mmol/L Final    CO2 09/25/2019 20* 21 - 32 mmol/L Final    Anion Gap 09/25/2019 17* 3 - 16 Final    08/08/2019   Component Date Value Ref Range Status    Vitamin B1,Whole Blood 08/08/2019 115  70 - 180 nmol/L Final   There may be more visits with results that are not included. Assessment and Plan     There are no diagnoses linked to this encounter. 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 Check Blood sugars 1-2 times per day. Log them along with insulin and send them every 2 weeks. Call for blood sugars less than 60 or more than 400. Eye exam: Last exam in fall 2019, has background  Foot exam:  March 2019   Deformity/amputation: absent  Skin lesions/pre-ulcerative calluses: absent  Edema: right- negative, left- negative  Sensory exam: Monofilament sensation: normal  Pulses: normal, Vibration (128 Hz):  Intact    Renal screen: high 184 Oct 2019    TSH screen: Feb 2018     2: HTN   Today BP was 138/93   Follows wit nephrologist   Runs <120/<85 at home     3: Hyperlipidemia   LDL: 49 , HDL: 32, TGs: 77  Feb 2019     Atorvastatin 80mg     4: Morbid obesity   S/p sleeve   Gradually losing weight   Last weight 246     Labs not done due to COVID-19 outbreak   He will do labs (A1C, MACR, UA, lipids, CMP) when outbreak is better   RTC in 3 months      EDUCATION:   Greater than 50% of this follow-up visit was spent in general counseling regarding   obesity, diet, exercise, importance of adherence to insulin regime, recognition and treatment of hypo and hyperglycemia,  glucose logging, proper diabetes management, diabetic complications with poor management and the importance of glycemic control in order to avoid the complications of diabetes. Risks and potential complications of diabetes were reviewed with the patient. Diabetes health maintenance plan and follow-up were discussed and understood by the patient. We reviewed the importance of medication compliance and regular follow-up. Aggressive lifestyle modification was encouraged. Exercise Counselling: This patient is a candidate for regular physical exercise. Instructions to perform the following types of exercise:  Swimming or water aerobic exercise  Brisk walking  Playing tennis  Stationary bicycle or elliptical indoor  Low impact aerobic exercise    Instructions given to exercise for the following duration:  30 minutes a day for five-seven days per week.     Following instructions for being active throughout the day in addition to formal exercise:  Walk instead of drive whenever possible  Take the stairs instead of the elevator  Work in the garden  Park to the far end of the parking lot to add more walking steps to destination      Electronically signed by Genelle Galeazzi, MD on 5/4/2020 at 2:38 PM

## 2020-05-21 ENCOUNTER — TELEPHONE (OUTPATIENT)
Dept: PHARMACY | Facility: CLINIC | Age: 55
End: 2020-05-21

## 2020-05-22 ENCOUNTER — SCHEDULED TELEPHONE ENCOUNTER (OUTPATIENT)
Dept: PHARMACY | Facility: CLINIC | Age: 55
End: 2020-05-22

## 2020-05-22 RX ORDER — ASPIRIN 81 MG/1
81 TABLET ORAL DAILY
COMMUNITY

## 2020-05-22 NOTE — TELEPHONE ENCOUNTER
Last 3 Encounters:   03/09/20 118/72   02/03/20 (!) 154/101   12/09/19 (!) 179/104     Lab Results   Component Value Date    LABMICR 34.80 (H) 10/04/2019     Lab Results   Component Value Date    LABA1C 10.1 01/31/2020    LABA1C 7.8 10/04/2019    LABA1C 8.6 06/26/2019     Lab Results   Component Value Date    CHOL 96 02/28/2019    TRIG 77 02/28/2019    HDL 32 (L) 02/28/2019    LDLCALC 49 02/28/2019     ALT   Date Value Ref Range Status   09/07/2019 25 10 - 40 U/L Final     AST   Date Value Ref Range Status   09/07/2019 22 15 - 37 U/L Final     The ASCVD Risk score (Josefa Pabon., et al., 2013) failed to calculate for the following reasons: The patient has a prior MI or stroke diagnosis     Lab Results   Component Value Date    CREATININE 1.1 10/29/2019     CrCl cannot be calculated (Patient's most recent lab result is older than the maximum 120 days allowed. ). CrCL ~102.33 mL/min AdjBW- 92.1kg  eGFR: >60 mL/min/1.73 m^2    Immunizations:  Immunization History   Administered Date(s) Administered    Influenza, Quadv, IM, PF (6 mo and older Fluzone, Flulaval, Fluarix, and 3 yrs and older Afluria) 10/17/2018, 12/30/2019    Pneumococcal Polysaccharide ([id number]) 10/17/2018    Tdap (Boostrix, Adacel) 05/08/2015      Social History:  Social History     Tobacco Use    Smoking status: Never Smoker    Smokeless tobacco: Never Used   Substance Use Topics    Alcohol use: Yes     Alcohol/week: 1.0 standard drinks     Types: 1 Cans of beer per week     Comment: socially      ASSESSMENT:  Initial Program Requirements (Y indicates has completed for the year, N indicates needs to be completed by 7/1/20): Yes - OV with provider for DM (1st)  Yes - A1c (1st)     Ongoing Program Requirements (Y indicates has completed for the year, N indicates needs to be completed by 12/31/20):   Yes - OV with provider for DM (2nd)  No - ACC/diabetes educator visit (if A1c over 8%)- aduro ryan   No - A1c (2nd)  No - Lipid panel  No - Urine

## 2020-05-22 NOTE — TELEPHONE ENCOUNTER
Dr. Brenda Horne,    Your patient is currently enrolled in the [address] [place], [place] [address] Employee Diabetes Program. After your patient's recent visit with a [facility] Clinical Pharmacist, the below were identified as opportunities to assist with their diabetes management (if patient is not eligible for below recommendations, please reply with reason/contraindication):   · Please approve a prescription for pen needles. He is almost out and wishes to change sizes. I have pended a prescription for your convenience. See pharmacist note dated 5/22/20 for complete details. Thank you,  FIORDALIZA Shen, PharmD  [facility] [address]  Phone: [phone] option-7

## 2020-05-25 RX ORDER — PEN NEEDLE, DIABETIC 30 GX3/16"
NEEDLE, DISPOSABLE MISCELLANEOUS
Qty: 200 EACH | Refills: 3 | Status: SHIPPED | OUTPATIENT
Start: 2020-05-25

## 2020-06-01 RX ORDER — OMEPRAZOLE 20 MG/1
CAPSULE, DELAYED RELEASE ORAL
Qty: 30 CAPSULE | Refills: 3 | Status: SHIPPED | OUTPATIENT
Start: 2020-06-01 | End: 2020-08-25

## 2020-06-02 RX ORDER — CHLORTHALIDONE 25 MG/1
25 TABLET ORAL DAILY
Qty: 30 TABLET | Refills: 3 | Status: SHIPPED | OUTPATIENT
Start: 2020-06-02 | End: 2020-08-10 | Stop reason: ALTCHOICE

## 2020-07-17 DIAGNOSIS — E66.9 DIABETES MELLITUS TYPE 2 IN OBESE (HCC): ICD-10-CM

## 2020-07-17 DIAGNOSIS — E11.3299 TYPE 2 DIABETES MELLITUS WITH BACKGROUND RETINOPATHY (HCC): ICD-10-CM

## 2020-07-17 DIAGNOSIS — E11.69 DIABETES MELLITUS TYPE 2 IN OBESE (HCC): ICD-10-CM

## 2020-07-17 DIAGNOSIS — E11.59 TYPE 2 DIABETES MELLITUS WITH VASCULAR DISEASE (HCC): ICD-10-CM

## 2020-07-17 LAB
A/G RATIO: 1.7 (ref 1.1–2.2)
ALBUMIN SERPL-MCNC: 4.3 G/DL (ref 3.4–5)
ALP BLD-CCNC: 80 U/L (ref 40–129)
ALT SERPL-CCNC: 20 U/L (ref 10–40)
ANION GAP SERPL CALCULATED.3IONS-SCNC: 14 MMOL/L (ref 3–16)
AST SERPL-CCNC: 19 U/L (ref 15–37)
BILIRUB SERPL-MCNC: 0.4 MG/DL (ref 0–1)
BILIRUBIN URINE: NEGATIVE
BLOOD, URINE: NEGATIVE
BUN BLDV-MCNC: 32 MG/DL (ref 7–20)
CALCIUM SERPL-MCNC: 9.6 MG/DL (ref 8.3–10.6)
CHLORIDE BLD-SCNC: 107 MMOL/L (ref 99–110)
CHOLESTEROL, FASTING: 75 MG/DL (ref 0–199)
CLARITY: CLEAR
CO2: 21 MMOL/L (ref 21–32)
COLOR: YELLOW
CREAT SERPL-MCNC: 1.6 MG/DL (ref 0.9–1.3)
CREATININE URINE: 159 MG/DL (ref 39–259)
EPITHELIAL CELLS, UA: 0 /HPF (ref 0–5)
GFR AFRICAN AMERICAN: 55
GFR NON-AFRICAN AMERICAN: 45
GLOBULIN: 2.6 G/DL
GLUCOSE BLD-MCNC: 67 MG/DL (ref 70–99)
GLUCOSE URINE: NEGATIVE MG/DL
HDLC SERPL-MCNC: 27 MG/DL (ref 40–60)
HYALINE CASTS: 1 /LPF (ref 0–8)
KETONES, URINE: NEGATIVE MG/DL
LDL CHOLESTEROL CALCULATED: 37 MG/DL
LEUKOCYTE ESTERASE, URINE: ABNORMAL
MICROALBUMIN UR-MCNC: <1.2 MG/DL
MICROALBUMIN/CREAT UR-RTO: NORMAL MG/G (ref 0–30)
MICROSCOPIC EXAMINATION: YES
NITRITE, URINE: NEGATIVE
PH UA: 5.5 (ref 5–8)
POTASSIUM SERPL-SCNC: 4 MMOL/L (ref 3.5–5.1)
PROTEIN UA: NEGATIVE MG/DL
RBC UA: NORMAL /HPF (ref 0–4)
SODIUM BLD-SCNC: 142 MMOL/L (ref 136–145)
SPECIFIC GRAVITY UA: 1.02 (ref 1–1.03)
TOTAL PROTEIN: 6.9 G/DL (ref 6.4–8.2)
TRIGLYCERIDE, FASTING: 57 MG/DL (ref 0–150)
URINE REFLEX TO CULTURE: ABNORMAL
URINE TYPE: ABNORMAL
UROBILINOGEN, URINE: 0.2 E.U./DL
VLDLC SERPL CALC-MCNC: 11 MG/DL
WBC UA: 1 /HPF (ref 0–5)

## 2020-07-18 LAB
ESTIMATED AVERAGE GLUCOSE: 165.7 MG/DL
HBA1C MFR BLD: 7.4 %

## 2020-07-27 ENCOUNTER — PATIENT MESSAGE (OUTPATIENT)
Dept: ENDOCRINOLOGY | Age: 55
End: 2020-07-27

## 2020-07-27 RX ORDER — ATORVASTATIN CALCIUM 80 MG/1
TABLET, FILM COATED ORAL
Qty: 30 TABLET | Refills: 5 | Status: SHIPPED | OUTPATIENT
Start: 2020-07-27 | End: 2021-03-31 | Stop reason: SDUPTHER

## 2020-07-28 ENCOUNTER — CARE COORDINATION (OUTPATIENT)
Dept: OTHER | Facility: CLINIC | Age: 55
End: 2020-07-28

## 2020-07-28 NOTE — TELEPHONE ENCOUNTER
From: Doc Knott  To: John Mays MD  Sent: 7/27/2020 9:29 PM EDT  Subject: Prescription Question    I needed to order a new monitor mine is broken.  Can you write me an order for one

## 2020-07-28 NOTE — CARE COORDINATION
Ambulatory Care Coordination Note  7/28/2020  CM Risk Score: 6  Charlson 10 Year Mortality Risk Score: 100%     ACC: Oliverio Julien, RN    Summary Note: ACM contacted patient for introduction to Associate Care Management Program and Be Well DM Program Education. Patient declines care management at this time, states \"I am doing very well, COVID got me off track\". Josselyn Zaragoza states his exercise had decreased and his diet worsened during quaritine therefore his A1C elevated. A1C now 7.4 patient declines any CM needs related to DM or other health care concerns. ACM provided contact information for self referral if patient would need care management in the future. ACM will sign off at this time. Prior to Admission medications    Medication Sig Start Date End Date Taking?  Authorizing Provider   atorvastatin (LIPITOR) 80 MG tablet TAKE 1 TABLET BY MOUTH ONE TIME A DAY 7/27/20   Fani Serrano MD   chlorthalidone (HYGROTON) 25 MG tablet Take 1 tablet by mouth daily 6/2/20   Fani Serrano MD   omeprazole (PRILOSEC) 20 MG delayed release capsule TAKE 1 CAPSULE BY MOUTH ONE TIME A DAY 6/1/20   NATIVIDAD Hernandez - CNP   Insulin Pen Needle (PEN NEEDLES) 32G X 4 MM MISC Use BID with insulin and victoza 5/25/20   Chris Beard MD   aspirin EC 81 MG EC tablet Take 81 mg by mouth daily    Historical Provider, MD   LANTUS SOLOSTAR 100 UNIT/ML injection pen Inject 24 Units into the skin nightly 3/27/20   Historical Provider, MD   CHLORTHALIDONE PO Take by mouth    Historical Provider, MD   metFORMIN (GLUCOPHAGE-XR) 500 MG extended release tablet Take 2 tablets by mouth 2 times daily (with meals) 2/24/20   Chris Beard MD   VICTOZA 18 MG/3ML SOPN SC injection INJECT 1.8 MG INTO THE SKIN DAILY 1/30/20   Chris Beard MD   blood glucose test strips (ASCENSIA AUTODISC VI;ONE TOUCH ULTRA TEST VI) strip USE THREE TIMES A DAY AS NEEDED 1/30/20   Chris Beard MD   lisinopril (PRINIVIL;ZESTRIL) 40 MG tablet Take 1 tablet by mouth daily 12/9/19   NATIVIDAD Hernandez CNP   NIFEdipine (PROCARDIA XL) 30 MG extended release tablet Take 1 tablet by mouth 2 times daily 9/27/19   NATIVIDAD Hernandez CNP   metoprolol tartrate (LOPRESSOR) 50 MG tablet Take 1 tablet by mouth 2 times daily 9/26/19   NATIVIDAD Hernandez CNP   Blood Glucose Monitoring Suppl (PRODIGY POCKET BLOOD GLUCOSE) w/Device KIT Use daily to monitor blood sugar level 12/31/18   Chris Beard MD   PRODIGY LANCETS 28G MISC 3 each by Does not apply route daily 12/31/18   Chris Beard MD       Future Appointments   Date Time Provider Miguel A Mclaughlin   8/3/2020  5:20 PM Chris Beard MD [facility]   8/10/2020  1:30 PM NATIVIDAD Hernandez CNP [facility]

## 2020-07-29 RX ORDER — BLOOD-GLUCOSE METER
EACH MISCELLANEOUS
Qty: 1 KIT | Refills: 1 | Status: SHIPPED | OUTPATIENT
Start: 2020-07-29

## 2020-08-03 ENCOUNTER — TELEMEDICINE (OUTPATIENT)
Dept: ENDOCRINOLOGY | Age: 55
End: 2020-08-03
Payer: COMMERCIAL

## 2020-08-03 PROCEDURE — 99214 OFFICE O/P EST MOD 30 MIN: CPT | Performed by: INTERNAL MEDICINE

## 2020-08-03 PROCEDURE — 3051F HG A1C>EQUAL 7.0%<8.0%: CPT | Performed by: INTERNAL MEDICINE

## 2020-08-03 RX ORDER — LISINOPRIL 20 MG/1
20 TABLET ORAL DAILY
Qty: 90 TABLET | Refills: 1 | Status: SHIPPED | OUTPATIENT
Start: 2020-08-03 | End: 2022-02-09

## 2020-08-03 RX ORDER — INSULIN GLARGINE 100 [IU]/ML
24 INJECTION, SOLUTION SUBCUTANEOUS NIGHTLY
Qty: 5 PEN | Refills: 2 | Status: SHIPPED | OUTPATIENT
Start: 2020-08-03 | End: 2021-02-04

## 2020-08-03 NOTE — PATIENT INSTRUCTIONS
Patient Education        Learning About Diabetes Food Guidelines  Your Care Instructions     Meal planning is important to manage diabetes. It helps keep your blood sugar at a target level (which you set with your doctor). You don't have to eat special foods. You can eat what your family eats, including sweets once in a while. But you do have to pay attention to how often you eat and how much you eat of certain foods. You may want to work with a dietitian or a certified diabetes educator (CDE) to help you plan meals and snacks. A dietitian or CDE can also help you lose weight if that is one of your goals. What should you know about eating carbs? Managing the amount of carbohydrate (carbs) you eat is an important part of healthy meals when you have diabetes. Carbohydrate is found in many foods. · Learn which foods have carbs. And learn the amounts of carbs in different foods. ? Bread, cereal, pasta, and rice have about 15 grams of carbs in a serving. A serving is 1 slice of bread (1 ounce), ½ cup of cooked cereal, or 1/3 cup of cooked pasta or rice. ? Fruits have 15 grams of carbs in a serving. A serving is 1 small fresh fruit, such as an apple or orange; ½ of a banana; ½ cup of cooked or canned fruit; ½ cup of fruit juice; 1 cup of melon or raspberries; or 2 tablespoons of dried fruit. ? Milk and no-sugar-added yogurt have 15 grams of carbs in a serving. A serving is 1 cup of milk or 2/3 cup of no-sugar-added yogurt. ? Starchy vegetables have 15 grams of carbs in a serving. A serving is ½ cup of mashed potatoes or sweet potato; 1 cup winter squash; ½ of a small baked potato; ½ cup of cooked beans; or ½ cup cooked corn or green peas. · Learn how much carbs to eat each day and at each meal. A dietitian or CDE can teach you how to keep track of the amount of carbs you eat. This is called carbohydrate counting. · If you are not sure how to count carbohydrate grams, use the Plate Method to plan meals.  It is a good, quick way to make sure that you have a balanced meal. It also helps you spread carbs throughout the day. ? Divide your plate by types of foods. Put non-starchy vegetables on half the plate, meat or other protein food on one-quarter of the plate, and a grain or starchy vegetable in the final quarter of the plate. To this you can add a small piece of fruit and 1 cup of milk or yogurt, depending on how many carbs you are supposed to eat at a meal.  · Try to eat about the same amount of carbs at each meal. Do not \"save up\" your daily allowance of carbs to eat at one meal.  · Proteins have very little or no carbs per serving. Examples of proteins are beef, chicken, turkey, fish, eggs, tofu, cheese, cottage cheese, and peanut butter. A serving size of meat is 3 ounces, which is about the size of a deck of cards. Examples of meat substitute serving sizes (equal to 1 ounce of meat) are 1/4 cup of cottage cheese, 1 egg, 1 tablespoon of peanut butter, and ½ cup of tofu. How can you eat out and still eat healthy? · Learn to estimate the serving sizes of foods that have carbohydrate. If you measure food at home, it will be easier to estimate the amount in a serving of restaurant food. · If the meal you order has too much carbohydrate (such as potatoes, corn, or baked beans), ask to have a low-carbohydrate food instead. Ask for a salad or green vegetables. · If you use insulin, check your blood sugar before and after eating out to help you plan how much to eat in the future. · If you eat more carbohydrate at a meal than you had planned, take a walk or do other exercise. This will help lower your blood sugar. What else should you know? · Limit saturated fat, such as the fat from meat and dairy products. This is a healthy choice because people who have diabetes are at higher risk of heart disease. So choose lean cuts of meat and nonfat or low-fat dairy products.  Use olive or canola oil instead of butter or shortening when cooking. · Don't skip meals. Your blood sugar may drop too low if you skip meals and take insulin or certain medicines for diabetes. · Check with your doctor before you drink alcohol. Alcohol can cause your blood sugar to drop too low. Alcohol can also cause a bad reaction if you take certain diabetes medicines. Follow-up care is a key part of your treatment and safety. Be sure to make and go to all appointments, and call your doctor if you are having problems. It's also a good idea to know your test results and keep a list of the medicines you take. Where can you learn more? Go to [email or web address] and sign in to your [employer] account. Enter K227 in the [employer] box to learn more about \"Learning About Diabetes Food Guidelines. \"     If you do not have an account, please click on the \"Sign Up Now\" link. Current as of: December 20, 2019               Content Version: 12.5  © 7855-2025 [employer]. Care instructions adapted under license by [facility] ([facility]). If you have questions about a medical condition or this instruction, always ask your healthcare professional. [place] [address] any warranty or liability for your use of this information.

## 2020-08-03 NOTE — PROGRESS NOTES
One day , sugar 69 in the morning. Awareness present in 70's    Meals: Three, dinner is big. No snacks, diet soda once a day   Exercise: work out at [facility].     Denies chest pain, exertional dyspnea. CVA in Aug 2016. No residual deficits. Denies smoking. Currently on ASA 81 mg daily     The following portions of the patient's history were reviewed and updated as appropriate:       Family History   Problem Relation Age of Onset    Heart Attack Mother     Diabetes Mother     High Blood Pressure Mother     Colon Cancer Sister          Social History     Socioeconomic History    Marital status:      Spouse name: Not on file    Number of children: Not on file    Years of education: Not on file    Highest education level: Not on file   Occupational History    Not on file   Social Needs    Financial resource strain: Not on file    Food insecurity     Worry: Not on file     Inability: Not on file    Transportation needs     Medical: Not on file     Non-medical: Not on file   Tobacco Use    Smoking status: Never Smoker    Smokeless tobacco: Never Used   Substance and Sexual Activity    Alcohol use:  Yes     Alcohol/week: 1.0 standard drinks     Types: 1 Cans of beer per week     Comment: socially     Drug use: No    Sexual activity: Yes     Partners: Female   Lifestyle    Physical activity     Days per week: Not on file     Minutes per session: Not on file    Stress: Not on file   Relationships    Social connections     Talks on phone: Not on file     Gets together: Not on file     Attends Anabaptist service: Not on file     Active member of club or organization: Not on file     Attends meetings of clubs or organizations: Not on file     Relationship status: Not on file    Intimate partner violence     Fear of current or ex partner: Not on file     Emotionally abused: Not on file     Physically abused: Not on file     Forced sexual activity: Not on file   Other Topics Concern    Not on file   Social History Narrative    Not on file       Past Medical History:   Diagnosis Date    Diabetes mellitus ([address]     GERD (gastroesophageal reflux disease)     Hypertension     Sleep apnea     cpap       Past Surgical History:   Procedure Laterality Date    CYSTOSCOPY  9/25/2019    FLEXIBLE CYSTOSCOPY, DILATION, POTTS CATHETER INSERTION performed by Erma Falk MD at [facility].      right knee     SLEEVE GASTRECTOMY N/A 9/25/2019    LAPAROSCOPIC SLEEVE GASTRECTOMY-ETHICON performed by Stephen Zelaya MD at [address] [facility] N/A 4/12/2019    EGD BIOPSY performed by Stephen Zelaya MD at [address]         Allergies   Allergen Reactions    Hctz [Hydrochlorothiazide]      cramps    Penicillins Hives         Current Outpatient Medications:     LANTUS SOLOSTAR 100 UNIT/ML injection pen, Inject 24 Units into the skin nightly, Disp: 5 pen, Rfl: 2    lisinopril (PRINIVIL;ZESTRIL) 20 MG tablet, Take 1 tablet by mouth daily, Disp: 90 tablet, Rfl: 1    Blood Glucose Monitoring Suppl (PRODIGY POCKET BLOOD GLUCOSE) w/Device KIT, Use daily to monitor blood sugar level, Disp: 1 kit, Rfl: 1    atorvastatin (LIPITOR) 80 MG tablet, TAKE 1 TABLET BY MOUTH ONE TIME A DAY, Disp: 30 tablet, Rfl: 5    chlorthalidone (HYGROTON) 25 MG tablet, Take 1 tablet by mouth daily, Disp: 30 tablet, Rfl: 3    omeprazole (PRILOSEC) 20 MG delayed release capsule, TAKE 1 CAPSULE BY MOUTH ONE TIME A DAY, Disp: 30 capsule, Rfl: 3    Insulin Pen Needle (PEN NEEDLES) 32G X 4 MM MISC, Use BID with insulin and victoza, Disp: 200 each, Rfl: 3    aspirin EC 81 MG EC tablet, Take 81 mg by mouth daily, Disp: , Rfl:     metFORMIN (GLUCOPHAGE-XR) 500 MG extended release tablet, Take 2 tablets by mouth 2 times daily (with meals), Disp: 124 tablet, Rfl: 5    VICTOZA 18 MG/3ML SOPN SC injection, INJECT 1.8 MG INTO THE SKIN DAILY, Disp: 27 mL, Rfl: 1    blood glucose test strips (ASCENSIA 07/17/2020   Component Date Value Ref Range Status    Color, UA 07/17/2020 YELLOW  Straw/Yellow Final    Clarity, UA 07/17/2020 Clear  Clear Final    Glucose, Ur 07/17/2020 Negative  Negative mg/dL Final    Bilirubin Urine 07/17/2020 Negative  Negative Final    Ketones, Urine 07/17/2020 Negative  Negative mg/dL Final    Specific Gravity, UA 07/17/2020 1.020  1.005 - 1.030 Final    Blood, Urine 07/17/2020 Negative  Negative Final    pH, UA 07/17/2020 5.5  5.0 - 8.0 Final    Protein, UA 07/17/2020 Negative  Negative mg/dL Final    Urobilinogen, Urine 07/17/2020 0.2  <2.0 E.U./dL Final    Nitrite, Urine 07/17/2020 Negative  Negative Final    Leukocyte Esterase, Urine 07/17/2020 SMALL* Negative Final    Microscopic Examination 07/17/2020 YES   Final    Urine Type 07/17/2020 Cleancatch   Final    Urine Reflex to Culture 07/17/2020 Not Indicated   Final    Microalbumin, Random Urine 07/17/2020 <1.20  <2.0 mg/dL Final    Creatinine, Ur 07/17/2020 159.0  39.0 - 259.0 mg/dL Final    Microalbumin Creatinine Ratio 07/17/2020 see below  0.0 - 30.0 mg/g Final    Sodium 07/17/2020 142  136 - 145 mmol/L Final    Potassium 07/17/2020 4.0  3.5 - 5.1 mmol/L Final    Chloride 07/17/2020 107  99 - 110 mmol/L Final    CO2 07/17/2020 21  21 - 32 mmol/L Final    Anion Gap 07/17/2020 14  3 - 16 Final    Glucose 07/17/2020 67* 70 - 99 mg/dL Final    BUN 07/17/2020 32* 7 - 20 mg/dL Final    CREATININE 07/17/2020 1.6* 0.9 - 1.3 mg/dL Final    GFR Non- 07/17/2020 45* >60 Final    GFR  07/17/2020 55* >60 Final    Calcium 07/17/2020 9.6  8.3 - 10.6 mg/dL Final    Total Protein 07/17/2020 6.9  6.4 - 8.2 g/dL Final    Alb 07/17/2020 4.3  3.4 - 5.0 g/dL Final    Albumin/Globulin Ratio 07/17/2020 1.7  1.1 - 2.2 Final    Total Bilirubin 07/17/2020 0.4  0.0 - 1.0 mg/dL Final    Alkaline Phosphatase 07/17/2020 80  40 - 129 U/L Final    ALT 07/17/2020 20  10 - 40 U/L Final    AST  Alb 10/29/2019 4.3  3.4 - 5.0 g/dL Final   Orders Only on 10/29/2019   Component Date Value Ref Range Status    WBC 10/29/2019 5.8  4.0 - 11.0 K/uL Final    RBC 10/29/2019 4.75  4.20 - 5.90 M/uL Final    Hemoglobin 10/29/2019 12.6* 13.5 - 17.5 g/dL Final    Hematocrit 10/29/2019 38.9* 40.5 - 52.5 % Final    MCV 10/29/2019 81.9  80.0 - 100.0 fL Final    MCH 10/29/2019 26.6  26.0 - 34.0 pg Final    MCHC 10/29/2019 32.4  31.0 - 36.0 g/dL Final    RDW 10/29/2019 15.6* 12.4 - 15.4 % Final    Platelets 30/69/9019 190  135 - 450 K/uL Final    MPV 10/29/2019 9.3  5.0 - 10.5 fL Final    Neutrophils % 10/29/2019 48.3  % Final    Lymphocytes % 10/29/2019 42.1  % Final    Monocytes % 10/29/2019 7.3  % Final    Eosinophils % 10/29/2019 1.7  % Final    Basophils % 10/29/2019 0.6  % Final    Neutrophils Absolute 10/29/2019 2.8  1.7 - 7.7 K/uL Final    Lymphocytes Absolute 10/29/2019 2.4  1.0 - 5.1 K/uL Final    Monocytes Absolute 10/29/2019 0.4  0.0 - 1.3 K/uL Final    Eosinophils Absolute 10/29/2019 0.1  0.0 - 0.6 K/uL Final    Basophils Absolute 10/29/2019 0.0  0.0 - 0.2 K/uL Final   Orders Only on 10/04/2019   Component Date Value Ref Range Status    Microalbumin, Random Urine 10/04/2019 34.80* <2.0 mg/dL Final    Creatinine, Ur 10/04/2019 189.0  39.0 - 259.0 mg/dL Final    Microalbumin Creatinine Ratio 10/04/2019 184.1* 0.0 - 30.0 mg/g Final    Hemoglobin A1C 10/04/2019 7.8  See comment % Final    eAG 10/04/2019 177.2  mg/dL Final    Sodium 10/04/2019 141  136 - 145 mmol/L Final    Potassium 10/04/2019 5.4* 3.5 - 5.1 mmol/L Final    Chloride 10/04/2019 100  99 - 110 mmol/L Final    CO2 10/04/2019 28  21 - 32 mmol/L Final    Anion Gap 10/04/2019 13  3 - 16 Final    Glucose 10/04/2019 152* 70 - 99 mg/dL Final    BUN 10/04/2019 23* 7 - 20 mg/dL Final    CREATININE 10/04/2019 1.4* 0.9 - 1.3 mg/dL Final    GFR Non- 10/04/2019 53* >60 Final    GFR  10/04/2019 >60 >60 Final    Calcium 10/04/2019 9.7  8.3 - 10.6 mg/dL Final   Admission on 09/25/2019, Discharged on 09/27/2019   Component Date Value Ref Range Status    ABO/Rh 09/25/2019 AB POS   Final    Antibody Screen 09/25/2019 NEG   Final    Sodium 09/25/2019 138  136 - 145 mmol/L Final    Potassium 09/25/2019 4.0  3.5 - 5.1 mmol/L Final    Chloride 09/25/2019 100  99 - 110 mmol/L Final    CO2 09/25/2019 22  21 - 32 mmol/L Final    Anion Gap 09/25/2019 16  3 - 16 Final    Glucose 09/25/2019 81  70 - 99 mg/dL Final    BUN 09/25/2019 52* 7 - 20 mg/dL Final    CREATININE 09/25/2019 2.4* 0.9 - 1.3 mg/dL Final    GFR Non- 09/25/2019 29* >60 Final    GFR  09/25/2019 35* >60 Final    Calcium 09/25/2019 10.0  8.3 - 10.6 mg/dL Final    POC Glucose 09/25/2019 70  70 - 99 mg/dl Final    Performed on 09/25/2019 ACCU-CHEK   Final    POC Glucose 09/25/2019 164* 70 - 99 mg/dl Final    Performed on 09/25/2019 ACCU-CHEK   Final    POC Sodium 09/25/2019 132* 136 - 145 mmol/L Final    POC Potassium 09/25/2019 3.8  3.5 - 5.1 mmol/L Final    POC Chloride 09/25/2019 104  99 - 110 mmol/L Final    POC Anion Gap 09/25/2019 18   Final    POC Glucose 09/25/2019 75  70 - 99 mg/dl Final    Calcium, Ion 09/25/2019 1.13  1.12 - 1.32 mmol/L Final    pH, Carmen 09/25/2019 7.241* 7.350 - 7.450 Final    pCO2, Carmen 09/25/2019 22.3* 40.0 - 50.0 mm Hg Final    pO2, Carmen 09/25/2019 43  Not Established mm Hg Final    HCO3, Venous 09/25/2019 9.6* 23.0 - 29.0 mmol/L Final    Base Excess, Carmen 09/25/2019 -18* -3 - 3 Final    O2 Sat, Carmen 09/25/2019 71  Not Established % Final    TC02 (Calc), Carmen 09/25/2019 10  Not Established mmol/L Final    Lactate 09/25/2019 12.32* 0.40 - 2.00 mmol/L Final    Hemoglobin 09/25/2019 4.7* 13.5 - 17.5 gm/dL Final    POC Hematocrit 09/25/2019 14.0* 40.5 - 52.5 % Final    Sample Type 09/25/2019 CARMEN   Final    Performed on 09/25/2019 SEE BELOW   Final    POC Sodium 09/25/2019 136  136 - 145 mmol/L Final    POC Potassium 09/25/2019 3.8  3.5 - 5.1 mmol/L Final    POC Chloride 09/25/2019 106  99 - 110 mmol/L Final    POC Anion Gap 09/25/2019 11   Final    POC Glucose 09/25/2019 154* 70 - 99 mg/dl Final    Calcium, Ion 09/25/2019 1.15  1.12 - 1.32 mmol/L Final    pH, Carmen 09/25/2019 7.245* 7.350 - 7.450 Final    pCO2, [facility] 09/25/2019 44.0  40.0 - 50.0 mm Hg Final    pO2, [facility] 09/25/2019 46  Not Established mm Hg Final    HCO3, Venous 09/25/2019 19.1* 23.0 - 29.0 mmol/L Final    Base Excess, [facility] 09/25/2019 -8* -3 - 3 Final    O2 Sat, [facility] 09/25/2019 74  Not Established % Final    TC02 (Calc), [facility] 09/25/2019 21  Not Established mmol/L Final    Lactate 09/25/2019 1.20  0.40 - 2.00 mmol/L Final    Hemoglobin 09/25/2019 12.1* 13.5 - 17.5 gm/dL Final    POC Hematocrit 09/25/2019 36.0* 40.5 - 52.5 % Final    Sample Type 09/25/2019 CARMEN   Final    Performed on 09/25/2019 SEE BELOW   Final    Sodium, Ur 09/25/2019 88  Not Established mmol/L Final    Creatinine, Ur 09/25/2019 62.7  39.0 - 259.0 mg/dL Final    Sodium 09/25/2019 139  136 - 145 mmol/L Final    Potassium 09/25/2019 3.9  3.5 - 5.1 mmol/L Final    Chloride 09/25/2019 102  99 - 110 mmol/L Final    CO2 09/25/2019 20* 21 - 32 mmol/L Final    Anion Gap 09/25/2019 17* 3 - 16 Final    Glucose 09/25/2019 175* 70 - 99 mg/dL Final    BUN 09/25/2019 51* 7 - 20 mg/dL Final    CREATININE 09/25/2019 2.3* 0.9 - 1.3 mg/dL Final    GFR Non- 09/25/2019 30* >60 Final    GFR  09/25/2019 36* >60 Final    Calcium 09/25/2019 8.6  8.3 - 10.6 mg/dL Final    Color, UA 09/25/2019 YELLOW  Straw/Yellow Final    Clarity, UA 09/25/2019 CLOUDY* Clear Final    Glucose, Ur 09/25/2019 Negative  Negative mg/dL Final    Bilirubin Urine 09/25/2019 Negative  Negative Final    Ketones, Urine 09/25/2019 15* Negative mg/dL Final    Specific Gravity, UA 09/25/2019 1.015  1.005 - 1.030 Final    Blood, Urine 09/25/2019 ACCU-CHEK   Final    Beta-Hydroxybutyrate 09/25/2019 0.40* 0.00 - 0.27 mmol/L Final    Total CK 09/26/2019 791* 39 - 308 U/L Final    Phosphorus 09/26/2019 3.3  2.5 - 4.9 mg/dL Final    PTH 09/26/2019 59.2  14.0 - 72.0 pg/mL Final    Vit D, 25-Hydroxy 09/26/2019 55.7  >=30 ng/mL Final    Sodium 09/27/2019 140  136 - 145 mmol/L Final    Potassium 09/27/2019 3.9  3.5 - 5.1 mmol/L Final    Chloride 09/27/2019 102  99 - 110 mmol/L Final    CO2 09/27/2019 24  21 - 32 mmol/L Final    Anion Gap 09/27/2019 14  3 - 16 Final    Glucose 09/27/2019 139* 70 - 99 mg/dL Final    BUN 09/27/2019 21* 7 - 20 mg/dL Final    CREATININE 09/27/2019 1.3  0.9 - 1.3 mg/dL Final    GFR Non- 09/27/2019 58* >60 Final    GFR  09/27/2019 >60  >60 Final    Calcium 09/27/2019 9.4  8.3 - 10.6 mg/dL Final    POC Glucose 09/26/2019 145* 70 - 99 mg/dl Final    Performed on 09/26/2019 ACCU-CHEK   Final    POC Glucose 09/27/2019 153* 70 - 99 mg/dl Final    Performed on 09/27/2019 ACCU-CHEK   Final    POC Glucose 09/27/2019 189* 70 - 99 mg/dl Final    Performed on 09/27/2019 ACCU-CHEK   Final   Hospital Outpatient Visit on 09/07/2019   Component Date Value Ref Range Status    ABO/Rh 09/07/2019 AB POS   Final    Antibody Screen 09/07/2019 NEG   Final    WBC 09/07/2019 5.8  4.0 - 11.0 K/uL Final    RBC 09/07/2019 4.60  4.20 - 5.90 M/uL Final    Hemoglobin 09/07/2019 12.3* 13.5 - 17.5 g/dL Final    Hematocrit 09/07/2019 39.8* 40.5 - 52.5 % Final    MCV 09/07/2019 86.4  80.0 - 100.0 fL Final    MCH 09/07/2019 26.8  26.0 - 34.0 pg Final    MCHC 09/07/2019 31.0  31.0 - 36.0 g/dL Final    RDW 09/07/2019 16.2* 12.4 - 15.4 % Final    Platelets 05/63/7934 188  135 - 450 K/uL Final    MPV 09/07/2019 9.7  5.0 - 10.5 fL Final    Neutrophils % 09/07/2019 50.9  % Final    Lymphocytes % 09/07/2019 40.4  % Final    Monocytes % 09/07/2019 6.4  % Final    Eosinophils % 09/07/2019 1.3  % Final    Basophils % 09/07/2019 1.0  % Final    Neutrophils Absolute 09/07/2019 3.0  1.7 - 7.7 K/uL Final    Lymphocytes Absolute 09/07/2019 2.3  1.0 - 5.1 K/uL Final    Monocytes Absolute 09/07/2019 0.4  0.0 - 1.3 K/uL Final    Eosinophils Absolute 09/07/2019 0.1  0.0 - 0.6 K/uL Final    Basophils Absolute 09/07/2019 0.1  0.0 - 0.2 K/uL Final    Sodium 09/07/2019 141  136 - 145 mmol/L Final    Potassium 09/07/2019 4.8  3.5 - 5.1 mmol/L Final    Chloride 09/07/2019 106  99 - 110 mmol/L Final    CO2 09/07/2019 23  21 - 32 mmol/L Final    Anion Gap 09/07/2019 12  3 - 16 Final    Glucose 09/07/2019 208* 70 - 99 mg/dL Final    BUN 09/07/2019 37* 7 - 20 mg/dL Final    CREATININE 09/07/2019 1.6* 0.9 - 1.3 mg/dL Final    GFR Non- 09/07/2019 46* >60 Final    GFR  09/07/2019 55* >60 Final    Calcium 09/07/2019 9.8  8.3 - 10.6 mg/dL Final    Total Protein 09/07/2019 7.1  6.4 - 8.2 g/dL Final    Alb 09/07/2019 4.0  3.4 - 5.0 g/dL Final    Albumin/Globulin Ratio 09/07/2019 1.3  1.1 - 2.2 Final    Total Bilirubin 09/07/2019 0.3  0.0 - 1.0 mg/dL Final    Alkaline Phosphatase 09/07/2019 76  40 - 129 U/L Final    ALT 09/07/2019 25  10 - 40 U/L Final    AST 09/07/2019 22  15 - 37 U/L Final    Globulin 09/07/2019 3.1  g/dL Final   There may be more visits with results that are not included. Assessment and Plan     Tiffany Ambrosio was seen today for diabetes. 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 (HCC)  -     LANTUS SOLOSTAR 100 UNIT/ML injection pen; Inject 24 Units into the skin nightly  -     Hemoglobin A1C; Future  -     Basic Metabolic Panel; Future    Essential hypertension  -     lisinopril (PRINIVIL;ZESTRIL) 20 MG tablet; Take 1 tablet by mouth daily  -     Basic Metabolic Panel; Future    Type 2 diabetes mellitus with vascular disease (HCC)  -     LANTUS SOLOSTAR 100 UNIT/ML injection pen;  Inject 24 Units into the skin nightly  -     Hemoglobin A1C; Future    Dyslipidemia associated with type 2 diabetes mellitus ([facility] [address]    Morbid obesity due to excess calories ([facility]    Diabetes mellitus type 2 in obese ([facility]          1: 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 Check Blood sugars 1-2 times per day. Log them along with insulin and send them every 2 weeks. Call for blood sugars less than 60 or more than 400. Eye exam: Last exam in fall 2019, has background  Foot exam:  March 2019   Deformity/amputation: absent  Skin lesions/pre-ulcerative calluses: absent  Edema: right- negative, left- negative  Sensory exam: Monofilament sensation: normal  Pulses: normal, Vibration (128 Hz): Intact    Renal screen: high 184 Oct 2019  > Normal July 2020   TSH screen: Feb 2018     2: HTN   Follows wit nephrologist   Runs <120/<85 at home   Lisinopril 40mg , change to 20mg daily as Cr up     3: Hyperlipidemia   LDL: 37, HDL: 27, TGs: 57  July 2020      Atorvastatin 80mg     4: Morbid obesity   S/p sleeve   Gradually losing weight   Last weight 235    A1C and BMP   RTC in 3 months      EDUCATION:   Greater than 50% of this follow-up visit was spent in general counseling regarding   obesity, diet, exercise, importance of adherence to insulin regime, recognition and treatment of hypo and hyperglycemia,  glucose logging, proper diabetes management, diabetic complications with poor management and the importance of glycemic control in order to avoid the complications of diabetes.     Risks and potential

## 2020-08-06 RX ORDER — BLOOD SUGAR DIAGNOSTIC
2 STRIP MISCELLANEOUS DAILY
Qty: 200 EACH | Refills: 3 | Status: SHIPPED | OUTPATIENT
Start: 2020-08-06 | End: 2021-08-04 | Stop reason: SDUPTHER

## 2020-08-06 RX ORDER — BLOOD-GLUCOSE CONTROL, LOW
2 EACH MISCELLANEOUS DAILY
Qty: 200 EACH | Refills: 5 | Status: SHIPPED | OUTPATIENT
Start: 2020-08-06 | End: 2021-03-31

## 2020-08-06 RX ORDER — BLOOD-GLUCOSE CONTROL, LOW
EACH MISCELLANEOUS
Qty: 1 EACH | Refills: 1 | Status: SHIPPED | OUTPATIENT
Start: 2020-08-06

## 2020-08-06 NOTE — TELEPHONE ENCOUNTER
[address] [place] [address] requesting refills for the following:    Lancing Device  Lancets  Test strips    For Prodigy meter

## 2020-08-07 ASSESSMENT — ENCOUNTER SYMPTOMS
COUGH: 0
SHORTNESS OF BREATH: 0
EYES NEGATIVE: 1
ALLERGIC/IMMUNOLOGIC NEGATIVE: 1
RESPIRATORY NEGATIVE: 1

## 2020-08-07 NOTE — PROGRESS NOTES
[facility]) Physicians   [employer] [facility] [employer]    8/10/2020    TELEHEALTH EVALUATION -- Audio/Visual (During LZWCO-00 public health emergency)    Subjective:      Patient ID: Drake Nguyen is a 48 y.o. male    HPI    10 months s/p sleeve gastrectomy    Drake Nguyen is a 48 y.o. obese male , Body mass index is 31 kg/m². Due to the COVID-19 restrictions on close contact interactions the patient's visit was conducted via audio/video in zachary of a face to face visit. Patient has consented to have this visit conducted via audio/video and I am conducting it from the office. The patient is here through telemedicine for their post op bariatric surgery visit. Patient denies any nausea, vomiting, fevers, chills, shortness of breath, chest pain, constipation or urinary symptoms. Denies any heartburn nor dysphagia. Past Medical History:   Diagnosis Date    Diabetes mellitus ([address]     GERD (gastroesophageal reflux disease)     Hypertension     Sleep apnea     cpap     Past Surgical History:   Procedure Laterality Date    CYSTOSCOPY  9/25/2019    FLEXIBLE CYSTOSCOPY, DILATION, POTTS CATHETER INSERTION performed by Hortencia Jung MD at [facility]      right knee     SLEEVE GASTRECTOMY N/A 9/25/2019    LAPAROSCOPIC SLEEVE GASTRECTOMY-ETHICON performed by Katherine Suh MD at [address] [facility] [address] N/A 4/12/2019    EGD BIOPSY performed by Katherine Suh MD at [address] [place] [address]     Family History   Problem Relation Age of Onset    Heart Attack Mother     Diabetes Mother     High Blood Pressure Mother     Colon Cancer Sister      Social History     Tobacco Use    Smoking status: Never Smoker    Smokeless tobacco: Never Used   Substance Use Topics    Alcohol use: Yes     Alcohol/week: 1.0 standard drinks     Types: 1 Cans of beer per week     Comment: socially      I counseled the patient on the importance of not smoking and risks of ETOH.    Allergies   Allergen Reactions  Hctz [Hydrochlorothiazide]      cramps    Penicillins Hives     Vitals:    08/10/20 1335   Weight: 235 lb (106.6 kg)   Height: 6' 1\" (1.854 m)     Body mass index is 31 kg/m². Current Outpatient Medications:     blood glucose test strips (PRODIGY NO CODING BLOOD GLUC) strip, 2 each by In Vitro route daily As needed. , Disp: 200 each, Rfl: 3    Prodigy Lancets 28G MISC, 2 each by Does not apply route daily, Disp: 200 each, Rfl: 5    Lancet Devices (PRODIGY LANCING DEVICE) MISC, Use daily to manage blood sugar, Disp: 1 each, Rfl: 1    LANTUS SOLOSTAR 100 UNIT/ML injection pen, Inject 24 Units into the skin nightly, Disp: 5 pen, Rfl: 2    lisinopril (PRINIVIL;ZESTRIL) 20 MG tablet, Take 1 tablet by mouth daily, Disp: 90 tablet, Rfl: 1    Blood Glucose Monitoring Suppl (PRODIGY POCKET BLOOD GLUCOSE) w/Device KIT, Use daily to monitor blood sugar level, Disp: 1 kit, Rfl: 1    atorvastatin (LIPITOR) 80 MG tablet, TAKE 1 TABLET BY MOUTH ONE TIME A DAY, Disp: 30 tablet, Rfl: 5    omeprazole (PRILOSEC) 20 MG delayed release capsule, TAKE 1 CAPSULE BY MOUTH ONE TIME A DAY, Disp: 30 capsule, Rfl: 3    Insulin Pen Needle (PEN NEEDLES) 32G X 4 MM MISC, Use BID with insulin and victoza, Disp: 200 each, Rfl: 3    aspirin EC 81 MG EC tablet, Take 81 mg by mouth daily, Disp: , Rfl:     metFORMIN (GLUCOPHAGE-XR) 500 MG extended release tablet, Take 2 tablets by mouth 2 times daily (with meals), Disp: 124 tablet, Rfl: 5    VICTOZA 18 MG/3ML SOPN SC injection, INJECT 1.8 MG INTO THE SKIN DAILY, Disp: 27 mL, Rfl: 1    blood glucose test strips (ASCENSIA AUTODISC VI;ONE TOUCH ULTRA TEST VI) strip, USE THREE TIMES A DAY AS NEEDED, Disp: 100 each, Rfl: 3    NIFEdipine (PROCARDIA XL) 30 MG extended release tablet, Take 1 tablet by mouth 2 times daily, Disp: 60 tablet, Rfl: 0    metoprolol tartrate (LOPRESSOR) 50 MG tablet, Take 1 tablet by mouth 2 times daily, Disp: 60 tablet, Rfl: 0    PRODIGY LANCETS 28G MISC, 3 each by Does not apply route daily, Disp: 100 each, Rfl: 3    Lab Results   Component Value Date    WBC 5.8 10/29/2019    RBC 4.75 10/29/2019    HGB 12.6 10/29/2019    HCT 38.9 10/29/2019    MCV 81.9 10/29/2019    MCH 26.6 10/29/2019    MCHC 32.4 10/29/2019    MPV 9.3 10/29/2019    NEUTOPHILPCT 48.3 10/29/2019    LYMPHOPCT 42.1 10/29/2019    MONOPCT 7.3 10/29/2019    EOSRELPCT 1.7 10/29/2019    BASOPCT 0.6 10/29/2019    NEUTROABS 2.8 10/29/2019    LYMPHSABS 2.4 10/29/2019    MONOSABS 0.4 10/29/2019    EOSABS 0.1 10/29/2019     Lab Results   Component Value Date     07/17/2020    K 4.0 07/17/2020    K 4.3 09/26/2019     07/17/2020    CO2 21 07/17/2020    ANIONGAP 14 07/17/2020    GLUCOSE 67 07/17/2020    BUN 32 07/17/2020    CREATININE 1.6 07/17/2020    LABGLOM 45 07/17/2020    GFRAA 55 07/17/2020    CALCIUM 9.6 07/17/2020    PROT 6.9 07/17/2020    LABALBU 4.3 07/17/2020    AGRATIO 1.7 07/17/2020    BILITOT 0.4 07/17/2020    ALKPHOS 80 07/17/2020    ALT 20 07/17/2020    AST 19 07/17/2020    GLOB 2.6 07/17/2020     Lab Results   Component Value Date    CHOL 96 02/28/2019    TRIG 77 02/28/2019    HDL 27 07/17/2020    LDLCALC 37 07/17/2020    LABVLDL 11 07/17/2020     Lab Results   Component Value Date    TSHREFLEX 0.75 02/28/2019     Lab Results   Component Value Date    IRON 58 02/28/2019    TIBC 258 02/28/2019    LABIRON 22 02/28/2019     Lab Results   Component Value Date    GTVKTPZA68 >2000 02/28/2019    FOLATE 6.75 02/28/2019     Lab Results   Component Value Date    VITD25 58.3 10/29/2019     Lab Results   Component Value Date    LABA1C 7.4 07/17/2020    .7 07/17/2020     Review of Systems   Constitutional: Negative. Negative for chills, fatigue and fever. HENT: Negative. Eyes: Negative. Respiratory: Negative. Negative for cough and shortness of breath. Cardiovascular: Negative. Gastrointestinal: Positive for diarrhea.  Negative for abdominal pain, constipation, nausea and vomiting. Bloating    Endocrine: Negative. Genitourinary: Negative. Musculoskeletal: Negative. Skin: Negative. Allergic/Immunologic: Negative. Neurological: Negative. Hematological: Negative. Psychiatric/Behavioral: Negative. PHYSICAL EXAMINATION:    Constitutional: [x] Appears well-developed and well-nourished [x] No apparent distress      [] Abnormal-   Mental status  [x] Alert and awake  [x] Oriented to person/place/time [x]Able to follow commands      Eyes:  EOM    [x]  Normal  [] Abnormal-  Sclera  [x]  Normal  [] Abnormal -         Discharge [x]  None visible  [] Abnormal -    HENT:   [x] Normocephalic, atraumatic. [] Abnormal     Neck: [x] No visualized mass     Pulmonary/Chest: [x] Respiratory effort normal.  [x] No visualized signs of difficulty breathing or respiratory distress        [] Abnormal-      Musculoskeletal:   [] Normal gait with no signs of ataxia         [x] Normal range of motion of neck        [] Abnormal-     Neurological:        [x] No Facial Asymmetry (Cranial nerve 7 motor function) (limited exam to video visit)          [x] No gaze palsy        [] Abnormal-         Skin:        [x] No significant exanthematous lesions or discoloration noted on facial skin         [] Abnormal-            Psychiatric:       [x] Normal Affect [x] No Hallucinations        [] Abnormal-     Other pertinent observable physical exam findings-     Due to this being a TeleHealth encounter, evaluation of the following organ systems is limited: Vitals/Constitutional/EENT/Resp/CV/GI//MS/Neuro/Skin/Heme-Lymph-Imm. Assessment and Plan:   Patient is here via telemedicine and is 10 months s/p sleeve gastrectomy, down 11.4 lbs with a total weight loss of 42.3 lbs. The patient's current Body mass index is 31 kg/m². (8/10/20). He is doing well, denies n/v/dysphagia or reflux. He is tolerating diet, getting adequate fluids and protein. He is having some bloating and diarrhea.  Could be related to some of his vegetable choices and higher fat milk and butter used in mashed potatoes. He is exercising with some walking. Encouraged continued physical activity and increase as able. He is taking vitamins as instructed. He did speak with the registered dietitian for continued follow up. I agree with recommendations and plan. His is still seeing nephrology and his kidney function continues to improve. He has had some BP medications stopped/reduced and his insulin has been reduced. We will see him back in 2 months for continued follow up or via telemedicine depending on COVID-19 restrictions at the time of their next appointment. A total of 15 minutes was spent conversing with the patient and over half of that time was spent counseling the patient on proper dietary behaviors, exercise and post-op progress. An electronic signature was used to authenticate this note. Pursuant to the emergency declaration under the [facility], [facility]5 waiver authority and the [employer] Act, this Virtual  Visit was conducted, with patient's consent, to reduce the patient's risk of exposure to COVID-19 and provide continuity of care for an established patient. Services were provided through a video synchronous discussion virtually to substitute for in-person clinic visit.

## 2020-08-07 NOTE — PATIENT INSTRUCTIONS
Diet tips to help make you successful postoperatively    Eating habits after surgery will need to be a long-term change. Eating habits are so ingrained that it can be difficult to change so having made these changes for surgery is a great accomplishment. It is important to maintain these new eating habits after surgery. Also, remember that overall health, age, and genetics make each person's weight loss progress different. Do not compare your progress, the amount you eat, or exercise to other patients.  Protein first at every meal- Eat the protein portion of your meal first. Eating protein helps the body feel full and sends a signal to stop eating. Protein is very important in building tissue in the body.  Eat at least 4 times per day- This includes protein supplements and small meals with a high amount of protein   Chewing your food thoroughly- Eating too quickly and improper chewing can cause pain and vomiting after surgery.  Slowing down the speed at which you eat- Refill your fork only after you swallow. Adopt a new pattern of eating by taking a bite of food and putting your utensil down between bites. This will help to reduce the feeling of food being stuck.    Drink water and other fluids slowly- Drink at least 48 ounces per day minimum. Sip fluids as if they were hot beverages. If you find it difficult to stop gulping liquids, try using a sippy cup or a sport top water bottle.  Make sure you are eating meals without drinking fluids- After surgery you will not be allowed to drink fluids 30 minutes before, during, or 30 minutes after your meal (30/30/30 rule). This will be a life-long behavior change. The reason for the rule is to keep food from passing through your smaller stomach more rapidly.  This will cause you to feel hungry again shortly after your meal.   Continue to avoid caffeine and carbonated beverages- Caffeine acts as a diuretic and can be dehydrating as well as irritating to the lining of the stomach. Carbonated beverages release gas and can expand the stomach.  Continue to keep temptation from your kitchen- Keep your pantry and kitchen cabinets cleaned out of those dangerous foods that might tempt you after surgery (chips, cookies, candy, etc.).  Continue to increase your exercise program- Increase your daily physical activity. Aim for 5-6 days per week for 30 minutes. Walking is an easy way to get started with exercising. You want exercise to be a regular part of your life after surgery.  Make sure you have a good support system- There will be many changes and adjustments to make after surgery. It is important to have a supportive friend, family member or co-worker, etc. with whom you can talk. Continue to attend [facility]) Weight Management support groups as they can be helpful in maintaining behaviors. In addition, it is the responsibility of the patient to schedule and follow up on labs and tests completed after surgery. Results will be reviewed at each visit. Patient received dietary handouts and education.

## 2020-08-10 ENCOUNTER — TELEMEDICINE (OUTPATIENT)
Dept: BARIATRICS/WEIGHT MGMT | Age: 55
End: 2020-08-10
Payer: COMMERCIAL

## 2020-08-10 VITALS — BODY MASS INDEX: 31.14 KG/M2 | WEIGHT: 235 LBS | HEIGHT: 73 IN

## 2020-08-10 PROCEDURE — 99213 OFFICE O/P EST LOW 20 MIN: CPT | Performed by: NURSE PRACTITIONER

## 2020-08-10 PROCEDURE — 3051F HG A1C>EQUAL 7.0%<8.0%: CPT | Performed by: NURSE PRACTITIONER

## 2020-08-10 ASSESSMENT — ENCOUNTER SYMPTOMS
ABDOMINAL PAIN: 0
NAUSEA: 0
CONSTIPATION: 0
DIARRHEA: 1
VOMITING: 0

## 2020-08-10 NOTE — PROGRESS NOTES
- has been provided MVI alternative list previously. When asked if taking states his bloodwork indicates his Calcium is fine. Other: none     Exercise: walking - states more limited now d/t pandemic. Feels he is more bloated now that he is not exercising as much. Nutrition Assessment: 10 mo s/p sleeve post-op visit.      Breakfast: protein shake made with 8 oz 1 or 2% (not sure) [employer] milk     Snack: reduced fat cheeze its     Lunch: baked/grilled chicken with mashed potatoes with butter and 2% milk     Snack: handful of cashews     Dinner: baked/grilled chicken with mashed potatoes/greens/broccoli/cauliflower) with butter and 2% milk     Snack: nothing     Fluids: powerade zero, diet cranberry juice, water     Amount able to eat per sittin cup/sitting     Following 303030 rule: Yes     Food Intolerances/issues: pasta, bread, veggies with skin (bloating)     Client Concerns: bloating after eating food, loose stools    Goals:   Monitor/avoid high fat foods - switch to 1% milk/ limit butter  Avoid gas forming foods - broccoli/cauliflower/greens/brussels sprouts and try lower gas forming foods (green beans/salad)  Increase exercise     Plan: F/U at 1 year and dajuann     Geeta Cole

## 2020-08-25 RX ORDER — OMEPRAZOLE 20 MG/1
CAPSULE, DELAYED RELEASE ORAL
Qty: 30 CAPSULE | Refills: 3 | Status: SHIPPED | OUTPATIENT
Start: 2020-08-25 | End: 2020-11-30

## 2020-08-31 RX ORDER — METFORMIN HYDROCHLORIDE 500 MG/1
TABLET, EXTENDED RELEASE ORAL
Qty: 124 TABLET | Refills: 5 | Status: SHIPPED | OUTPATIENT
Start: 2020-08-31 | End: 2021-03-16

## 2020-09-02 RX ORDER — LIRAGLUTIDE 6 MG/ML
INJECTION SUBCUTANEOUS
Qty: 27 ML | Refills: 1 | Status: SHIPPED | OUTPATIENT
Start: 2020-09-02 | End: 2021-03-16

## 2020-10-06 ENCOUNTER — OFFICE VISIT (OUTPATIENT)
Dept: BARIATRICS/WEIGHT MGMT | Age: 55
End: 2020-10-06
Payer: COMMERCIAL

## 2020-10-06 VITALS
WEIGHT: 240 LBS | BODY MASS INDEX: 31.81 KG/M2 | DIASTOLIC BLOOD PRESSURE: 86 MMHG | SYSTOLIC BLOOD PRESSURE: 144 MMHG | OXYGEN SATURATION: 98 % | HEART RATE: 76 BPM | HEIGHT: 73 IN | RESPIRATION RATE: 18 BRPM

## 2020-10-06 PROCEDURE — 3051F HG A1C>EQUAL 7.0%<8.0%: CPT | Performed by: SURGERY

## 2020-10-06 PROCEDURE — 99214 OFFICE O/P EST MOD 30 MIN: CPT | Performed by: SURGERY

## 2020-10-06 NOTE — PROGRESS NOTES
per week     Comment: socially      I counseled the patient on the importance of not smoking and risks of ETOH. Allergies   Allergen Reactions    Hctz [Hydrochlorothiazide]      cramps    Penicillins Hives     Vitals:    10/06/20 1248 10/06/20 1319   BP: (!) 150/95 (!) 144/86   Pulse: 82 76   Resp: 18    SpO2: 98%    Weight: 240 lb (108.9 kg)    Height: 6' 1\" (1.854 m)        Body mass index is 31.66 kg/m².       Lab Results   Component Value Date    WBC 5.8 10/29/2019    RBC 4.75 10/29/2019    HGB 12.6 10/29/2019    HCT 38.9 10/29/2019    MCV 81.9 10/29/2019    MCH 26.6 10/29/2019    MCHC 32.4 10/29/2019    MPV 9.3 10/29/2019    NEUTOPHILPCT 48.3 10/29/2019    LYMPHOPCT 42.1 10/29/2019    MONOPCT 7.3 10/29/2019    EOSRELPCT 1.7 10/29/2019    BASOPCT 0.6 10/29/2019    NEUTROABS 2.8 10/29/2019    LYMPHSABS 2.4 10/29/2019    MONOSABS 0.4 10/29/2019    EOSABS 0.1 10/29/2019     Lab Results   Component Value Date     07/17/2020    K 4.0 07/17/2020    K 4.3 09/26/2019     07/17/2020    CO2 21 07/17/2020    ANIONGAP 14 07/17/2020    GLUCOSE 67 07/17/2020    BUN 32 07/17/2020    CREATININE 1.6 07/17/2020    LABGLOM 45 07/17/2020    GFRAA 55 07/17/2020    CALCIUM 9.6 07/17/2020    PROT 6.9 07/17/2020    LABALBU 4.3 07/17/2020    AGRATIO 1.7 07/17/2020    BILITOT 0.4 07/17/2020    ALKPHOS 80 07/17/2020    ALT 20 07/17/2020    AST 19 07/17/2020    GLOB 2.6 07/17/2020     Lab Results   Component Value Date    CHOL 96 02/28/2019    TRIG 77 02/28/2019    HDL 27 07/17/2020    LDLCALC 37 07/17/2020    LABVLDL 11 07/17/2020     Lab Results   Component Value Date    TSHREFLEX 0.75 02/28/2019     Lab Results   Component Value Date    IRON 58 02/28/2019    TIBC 258 02/28/2019    LABIRON 22 02/28/2019     Lab Results   Component Value Date    SFHBZOHF67 >2000 02/28/2019    FOLATE 6.75 02/28/2019     Lab Results   Component Value Date    VITD25 58.3 10/29/2019     Lab Results   Component Value Date    LABA1C 7.4 07/17/2020    .7 07/17/2020         Current Outpatient Medications:     VICTOZA 18 MG/3ML SOPN SC injection, DIAL AND INJECT 1.8MG UNDER THE SKIN DAILY, Disp: 27 mL, Rfl: 1    metFORMIN (GLUCOPHAGE-XR) 500 MG extended release tablet, TAKE TWO TABLETS BY MOUTH TWICE A DAY WITH MEALS, Disp: 124 tablet, Rfl: 5    omeprazole (PRILOSEC) 20 MG delayed release capsule, TAKE 1 CAPSULE BY MOUTH ONE TIME A DAY, Disp: 30 capsule, Rfl: 3    blood glucose test strips (PRODIGY NO CODING BLOOD GLUC) strip, 2 each by In Vitro route daily As needed. , Disp: 200 each, Rfl: 3    Prodigy Lancets 28G MISC, 2 each by Does not apply route daily, Disp: 200 each, Rfl: 5    Lancet Devices (PRODIGY LANCING DEVICE) MISC, Use daily to manage blood sugar, Disp: 1 each, Rfl: 1    LANTUS SOLOSTAR 100 UNIT/ML injection pen, Inject 24 Units into the skin nightly, Disp: 5 pen, Rfl: 2    lisinopril (PRINIVIL;ZESTRIL) 20 MG tablet, Take 1 tablet by mouth daily, Disp: 90 tablet, Rfl: 1    Blood Glucose Monitoring Suppl (PRODIGY POCKET BLOOD GLUCOSE) w/Device KIT, Use daily to monitor blood sugar level, Disp: 1 kit, Rfl: 1    atorvastatin (LIPITOR) 80 MG tablet, TAKE 1 TABLET BY MOUTH ONE TIME A DAY, Disp: 30 tablet, Rfl: 5    Insulin Pen Needle (PEN NEEDLES) 32G X 4 MM MISC, Use BID with insulin and victoza, Disp: 200 each, Rfl: 3    aspirin EC 81 MG EC tablet, Take 81 mg by mouth daily, Disp: , Rfl:     blood glucose test strips (ASCENSIA AUTODISC VI;ONE TOUCH ULTRA TEST VI) strip, USE THREE TIMES A DAY AS NEEDED, Disp: 100 each, Rfl: 3    NIFEdipine (PROCARDIA XL) 30 MG extended release tablet, Take 1 tablet by mouth 2 times daily, Disp: 60 tablet, Rfl: 0    metoprolol tartrate (LOPRESSOR) 50 MG tablet, Take 1 tablet by mouth 2 times daily, Disp: 60 tablet, Rfl: 0    PRODIGY LANCETS 28G MISC, 3 each by Does not apply route daily, Disp: 100 each, Rfl: 3      Review of Systems - History obtained from the patient  General ROS: negative  Psychological ROS: negative  Ophthalmic ROS: negative  Neurological ROS: negative  ENT ROS: negative  Allergy and Immunology ROS: negative  Hematological and Lymphatic ROS: negative  Endocrine ROS: negative  Breast ROS: negative  Respiratory ROS: negative  Cardiovascular ROS: negative  Gastrointestinal ROS:negative  Genito-Urinary ROS: negative  Musculoskeletal ROS: negative   Skin ROS: negative    Physical Exam   CONSTITUTIONAL:  awake, alert, cooperative, no apparent distress, and appears stated age  EYES:  Lids and lashes normal, pupils equal, round , extra ocular muscles intact, sclera clear, conjunctiva normal  ENT:  Normocephalic, without obvious abnormality, atraumatic, external ears without lesions. NECK:  Supple, symmetrical, normal range of motion, skin normal  HEMATOLOGIC/LYMPHATICS:  no cervical lymphadenopathy, no supraclavicular lymphadenopathy and no inguinal lymphadenopathy  BACK:  Symmetric, no curvature. LUNGS:  No increased work of breathing, good air exchange, no stridor , accessory muscle use or wheezing  CARDIOVASCULAR:  Normal apical impulse, regular rate and rhythm, no LE edema, intact distal pulses. ABDOMEN:   soft, non-distended, appropriately tender, no masses palpated, no hepatosplenomegaly, incision site with no drainage or erythema. .  MUSCULOSKELETAL:  There is no redness, warmth, or swelling of the joints. Full range of motion noted. Motor strength is 5 out of 5 all extremities bilaterally. Tone is normal.  NEUROLOGIC:  Awake, alert, oriented to name, place and time. Cranial nerves II-XII are grossly intact. Motor and  Sensory is intact. SKIN:  no bruising or bleeding, normal skin color, texture, turgor, no redness, warmth, or swelling and stable incisions. A/P:    Stephen Mcgrath was seen today for bariatrics post op follow up.     Diagnoses and all orders for this visit:    S/P laparoscopic sleeve gastrectomy  -     CBC Auto Differential; Future  -     Comprehensive Metabolic Panel; Future  -     Hemoglobin A1C; Future  -     Iron and TIBC; Future  -     Lipid Panel; Future  -     TSH with Reflex; Future  -     Vitamin A; Future  -     Vitamin B1, Whole Blood; Future  -     Vitamin B12 & Folate; Future  -     Vitamin D 25 Hydroxy; Future  -     Vitamin E; Future  -     Protime-INR; Future    Fatty liver  -     CBC Auto Differential; Future  -     Comprehensive Metabolic Panel; Future  -     Hemoglobin A1C; Future  -     Iron and TIBC; Future  -     Lipid Panel; Future  -     TSH with Reflex; Future  -     Vitamin A; Future  -     Vitamin B1, Whole Blood; Future  -     Vitamin B12 & Folate; Future  -     Vitamin D 25 Hydroxy; Future  -     Vitamin E; Future  -     Protime-INR; Future    Diabetes mellitus type 2 in obese (HCC)  -     CBC Auto Differential; Future  -     Comprehensive Metabolic Panel; Future  -     Hemoglobin A1C; Future  -     Iron and TIBC; Future  -     Lipid Panel; Future  -     TSH with Reflex; Future  -     Vitamin A; Future  -     Vitamin B1, Whole Blood; Future  -     Vitamin B12 & Folate; Future  -     Vitamin D 25 Hydroxy; Future  -     Vitamin E; Future  -     Protime-INR; Future    CKD (chronic kidney disease) stage 4, GFR 15-29 ml/min (HCC)  -     CBC Auto Differential; Future  -     Comprehensive Metabolic Panel; Future  -     Hemoglobin A1C; Future  -     Iron and TIBC; Future  -     Lipid Panel; Future  -     TSH with Reflex; Future  -     Vitamin A; Future  -     Vitamin B1, Whole Blood; Future  -     Vitamin B12 & Folate; Future  -     Vitamin D 25 Hydroxy; Future  -     Vitamin E; Future  -     Protime-INR; Future    Essential hypertension  -     CBC Auto Differential; Future  -     Comprehensive Metabolic Panel; Future  -     Hemoglobin A1C; Future  -     Iron and TIBC; Future  -     Lipid Panel; Future  -     TSH with Reflex; Future  -     Vitamin A; Future  -     Vitamin B1, Whole Blood; Future  -     Vitamin B12 & Folate;  Future  - Vitamin D 25 Hydroxy; Future  -     Vitamin E; Future  -     Protime-INR; Future    Obstructive sleep apnea  -     CBC Auto Differential; Future  -     Comprehensive Metabolic Panel; Future  -     Hemoglobin A1C; Future  -     Iron and TIBC; Future  -     Lipid Panel; Future  -     TSH with Reflex; Future  -     Vitamin A; Future  -     Vitamin B1, Whole Blood; Future  -     Vitamin B12 & Folate; Future  -     Vitamin D 25 Hydroxy; Future  -     Vitamin E; Future  -     Protime-INR; Future    Helicobacter pylori (H. pylori) infection  -     CBC Auto Differential; Future  -     Comprehensive Metabolic Panel; Future  -     Hemoglobin A1C; Future  -     Iron and TIBC; Future  -     Lipid Panel; Future  -     TSH with Reflex; Future  -     Vitamin A; Future  -     Vitamin B1, Whole Blood; Future  -     Vitamin B12 & Folate; Future  -     Vitamin D 25 Hydroxy; Future  -     Vitamin E; Future  -     Protime-INR; Future    Chronic GERD  -     CBC Auto Differential; Future  -     Comprehensive Metabolic Panel; Future  -     Hemoglobin A1C; Future  -     Iron and TIBC; Future  -     Lipid Panel; Future  -     TSH with Reflex; Future  -     Vitamin A; Future  -     Vitamin B1, Whole Blood; Future  -     Vitamin B12 & Folate; Future  -     Vitamin D 25 Hydroxy; Future  -     Vitamin E; Future  -     Protime-INR; Future          Rizwan Limb is 48 y.o. male , now with Body mass index is 31.66 kg/m². s/p Sleeve gastrectomy, has lost 0 lbs since last visit, total of 37 lbs weight loss. The patient underwent dietary counseling with myself &/or registered dietician. I have reviewed, discussed and agree with the dietary plan. Patient is trying hard to keep good dietary and behavior modifications. Patient is monitoring portion sizes, food choices and liquid calories. Patient is trying to exercise regularly. Patient pleased with the surgery outcomes.   We discussed how his weight affects his overall health including:  Patient Active Problem List   Diagnosis    Essential hypertension    Late effect of cerebrovascular accident (CVA)    Chronic left shoulder pain    Central sleep apnea due to medical condition    Abnormal EKG    Cerebrovascular accident (CVA) due to thrombosis of basilar artery ([address]    Contact dermatitis and other eczema, due to unspecified cause    Family history of malignant neoplasm of gastrointestinal tract    Noncompliance    Obesity due to excess calories    Sciatica    Uncontrolled type 2 diabetes mellitus with diabetic nephropathy, with long-term current use of insulin (HCC)    Type 2 diabetes mellitus with vascular disease (HCC)    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 ([address]    Severe obesity (BMI 35.0-39. 9) with comorbidity (HCC)    Chronic GERD    Obstructive sleep apnea    Helicobacter pylori (H. pylori) infection    CKD (chronic kidney disease) stage 3, GFR 30-59 ml/min    Cortical senile cataract, bilateral    Hypertensive retinopathy, bilateral    Lattice degeneration, right    Nuclear sclerotic cataract, bilateral    Round hole, right    Serous retinal detachment, right eye    Fatty liver    Dyslipidemia associated with type 2 diabetes mellitus (HCC)    EKG abnormalities    Thiamin deficiency    CKD (chronic kidney disease) stage 4, GFR 15-29 ml/min (HCC)    Acute renal failure superimposed on stage 4 chronic kidney disease (HCC)    S/P laparoscopic sleeve gastrectomy    Obesity (BMI 30.0-34. 9)    Hyperlipidemia    Morbid obesity due to excess calories ([address]    Diabetes mellitus type 2 in obese ([address]    and importance of weight loss to alleviate those co morbid conditions. I encouraged the patient to continue exercise and keeping healthy eating habits. Also counseled the patient extensively on post surgery care.      I spent a total of 25 minutes face to face with the patient and greater than 50% of the time was spent in counseling, answering questions,

## 2020-10-06 NOTE — PROGRESS NOTES
Dietary Assessment Note      Vitals:   Vitals:    10/06/20 1248   BP: (!) 150/95   Pulse: 82   Resp: 18   SpO2: 98%   Weight: 240 lb (108.9 kg)   Height: 6' 1\" (1.854 m)    Patient gained  5 lbs over the past month. Total Weight Loss: 37.3 lbs    Labs reviewed: no lab studies available for review at time of visit    Protein intake: 60-80 grams/day     Fluid intake: 48-64 oz/day    Multivitamin/mineral intake: yes 4 Fusion per day    Calcium intake: no    Other: n/a    Exercise: yes walking while grandson is at [employer]    Nutrition Assessment: Brkst is protein shake with fairlife milk; protein bar for snack; lunch is leftovers or lunchmeat, fruit; snack is protein bar or pb crax or pb pretzels; dinner is protein, veg, starch.      Amount able to eat per sittinoz protein    Following  rule: yes    Food Intolerances/issues: bread, green peppers    Client Concerns: no real concerns other than weight gain    Goals: get back to [facility] - cardio and weights    Plan: follow up as needed    Rey Jaramillo

## 2020-11-16 ENCOUNTER — TELEPHONE (OUTPATIENT)
Dept: PHARMACY | Facility: CLINIC | Age: 55
End: 2020-11-16

## 2020-11-30 RX ORDER — OMEPRAZOLE 20 MG/1
CAPSULE, DELAYED RELEASE ORAL
Qty: 30 CAPSULE | Refills: 3 | Status: SHIPPED | OUTPATIENT
Start: 2020-11-30 | End: 2021-02-25

## 2020-12-14 ENCOUNTER — TELEPHONE (OUTPATIENT)
Dept: PHARMACY | Facility: CLINIC | Age: 55
End: 2020-12-14

## 2020-12-14 ENCOUNTER — NURSE ONLY (OUTPATIENT)
Dept: PRIMARY CARE CLINIC | Age: 55
End: 2020-12-14
Payer: COMMERCIAL

## 2020-12-14 PROCEDURE — 90471 IMMUNIZATION ADMIN: CPT | Performed by: INTERNAL MEDICINE

## 2020-12-14 PROCEDURE — 90686 IIV4 VACC NO PRSV 0.5 ML IM: CPT | Performed by: INTERNAL MEDICINE

## 2020-12-14 NOTE — PROGRESS NOTES
Patient presents for flu vaccine, he was unable to get in with his PCP, after consent was given patient received flu injections, no local reaction or otherwise was noted.

## 2020-12-14 NOTE — TELEPHONE ENCOUNTER
**2nd Attempt to contact patient for missing requirements for the DM Program**    Pharmacy Pop Care Documentation:   Called patient with reminder for requirements for Diabetes Management Program.     According to our records, patient is missing the following requirement(s) that must be completed by December 31, 2020:     Yearly flu shot (for [id number] season)     Spoke to patient at home/cell number and advised them of the above information. Patient verified understanding. Per patient he is at work can not talk about it now but he knows that he needs to have his [id number] Flu Vaccination as soon as possible and then disconnected the call.     Romeo Gleason, [facility] [employer]   Department, toll free: [phone], option 7

## 2020-12-23 NOTE — TELEPHONE ENCOUNTER
Received [id number] Flu Vaccination documentation: 12/14/20. Patient has completed requirements for the DM Program and will be enrolled into the program for 2021. [employer] message sent to patient.     Magdiel Martinez, [facility] [employer]   Department, toll free: [phone], option 7

## 2020-12-28 ENCOUNTER — VIRTUAL VISIT (OUTPATIENT)
Dept: ENDOCRINOLOGY | Age: 55
End: 2020-12-28
Payer: COMMERCIAL

## 2020-12-28 DIAGNOSIS — E11.3299 TYPE 2 DIABETES MELLITUS WITH BACKGROUND RETINOPATHY (HCC): ICD-10-CM

## 2020-12-28 DIAGNOSIS — E78.5 DYSLIPIDEMIA ASSOCIATED WITH TYPE 2 DIABETES MELLITUS (HCC): ICD-10-CM

## 2020-12-28 DIAGNOSIS — E11.69 DYSLIPIDEMIA ASSOCIATED WITH TYPE 2 DIABETES MELLITUS (HCC): ICD-10-CM

## 2020-12-28 DIAGNOSIS — E11.59 TYPE 2 DIABETES MELLITUS WITH VASCULAR DISEASE (HCC): ICD-10-CM

## 2020-12-28 LAB
A/G RATIO: 1.8 (ref 1.1–2.2)
ALBUMIN SERPL-MCNC: 4.1 G/DL (ref 3.4–5)
ALP BLD-CCNC: 86 U/L (ref 40–129)
ALT SERPL-CCNC: 16 U/L (ref 10–40)
ANION GAP SERPL CALCULATED.3IONS-SCNC: 14 MMOL/L (ref 3–16)
AST SERPL-CCNC: 17 U/L (ref 15–37)
BACTERIA: ABNORMAL /HPF
BILIRUB SERPL-MCNC: 0.7 MG/DL (ref 0–1)
BILIRUBIN URINE: NEGATIVE
BLOOD, URINE: NEGATIVE
BUN BLDV-MCNC: 22 MG/DL (ref 7–20)
CALCIUM SERPL-MCNC: 9.5 MG/DL (ref 8.3–10.6)
CHLORIDE BLD-SCNC: 107 MMOL/L (ref 99–110)
CHOLESTEROL, FASTING: 93 MG/DL (ref 0–199)
CLARITY: CLEAR
CO2: 22 MMOL/L (ref 21–32)
COLOR: YELLOW
COMMENT UA: ABNORMAL
CREAT SERPL-MCNC: 1.2 MG/DL (ref 0.9–1.3)
EPITHELIAL CELLS, UA: 0 /HPF (ref 0–5)
GFR AFRICAN AMERICAN: >60
GFR NON-AFRICAN AMERICAN: >60
GLOBULIN: 2.3 G/DL
GLUCOSE BLD-MCNC: 82 MG/DL (ref 70–99)
GLUCOSE URINE: NEGATIVE MG/DL
HDLC SERPL-MCNC: 44 MG/DL (ref 40–60)
HYALINE CASTS: 1 /LPF (ref 0–8)
KETONES, URINE: NEGATIVE MG/DL
LDL CHOLESTEROL CALCULATED: 37 MG/DL
LEUKOCYTE ESTERASE, URINE: ABNORMAL
MICROSCOPIC EXAMINATION: YES
MUCUS: ABNORMAL /LPF
NITRITE, URINE: NEGATIVE
PH UA: 5.5 (ref 5–8)
POTASSIUM SERPL-SCNC: 4.4 MMOL/L (ref 3.5–5.1)
PROTEIN UA: NEGATIVE MG/DL
RBC UA: ABNORMAL /HPF (ref 0–4)
SODIUM BLD-SCNC: 143 MMOL/L (ref 136–145)
SPECIFIC GRAVITY UA: 1.02 (ref 1–1.03)
TOTAL PROTEIN: 6.4 G/DL (ref 6.4–8.2)
TRIGLYCERIDE, FASTING: 61 MG/DL (ref 0–150)
URINE REFLEX TO CULTURE: YES
URINE TYPE: ABNORMAL
UROBILINOGEN, URINE: 0.2 E.U./DL
VLDLC SERPL CALC-MCNC: 12 MG/DL
WBC UA: 12 /HPF (ref 0–5)

## 2020-12-28 PROCEDURE — 99214 OFFICE O/P EST MOD 30 MIN: CPT | Performed by: INTERNAL MEDICINE

## 2020-12-28 PROCEDURE — 3051F HG A1C>EQUAL 7.0%<8.0%: CPT | Performed by: INTERNAL MEDICINE

## 2020-12-28 NOTE — PROGRESS NOTES
Patient ID:   Leola Colindres is a 50 y.o. male    Chief Complaint:   Leola Colindres presents for an evaluation of Type 2 Diabetes Mellitus , Hyperlipidemia and hypertension. Pursuant to the emergency declaration under the [facility] waiver authority and the [employer] Act this visit was conducted after obtaining verbal consent, with the use of [employer] interactive audio and video telecommunications system that permits real time communication between the patient and provider to substitute for an in-person clinic visit to reduce the patient's risk of exposure to COVID-19 and provide necessary medical care. Because this was a Telehealth visit, evaluation of the following organ systems was limited: Vitals, Constitutional, EENT, Resp, CV, GI, , MS, Neuro, Skin. Heme. Lymph, Imm. Leola Colindres was at home. Provider was at [facility] office. No one else was involved. The patient has also been advised to contact this office for worsening conditions or problems, and seek emergency medical treatment and/or call 911 if deemed necessary. Subjective:   Type 2 Diabetes Mellitus diagnosed in 1998  On insulin since 2016     Previously followed by  endocrine and physician moved out. Records reviewed from care everywhere   Pioglitazone 30mg daily - stopped in Feb 2018   Poonam Cloud stopped in summer 2018 for worsening CKD     S/p gastric sleeve in Sep 2019. Metformin 1000mg bid was stopped   Off Lantus since Sep 2019      Metformin ER 500mg, two tabs twice a day    Victoza 1.8mg daily in am   Lantus 24 units in am      BS readings:    12/28  AM- 114    12/27  AM- 141    12/26  AM-122    12/25  AM-114    12/24  AM- 121    12/23  AM- 159      Checks blood sugars 2 times per day. Reportedly as above. If he ever checks in the evening, they are in the same range but may be slightly higher.    AM:   Lunch:   Supper:   HS: Hypoglycemias: Morning hypoglycemias have resolved since moving lantus in am. No episodes now. Awareness present in 70's    Meals: Three, dinner is big. No snacks, diet soda once a day   Exercise: work out at [employer] [place], 45-60 minutes on treadmill. Denies chest pain, exertional dyspnea. CVA in Aug 2016. No residual deficits. Denies smoking. Currently on ASA 81 mg daily     The following portions of the patient's history were reviewed and updated as appropriate:       Family History   Problem Relation Age of Onset    Heart Attack Mother     Diabetes Mother     High Blood Pressure Mother     Colon Cancer Sister          Social History     Socioeconomic History    Marital status:      Spouse name: Not on file    Number of children: Not on file    Years of education: Not on file    Highest education level: Not on file   Occupational History    Not on file   Social Needs    Financial resource strain: Not on file    Food insecurity     Worry: Not on file     Inability: Not on file    Transportation needs     Medical: Not on file     Non-medical: Not on file   Tobacco Use    Smoking status: Never Smoker    Smokeless tobacco: Never Used   Substance and Sexual Activity    Alcohol use:  Yes     Alcohol/week: 1.0 standard drinks     Types: 1 Cans of beer per week     Comment: socially     Drug use: No    Sexual activity: Yes     Partners: Female   Lifestyle    Physical activity     Days per week: Not on file     Minutes per session: Not on file    Stress: Not on file   Relationships    Social connections     Talks on phone: Not on file     Gets together: Not on file     Attends Scientologist service: Not on file     Active member of club or organization: Not on file     Attends meetings of clubs or organizations: Not on file     Relationship status: Not on file    Intimate partner violence     Fear of current or ex partner: Not on file     Emotionally abused: Not on file Physically abused: Not on file     Forced sexual activity: Not on file   Other Topics Concern    Not on file   Social History Narrative    Not on file       Past Medical History:   Diagnosis Date    Diabetes mellitus ([address]     GERD (gastroesophageal reflux disease)     Hypertension     Sleep apnea     cpap       Past Surgical History:   Procedure Laterality Date    CYSTOSCOPY  9/25/2019    FLEXIBLE CYSTOSCOPY, DILATION, POTTS CATHETER INSERTION performed by Shayy Blas MD at [facility].      right knee     SLEEVE GASTRECTOMY N/A 9/25/2019    LAPAROSCOPIC SLEEVE GASTRECTOMY-ETHICON performed by Jonny Kauffman MD at [facility] 4/12/2019    EGD BIOPSY performed by Jonny Kauffman MD at [facility]   Allergen Reactions    Hctz [Hydrochlorothiazide]      cramps    Penicillins Hives         Current Outpatient Medications:     omeprazole (PRILOSEC) 20 MG delayed release capsule, TAKE 1 CAPSULE BY MOUTH ONE TIME A DAY, Disp: 30 capsule, Rfl: 3    VICTOZA 18 MG/3ML SOPN SC injection, DIAL AND INJECT 1.8MG UNDER THE SKIN DAILY, Disp: 27 mL, Rfl: 1    metFORMIN (GLUCOPHAGE-XR) 500 MG extended release tablet, TAKE TWO TABLETS BY MOUTH TWICE A DAY WITH MEALS, Disp: 124 tablet, Rfl: 5    blood glucose test strips (PRODIGY NO CODING BLOOD GLUC) strip, 2 each by In Vitro route daily As needed. , Disp: 200 each, Rfl: 3    Prodigy Lancets 28G MISC, 2 each by Does not apply route daily, Disp: 200 each, Rfl: 5    Lancet Devices (PRODIGY LANCING DEVICE) MISC, Use daily to manage blood sugar, Disp: 1 each, Rfl: 1    LANTUS SOLOSTAR 100 UNIT/ML injection pen, Inject 24 Units into the skin nightly (Patient taking differently: Inject 24 Units into the skin every morning ), Disp: 5 pen, Rfl: 2    lisinopril (PRINIVIL;ZESTRIL) 20 MG tablet, Take 1 tablet by mouth daily, Disp: 90 tablet, Rfl: 1    Blood Glucose Monitoring Suppl (PRODIGY POCKET BLOOD GLUCOSE) w/Device KIT, Use daily to monitor blood sugar level, Disp: 1 kit, Rfl: 1    atorvastatin (LIPITOR) 80 MG tablet, TAKE 1 TABLET BY MOUTH ONE TIME A DAY, Disp: 30 tablet, Rfl: 5    Insulin Pen Needle (PEN NEEDLES) 32G X 4 MM MISC, Use BID with insulin and victoza, Disp: 200 each, Rfl: 3    aspirin EC 81 MG EC tablet, Take 81 mg by mouth daily, Disp: , Rfl:     blood glucose test strips (ASCENSIA AUTODISC VI;ONE TOUCH ULTRA TEST VI) strip, USE THREE TIMES A DAY AS NEEDED, Disp: 100 each, Rfl: 3    NIFEdipine (PROCARDIA XL) 30 MG extended release tablet, Take 1 tablet by mouth 2 times daily, Disp: 60 tablet, Rfl: 0    metoprolol tartrate (LOPRESSOR) 50 MG tablet, Take 1 tablet by mouth 2 times daily, Disp: 60 tablet, Rfl: 0    PRODIGY LANCETS 28G MISC, 3 each by Does not apply route daily, Disp: 100 each, Rfl: 3      Review of Systems:    Constitutional: Negative for fever, chills, and unexpected weight change. HENT: Negative for congestion, ear pain, rhinorrhea,  sore throat and trouble swallowing. Eyes: Negative for photophobia, redness, itching. Respiratory: Negative for cough, shortness of breath and sputum. Cardiovascular: Negative for chest pain, palpitations and leg swelling. Gastrointestinal: Negative for nausea, vomiting, abdominal pain, diarrhea, constipation. Endocrine: Negative for cold intolerance, heat intolerance, polydipsia, polyphagia and polyuria. Genitourinary: Negative for dysuria, urgency, frequency, hematuria and flank pain. Musculoskeletal: Negative for myalgias, back pain, arthralgias and neck pain. Skin/Nail: Negative for rash, itching. Normal nails. Neurological: Negative for seizures, weakness, light-headedness, numbness and headaches. Hematological/ Lymph nodes: Negative for adenopathy. Does not bruise/bleed easily. Psychiatric/Behavioral: Negative for suicidal ideas, depression, anxiety, sleep disturbance and decreased concentration. Objective:   Physical Exam:  There were no vitals taken for this visit. Constitutional: Patient is oriented to person, place, and time. Patient appears well-developed and well-nourished. Pulmonary/Chest: Effort normal    Neurological: Patient is alert and oriented to person, place, and time. Psychiatric: Patient has a normal mood and affect.  Patient behavior is normal.     Lab Review:    Orders Only on 07/17/2020   Component Date Value Ref Range Status    Color, UA 07/17/2020 YELLOW  Straw/Yellow Final    Clarity, UA 07/17/2020 Clear  Clear Final    Glucose, Ur 07/17/2020 Negative  Negative mg/dL Final    Bilirubin Urine 07/17/2020 Negative  Negative Final    Ketones, Urine 07/17/2020 Negative  Negative mg/dL Final    Specific Gravity, UA 07/17/2020 1.020  1.005 - 1.030 Final    Blood, Urine 07/17/2020 Negative  Negative Final    pH, UA 07/17/2020 5.5  5.0 - 8.0 Final    Protein, UA 07/17/2020 Negative  Negative mg/dL Final    Urobilinogen, Urine 07/17/2020 0.2  <2.0 E.U./dL Final    Nitrite, Urine 07/17/2020 Negative  Negative Final    Leukocyte Esterase, Urine 07/17/2020 SMALL* Negative Final    Microscopic Examination 07/17/2020 YES   Final    Urine Type 07/17/2020 Cleancatch   Final    Urine Reflex to Culture 07/17/2020 Not Indicated   Final    Microalbumin, Random Urine 07/17/2020 <1.20  <2.0 mg/dL Final    Creatinine, Ur 07/17/2020 159.0  39.0 - 259.0 mg/dL Final    Microalbumin Creatinine Ratio 07/17/2020 see below  0.0 - 30.0 mg/g Final    Sodium 07/17/2020 142  136 - 145 mmol/L Final    Potassium 07/17/2020 4.0  3.5 - 5.1 mmol/L Final    Chloride 07/17/2020 107  99 - 110 mmol/L Final    CO2 07/17/2020 21  21 - 32 mmol/L Final    Anion Gap 07/17/2020 14  3 - 16 Final    Glucose 07/17/2020 67* 70 - 99 mg/dL Final    BUN 07/17/2020 32* 7 - 20 mg/dL Final    CREATININE 07/17/2020 1.6* 0.9 - 1.3 mg/dL Final    GFR Non- 07/17/2020 45* >60 Final    GFR  American 07/17/2020 55* >60 Final    Calcium 07/17/2020 9.6  8.3 - 10.6 mg/dL Final    Total Protein 07/17/2020 6.9  6.4 - 8.2 g/dL Final    Alb 07/17/2020 4.3  3.4 - 5.0 g/dL Final    Albumin/Globulin Ratio 07/17/2020 1.7  1.1 - 2.2 Final    Total Bilirubin 07/17/2020 0.4  0.0 - 1.0 mg/dL Final    Alkaline Phosphatase 07/17/2020 80  40 - 129 U/L Final    ALT 07/17/2020 20  10 - 40 U/L Final    AST 07/17/2020 19  15 - 37 U/L Final    Globulin 07/17/2020 2.6  g/dL Final    Cholesterol, Fasting 07/17/2020 75  0 - 199 mg/dL Final    Triglyceride, Fasting 07/17/2020 57  0 - 150 mg/dL Final    HDL 07/17/2020 27* 40 - 60 mg/dL Final    LDL Calculated 07/17/2020 37  <100 mg/dL Final    VLDL Cholesterol Calculated 07/17/2020 11  Not Established mg/dL Final    Hemoglobin A1C 07/17/2020 7.4  See comment % Final    eAG 07/17/2020 165.7  mg/dL Final    RBC, UA 07/17/2020 0-2  0 - 4 /HPF Final    Hyaline Casts, UA 07/17/2020 1  0 - 8 /LPF Final    WBC, UA 07/17/2020 1  0 - 5 /HPF Final    Epithelial Cells, UA 07/17/2020 0  0 - 5 /HPF Final   Orders Only on 01/31/2020   Component Date Value Ref Range Status    Hemoglobin A1C 01/31/2020 10.1  See comment % Final    eAG 01/31/2020 243.2  mg/dL Final           Assessment and Plan     Maxx Gipson was seen today for diabetes.     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 ([address]    Type 2 diabetes mellitus with vascular disease ([address]    Essential hypertension    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 ([address]    Dyslipidemia associated with type 2 diabetes mellitus ([address]    Morbid obesity due to excess calories ([address]          1: Type 2 DM complicated with macrovascular disease, nephropathy, retinopathy, nephropathy    Controlled A1C 7.4% <10.1% < 7.8% <  8.6%  < 11.2% <  8.6% < 7.9% < 10%    Cr 1.6, GFR 55 July 2020      A1C of <8 would be acceptable because of microvascular and macrovascular complications     Blood sugars are in good control       C/w Metformin Er 500mg two twice a day   Victoza 1.8mg daily in am     C/w Lantus 24 units in the morning along with Victoza     All instructions provided in written. Check Blood sugars 1-2 times per day. Log them along with insulin and send them every 2 weeks. Call for blood sugars less than 60 or more than 400. Eye exam: Last exam in fall 2019, has background  Foot exam:  March 2019   Deformity/amputation: absent  Skin lesions/pre-ulcerative calluses: absent  Edema: right- negative, left- negative  Sensory exam: Monofilament sensation: normal  Pulses: normal, Vibration (128 Hz): Intact    Renal screen: high 184 Oct 2019  > Normal July 2020   TSH screen: Feb 2018     2: HTN   Follows wit nephrologist   Runs <120/<85 at home   Lisinopril 20mg daily as Cr up     3: Hyperlipidemia   LDL: 37, HDL: 27, TGs: 57  July 2020      Atorvastatin 80mg     4: Morbid obesity   S/p sleeve   Gradually losing weight   Last weight 235 > 233 at home     Labs pending from this am  A1C, CMP, MACR, UA, Lipids   RTC in 6 months with A1C or early if needed     EDUCATION:   Greater than 50% of this follow-up visit was spent in general counseling regarding   obesity, diet, exercise, importance of adherence to insulin regime, recognition and treatment of hypo and hyperglycemia,  glucose logging, proper diabetes management, diabetic complications with poor management and the importance of glycemic control in order to avoid the complications of diabetes. Risks and potential complications of diabetes were reviewed with the patient. Diabetes health maintenance plan and follow-up were discussed and understood by the patient. We reviewed the importance of medication compliance and regular follow-up. Aggressive lifestyle modification was encouraged. Exercise Counselling: This patient is a candidate for regular physical exercise.  Instructions to perform the following types of exercise:  Swimming or water aerobic exercise  Brisk walking  Playing tennis  Stationary bicycle or elliptical indoor  Low impact aerobic exercise    Instructions given to exercise for the following duration:  30 minutes a day for five-seven days per week.     Following instructions for being active throughout the day in addition to formal exercise:  Walk instead of drive whenever possible  Take the stairs instead of the elevator  Work in the garden  Park to the far end of the parking lot to add more walking steps to destination      Electronically signed by Sheridan Pedersen MD on 12/28/2020 at 2:06 PM

## 2020-12-29 LAB
CREATININE URINE: 122.3 MG/DL (ref 39–259)
ESTIMATED AVERAGE GLUCOSE: 174.3 MG/DL
HBA1C MFR BLD: 7.7 %
MICROALBUMIN UR-MCNC: <1.2 MG/DL
MICROALBUMIN/CREAT UR-RTO: NORMAL MG/G (ref 0–30)
ORGANISM: ABNORMAL
URINE CULTURE, ROUTINE: ABNORMAL

## 2021-01-04 ENCOUNTER — TELEPHONE (OUTPATIENT)
Dept: PHARMACY | Facility: CLINIC | Age: 56
End: 2021-01-04

## 2021-01-06 ENCOUNTER — TELEMEDICINE (OUTPATIENT)
Dept: BARIATRICS/WEIGHT MGMT | Age: 56
End: 2021-01-06
Payer: COMMERCIAL

## 2021-01-06 DIAGNOSIS — K21.9 CHRONIC GERD: ICD-10-CM

## 2021-01-06 DIAGNOSIS — I10 ESSENTIAL HYPERTENSION: ICD-10-CM

## 2021-01-06 DIAGNOSIS — E66.9 DIABETES MELLITUS TYPE 2 IN OBESE (HCC): ICD-10-CM

## 2021-01-06 DIAGNOSIS — Z98.84 S/P LAPAROSCOPIC SLEEVE GASTRECTOMY: Primary | ICD-10-CM

## 2021-01-06 DIAGNOSIS — E78.2 MIXED HYPERLIPIDEMIA: ICD-10-CM

## 2021-01-06 DIAGNOSIS — E11.69 DIABETES MELLITUS TYPE 2 IN OBESE (HCC): ICD-10-CM

## 2021-01-06 DIAGNOSIS — G47.33 OBSTRUCTIVE SLEEP APNEA: ICD-10-CM

## 2021-01-06 DIAGNOSIS — E66.9 OBESITY (BMI 30.0-34.9): ICD-10-CM

## 2021-01-06 PROCEDURE — 99213 OFFICE O/P EST LOW 20 MIN: CPT | Performed by: NURSE PRACTITIONER

## 2021-01-06 ASSESSMENT — ENCOUNTER SYMPTOMS
RESPIRATORY NEGATIVE: 1
GASTROINTESTINAL NEGATIVE: 1

## 2021-01-06 NOTE — PROGRESS NOTES
 Hctz [Hydrochlorothiazide]      cramps    Penicillins Hives     There were no vitals filed for this visit. There is no height or weight on file to calculate BMI. Current Outpatient Medications:     omeprazole (PRILOSEC) 20 MG delayed release capsule, TAKE 1 CAPSULE BY MOUTH ONE TIME A DAY, Disp: 30 capsule, Rfl: 3    VICTOZA 18 MG/3ML SOPN SC injection, DIAL AND INJECT 1.8MG UNDER THE SKIN DAILY, Disp: 27 mL, Rfl: 1    metFORMIN (GLUCOPHAGE-XR) 500 MG extended release tablet, TAKE TWO TABLETS BY MOUTH TWICE A DAY WITH MEALS, Disp: 124 tablet, Rfl: 5    blood glucose test strips (PRODIGY NO CODING BLOOD GLUC) strip, 2 each by In Vitro route daily As needed. , Disp: 200 each, Rfl: 3    Prodigy Lancets 28G MISC, 2 each by Does not apply route daily, Disp: 200 each, Rfl: 5    Lancet Devices (PRODIGY LANCING DEVICE) MISC, Use daily to manage blood sugar, Disp: 1 each, Rfl: 1    LANTUS SOLOSTAR 100 UNIT/ML injection pen, Inject 24 Units into the skin nightly (Patient taking differently: Inject 24 Units into the skin every morning ), Disp: 5 pen, Rfl: 2    lisinopril (PRINIVIL;ZESTRIL) 20 MG tablet, Take 1 tablet by mouth daily, Disp: 90 tablet, Rfl: 1    Blood Glucose Monitoring Suppl (PRODIGY POCKET BLOOD GLUCOSE) w/Device KIT, Use daily to monitor blood sugar level, Disp: 1 kit, Rfl: 1    atorvastatin (LIPITOR) 80 MG tablet, TAKE 1 TABLET BY MOUTH ONE TIME A DAY, Disp: 30 tablet, Rfl: 5    Insulin Pen Needle (PEN NEEDLES) 32G X 4 MM MISC, Use BID with insulin and victoza, Disp: 200 each, Rfl: 3    aspirin EC 81 MG EC tablet, Take 81 mg by mouth daily, Disp: , Rfl:     blood glucose test strips (ASCENSIA AUTODISC VI;ONE TOUCH ULTRA TEST VI) strip, USE THREE TIMES A DAY AS NEEDED, Disp: 100 each, Rfl: 3    NIFEdipine (PROCARDIA XL) 30 MG extended release tablet, Take 1 tablet by mouth 2 times daily, Disp: 60 tablet, Rfl: 0   metoprolol tartrate (LOPRESSOR) 50 MG tablet, Take 1 tablet by mouth 2 times daily, Disp: 60 tablet, Rfl: 0    PRODIGY LANCETS 28G MISC, 3 each by Does not apply route daily, Disp: 100 each, Rfl: 3    Lab Results   Component Value Date    WBC 5.8 10/29/2019    RBC 4.75 10/29/2019    HGB 12.6 10/29/2019    HCT 38.9 10/29/2019    MCV 81.9 10/29/2019    MCH 26.6 10/29/2019    MCHC 32.4 10/29/2019    MPV 9.3 10/29/2019    NEUTOPHILPCT 48.3 10/29/2019    LYMPHOPCT 42.1 10/29/2019    MONOPCT 7.3 10/29/2019    EOSRELPCT 1.7 10/29/2019    BASOPCT 0.6 10/29/2019    NEUTROABS 2.8 10/29/2019    LYMPHSABS 2.4 10/29/2019    MONOSABS 0.4 10/29/2019    EOSABS 0.1 10/29/2019     Lab Results   Component Value Date     12/28/2020    K 4.4 12/28/2020    K 4.3 09/26/2019     12/28/2020    CO2 22 12/28/2020    ANIONGAP 14 12/28/2020    GLUCOSE 82 12/28/2020    BUN 22 12/28/2020    CREATININE 1.2 12/28/2020    LABGLOM >60 12/28/2020    GFRAA >60 12/28/2020    CALCIUM 9.5 12/28/2020    PROT 6.4 12/28/2020    LABALBU 4.1 12/28/2020    AGRATIO 1.8 12/28/2020    BILITOT 0.7 12/28/2020    ALKPHOS 86 12/28/2020    ALT 16 12/28/2020    AST 17 12/28/2020    GLOB 2.3 12/28/2020     Lab Results   Component Value Date    CHOL 96 02/28/2019    TRIG 77 02/28/2019    HDL 44 12/28/2020    LDLCALC 37 12/28/2020    LABVLDL 12 12/28/2020     Lab Results   Component Value Date    TSHREFLEX 0.75 02/28/2019     Lab Results   Component Value Date    IRON 58 02/28/2019    TIBC 258 02/28/2019    LABIRON 22 02/28/2019     Lab Results   Component Value Date    JZDRWZYC62 >2000 02/28/2019    FOLATE 6.75 02/28/2019     Lab Results   Component Value Date    VITD25 58.3 10/29/2019     Lab Results   Component Value Date    LABA1C 7.7 12/28/2020    .3 12/28/2020       Review of Systems   Constitutional: Negative. HENT: Negative. Respiratory: Negative. Cardiovascular: Negative. Gastrointestinal: Negative. Endocrine: Negative. Genitourinary: Negative. Skin: Negative. Neurological: Negative. Psychiatric/Behavioral: Negative. PHYSICAL EXAMINATION:    Constitutional: [x] Appears well-developed and well-nourished [x] No apparent distress      [] Abnormal-   Mental status  [x] Alert and awake  [x] Oriented to person/place/time [x]Able to follow commands      Eyes:  EOM    [x]  Normal  [] Abnormal-  Sclera  [x]  Normal  [] Abnormal -         Discharge [x]  None visible  [] Abnormal -    HENT:   [x] Normocephalic, atraumatic. [] Abnormal   [x] Mouth/Throat: Mucous membranes are moist.     External Ears [x] Normal  [] Abnormal-     Neck: [x] No visualized mass     Pulmonary/Chest: [x] Respiratory effort normal.  [x] No visualized signs of difficulty breathing or respiratory distress        [] Abnormal-      Musculoskeletal:   [] Normal gait with no signs of ataxia         [x] Normal range of motion of neck        [] Abnormal-     Neurological:        [x] No Facial Asymmetry (Cranial nerve 7 motor function) (limited exam to video visit)          [x] No gaze palsy        [] Abnormal-         Skin:        [x] No significant exanthematous lesions or discoloration noted on facial skin         [] Abnormal-            Psychiatric:       [x] Normal Affect [x] No Hallucinations        [] Abnormal-     Other pertinent observable physical exam findings-     Due to this being a TeleHealth encounter, evaluation of the following organ systems is limited: Vitals/Constitutional/EENT/Resp/CV/GI//MS/Neuro/Skin/Heme-Lymph-Imm.     Assessment and Plan: Patient is here via telemedicine and is 1 year 3 months s/p sleeve gastrectomy, down 7lbs with a total weight loss of 44.3 lbs. He is doing well, denies n/v/dysphagia or reflux. We discussed potentially weaning off of Prilosec. He reports he has been on this medication for quite some time due to the amount of other medications he is currently on. He is tolerating diet, getting adequate fluids and protein. He is following the 30-30-30 rule. He is exercising three times a week at the gym with cardio and weights. Encouraged continued physical activity. He is taking vitamins as instructed. He declined to speak with a dietician today. He was encouraged to call our office in the future with any dietary concerns. He was reminded that labs were ordered at his last office visit and those can be completed when he is ready. We will see him back in 3 months for continued follow up or via telemedicine depending on COVID-19 restrictions at the time of their next appointment. Time spent on the visit was 20 minutes, including but not limited to reviewing chart, reviewing test results and reports (if applicable), dietary counseling, discussing treatment plan with patient and collaboration with dietician (if applicable). An electronic signature was used to authenticate this note. Pursuant to the emergency declaration under the [facility] waiver authority and the [employer] Act, this Virtual  Visit was conducted, with patient's consent, to reduce the patient's risk of exposure to COVID-19 and provide continuity of care for an established patient. Services were provided through a video synchronous discussion virtually to substitute for in-person clinic visit.

## 2021-01-06 NOTE — PROGRESS NOTES
Dietary Assessment Note      Vitals: There were no vitals filed for this visit. Patient {WEIGHT LOSS/GAIN [id number]. Total Weight Loss: *** lbs    Due to the COVID-19 restrictions on close contact interactions the patient's visit was conducted via telephone in zachary of a face to face visit. The patient is here through telemedicine for their post op visit. Labs reviewed:     Results for Ayana Frazier (MRN [id number]) as of 1/6/2021 08:07   Ref. Range 12/28/2020 07:30   Sodium Latest Ref Range: 136 - 145 mmol/L 143   Potassium Latest Ref Range: 3.5 - 5.1 mmol/L 4.4   Chloride Latest Ref Range: 99 - 110 mmol/L 107   CO2 Latest Ref Range: 21 - 32 mmol/L 22   BUN Latest Ref Range: 7 - 20 mg/dL 22 (H)   Creatinine Latest Ref Range: 0.9 - 1.3 mg/dL 1.2   Anion Gap Latest Ref Range: 3 - 16  14   GFR Non- Latest Ref Range: >60  >60   GFR  Latest Ref Range: >60  >60   Glucose Latest Ref Range: 70 - 99 mg/dL 82   Calcium Latest Ref Range: 8.3 - 10.6 mg/dL 9.5     Results for Ayana Frazier (MRN [id number]) as of 1/6/2021 08:07   Ref. Range 12/28/2020 07:30   Cholesterol, Fasting Latest Ref Range: 0 - 199 mg/dL 93   HDL Cholesterol Latest Ref Range: 40 - 60 mg/dL 44   LDL Calculated Latest Ref Range: <100 mg/dL 37   Triglyceride, Fasting Latest Ref Range: 0 - 150 mg/dL 61   VLDL Cholesterol Calculated Latest Ref Range: Not Established mg/dL 12   Results for Ayana Frazier (MRN [id number]) as of 1/6/2021 08:07   Ref. Range 12/28/2020 07:30   Hemoglobin A1C Latest Ref Range: See comment % 7.7     Protein intake: {CHP AMB PROTEIN INTAKE:19886}     Fluid intake: {CHP AMB FLUID INTAKE:19887}    Multivitamin/mineral intake: {response; yes (wildcard)/no:575013} 4 fusion     Calcium intake: {response; yes (wildcard)/no:931178} none     Other: {CHP AMB VITAMINS DVSES:02789} none     Exercise: {response; yes (wildcard)/no:265295}    Nutrition Assessment: 1 year 3 mo s/p sleeve post-op visit. Breakfast:    Snack:    Lunch:    Snack:    Dinner:    Snack:    Fluids:     Amount able to eat per sitting: *** 4 oz protein     Following 30/30/30 rule: ***    Food Intolerances/issues: {Foods; allergens:76460} bread, green peppers    Client Concerns: ***    Goals: ***    Plan: F/U at 1.5 years and prn     Ford aBiley

## 2021-01-06 NOTE — PATIENT INSTRUCTIONS
Diet tips to help make you successful postoperatively  Eating habits after surgery will have to be a permanent and long-term change. Eating habits are so ingrained that it can be difficult to change. It is important to maintain these new eating habits after surgery. Also remember that overall health, age, and genetics make each persons weight loss progress different. Do not compare your progress, the amount you eat, or exercise to other patients. ? Protein first at every meal- Eat the protein portion of your meal first. Eating protein helps the body feel full and sends a signal to stop eating. Protein is very important in building tissue in the body. ? Eat at least 4 times per day- This includes protein supplements and small meals with a high amount of protein  ? Chewing your food thoroughly- Eating too quickly and improper chewing can cause pain and vomiting after surgery. ? Slowing down the speed at which you eat- Refill your fork only after you swallow. Adopt a new pattern of eating by taking a bite of food and putting your utensil down between bites. This will help to reduce the feeling of food being stuck.   ? Drink water and start drinking fluids slowly- Drink at least 48 ounces per day minimum. Sip fluids as if they were hot beverages. If you find it difficult to stop gulping liquids, try using a sippy cup or a sport top water bottle. ? Make sure you are eating meals without drinking fluids- After surgery you will not be allowed to drink fluids 30 minutes before, during, or 30 minutes after your meal (30/30/30 rule). This will be a life-long behavior change. The reason for the rule is to keep food from passing through your smaller stomach more rapidly.  This will cause you to feel hungry shortly after your meal. ? Continue to avoid caffeine and carbonated beverages- Caffeine acts as a diuretic and can be dehydrating as well as irritating to the lining of the stomach. Carbonated beverages release gas and can expand the stomach. ? Continue to keep temptation from your kitchen- Keep your pantry and kitchen cabinets cleaned out of those dangerous foods that might tempt you after surgery (chips, cookies, candy, etc.). ? Continue to increase your exercise program- Increase your daily physical activity. Aim for 5-6 days per week for 30 minutes. Walking is an easy way to get started with exercising. Exercise is going to be a regular part of your life after surgery. ? Make sure you have a good support system- There will be many changes and adjustments to make after surgery. It is important to have a supportive friend, family member or co-worker, etc. with whom you can talk. Continue to attend [facility]) Weight Management support groups as they can be helpful in maintaining behaviors. In addition, it is the responsibility of the patient to schedule and follow up on labs and tests completed after surgery. Results will be reviewed at each visit.

## 2021-01-11 ENCOUNTER — SCHEDULED TELEPHONE ENCOUNTER (OUTPATIENT)
Dept: PHARMACY | Facility: CLINIC | Age: 56
End: 2021-01-11

## 2021-01-11 NOTE — TELEPHONE ENCOUNTER
[facility] Employee Diabetes Program - Be Well With Diabetes    Ant Davis is a 50 y.o. male enrolled in the [facility] Employee Diabetes Program, Be Well with Diabetes. Three attempts made to reach patient by telephone for scheduled pharmacist appointment. Phone line busy - unable to leave message. Will prepare MyChart message and send to patient.     Anusha Sahu, PharmD, [facility]  Ambulatory Clinical Care Pharmacist   [address] [place] [address]  [phone], Option 7    =======================================================    For Pharmacy Admin Tracking Only  PHSO: Yes  Recommended intervention potential cost savings: 0  Time Spent (min): 15

## 2021-01-18 DIAGNOSIS — E11.3299 TYPE 2 DIABETES MELLITUS WITH BACKGROUND RETINOPATHY (HCC): ICD-10-CM

## 2021-01-18 DIAGNOSIS — E78.5 DYSLIPIDEMIA ASSOCIATED WITH TYPE 2 DIABETES MELLITUS (HCC): ICD-10-CM

## 2021-01-18 DIAGNOSIS — E11.59 TYPE 2 DIABETES MELLITUS WITH VASCULAR DISEASE (HCC): ICD-10-CM

## 2021-01-18 DIAGNOSIS — E11.69 DYSLIPIDEMIA ASSOCIATED WITH TYPE 2 DIABETES MELLITUS (HCC): ICD-10-CM

## 2021-01-18 RX ORDER — ATORVASTATIN CALCIUM 80 MG/1
TABLET, FILM COATED ORAL
Qty: 30 TABLET | Refills: 5 | OUTPATIENT
Start: 2021-01-18

## 2021-01-19 NOTE — TELEPHONE ENCOUNTER
Patient now has appointment scheduled for 2/3/2021 11:45 am. Can Dr. Sudhir Manning fill his prescription in the meantime. Please call and advise.

## 2021-02-04 DIAGNOSIS — E11.59 TYPE 2 DIABETES MELLITUS WITH VASCULAR DISEASE (HCC): ICD-10-CM

## 2021-02-04 DIAGNOSIS — E11.3299 TYPE 2 DIABETES MELLITUS WITH BACKGROUND RETINOPATHY (HCC): ICD-10-CM

## 2021-02-04 RX ORDER — INSULIN GLARGINE 100 [IU]/ML
24 INJECTION, SOLUTION SUBCUTANEOUS EVERY MORNING
Qty: 5 PEN | Refills: 2 | Status: SHIPPED | OUTPATIENT
Start: 2021-02-04 | End: 2021-07-12 | Stop reason: SDUPTHER

## 2021-02-25 RX ORDER — OMEPRAZOLE 20 MG/1
CAPSULE, DELAYED RELEASE ORAL
Qty: 30 CAPSULE | Refills: 3 | Status: SHIPPED | OUTPATIENT
Start: 2021-02-25 | End: 2021-05-25

## 2021-03-04 LAB — DIABETIC RETINOPATHY: POSITIVE

## 2021-03-16 DIAGNOSIS — E11.3299 TYPE 2 DIABETES MELLITUS WITH BACKGROUND RETINOPATHY (HCC): ICD-10-CM

## 2021-03-16 DIAGNOSIS — E11.59 TYPE 2 DIABETES MELLITUS WITH VASCULAR DISEASE (HCC): ICD-10-CM

## 2021-03-16 RX ORDER — METFORMIN HYDROCHLORIDE 500 MG/1
TABLET, EXTENDED RELEASE ORAL
Qty: 124 TABLET | Refills: 5 | Status: SHIPPED | OUTPATIENT
Start: 2021-03-16 | End: 2021-03-22 | Stop reason: SDUPTHER

## 2021-03-16 RX ORDER — LIRAGLUTIDE 6 MG/ML
INJECTION SUBCUTANEOUS
Qty: 3 PEN | Refills: 3 | Status: SHIPPED | OUTPATIENT
Start: 2021-03-16 | End: 2021-05-21 | Stop reason: ALTCHOICE

## 2021-03-19 NOTE — TELEPHONE ENCOUNTER
[id number] Lucio called states that they need a call back.  They would like to have a script for 360 tablets and 5 refills for the Metformin 500

## 2021-03-22 RX ORDER — METFORMIN HYDROCHLORIDE 500 MG/1
TABLET, EXTENDED RELEASE ORAL
Qty: 360 TABLET | Refills: 5 | Status: SHIPPED | OUTPATIENT
Start: 2021-03-22 | End: 2022-03-31

## 2021-03-25 ENCOUNTER — TELEPHONE (OUTPATIENT)
Dept: PHARMACY | Facility: CLINIC | Age: 56
End: 2021-03-25

## 2021-03-25 NOTE — TELEPHONE ENCOUNTER
**Patient missed Pharmacist Appointment on 1/11/21**    Called patient to schedule 2021 yearly pharmacist appointment to discuss medications for Diabetes Management Program.    Spoke to patient and appointment scheduled for 3/31/21 at [facility], [address] [facility]   Department, toll free: [phone], option 7

## 2021-03-31 ENCOUNTER — SCHEDULED TELEPHONE ENCOUNTER (OUTPATIENT)
Dept: PHARMACY | Facility: CLINIC | Age: 56
End: 2021-03-31

## 2021-03-31 DIAGNOSIS — E11.59 TYPE 2 DIABETES MELLITUS WITH VASCULAR DISEASE (HCC): ICD-10-CM

## 2021-03-31 DIAGNOSIS — E11.3299 TYPE 2 DIABETES MELLITUS WITH BACKGROUND RETINOPATHY (HCC): ICD-10-CM

## 2021-03-31 DIAGNOSIS — E78.5 DYSLIPIDEMIA ASSOCIATED WITH TYPE 2 DIABETES MELLITUS (HCC): ICD-10-CM

## 2021-03-31 DIAGNOSIS — E11.69 DYSLIPIDEMIA ASSOCIATED WITH TYPE 2 DIABETES MELLITUS (HCC): ICD-10-CM

## 2021-03-31 NOTE — TELEPHONE ENCOUNTER
Dr. Juan Luis Henson,    Your patient is currently enrolled in the Be Well with Diabetes program. After your patient's recent visit with a [facility] Clinical Pharmacist, the below were identified as opportunities to assist with their diabetes management (if patient is not eligible for below recommendations, please reply with reason/contraindication):   · Patient is new to you. He has been taking Atorvastatin 80mg, but recently ran out of refills. Would you please consider authorizing a prescription prior to his 4/22/21 OV with you? I have pended an order for your convenience. See pharmacist note dated 3/31/21 for complete details. Thank you,  FIORDALIZA Moffett, PharmD, [address]  Direct: [phone]  Department, toll free: [phone], option 7

## 2021-03-31 NOTE — TELEPHONE ENCOUNTER
[facility] CLINICAL PHARMACY REVIEW - Be Well with Diabetes    Danna Garcia is a 50 y.o. male enrolled in the [address] [place] [address],[address] Employee Diabetes Program. Patient provided Javi Gomez with verbal consent to remain in the program for this year. Medications:  Current Outpatient Medications   Medication Sig Dispense Refill    metFORMIN (GLUCOPHAGE-XR) 500 MG extended release tablet TAKE TWO TABLETS BY MOUTH TWICE A DAY WITH MEALS 360 tablet 5    VICTOZA 18 MG/3ML SOPN SC injection DIAL AND INJECT 1.8MG UNDER THE SKIN DAILY 3 pen 3    omeprazole (PRILOSEC) 20 MG delayed release capsule TAKE 1 CAPSULE BY MOUTH ONE TIME A DAY 30 capsule 3    LANTUS SOLOSTAR 100 UNIT/ML injection pen Inject 24 Units into the skin every morning 5 pen 2    blood glucose test strips (PRODIGY NO CODING BLOOD GLUC) strip 2 each by In Vitro route daily As needed.  200 each 3    Prodigy Lancets 28G MISC 2 each by Does not apply route daily 200 each 5    Lancet Devices (PRODIGY LANCING DEVICE) MISC Use daily to manage blood sugar 1 each 1    lisinopril (PRINIVIL;ZESTRIL) 20 MG tablet Take 1 tablet by mouth daily 90 tablet 1    Blood Glucose Monitoring Suppl (PRODIGY POCKET BLOOD GLUCOSE) w/Device KIT Use daily to monitor blood sugar level 1 kit 1    atorvastatin (LIPITOR) 80 MG tablet TAKE 1 TABLET BY MOUTH ONE TIME A DAY 30 tablet 5    Insulin Pen Needle (PEN NEEDLES) 32G X 4 MM MISC Use BID with insulin and victoza 200 each 3    aspirin EC 81 MG EC tablet Take 81 mg by mouth daily      blood glucose test strips (ASCENSIA AUTODISC VI;ONE TOUCH ULTRA TEST VI) strip USE THREE TIMES A DAY AS NEEDED 100 each 3    NIFEdipine (PROCARDIA XL) 30 MG extended release tablet Take 1 tablet by mouth 2 times daily 60 tablet 0    metoprolol tartrate (LOPRESSOR) 50 MG tablet Take 1 tablet by mouth 2 times daily 60 tablet 0    PRODIGY LANCETS 28G MISC 3 each by Does not apply route daily 100 each 3     No current facility-administered indicates has completed for the year, N indicates needs to be completed by 07/01/2021): No - Provider Visit for DM (1st)  No - A1c (1st)     Ongoing Program Requirements (Y indicates has completed for the year, N indicates needs to be completed by 12/31/2021): No - Provider Visit for DM (2nd)  Yes - ACC/diabetes educator visit (if A1c over 8%)- Not a concern, but is aware of the requirement  No - A1c (2nd)  No - Lipid panel  No - Urine microalbumin  Yes - Pneumococcal vaccination: Up-to-date, not needed again until age 74  No - Influenza vaccination for Fall 2021  Yes - Medication adherence over 70%- [facility], patient is adherent to medications  Yes - On statin or contraindication(s) Atorvastatin 80mg- Patient is no longer seeing his pcp who prescribed atorvastatin and has not been able to get a refill from his new pcp since he has not had an appointment. I will pend a refill for new pcp since he is out and has an appointment set up in April. Yes - On ACEi/ARB or contraindication(s) Lisinopril 20mg     Current medications eligible for copay waiver, up to $600, through [employer]:  - Metformin XR, Victoza, Lantus, Lisinopril, Atorvastatin, Aspirin (If prescription), Nifedipine (New for 2021)  - Prodigy and generic pen needles and syringes     Diabetes Care:   - Glycemic Goal: <7.0%. Is not at blood glucose goal but made significant improvements in 2020. Type 2 DM under improving control as evidenced by A1c improving from 10.1% to 7.7% over the course of a year. - Current symptoms/problems include none  - Home blood sugar records:  fasting range: 120-160, patient tests 2 time(s) per day. Patient reports that he sees higher readings in the morning (150-160), and lower readings in the evening (110-130). Of note, he takes his Lantus in the morning because he was experiencing early morning lows and at risk for hypoglycemic unawareness. - Any episodes of hypoglycemia? Not since changing Lantus to AM  - Eye exam current (within one year): yes- Appt is next week  - Foot exam current (within one year): yes- Had appt last week  - Reduce Pill [place]: n/a  - Appropriateness of Insulin Therapy: Appropriate. Patient is able to avoid hypoglycemic episodes with AM dosing  - Therapy Optimization: Could adjust Lantus. Patient states he has all readings in a book that he takes to the endocrinologist and said he will take with him to upcoming appt. - Daily aspirin? Yes  - Adherent to ACEi/ARB and/or statin: Yes.  - Medication compliance: compliant all of the time    Other Considerations:  - Blood Pressure Goal: BP less than 140/90 mmHg due to history of DM: Is at blood pressure goal.   - Lipids: Patient is prescribed high-intensity statin therapy. Needs refill- see above    PLAN:  - Consideration(s) for provider:   · Pending prescription for statin. - DM program gaps identified:   · Initial requirements: Provider Visit for DM (1st) and A1c (1st)   · Ongoing requirements: Provider visit for DM (2nd), A1c (2nd), Lipid panel, Urine microalbumin and Influenza vaccination for [id number]   - Education to patient: Discussed general issues about diabetes pathophysiology and management. , Discussed foot care, Reminded to get yearly retinal exam and Overview of Be Well With Diabetes program   - Follow up: PCP for identified gaps or as scheduled below and Endocrinologist for identified gaps or as scheduled below  - Upcoming appointments:   Future Appointments   Date Time Provider Miguel A Mclaughlin   3/31/2021 12:00 PM SCHEDULE, S CLINICAL PHARMACY S Clin Rx None   4/7/2021 11:00 AM NATIVIDAD Milton - CNP HEALTHY WT [facility]   4/12/2021  4:40 PM Sourav Ghosh MD [facility] ENDO [facility]   4/22/2021  2:00 PM MD KRISTY Trejo [facility]       FIORDALIZA Blandon, PharmD, [address] Clinical Pharmacy  Direct: [phone]  Department, toll free: [phone], option [id number] [facility] [address]: Yes  Total # of Interventions Recommended: 2  - Refills Provided #: 1  - Updated Order #: 1 Updated Order Reason(s):  Other  Recommended intervention potential cost savings: 1  Total Interventions Accepted: 2  Time Spent (min): 30

## 2021-03-31 NOTE — PATIENT INSTRUCTIONS
the lining of the stomach. Carbonated beverages release gas and can expand the stomach.  Continue to keep temptation from your kitchen- Keep your pantry and kitchen cabinets cleaned out of those dangerous foods that might tempt you after surgery (chips, cookies, candy, etc.).  Continue to increase your exercise program- Increase your daily physical activity. Aim for 5-6 days per week for 30 minutes. Walking is an easy way to get started with exercising. You want exercise to be a regular part of your life after surgery.  Make sure you have a good support system- There will be many changes and adjustments to make after surgery. It is important to have a supportive friend, family member or co-worker, etc. with whom you can talk. Continue to attend [facility]) Weight Management support groups as they can be helpful in maintaining behaviors. In addition, it is the responsibility of the patient to schedule and follow up on labs and tests completed after surgery. Results will be reviewed at each visit. We recommend you have the following labs completed by your primary care doctor each year after bariatric surgery: Complete Blood Count (CBC), Complete Metabolic Panel (CMP), Iron Studies, Ha1c, Lipid Panel, TSH (Thyroid), Vitamin A, Vitamin B1, Vitamin B12 & Folate and Vitamin D. Patient received dietary handouts and education.     Goals:   - Continue plan

## 2021-04-01 RX ORDER — ATORVASTATIN CALCIUM 80 MG/1
TABLET, FILM COATED ORAL
Qty: 90 TABLET | Refills: 0 | Status: SHIPPED | OUTPATIENT
Start: 2021-04-01 | End: 2021-04-12 | Stop reason: SDUPTHER

## 2021-04-06 DIAGNOSIS — E66.9 OBESITY (BMI 30.0-34.9): ICD-10-CM

## 2021-04-06 DIAGNOSIS — K76.0 FATTY LIVER: ICD-10-CM

## 2021-04-06 DIAGNOSIS — Z98.84 S/P LAPAROSCOPIC SLEEVE GASTRECTOMY: ICD-10-CM

## 2021-04-06 DIAGNOSIS — E11.59 TYPE 2 DIABETES MELLITUS WITH VASCULAR DISEASE (HCC): ICD-10-CM

## 2021-04-06 DIAGNOSIS — E11.3299 TYPE 2 DIABETES MELLITUS WITH BACKGROUND RETINOPATHY (HCC): ICD-10-CM

## 2021-04-06 DIAGNOSIS — E66.9 DIABETES MELLITUS TYPE 2 IN OBESE (HCC): ICD-10-CM

## 2021-04-06 DIAGNOSIS — I10 ESSENTIAL HYPERTENSION: ICD-10-CM

## 2021-04-06 DIAGNOSIS — E78.5 HYPERLIPIDEMIA, UNSPECIFIED HYPERLIPIDEMIA TYPE: ICD-10-CM

## 2021-04-06 DIAGNOSIS — E11.69 DIABETES MELLITUS TYPE 2 IN OBESE (HCC): ICD-10-CM

## 2021-04-06 LAB
A/G RATIO: 1.7 (ref 1.1–2.2)
ALBUMIN SERPL-MCNC: 4.4 G/DL (ref 3.4–5)
ALP BLD-CCNC: 81 U/L (ref 40–129)
ALT SERPL-CCNC: 14 U/L (ref 10–40)
ANION GAP SERPL CALCULATED.3IONS-SCNC: 11 MMOL/L (ref 3–16)
ANISOCYTOSIS: ABNORMAL
AST SERPL-CCNC: 18 U/L (ref 15–37)
BASOPHILS ABSOLUTE: 0 K/UL (ref 0–0.2)
BASOPHILS RELATIVE PERCENT: 0 %
BILIRUB SERPL-MCNC: 0.6 MG/DL (ref 0–1)
BUN BLDV-MCNC: 19 MG/DL (ref 7–20)
CALCIUM SERPL-MCNC: 9.5 MG/DL (ref 8.3–10.6)
CHLORIDE BLD-SCNC: 100 MMOL/L (ref 99–110)
CHOLESTEROL, TOTAL: 162 MG/DL (ref 0–199)
CO2: 25 MMOL/L (ref 21–32)
CREAT SERPL-MCNC: 1.3 MG/DL (ref 0.9–1.3)
EOSINOPHILS ABSOLUTE: 0.1 K/UL (ref 0–0.6)
EOSINOPHILS RELATIVE PERCENT: 1 %
FOLATE: 18.54 NG/ML (ref 4.78–24.2)
GFR AFRICAN AMERICAN: >60
GFR NON-AFRICAN AMERICAN: 58
GLOBULIN: 2.6 G/DL
GLUCOSE BLD-MCNC: 84 MG/DL (ref 70–99)
HCT VFR BLD CALC: 40.2 % (ref 40.5–52.5)
HDLC SERPL-MCNC: 48 MG/DL (ref 40–60)
HEMOGLOBIN: 13.2 G/DL (ref 13.5–17.5)
IRON SATURATION: 37 % (ref 20–50)
IRON: 99 UG/DL (ref 59–158)
LDL CHOLESTEROL CALCULATED: 103 MG/DL
LYMPHOCYTES ABSOLUTE: 4 K/UL (ref 1–5.1)
LYMPHOCYTES RELATIVE PERCENT: 74 %
MCH RBC QN AUTO: 27.1 PG (ref 26–34)
MCHC RBC AUTO-ENTMCNC: 32.8 G/DL (ref 31–36)
MCV RBC AUTO: 82.8 FL (ref 80–100)
MONOCYTES ABSOLUTE: 0.3 K/UL (ref 0–1.3)
MONOCYTES RELATIVE PERCENT: 6 %
NEUTROPHILS ABSOLUTE: 1 K/UL (ref 1.7–7.7)
NEUTROPHILS RELATIVE PERCENT: 19 %
PDW BLD-RTO: 15.5 % (ref 12.4–15.4)
PLATELET # BLD: 178 K/UL (ref 135–450)
PMV BLD AUTO: 9.8 FL (ref 5–10.5)
POTASSIUM SERPL-SCNC: 3.9 MMOL/L (ref 3.5–5.1)
RBC # BLD: 4.86 M/UL (ref 4.2–5.9)
SLIDE REVIEW: ABNORMAL
SODIUM BLD-SCNC: 136 MMOL/L (ref 136–145)
TOTAL IRON BINDING CAPACITY: 271 UG/DL (ref 260–445)
TOTAL PROTEIN: 7 G/DL (ref 6.4–8.2)
TRIGL SERPL-MCNC: 57 MG/DL (ref 0–150)
TSH REFLEX: 1.76 UIU/ML (ref 0.27–4.2)
VITAMIN B-12: 684 PG/ML (ref 211–911)
VITAMIN D 25-HYDROXY: 39.3 NG/ML
VLDLC SERPL CALC-MCNC: 11 MG/DL
WBC # BLD: 5.4 K/UL (ref 4–11)

## 2021-04-07 ENCOUNTER — TELEMEDICINE (OUTPATIENT)
Dept: BARIATRICS/WEIGHT MGMT | Age: 56
End: 2021-04-07
Payer: COMMERCIAL

## 2021-04-07 VITALS — WEIGHT: 231 LBS | BODY MASS INDEX: 30.62 KG/M2 | HEIGHT: 73 IN

## 2021-04-07 DIAGNOSIS — E78.2 MIXED HYPERLIPIDEMIA: ICD-10-CM

## 2021-04-07 DIAGNOSIS — K76.0 FATTY LIVER: ICD-10-CM

## 2021-04-07 DIAGNOSIS — E66.9 DIABETES MELLITUS TYPE 2 IN OBESE (HCC): ICD-10-CM

## 2021-04-07 DIAGNOSIS — K21.9 CHRONIC GERD: ICD-10-CM

## 2021-04-07 DIAGNOSIS — E66.9 OBESITY (BMI 30.0-34.9): ICD-10-CM

## 2021-04-07 DIAGNOSIS — I10 ESSENTIAL HYPERTENSION: Primary | ICD-10-CM

## 2021-04-07 DIAGNOSIS — Z98.84 S/P LAPAROSCOPIC SLEEVE GASTRECTOMY: ICD-10-CM

## 2021-04-07 DIAGNOSIS — G47.33 OBSTRUCTIVE SLEEP APNEA: ICD-10-CM

## 2021-04-07 DIAGNOSIS — E11.69 DIABETES MELLITUS TYPE 2 IN OBESE (HCC): ICD-10-CM

## 2021-04-07 PROBLEM — D64.9 ANEMIA: Status: ACTIVE | Noted: 2020-06-26

## 2021-04-07 PROBLEM — N25.0 RENAL OSTEODYSTROPHY: Status: ACTIVE | Noted: 2020-06-26

## 2021-04-07 LAB
ESTIMATED AVERAGE GLUCOSE: 188.6 MG/DL
HBA1C MFR BLD: 8.2 %

## 2021-04-07 PROCEDURE — 99214 OFFICE O/P EST MOD 30 MIN: CPT | Performed by: NURSE PRACTITIONER

## 2021-04-07 NOTE — PROGRESS NOTES
Dietary Assessment Note  Vitals:   Vitals:    04/07/21 1055   Weight: 231 lb (104.8 kg)   Height: 6' 1\" (1.854 m)   Patient lost 2 lbs over past ~3 months, per pt report. Total Weight Loss: 46.3 lbs    Labs reviewed: Reviewed labs 4/6/21    Protein intake: 60-80 grams/day - uses protein shake daily w/ [employer]life milk    Fluid intake: 48-64 oz/day    Multivitamin/mineral intake: yes - 3 OTC MVI    Calcium intake: states it is in his MVI    Other: none    Exercise: yes - going to the gym 3-4x/wk & walking 45-60 min. Nutrition Assessment: 1 year 6 month post-op visit. Pt is eating 3 meals & 2 snacks per day (includes protein shake). Pt focuses on protein fish & chicken. Includes vegetables w/ lunch & dinner. Amount able to eat per sitting: ~1 cup volume    Following 30/30/30 rule: yes    Food Intolerances/issues: bread, peppers, vegetables w/ skin     Client Concerns: none    Goals:   - Continue plan    Plan: f/u as directed    Due to the COVID-19 restrictions on close contact interactions the patient's visit was conducted via telephone in zachary of a face to face visit. The patient is here through telemedicine for their 1 year 6 month post-op visit.     [place]

## 2021-04-09 LAB
ALPHA-TOCOPHEROL: 10 MG/L (ref 5.5–18)
GAMMA-TOCOPHEROL: 1 MG/L (ref 0–6)
RETINYL PALMITATE: 0.02 MG/L (ref 0–0.1)
VITAMIN A LEVEL: 0.66 MG/L (ref 0.3–1.2)
VITAMIN A, INTERP: NORMAL
VITAMIN B1 WHOLE BLOOD: 174 NMOL/L (ref 70–180)

## 2021-04-12 ENCOUNTER — VIRTUAL VISIT (OUTPATIENT)
Dept: ENDOCRINOLOGY | Age: 56
End: 2021-04-12
Payer: COMMERCIAL

## 2021-04-12 DIAGNOSIS — E78.5 DYSLIPIDEMIA ASSOCIATED WITH TYPE 2 DIABETES MELLITUS (HCC): ICD-10-CM

## 2021-04-12 DIAGNOSIS — E11.59 TYPE 2 DIABETES MELLITUS WITH VASCULAR DISEASE (HCC): ICD-10-CM

## 2021-04-12 DIAGNOSIS — E11.3299 TYPE 2 DIABETES MELLITUS WITH BACKGROUND RETINOPATHY (HCC): ICD-10-CM

## 2021-04-12 DIAGNOSIS — E11.69 DYSLIPIDEMIA ASSOCIATED WITH TYPE 2 DIABETES MELLITUS (HCC): ICD-10-CM

## 2021-04-12 PROCEDURE — 99214 OFFICE O/P EST MOD 30 MIN: CPT | Performed by: INTERNAL MEDICINE

## 2021-04-12 PROCEDURE — 3052F HG A1C>EQUAL 8.0%<EQUAL 9.0%: CPT | Performed by: INTERNAL MEDICINE

## 2021-04-12 RX ORDER — ATORVASTATIN CALCIUM 80 MG/1
TABLET, FILM COATED ORAL
Qty: 90 TABLET | Refills: 3 | Status: SHIPPED | OUTPATIENT
Start: 2021-04-12 | End: 2022-04-14

## 2021-04-12 NOTE — PROGRESS NOTES
BS Readings:    4/12  AM-101    4/11  AM- 76    4/10  AM-82    4/9  AM-111    4/8  AM-145    4/7  AM-152    4/6  AM-177    4/5  AM-160    4/4  AM-122

## 2021-04-12 NOTE — PROGRESS NOTES
Patient ID:   Barbara Murillo is a 50 y.o. male    Chief Complaint:   Barbara Murillo presents for an evaluation of Type 2 Diabetes Mellitus , Hyperlipidemia and hypertension. Pursuant to the emergency declaration under the [facility] [place] [address] [facility] authority and the [employer] Act this visit was conducted after obtaining verbal consent, with the use of Wallit interactive audio and video telecommunications system that permits real time communication between the patient and provider to substitute for an in-person clinic visit to reduce the patient's risk of exposure to COVID-19 and provide necessary medical care. Because this was a Telehealth visit, evaluation of the following organ systems was limited: Vitals, Constitutional, EENT, Resp, CV, GI, , MS, Neuro, Skin. Heme. Lymph, Imm. Barbara Murillo was at home. Provider was at [facility] office. No one else was involved. The patient has also been advised to contact this office for worsening conditions or problems, and seek emergency medical treatment and/or call 911 if deemed necessary. Subjective:   Type 2 Diabetes Mellitus diagnosed in 1998  On insulin since 2016     Previously followed by  endocrine and physician moved out. Records reviewed from care everywhere   Pioglitazone 30mg daily - stopped in Feb 2018   Raphael Sanjuana stopped in summer 2018 for worsening CKD     S/p gastric sleeve in Sep 2019. Metformin 1000mg bid was stopped   Off Lantus since Sep 2019      Metformin ER 500mg, two tabs twice a day. Until a week ago he was taking metformin 500mg bid. He was trying to wean himself off. Victoza 1.8mg daily in am   Lantus 24 units in am      BS Readings:     4/12  AM-101     4/11  AM- 76     4/10  AM-82     4/9  AM-111     4/8  AM-145     4/7  AM-152     4/6  AM-177     4/5  AM-160     4/4  AM-122    Checks blood sugars 2 times per day. Reportedly as above.  AM in am are good, higher in the evening. AM:   Lunch:   Supper:   HS:     Hypoglycemias: Morning hypoglycemias have resolved since moving lantus in am. No episodes now. Awareness present in 70's    Meals: Three, dinner is big. No snacks, diet soda once a day   Exercise: work out at [employer], 45-60 minutes on treadmill. Denies chest pain, exertional dyspnea. CVA in Aug 2016. No residual deficits. Denies smoking. Currently on ASA 81 mg daily     The following portions of the patient's history were reviewed and updated as appropriate:       Family History   Problem Relation Age of Onset    Heart Attack Mother     Diabetes Mother     High Blood Pressure Mother     Colon Cancer Sister          Social History     Socioeconomic History    Marital status:      Spouse name: Not on file    Number of children: Not on file    Years of education: Not on file    Highest education level: Not on file   Occupational History    Not on file   Social Needs    Financial resource strain: Not on file    Food insecurity     Worry: Not on file     Inability: Not on file    Transportation needs     Medical: Not on file     Non-medical: Not on file   Tobacco Use    Smoking status: Never Smoker    Smokeless tobacco: Never Used   Substance and Sexual Activity    Alcohol use:  Yes     Alcohol/week: 1.0 standard drinks     Types: 1 Cans of beer per week     Comment: socially     Drug use: No    Sexual activity: Yes     Partners: Female   Lifestyle    Physical activity     Days per week: Not on file     Minutes per session: Not on file    Stress: Not on file   Relationships    Social connections     Talks on phone: Not on file     Gets together: Not on file     Attends Hinduism service: Not on file     Active member of club or organization: Not on file     Attends meetings of clubs or organizations: Not on file     Relationship status: Not on file    Intimate partner violence     Fear of current or ex partner: Not on file     Emotionally abused: Not on file     Physically abused: Not on file     Forced sexual activity: Not on file   Other Topics Concern    Not on file   Social History Narrative    Not on file       Past Medical History:   Diagnosis Date    Diabetes mellitus ([address]     GERD (gastroesophageal reflux disease)     Hypertension     Sleep apnea     cpap       Past Surgical History:   Procedure Laterality Date    CYSTOSCOPY  9/25/2019    FLEXIBLE CYSTOSCOPY, DILATION, POTTS CATHETER INSERTION performed by Maddie Mendoza MD at [address]      right knee     SLEEVE GASTRECTOMY N/A 9/25/2019    LAPAROSCOPIC SLEEVE GASTRECTOMY-ETHICON performed by Tremayne Garcia MD at [facility] 4/12/2019    EGD BIOPSY performed by Tremayne Garcia MD at [facility]   Allergen Reactions    Hctz [Hydrochlorothiazide]      cramps    Penicillins Hives         Current Outpatient Medications:     atorvastatin (LIPITOR) 80 MG tablet, TAKE 1 TABLET BY MOUTH ONE TIME A DAY, Disp: 90 tablet, Rfl: 3    metFORMIN (GLUCOPHAGE-XR) 500 MG extended release tablet, TAKE TWO TABLETS BY MOUTH TWICE A DAY WITH MEALS, Disp: 360 tablet, Rfl: 5    VICTOZA 18 MG/3ML SOPN SC injection, DIAL AND INJECT 1.8MG UNDER THE SKIN DAILY, Disp: 3 pen, Rfl: 3    omeprazole (PRILOSEC) 20 MG delayed release capsule, TAKE 1 CAPSULE BY MOUTH ONE TIME A DAY, Disp: 30 capsule, Rfl: 3    LANTUS SOLOSTAR 100 UNIT/ML injection pen, Inject 24 Units into the skin every morning, Disp: 5 pen, Rfl: 2    blood glucose test strips (PRODIGY NO CODING BLOOD GLUC) strip, 2 each by In Vitro route daily As needed. , Disp: 200 each, Rfl: 3    Lancet Devices (PRODIGY LANCING DEVICE) MISC, Use daily to manage blood sugar, Disp: 1 each, Rfl: 1    lisinopril (PRINIVIL;ZESTRIL) 20 MG tablet, Take 1 tablet by mouth daily, Disp: 90 tablet, Rfl: 1    Blood Glucose Monitoring Suppl (PRODIGY Patient is alert and oriented to person, place, and time. Psychiatric: Patient has a normal mood and affect.  Patient behavior is normal.     Lab Review:    Orders Only on 04/06/2021   Component Date Value Ref Range Status    Hemoglobin A1C 04/06/2021 8.2  See comment % Final    eAG 04/06/2021 188.6  mg/dL Final    WBC 04/06/2021 5.4  4.0 - 11.0 K/uL Final    RBC 04/06/2021 4.86  4.20 - 5.90 M/uL Final    Hemoglobin 04/06/2021 13.2* 13.5 - 17.5 g/dL Final    Hematocrit 04/06/2021 40.2* 40.5 - 52.5 % Final    MCV 04/06/2021 82.8  80.0 - 100.0 fL Final    MCH 04/06/2021 27.1  26.0 - 34.0 pg Final    MCHC 04/06/2021 32.8  31.0 - 36.0 g/dL Final    RDW 04/06/2021 15.5* 12.4 - 15.4 % Final    Platelets 39/16/2562 178  135 - 450 K/uL Final    MPV 04/06/2021 9.8  5.0 - 10.5 fL Final    SLIDE REVIEW 04/06/2021 see below   Final    Neutrophils % 04/06/2021 19.0  % Final    Lymphocytes % 04/06/2021 74.0  % Final    Monocytes % 04/06/2021 6.0  % Final    Eosinophils % 04/06/2021 1.0  % Final    Basophils % 04/06/2021 0.0  % Final    Neutrophils Absolute 04/06/2021 1.0* 1.7 - 7.7 K/uL Final    Lymphocytes Absolute 04/06/2021 4.0  1.0 - 5.1 K/uL Final    Monocytes Absolute 04/06/2021 0.3  0.0 - 1.3 K/uL Final    Eosinophils Absolute 04/06/2021 0.1  0.0 - 0.6 K/uL Final    Basophils Absolute 04/06/2021 0.0  0.0 - 0.2 K/uL Final    Anisocytosis 04/06/2021 Occasional*  Final    Sodium 04/06/2021 136  136 - 145 mmol/L Final    Potassium 04/06/2021 3.9  3.5 - 5.1 mmol/L Final    Chloride 04/06/2021 100  99 - 110 mmol/L Final    CO2 04/06/2021 25  21 - 32 mmol/L Final    Anion Gap 04/06/2021 11  3 - 16 Final    Glucose 04/06/2021 84  70 - 99 mg/dL Final    BUN 04/06/2021 19  7 - 20 mg/dL Final    CREATININE 04/06/2021 1.3  0.9 - 1.3 mg/dL Final    GFR Non- 04/06/2021 58* >60 Final    GFR  04/06/2021 >60  >60 Final    Calcium 04/06/2021 9.5  8.3 - 10.6 mg/dL Final  Total Protein 04/06/2021 7.0  6.4 - 8.2 g/dL Final    Albumin 04/06/2021 4.4  3.4 - 5.0 g/dL Final    Albumin/Globulin Ratio 04/06/2021 1.7  1.1 - 2.2 Final    Total Bilirubin 04/06/2021 0.6  0.0 - 1.0 mg/dL Final    Alkaline Phosphatase 04/06/2021 81  40 - 129 U/L Final    ALT 04/06/2021 14  10 - 40 U/L Final    AST 04/06/2021 18  15 - 37 U/L Final    Globulin 04/06/2021 2.6  g/dL Final    Iron 04/06/2021 99  59 - 158 ug/dL Final    TIBC 04/06/2021 271  260 - 445 ug/dL Final    Iron Saturation 04/06/2021 37  20 - 50 % Final    Cholesterol, Total 04/06/2021 162  0 - 199 mg/dL Final    Triglycerides 04/06/2021 57  0 - 150 mg/dL Final    HDL 04/06/2021 48  40 - 60 mg/dL Final    LDL Calculated 04/06/2021 103* <100 mg/dL Final    VLDL Cholesterol Calculated 04/06/2021 11  Not Established mg/dL Final    TSH 04/06/2021 1.76  0.27 - 4.20 uIU/mL Final    Vitamin A 04/06/2021 0.66  0.30 - 1.20 mg/L Final    Vitamin A, Interp 04/06/2021 Normal   Final    RETINYL PALMITATE 04/06/2021 0.02  0.00 - 0.10 mg/L Final    Vitamin B1,Whole Blood 04/06/2021 174  70 - 180 nmol/L Final    Vitamin B-12 04/06/2021 684  211 - 911 pg/mL Final    Folate 04/06/2021 18.54  4.78 - 24.20 ng/mL Final    Vit D, 25-Hydroxy 04/06/2021 39.3  >=30 ng/mL Final    Alpha-Tocopherol 04/06/2021 10.0  5.5 - 18.0 mg/L Final    Gamma-Tocopherol 04/06/2021 1.0  0.0 - 6.0 mg/L Final   Orders Only on 12/28/2020   Component Date Value Ref Range Status    Hemoglobin A1C 12/28/2020 7.7  See comment % Final    eAG 12/28/2020 174.3  mg/dL Final    Cholesterol, Fasting 12/28/2020 93  0 - 199 mg/dL Final    Triglyceride, Fasting 12/28/2020 61  0 - 150 mg/dL Final    HDL 12/28/2020 44  40 - 60 mg/dL Final    LDL Calculated 12/28/2020 37  <100 mg/dL Final    VLDL Cholesterol Calculated 12/28/2020 12  Not Established mg/dL Final    Sodium 12/28/2020 143  136 - 145 mmol/L Final    Potassium 12/28/2020 4.4  3.5 - 5.1 mmol/L Final    Chloride 12/28/2020 107  99 - 110 mmol/L Final    CO2 12/28/2020 22  21 - 32 mmol/L Final    Anion Gap 12/28/2020 14  3 - 16 Final    Glucose 12/28/2020 82  70 - 99 mg/dL Final    BUN 12/28/2020 22* 7 - 20 mg/dL Final    CREATININE 12/28/2020 1.2  0.9 - 1.3 mg/dL Final    GFR Non- 12/28/2020 >60  >60 Final    GFR  12/28/2020 >60  >60 Final    Calcium 12/28/2020 9.5  8.3 - 10.6 mg/dL Final    Total Protein 12/28/2020 6.4  6.4 - 8.2 g/dL Final    Albumin 12/28/2020 4.1  3.4 - 5.0 g/dL Final    Albumin/Globulin Ratio 12/28/2020 1.8  1.1 - 2.2 Final    Total Bilirubin 12/28/2020 0.7  0.0 - 1.0 mg/dL Final    Alkaline Phosphatase 12/28/2020 86  40 - 129 U/L Final    ALT 12/28/2020 16  10 - 40 U/L Final    AST 12/28/2020 17  15 - 37 U/L Final    Globulin 12/28/2020 2.3  g/dL Final    Microalbumin, Random Urine 12/28/2020 <1.20  <2.0 mg/dL Final    Creatinine, Ur 12/28/2020 122.3  39.0 - 259.0 mg/dL Final    Microalbumin Creatinine Ratio 12/28/2020 see below  0.0 - 30.0 mg/g Final    Color, UA 12/28/2020 Yellow  Straw/Yellow Final    Clarity, UA 12/28/2020 Clear  Clear Final    Glucose, Ur 12/28/2020 Negative  Negative mg/dL Final    Bilirubin Urine 12/28/2020 Negative  Negative Final    Ketones, Urine 12/28/2020 Negative  Negative mg/dL Final    Specific [place], UA 12/28/2020 1.025  1.005 - 1.030 Final    Blood, Urine 12/28/2020 Negative  Negative Final    pH, UA 12/28/2020 5.5  5.0 - 8.0 Final    Protein, UA 12/28/2020 Negative  Negative mg/dL Final    Urobilinogen, Urine 12/28/2020 0.2  <2.0 E.U./dL Final    Nitrite, Urine 12/28/2020 Negative  Negative Final    Leukocyte Esterase, Urine 12/28/2020 TRACE* Negative Final    Microscopic Examination 12/28/2020 YES   Final    Urine Type 12/28/2020 Voided   Final    Urine Reflex to Culture 12/28/2020 Yes   Final    Mucus, UA 12/28/2020 Rare* None Seen /LPF Final    RBC, UA 12/28/2020 0-2  0 - 4 /HPF Final  Bacteria, UA 12/28/2020 2+* None Seen /HPF Final    Urinalysis Comments 12/28/2020 see below   Final    Hyaline Casts, UA 12/28/2020 1  0 - 8 /LPF Final    WBC, UA 12/28/2020 12* 0 - 5 /HPF Final    Epithelial Cells, UA 12/28/2020 0  0 - 5 /HPF Final    Organism 12/28/2020 Gram negative veronica*  Final    Urine Culture, Routine 12/28/2020    Final                    Value:<10,000 CFU/ml  No further workup     Orders Only on 07/17/2020   Component Date Value Ref Range Status    Color, UA 07/17/2020 YELLOW  Straw/Yellow Final    Clarity, UA 07/17/2020 Clear  Clear Final    Glucose, Ur 07/17/2020 Negative  Negative mg/dL Final    Bilirubin Urine 07/17/2020 Negative  Negative Final    Ketones, Urine 07/17/2020 Negative  Negative mg/dL Final    Specific Gravity, UA 07/17/2020 1.020  1.005 - 1.030 Final    Blood, Urine 07/17/2020 Negative  Negative Final    pH, UA 07/17/2020 5.5  5.0 - 8.0 Final    Protein, UA 07/17/2020 Negative  Negative mg/dL Final    Urobilinogen, Urine 07/17/2020 0.2  <2.0 E.U./dL Final    Nitrite, Urine 07/17/2020 Negative  Negative Final    Leukocyte Esterase, Urine 07/17/2020 SMALL* Negative Final    Microscopic Examination 07/17/2020 YES   Final    Urine Type 07/17/2020 Cleancatch   Final    Urine Reflex to Culture 07/17/2020 Not Indicated   Final    Microalbumin, Random Urine 07/17/2020 <1.20  <2.0 mg/dL Final    Creatinine, Ur 07/17/2020 159.0  39.0 - 259.0 mg/dL Final    Microalbumin Creatinine Ratio 07/17/2020 see below  0.0 - 30.0 mg/g Final    Sodium 07/17/2020 142  136 - 145 mmol/L Final    Potassium 07/17/2020 4.0  3.5 - 5.1 mmol/L Final    Chloride 07/17/2020 107  99 - 110 mmol/L Final    CO2 07/17/2020 21  21 - 32 mmol/L Final    Anion Gap 07/17/2020 14  3 - 16 Final    Glucose 07/17/2020 67* 70 - 99 mg/dL Final    BUN 07/17/2020 32* 7 - 20 mg/dL Final    CREATININE 07/17/2020 1.6* 0.9 - 1.3 mg/dL Final    GFR Non- 07/17/2020 45* >60 Final    GFR  07/17/2020 55* >60 Final    Calcium 07/17/2020 9.6  8.3 - 10.6 mg/dL Final    Total Protein 07/17/2020 6.9  6.4 - 8.2 g/dL Final    Albumin 07/17/2020 4.3  3.4 - 5.0 g/dL Final    Albumin/Globulin Ratio 07/17/2020 1.7  1.1 - 2.2 Final    Total Bilirubin 07/17/2020 0.4  0.0 - 1.0 mg/dL Final    Alkaline Phosphatase 07/17/2020 80  40 - 129 U/L Final    ALT 07/17/2020 20  10 - 40 U/L Final    AST 07/17/2020 19  15 - 37 U/L Final    Globulin 07/17/2020 2.6  g/dL Final    Cholesterol, Fasting 07/17/2020 75  0 - 199 mg/dL Final    Triglyceride, Fasting 07/17/2020 57  0 - 150 mg/dL Final    HDL 07/17/2020 27* 40 - 60 mg/dL Final    LDL Calculated 07/17/2020 37  <100 mg/dL Final    VLDL Cholesterol Calculated 07/17/2020 11  Not Established mg/dL Final    Hemoglobin A1C 07/17/2020 7.4  See comment % Final    eAG 07/17/2020 165.7  mg/dL Final    RBC, UA 07/17/2020 0-2  0 - 4 /HPF Final    Hyaline Casts, UA 07/17/2020 1  0 - 8 /LPF Final    WBC, UA 07/17/2020 1  0 - 5 /HPF Final    Epithelial Cells, UA 07/17/2020 0  0 - 5 /HPF Final           Assessment and Plan     Linn was seen today for diabetes.     Diagnoses and all orders for this visit:    Type 2 diabetes mellitus with vascular disease ([facility] [address]  -     atorvastatin (LIPITOR) 80 MG tablet; TAKE 1 TABLET BY MOUTH ONE TIME A DAY  -     Hemoglobin A1C; Future    Uncontrolled type 2 diabetes mellitus with diabetic nephropathy, with long-term current use of insulin (HCC)  -     atorvastatin (LIPITOR) 80 MG tablet; TAKE 1 TABLET BY MOUTH ONE TIME A DAY  -     Hemoglobin A1C; Future    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 (HCC)  -     atorvastatin (LIPITOR) 80 MG tablet; TAKE 1 TABLET BY MOUTH ONE TIME A DAY  -     Hemoglobin A1C; Future    Dyslipidemia associated with type 2 diabetes mellitus (HCC)  -     atorvastatin (LIPITOR) 80 MG tablet; TAKE 1 TABLET BY MOUTH ONE TIME A DAY  -     Lipid Panel; maintenance plan and follow-up were discussed and understood by the patient. We reviewed the importance of medication compliance and regular follow-up. Aggressive lifestyle modification was encouraged. Exercise Counselling: This patient is a candidate for regular physical exercise. Instructions to perform the following types of exercise:  Swimming or water aerobic exercise  Brisk walking  Playing tennis  Stationary bicycle or elliptical indoor  Low impact aerobic exercise    Instructions given to exercise for the following duration:  30 minutes a day for five-seven days per week.     Following instructions for being active throughout the day in addition to formal exercise:  Walk instead of drive whenever possible  Take the stairs instead of the elevator  Work in the garden  Park to the far end of the parking lot to add more walking steps to destination      Electronically signed by Archie Sapp MD on 4/12/2021 at 4:47 PM

## 2021-04-15 NOTE — TELEPHONE ENCOUNTER
Statin order sent in. Pt saw endo in April and a1c up. will  continue to follow as necessary.     Nora Spain, PharmD, [address]  Direct: [phone]  Department, toll free: [phone], option 7

## 2021-04-21 ENCOUNTER — IMMUNIZATION (OUTPATIENT)
Dept: PRIMARY CARE CLINIC | Age: 56
End: 2021-04-21
Payer: COMMERCIAL

## 2021-04-21 PROCEDURE — 0011A COVID-19, MODERNA VACCINE 100MCG/0.5ML DOSE: CPT | Performed by: FAMILY MEDICINE

## 2021-04-21 PROCEDURE — 91301 COVID-19, MODERNA VACCINE 100MCG/0.5ML DOSE: CPT | Performed by: FAMILY MEDICINE

## 2021-04-22 ENCOUNTER — OFFICE VISIT (OUTPATIENT)
Dept: INTERNAL MEDICINE CLINIC | Age: 56
End: 2021-04-22
Payer: COMMERCIAL

## 2021-04-22 VITALS
HEART RATE: 66 BPM | TEMPERATURE: 97.5 F | WEIGHT: 249 LBS | SYSTOLIC BLOOD PRESSURE: 138 MMHG | BODY MASS INDEX: 35.65 KG/M2 | OXYGEN SATURATION: 98 % | HEIGHT: 70 IN | DIASTOLIC BLOOD PRESSURE: 86 MMHG

## 2021-04-22 DIAGNOSIS — Z12.5 SCREENING FOR PROSTATE CANCER: ICD-10-CM

## 2021-04-22 DIAGNOSIS — E29.1 HYPOGONADISM IN MALE: Primary | ICD-10-CM

## 2021-04-22 DIAGNOSIS — Z12.11 COLON CANCER SCREENING: ICD-10-CM

## 2021-04-22 PROCEDURE — 99204 OFFICE O/P NEW MOD 45 MIN: CPT | Performed by: INTERNAL MEDICINE

## 2021-04-22 ASSESSMENT — PATIENT HEALTH QUESTIONNAIRE - PHQ9
2. FEELING DOWN, DEPRESSED OR HOPELESS: 0
SUM OF ALL RESPONSES TO PHQ9 QUESTIONS 1 & 2: 0

## 2021-04-22 NOTE — PATIENT INSTRUCTIONS
Dr. Blandon Hang  [address] [facility], [address]  Phone: [phone]    Dr. Benjamin Or  [facility], [facility]  [phone]

## 2021-04-22 NOTE — PROGRESS NOTES
Marisela Curling (:  1965) is a 50 y.o. male,Established patient, here for evaluation of the following chief complaint(s):  Establish Care (No complaints. Handicap placard renewal, bloodwork)      ASSESSMENT/PLAN:  1. Hypogonadism in male  -     Testosterone, free, total  2. Colon cancer screening  -     Cinthia Rdz MD, Gastroenterology, [facility]  3. Screening for prostate cancer  -     PSA, Prostatic Specific Antigen      Return in about 1 month (around 2021) for hypogonadism. SUBJECTIVE/OBJECTIVE:  HPI  Patient comes in for hypogonadism. He has difficulty with energy and ED. He notes that he has not achieved erections. His wife would like him to have his testerone checked. He has a history of cva, ckd and hypertension. He also has hypertension and type 2 diabetes. Review of Systems   Constitutional: Positive for fatigue. Negative for appetite change and chills. HENT: Negative for dental problem, drooling and facial swelling. Eyes: Negative for pain and redness. Respiratory: Negative for cough, choking and shortness of breath. Cardiovascular: Negative for leg swelling. Gastrointestinal: Negative for blood in stool. Genitourinary: Negative for discharge, hematuria, penile pain and penile swelling. Musculoskeletal: Negative for back pain and gait problem. Vitals:    21 1403   BP: 138/86   Site: Left Upper Arm   Position: Sitting   Cuff Size: Large Adult   Pulse: 66   Temp: 97.5 °F (36.4 °C)   TempSrc: Infrared   SpO2: 98%   Weight: 249 lb (112.9 kg)   Height: 5' 10\" (1.778 m)      Wt Readings from Last 3 Encounters:   21 249 lb (112.9 kg)   21 231 lb (104.8 kg)   10/06/20 240 lb (108.9 kg)     BP Readings from Last 3 Encounters:   21 138/86   10/06/20 (!) 144/86   20 118/72     Body mass index is 35.73 kg/m². Facility age limit for growth percentiles is 20 years.    Physical Exam    Hemoglobin A1C   Date Value Ref Range Status 04/06/2021 8.2 See comment % Final     Comment:     Comment:  Diagnosis of Diabetes: > or = 6.5%  Increased risk of diabetes (Prediabetes): 5.7-6.4%  Glycemic Control: Nonpregnant Adults: <7.0%                    Pregnant: <6.0%            Lab Results   Component Value Date    TRIG 57 04/06/2021    HDL 48 04/06/2021    LDLCALC 103 04/06/2021           An electronic signature was used to authenticate this note.     --Erick Franco MD

## 2021-04-22 NOTE — LETTER
[address] [facility]  [address] [facility] [id number]  Phone: [phone]  Fax: [phone]    Maxwell Estrada MD        April 22, 2021     Patient: Jakub Alarcon   YOB: 1965   Date of Visit: 4/22/2021       To Whom It May Concern: It is my medical opinion that Rufus Vo requires a disability parking placard for the following reasons:  He has limited walking ability due to a neurologic condition. Duration of need: 5 years    If you have any questions or concerns, please don't hesitate to call.     Sincerely,        Maxwell Estrada MD

## 2021-04-23 LAB — PROSTATE SPECIFIC ANTIGEN: 1.64 NG/ML (ref 0–4)

## 2021-04-24 ASSESSMENT — ENCOUNTER SYMPTOMS
FACIAL SWELLING: 0
CHOKING: 0
BACK PAIN: 0
COUGH: 0
SHORTNESS OF BREATH: 0
EYE PAIN: 0
BLOOD IN STOOL: 0
EYE REDNESS: 0

## 2021-04-27 LAB
SEX HORMONE BINDING GLOBULIN: 30 NMOL/L (ref 11–80)
TESTOSTERONE FREE-NONMALE: 85.7 PG/ML (ref 47–244)
TESTOSTERONE TOTAL: 399 NG/DL (ref 220–1000)

## 2021-05-19 ENCOUNTER — IMMUNIZATION (OUTPATIENT)
Dept: PRIMARY CARE CLINIC | Age: 56
End: 2021-05-19
Payer: COMMERCIAL

## 2021-05-19 PROCEDURE — 91301 COVID-19, MODERNA VACCINE 100MCG/0.5ML DOSE: CPT | Performed by: FAMILY MEDICINE

## 2021-05-19 PROCEDURE — 0012A COVID-19, MODERNA VACCINE 100MCG/0.5ML DOSE: CPT | Performed by: FAMILY MEDICINE

## 2021-05-21 ENCOUNTER — OFFICE VISIT (OUTPATIENT)
Dept: INTERNAL MEDICINE CLINIC | Age: 56
End: 2021-05-21
Payer: COMMERCIAL

## 2021-05-21 VITALS
OXYGEN SATURATION: 97 % | SYSTOLIC BLOOD PRESSURE: 132 MMHG | BODY MASS INDEX: 35.84 KG/M2 | TEMPERATURE: 97.7 F | WEIGHT: 249.8 LBS | HEART RATE: 79 BPM | DIASTOLIC BLOOD PRESSURE: 84 MMHG

## 2021-05-21 DIAGNOSIS — E11.69 DIABETES MELLITUS TYPE 2 IN OBESE (HCC): ICD-10-CM

## 2021-05-21 DIAGNOSIS — N52.1 ERECTILE DYSFUNCTION DUE TO DISEASES CLASSIFIED ELSEWHERE: Primary | ICD-10-CM

## 2021-05-21 DIAGNOSIS — E66.9 DIABETES MELLITUS TYPE 2 IN OBESE (HCC): ICD-10-CM

## 2021-05-21 PROCEDURE — 99214 OFFICE O/P EST MOD 30 MIN: CPT | Performed by: INTERNAL MEDICINE

## 2021-05-21 PROCEDURE — 3052F HG A1C>EQUAL 8.0%<EQUAL 9.0%: CPT | Performed by: INTERNAL MEDICINE

## 2021-05-21 RX ORDER — FUROSEMIDE 20 MG/1
20 TABLET ORAL DAILY PRN
COMMUNITY
Start: 2021-05-06 | End: 2022-09-29 | Stop reason: SDUPTHER

## 2021-05-21 RX ORDER — SEMAGLUTIDE 1.34 MG/ML
0.5 INJECTION, SOLUTION SUBCUTANEOUS WEEKLY
Qty: 2 PEN | Refills: 5 | Status: SHIPPED | OUTPATIENT
Start: 2021-05-21 | End: 2021-08-04

## 2021-05-21 RX ORDER — SILDENAFIL 100 MG/1
100 TABLET, FILM COATED ORAL DAILY PRN
Qty: 30 TABLET | Refills: 5 | Status: SHIPPED | OUTPATIENT
Start: 2021-05-21

## 2021-05-21 SDOH — ECONOMIC STABILITY: FOOD INSECURITY: WITHIN THE PAST 12 MONTHS, THE FOOD YOU BOUGHT JUST DIDN'T LAST AND YOU DIDN'T HAVE MONEY TO GET MORE.: NEVER TRUE

## 2021-05-21 SDOH — ECONOMIC STABILITY: FOOD INSECURITY: WITHIN THE PAST 12 MONTHS, YOU WORRIED THAT YOUR FOOD WOULD RUN OUT BEFORE YOU GOT MONEY TO BUY MORE.: NEVER TRUE

## 2021-05-21 NOTE — PROGRESS NOTES
Rosalind Yi (:  1965) is a 50 y.o. male,Established patient, here for evaluation of the following chief complaint(s):  Hypogonadism (F/U)         ASSESSMENT/PLAN:  1. Erectile dysfunction due to diseases classified elsewhere  -     sildenafil (VIAGRA) 100 MG tablet; Take 1 tablet by mouth daily as needed for Erectile Dysfunction, Disp-30 tablet, R-5Normal  2. Diabetes mellitus type 2 in obese (HCC)  -     Semaglutide,0.25 or 0.5MG/DOS, (OZEMPIC, 0.25 OR 0.5 MG/DOSE,) 2 MG/1.5ML SOPN; Inject 0.5 mg into the skin once a week Switching from Victoza 1.8 mg daily, Disp-2 pen, R-5Normal      No follow-ups on file. Subjective   SUBJECTIVE/OBJECTIVE:  HPI patient comes in for ED. He had some testosterone levels drawn that were normal.  He reports some difficulty with erections. He would like to try some viagra. He does have  Some fatigue but his blood sugars have been elevated. He would also like to switch to  Ozempic. Review of Systems   Constitutional: Negative for diaphoresis and fatigue. Genitourinary: Negative for penile swelling, scrotal swelling and testicular pain. ED   Psychiatric/Behavioral: Positive for decreased concentration. The patient is nervous/anxious. Objective    Vitals:    21 1539   BP: 132/84   Site: Left Upper Arm   Position: Sitting   Cuff Size: Large Adult   Pulse: 79   Temp: 97.7 °F (36.5 °C)   TempSrc: Infrared   SpO2: 97%   Weight: 249 lb 12.8 oz (113.3 kg)      Wt Readings from Last 3 Encounters:   21 249 lb 12.8 oz (113.3 kg)   21 249 lb (112.9 kg)   21 231 lb (104.8 kg)     BP Readings from Last 3 Encounters:   21 132/84   21 138/86   10/06/20 (!) 144/86     Body mass index is 35.84 kg/m². Facility age limit for growth percentiles is 20 years. Physical Exam  Constitutional:       General: He is not in acute distress. Appearance: Normal appearance. He is not ill-appearing.    HENT:      Right Ear: Tympanic

## 2021-05-25 RX ORDER — OMEPRAZOLE 20 MG/1
CAPSULE, DELAYED RELEASE ORAL
Qty: 30 CAPSULE | Refills: 3 | Status: SHIPPED | OUTPATIENT
Start: 2021-05-25 | End: 2021-08-23

## 2021-06-23 ENCOUNTER — ANESTHESIA EVENT (OUTPATIENT)
Dept: ENDOSCOPY | Age: 56
End: 2021-06-23
Payer: COMMERCIAL

## 2021-06-23 ENCOUNTER — HOSPITAL ENCOUNTER (OUTPATIENT)
Age: 56
Setting detail: OUTPATIENT SURGERY
Discharge: HOME | End: 2021-06-23
Attending: INTERNAL MEDICINE | Admitting: INTERNAL MEDICINE
Payer: COMMERCIAL

## 2021-06-23 ENCOUNTER — ANESTHESIA (OUTPATIENT)
Dept: ENDOSCOPY | Age: 56
End: 2021-06-23
Payer: COMMERCIAL

## 2021-06-23 VITALS
HEIGHT: 73 IN | DIASTOLIC BLOOD PRESSURE: 93 MMHG | HEART RATE: 63 BPM | RESPIRATION RATE: 16 BRPM | SYSTOLIC BLOOD PRESSURE: 154 MMHG | WEIGHT: 240 LBS | OXYGEN SATURATION: 97 % | BODY MASS INDEX: 31.81 KG/M2 | TEMPERATURE: 99 F

## 2021-06-23 VITALS
OXYGEN SATURATION: 95 % | RESPIRATION RATE: 9 BRPM | DIASTOLIC BLOOD PRESSURE: 96 MMHG | SYSTOLIC BLOOD PRESSURE: 150 MMHG

## 2021-06-23 LAB
GLUCOSE BLD-MCNC: 114 MG/DL (ref 70–99)
PERFORMED ON: ABNORMAL

## 2021-06-23 PROCEDURE — 6370000000 HC RX 637 (ALT 250 FOR IP): Performed by: INTERNAL MEDICINE

## 2021-06-23 PROCEDURE — 6360000002 HC RX W HCPCS: Performed by: NURSE ANESTHETIST, CERTIFIED REGISTERED

## 2021-06-23 PROCEDURE — 3609008400 HC SIGMOIDOSCOPY DIAGNOSTIC: Performed by: INTERNAL MEDICINE

## 2021-06-23 PROCEDURE — 2709999900 HC NON-CHARGEABLE SUPPLY: Performed by: INTERNAL MEDICINE

## 2021-06-23 PROCEDURE — 2580000003 HC RX 258: Performed by: ANESTHESIOLOGY

## 2021-06-23 PROCEDURE — 7100000011 HC PHASE II RECOVERY - ADDTL 15 MIN: Performed by: INTERNAL MEDICINE

## 2021-06-23 PROCEDURE — 7100000010 HC PHASE II RECOVERY - FIRST 15 MIN: Performed by: INTERNAL MEDICINE

## 2021-06-23 PROCEDURE — 3700000000 HC ANESTHESIA ATTENDED CARE: Performed by: INTERNAL MEDICINE

## 2021-06-23 PROCEDURE — 3700000001 HC ADD 15 MINUTES (ANESTHESIA): Performed by: INTERNAL MEDICINE

## 2021-06-23 PROCEDURE — 2500000003 HC RX 250 WO HCPCS: Performed by: NURSE ANESTHETIST, CERTIFIED REGISTERED

## 2021-06-23 RX ORDER — PROPOFOL 10 MG/ML
INJECTION, EMULSION INTRAVENOUS PRN
Status: DISCONTINUED | OUTPATIENT
Start: 2021-06-23 | End: 2021-06-23 | Stop reason: SDUPTHER

## 2021-06-23 RX ORDER — SODIUM CHLORIDE 9 MG/ML
INJECTION, SOLUTION INTRAVENOUS CONTINUOUS
Status: DISCONTINUED | OUTPATIENT
Start: 2021-06-23 | End: 2021-06-23 | Stop reason: HOSPADM

## 2021-06-23 RX ORDER — LIDOCAINE HYDROCHLORIDE 20 MG/ML
INJECTION, SOLUTION EPIDURAL; INFILTRATION; INTRACAUDAL; PERINEURAL PRN
Status: DISCONTINUED | OUTPATIENT
Start: 2021-06-23 | End: 2021-06-23 | Stop reason: SDUPTHER

## 2021-06-23 RX ADMIN — PROPOFOL 50 MG: 10 INJECTION, EMULSION INTRAVENOUS at 11:40

## 2021-06-23 RX ADMIN — PROPOFOL 50 MG: 10 INJECTION, EMULSION INTRAVENOUS at 11:34

## 2021-06-23 RX ADMIN — PROPOFOL 50 MG: 10 INJECTION, EMULSION INTRAVENOUS at 11:37

## 2021-06-23 RX ADMIN — SODIUM CHLORIDE: 9 INJECTION, SOLUTION INTRAVENOUS at 10:49

## 2021-06-23 RX ADMIN — LIDOCAINE HYDROCHLORIDE 100 MG: 20 INJECTION, SOLUTION EPIDURAL; INFILTRATION; INTRACAUDAL; PERINEURAL at 11:35

## 2021-06-23 ASSESSMENT — PAIN SCALES - GENERAL
PAINLEVEL_OUTOF10: 0

## 2021-06-23 ASSESSMENT — PAIN - FUNCTIONAL ASSESSMENT: PAIN_FUNCTIONAL_ASSESSMENT: 0-10

## 2021-06-23 NOTE — PROGRESS NOTES
Post-procedure education reviewed with patient and visitor, and they verbalized understanding.
Pt ready for discharge - pt discharged in stable condition.
Sample prep for procedure tomorrow was given to and signed for by pt.
See anesthesia record for Propofol dosages and vital signs.
Teaching / education initiated regarding perioperative experience, expectations, and pain management during stay. Patient verbalized understanding.
with a cell phone for communication. If the ride is leaving the hospital grounds please make sure they are back in time for pickup. Have the patient inform the staff on arrival what their rides plans are while the patient is in the facility. At the MAIN there is one visitor allowed. Please note that the visitor policy is subject to change.

## 2021-06-23 NOTE — H&P
Gastroenterology Note                 Pre-operative History and Physical    Patient: Marisela Curling  : 1965  CSN:     History Obtained From:   Patient or guardian. HISTORY OF PRESENT ILLNESS:    The patient is a 50 y.o. male here for Endoscopy. Past Medical History:    Past Medical History:   Diagnosis Date    Cerebral artery occlusion with cerebral infarction ([facility]     CKD (chronic kidney disease)     Diabetes mellitus (HCC)     GERD (gastroesophageal reflux disease)     Hypertension     Sleep apnea     cpap     Past Surgical History:    Past Surgical History:   Procedure Laterality Date    CYSTOSCOPY  2019    FLEXIBLE CYSTOSCOPY, DILATION, POTTS CATHETER INSERTION performed by Grisel Da Silva MD at [facility].      right knee     SLEEVE GASTRECTOMY N/A 2019    LAPAROSCOPIC SLEEVE GASTRECTOMY-ETHICON performed by Javan Blackman MD at [place] [address] N/A 2019    EGD BIOPSY performed by Javan Blackman MD at [address] [place] [address]     Medications Prior to Admission:   No current facility-administered medications on file prior to encounter. Current Outpatient Medications on File Prior to Encounter   Medication Sig Dispense Refill    Omega-3 Fatty Acids (OMEGA-3 FISH OIL PO) Take by mouth      MULTIPLE VITAMIN PO Take by mouth      metFORMIN (GLUCOPHAGE-XR) 500 MG extended release tablet TAKE TWO TABLETS BY MOUTH TWICE A DAY WITH MEALS 360 tablet 5    LANTUS SOLOSTAR 100 UNIT/ML injection pen Inject 24 Units into the skin every morning 5 pen 2    blood glucose test strips (PRODIGY NO CODING BLOOD GLUC) strip 2 each by In Vitro route daily As needed.  200 each 3    Lancet Devices (PRODIGY LANCING DEVICE) MISC Use daily to manage blood sugar 1 each 1    lisinopril (PRINIVIL;ZESTRIL) 20 MG tablet Take 1 tablet by mouth daily 90 tablet 1    Blood Glucose Monitoring Suppl (PRODIGY POCKET BLOOD GLUCOSE) w/Device KIT Use daily to monitor blood sugar level 1 kit 1    Insulin Pen Needle (PEN NEEDLES) 32G X 4 MM MISC Use BID with insulin and victoza 200 each 3    aspirin EC 81 MG EC tablet Take 81 mg by mouth daily      NIFEdipine (PROCARDIA XL) 30 MG extended release tablet Take 1 tablet by mouth 2 times daily 60 tablet 0    metoprolol tartrate (LOPRESSOR) 50 MG tablet Take 1 tablet by mouth 2 times daily 60 tablet 0    PRODIGY LANCETS 28G MISC 3 each by Does not apply route daily 100 each 3    atorvastatin (LIPITOR) 80 MG tablet TAKE 1 TABLET BY MOUTH ONE TIME A DAY 90 tablet 3        Allergies:  Hctz [hydrochlorothiazide] and Penicillins      Social History:   Social History     Tobacco Use    Smoking status: Never Smoker    Smokeless tobacco: Never Used   Substance Use Topics    Alcohol use: Yes     Alcohol/week: 1.0 standard drinks     Types: 1 Cans of beer per week     Comment: socially      Family History:   Family History   Problem Relation Age of Onset    Heart Attack Mother     Diabetes Mother     High Blood Pressure Mother     Colon Cancer Sister        PHYSICAL EXAM:      BP (!) 166/105 Comment: left arm  Pulse 73   Temp 97.2 °F (36.2 °C) (Temporal)   Resp 16   Ht 6' 1\" (1.854 m)   Wt 240 lb (108.9 kg)   SpO2 96%   BMI 31.66 kg/m²  I        Heart:  RRR, normal s1s2    Lungs:  CTA and normal effort    Abdomen:   Soft, nt nd. ASSESSMENT AND PLAN:    1. Patient is a 50 y.o. male here for endoscopy with MAC sedation. 2.  Procedure options, risks and benefits reviewed with patient and/or guardian. They express understanding.

## 2021-06-23 NOTE — ANESTHESIA PRE PROCEDURE
Department of Anesthesiology  Preprocedure Note       Name:  Barbara Murillo   Age:  50 y.o.  :  1965                                          MRN:  [id number]         Date:  2021      Surgeon: Kvng Chavarria):  Harsha Quiros MD    Procedure: Procedure(s):  COLONOSCOPY DIAGNOSTIC    Medications prior to admission:   Prior to Admission medications    Medication Sig Start Date End Date Taking? Authorizing Provider   omeprazole (PRILOSEC) 20 MG delayed release capsule TAKE 1 CAPSULE BY MOUTH ONE TIME A DAY 21  Yes Reece Boothe APRN - CNP   furosemide (LASIX) 20 MG tablet Take 20 mg by mouth daily as needed 21 Yes Historical Provider, MD   Semaglutide,0.25 or 0.5MG/DOS, (OZEMPIC, 0.25 OR 0.5 MG/DOSE,) 2 MG/1.5ML SOPN Inject 0.5 mg into the skin once a week Switching from Victoza 1.8 mg daily 21  Yes Rosa Quinonez MD   Omega-3 Fatty Acids (OMEGA-3 FISH OIL PO) Take by mouth   Yes Historical Provider, MD   MULTIPLE VITAMIN PO Take by mouth   Yes Historical Provider, MD   metFORMIN (GLUCOPHAGE-XR) 500 MG extended release tablet TAKE TWO TABLETS BY MOUTH TWICE A DAY WITH MEALS 3/22/21  Yes Barbara Barnes MD   LANTUS SOLOSTAR 100 UNIT/ML injection pen Inject 24 Units into the skin every morning 21  Yes Barbara Barnes MD   blood glucose test strips (PRODIGY NO CODING BLOOD GLUC) strip 2 each by In Vitro route daily As needed.  20  Yes Barbara Barnes MD   Lancet Devices (PRODIGY LANCING DEVICE) MISC Use daily to manage blood sugar 20  Yes Barbara Barnes MD   lisinopril (PRINIVIL;ZESTRIL) 20 MG tablet Take 1 tablet by mouth daily 8/3/20  Yes Barbara Barnes MD   Blood Glucose Monitoring Suppl (PRODIGY POCKET BLOOD GLUCOSE) w/Device KIT Use daily to monitor blood sugar level 20  Yes Barbara Barnes MD   Insulin Pen Needle (PEN NEEDLES) 32G X 4 MM MISC Use BID with insulin and victoza 20  Yes Barbara Barnes comorbidity ([facility]) E66.01    Chronic GERD K21.9    Obstructive sleep apnea I72.66    Helicobacter pylori (H. pylori) infection A04.8    CKD (chronic kidney disease) stage 3, GFR 30-59 ml/min ([facility]) N18.30    Cortical senile cataract, bilateral H25.013    Hypertensive retinopathy, bilateral H35.033    Lattice degeneration, right H35.411    Nuclear sclerotic cataract, bilateral H25.13    Round hole, right H33.321    Serous retinal detachment, right eye H33.21    Fatty liver K76.0    Dyslipidemia associated with type 2 diabetes mellitus (HCC) E11.69, E78.5    EKG abnormalities R94.31    Thiamin deficiency E51.9    CKD (chronic kidney disease) stage 4, GFR 15-29 ml/min ([facility]) N18.4    Acute renal failure superimposed on stage 4 chronic kidney disease ([facility]) N17.9, N18.4    S/P laparoscopic sleeve gastrectomy Z98.84    Obesity (BMI 30.0-34. 9) E66.9    Hyperlipidemia E78.5    Morbid obesity due to excess calories ([facility]) E66.01    Diabetes mellitus type 2 in obese ([facility]) E11.69, E66.9    Anemia D64.9    Renal osteodystrophy N25.0       Past Medical History:        Diagnosis Date    [facility]bral [facility] occlusion with [facility] infarction ([facility]     CKD (chronic kidney disease)     Diabetes mellitus (HCC)     GERD (gastroesophageal reflux disease)     Hypertension     Sleep apnea     cpap       Past Surgical History:        Procedure Laterality Date    CYSTOSCOPY  9/25/2019    FLEXIBLE CYSTOSCOPY, DILATION, POTTS CATHETER INSERTION performed [facility] Lidia Woodard MD [facility]      right knee     SLEEVE GASTRECTOMY N/A 9/25/2019    LAPAROSCOPIC SLEEVE GASTRECTOMY-ETHICON performed by Maddie Echols MD at [facility] N/A 4/12/2019    EGD BIOPSY performed by Maddie Echols MD at [address] [place] [address] History:    Social History     Tobacco Use    Smoking status: Never Smoker    Smokeless tobacco: Never Used   Substance Use Topics    Alcohol use: for: PHART, PO2ART, OAF5OWA, ACN9QHX, BEART, M3UXIVUS     Type & Screen (If Applicable):  No results found for: LABABO, LABRH    Drug/Infectious Status (If Applicable):  No results found for: HIV, HEPCAB    COVID-19 Screening (If Applicable): No results found for: COVID19        Anesthesia Evaluation  Patient summary reviewed no history of anesthetic complications:   Airway: Mallampati: II  TM distance: >3 FB   Neck ROM: full  Mouth opening: > = 3 FB Dental:    (+) partials      Pulmonary: breath sounds clear to auscultation  (+) sleep apnea (no longer has symptoms):                             Cardiovascular:    (+) hypertension:,     (-) CABG/stent, dysrhythmias and  angina      Rhythm: regular  Rate: normal                    Neuro/Psych:   (+) CVA (4 yrs ago, residual balance issues):,    (-) seizures           GI/Hepatic/Renal:   (+) liver disease (fatty liver):,          ROS comment: Denies gerd sx today or n/v today  Gastric bypass surgery. Endo/Other:    (+) Diabetes, . Abdominal:           Vascular: negative vascular ROS. Anesthesia Plan      MAC     ASA 3       Induction: intravenous. Anesthetic plan and risks discussed with patient. Plan discussed with CRNA.                   José Miguel Schwab MD   6/23/2021

## 2021-06-24 ENCOUNTER — ANESTHESIA (OUTPATIENT)
Dept: ENDOSCOPY | Age: 56
End: 2021-06-24
Payer: COMMERCIAL

## 2021-06-24 ENCOUNTER — HOSPITAL ENCOUNTER (OUTPATIENT)
Age: 56
Setting detail: OUTPATIENT SURGERY
Discharge: HOME | End: 2021-06-24
Attending: INTERNAL MEDICINE | Admitting: INTERNAL MEDICINE
Payer: COMMERCIAL

## 2021-06-24 VITALS
BODY MASS INDEX: 31.81 KG/M2 | OXYGEN SATURATION: 96 % | HEART RATE: 78 BPM | RESPIRATION RATE: 14 BRPM | DIASTOLIC BLOOD PRESSURE: 113 MMHG | SYSTOLIC BLOOD PRESSURE: 172 MMHG | HEIGHT: 73 IN | WEIGHT: 240 LBS | TEMPERATURE: 97.1 F

## 2021-06-24 VITALS
RESPIRATION RATE: 11 BRPM | DIASTOLIC BLOOD PRESSURE: 98 MMHG | SYSTOLIC BLOOD PRESSURE: 152 MMHG | OXYGEN SATURATION: 97 %

## 2021-06-24 LAB
GLUCOSE BLD-MCNC: 124 MG/DL (ref 70–99)
GLUCOSE BLD-MCNC: 129 MG/DL (ref 70–99)
PERFORMED ON: ABNORMAL
PERFORMED ON: ABNORMAL

## 2021-06-24 PROCEDURE — 2500000003 HC RX 250 WO HCPCS: Performed by: NURSE ANESTHETIST, CERTIFIED REGISTERED

## 2021-06-24 PROCEDURE — 7100000000 HC PACU RECOVERY - FIRST 15 MIN: Performed by: INTERNAL MEDICINE

## 2021-06-24 PROCEDURE — 7100000011 HC PHASE II RECOVERY - ADDTL 15 MIN: Performed by: INTERNAL MEDICINE

## 2021-06-24 PROCEDURE — 3700000001 HC ADD 15 MINUTES (ANESTHESIA): Performed by: INTERNAL MEDICINE

## 2021-06-24 PROCEDURE — 2580000003 HC RX 258: Performed by: ANESTHESIOLOGY

## 2021-06-24 PROCEDURE — 6360000002 HC RX W HCPCS: Performed by: NURSE ANESTHETIST, CERTIFIED REGISTERED

## 2021-06-24 PROCEDURE — 2709999900 HC NON-CHARGEABLE SUPPLY: Performed by: INTERNAL MEDICINE

## 2021-06-24 PROCEDURE — 88305 TISSUE EXAM BY PATHOLOGIST: CPT

## 2021-06-24 PROCEDURE — 3700000000 HC ANESTHESIA ATTENDED CARE: Performed by: INTERNAL MEDICINE

## 2021-06-24 PROCEDURE — 7100000010 HC PHASE II RECOVERY - FIRST 15 MIN: Performed by: INTERNAL MEDICINE

## 2021-06-24 PROCEDURE — 7100000001 HC PACU RECOVERY - ADDTL 15 MIN: Performed by: INTERNAL MEDICINE

## 2021-06-24 PROCEDURE — 3609010600 HC COLONOSCOPY POLYPECTOMY SNARE/COLD BIOPSY: Performed by: INTERNAL MEDICINE

## 2021-06-24 RX ORDER — PROPOFOL 10 MG/ML
INJECTION, EMULSION INTRAVENOUS PRN
Status: DISCONTINUED | OUTPATIENT
Start: 2021-06-24 | End: 2021-06-24 | Stop reason: SDUPTHER

## 2021-06-24 RX ORDER — SODIUM CHLORIDE 9 MG/ML
INJECTION, SOLUTION INTRAVENOUS CONTINUOUS
Status: DISCONTINUED | OUTPATIENT
Start: 2021-06-24 | End: 2021-06-24 | Stop reason: HOSPADM

## 2021-06-24 RX ORDER — LIDOCAINE HYDROCHLORIDE 20 MG/ML
INJECTION, SOLUTION EPIDURAL; INFILTRATION; INTRACAUDAL; PERINEURAL PRN
Status: DISCONTINUED | OUTPATIENT
Start: 2021-06-24 | End: 2021-06-24 | Stop reason: SDUPTHER

## 2021-06-24 RX ADMIN — PROPOFOL 50 MG: 10 INJECTION, EMULSION INTRAVENOUS at 07:32

## 2021-06-24 RX ADMIN — PROPOFOL 20 MG: 10 INJECTION, EMULSION INTRAVENOUS at 07:52

## 2021-06-24 RX ADMIN — SODIUM CHLORIDE: 9 INJECTION, SOLUTION INTRAVENOUS at 06:51

## 2021-06-24 RX ADMIN — PROPOFOL 50 MG: 10 INJECTION, EMULSION INTRAVENOUS at 07:34

## 2021-06-24 RX ADMIN — PROPOFOL 50 MG: 10 INJECTION, EMULSION INTRAVENOUS at 07:35

## 2021-06-24 RX ADMIN — PROPOFOL 20 MG: 10 INJECTION, EMULSION INTRAVENOUS at 07:42

## 2021-06-24 RX ADMIN — LIDOCAINE HYDROCHLORIDE 100 MG: 20 INJECTION, SOLUTION EPIDURAL; INFILTRATION; INTRACAUDAL; PERINEURAL at 07:32

## 2021-06-24 RX ADMIN — PROPOFOL 20 MG: 10 INJECTION, EMULSION INTRAVENOUS at 07:39

## 2021-06-24 RX ADMIN — PROPOFOL 20 MG: 10 INJECTION, EMULSION INTRAVENOUS at 07:55

## 2021-06-24 RX ADMIN — PROPOFOL 20 MG: 10 INJECTION, EMULSION INTRAVENOUS at 07:59

## 2021-06-24 RX ADMIN — PROPOFOL 20 MG: 10 INJECTION, EMULSION INTRAVENOUS at 07:49

## 2021-06-24 RX ADMIN — PROPOFOL 20 MG: 10 INJECTION, EMULSION INTRAVENOUS at 07:45

## 2021-06-24 ASSESSMENT — PULMONARY FUNCTION TESTS
PIF_VALUE: 1
PIF_VALUE: 1
PIF_VALUE: 0
PIF_VALUE: 1
PIF_VALUE: 0
PIF_VALUE: 1

## 2021-06-24 ASSESSMENT — PAIN - FUNCTIONAL ASSESSMENT: PAIN_FUNCTIONAL_ASSESSMENT: 0-10

## 2021-06-24 NOTE — PROGRESS NOTES
Pt awake and alert. Pt on RA, VSS. Wife in the waiting room. Pt denies pain and nausea, tolerating PO. Pt meets criteria to be discharged from phase 1.

## 2021-06-24 NOTE — ANESTHESIA PRE PROCEDURE
Department of Anesthesiology  Preprocedure Note       Name:  John Antoine   Age:  50 y.o.  :  1965                                          MRN:  [id number]         Date:  2021      Surgeon: Jose Phan):  Almita King MD    Procedure: Procedure(s):  COLONOSCOPY DIAGNOSTIC    Medications prior to admission:   Prior to Admission medications    Medication Sig Start Date End Date Taking? Authorizing Provider   omeprazole (PRILOSEC) 20 MG delayed release capsule TAKE 1 CAPSULE BY MOUTH ONE TIME A DAY 21   Vanessa Martinez APRN - CNP   furosemide (LASIX) 20 MG tablet Take 20 mg by mouth daily as needed 21  Historical Provider, MD   sildenafil (VIAGRA) 100 MG tablet Take 1 tablet by mouth daily as needed for Erectile Dysfunction 21   Nabila Arevalo MD   Semaglutide,0.25 or 0.5MG/DOS, (OZEMPIC, 0.25 OR 0.5 MG/DOSE,) 2 MG/1.5ML SOPN Inject 0.5 mg into the skin once a week Switching from Victoza 1.8 mg daily 21   Nabila Arevalo MD   Omega-3 Fatty Acids (OMEGA-3 FISH OIL PO) Take by mouth    Historical Provider, MD   MULTIPLE VITAMIN PO Take by mouth    Historical Provider, MD   atorvastatin (LIPITOR) 80 MG tablet TAKE 1 TABLET BY MOUTH ONE TIME A DAY 21   Linda Guardado MD   metFORMIN (GLUCOPHAGE-XR) 500 MG extended release tablet TAKE TWO TABLETS BY MOUTH TWICE A DAY WITH MEALS 3/22/21   Linda Guardado MD   LANTUS SOLOSTAR 100 UNIT/ML injection pen Inject 24 Units into the skin every morning 21   Linda Guardado MD   blood glucose test strips (PRODIGY NO CODING BLOOD GLUC) strip 2 each by In Vitro route daily As needed.  20   Linda Guardado MD   Lancet Devices ("[employer]IGY LANCING DEVICE) MISC Use daily to manage blood sugar 20   Linda Guardado MD   lisinopril (PRINIVIL;ZESTRIL) 20 MG tablet Take 1 tablet by mouth daily 8/3/20   Linda Guardado MD   Blood Glucose Monitoring Suppl (PRODIGY POCKET BLOOD GLUCOSE) w/Device KIT Use daily to monitor blood sugar level 7/29/20   Janice Garcia MD   Insulin Pen Needle (PEN NEEDLES) 32G X 4 MM MISC Use BID with insulin and victoza 5/25/20   Janice Garcia MD   aspirin EC 81 MG EC tablet Take 81 mg by mouth daily    Historical Provider, MD   NIFEdipine (PROCARDIA XL) 30 MG extended release tablet Take 1 tablet by mouth 2 times daily 9/27/19   [facility]   metoprolol tartrate (LOPRESSOR) 50 MG tablet Take 1 tablet by mouth 2 times daily 9/26/19   Clora Hazard, [facility] - CNP   PRODIGY LANCETS 28G MISC 3 each by Does not apply route daily 12/31/18   Janice Garcia MD       Current medications:    No current facility-administered medications for this visit. No current outpatient medications on file. Facility-Administered Medications Ordered in Other Visits   Medication Dose Route Frequency Provider Last Rate Last Admin    0.9 % sodium chloride infusion   Intravenous Continuous Eliezer Card MD           Allergies:     Allergies   Allergen Reactions    Hctz [Hydrochlorothiazide]      cramps    Penicillins Hives       Problem List:    Patient Active Problem List   Diagnosis Code    Essential hypertension I10    Late effect of cerebrovascular accident (CVA) I69.30    Chronic left shoulder pain M25.512, G89.29    Central sleep apnea due to medical condition G47.37    Abnormal EKG R94.31    Cerebrovascular accident (CVA) due to thrombosis of basilar artery ([facility]) I63.02    Contact dermatitis and other eczema, due to unspecified cause L25.9    Family history of malignant neoplasm of gastrointestinal tract Z80.0    Noncompliance Z91.19    Obesity due to excess calories E66.09    Sciatica M54.30    Uncontrolled type 2 diabetes mellitus with diabetic nephropathy, with long-term current use of insulin ([facility]) E11.21, E11.65, Z79.4    Type 2 diabetes mellitus with vascular disease ([facility]) E11.59    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 ([facility] E11.3299    Severe obesity (BMI 35.0-39. 9) with comorbidity ([facility]) E66.01    Chronic GERD K21.9    Obstructive sleep apnea W73.66    Helicobacter pylori (H. pylori) infection A04.8    CKD (chronic kidney disease) stage 3, GFR 30-59 ml/min (HCC) N18.30    Cortical senile cataract, bilateral H25.013    Hypertensive retinopathy, bilateral H35.033    Lattice degeneration, right H35.411    Nuclear sclerotic cataract, bilateral H25.13    Round hole, right H33.321    Serous retinal detachment, right eye H33.21    Fatty liver K76.0    Dyslipidemia associated with type 2 diabetes mellitus (HCC) E11.69, E78.5    EKG abnormalities R94.31    Thiamin deficiency E51.9    CKD (chronic kidney disease) stage 4, GFR 15-29 ml/min ([facility]) N18.4    Acute renal failure superimposed on stage 4 chronic kidney disease (HCC) N17.9, N18.4    S/P laparoscopic sleeve gastrectomy Z98.84    Obesity (BMI 30.0-34. 9) E66.9    Hyperlipidemia E78.5    Morbid obesity due to excess calories ([facility]) E66.01    Diabetes mellitus type 2 in obese ([facility]) E11.69, E66.9    Anemia D64.9    Renal osteodystrophy N25.0       Past Medical History:        Diagnosis Date    Cerebral artery occlusion with cerebral infarction ([facility]     CKD (chronic kidney disease)     Diabetes mellitus (HCC)     GERD (gastroesophageal reflux disease)     Hypertension     Sleep apnea     no cpap       Past Surgical History:        Procedure Laterality Date    CYSTOSCOPY  9/25/2019    FLEXIBLE CYSTOSCOPY, DILATION, POTTS CATHETER INSERTION performed by Chase Pérez MD at [facility].      right knee     SIGMOIDOSCOPY N/A 6/23/2021    SIGMOIDOSCOPY DIAGNOSTIC FLEXIBLE performed by Lisset Hua MD at [facility] N/A 9/25/2019    LAPAROSCOPIC SLEEVE GASTRECTOMY-ETHICON performed by Fidel Emmanuel MD at [address] 4/12/2019    EGD BIOPSY performed by Hunter Jordan MD at [address] History:    Social History     Tobacco Use    Smoking status: Never Smoker    Smokeless tobacco: Never Used   Substance Use Topics    Alcohol use: Yes     Alcohol/week: 1.0 standard drinks     Types: 1 Cans of beer per week     Comment: socially                                 Counseling given: Not Answered      Vital Signs (Current): There were no vitals filed for this visit.                                            BP Readings from Last 3 Encounters:   06/24/21 (!) 188/111   06/23/21 (!) 154/93   06/23/21 (!) 150/96       NPO Status:                                                                                 BMI:   Wt Readings from Last 3 Encounters:   06/24/21 240 lb (108.9 kg)   06/23/21 240 lb (108.9 kg)   05/21/21 249 lb 12.8 oz (113.3 kg)     There is no height or weight on file to calculate BMI.    CBC:   Lab Results   Component Value Date    WBC 5.4 04/06/2021    RBC 4.86 04/06/2021    HGB 13.2 04/06/2021    HCT 40.2 04/06/2021    MCV 82.8 04/06/2021    RDW 15.5 04/06/2021     04/06/2021       CMP:   Lab Results   Component Value Date     04/06/2021    K 3.9 04/06/2021    K 4.3 09/26/2019     04/06/2021    CO2 25 04/06/2021    BUN 19 04/06/2021    CREATININE 1.3 04/06/2021    GFRAA >60 04/06/2021    AGRATIO 1.7 04/06/2021    LABGLOM 58 04/06/2021    GLUCOSE 84 04/06/2021    PROT 7.0 04/06/2021    CALCIUM 9.5 04/06/2021    BILITOT 0.6 04/06/2021    ALKPHOS 81 04/06/2021    AST 18 04/06/2021    ALT 14 04/06/2021       POC Tests:   Recent Labs     06/23/21  1043   POCGLU 114*       Coags: No results found for: PROTIME, INR, APTT    HCG (If Applicable): No results found for: PREGTESTUR, PREGSERUM, HCG, HCGQUANT     ABGs: No results found for: PHART, PO2ART, BPG4HMF, YVE3EJY, BEART, D6QUHGUC     Type & Screen (If Applicable):  No results found for: LABABO, LABRH    Drug/Infectious Status (If Applicable):  No results found for: HIV, HEPCAB    COVID-19 Screening (If Applicable): No results found for: COVID19        Anesthesia Evaluation  Patient summary reviewed no history of anesthetic complications:   Airway: Mallampati: I  TM distance: >3 FB   Neck ROM: full  Mouth opening: > = 3 FB Dental:    (+) partials      Pulmonary: breath sounds clear to auscultation  (+) sleep apnea (no longer has symptoms):                             Cardiovascular:    (+) hypertension:,     (-) CABG/stent, dysrhythmias and  angina      Rhythm: regular  Rate: normal                    Neuro/Psych:   (+) CVA (4 yrs ago, residual balance issues):,    (-) seizures           GI/Hepatic/Renal:   (+) GERD:, liver disease (fatty liver):,          ROS comment: Denies gerd sx today or n/v today  Gastric bypass surgery. Endo/Other:    (+) DiabetesType II DM, , .                 Abdominal:           Vascular: negative vascular ROS. Anesthesia Plan      MAC     ASA 3       Induction: intravenous. Anesthetic plan and risks discussed with patient. Plan discussed with CRNA.                   Madisyn Chow MD   6/24/2021

## 2021-06-24 NOTE — H&P
Gastroenterology Note                 Pre-operative History and Physical    Patient: Jakub Alarcon  : 1965  CSN:     History Obtained From:   Patient or guardian. HISTORY OF PRESENT ILLNESS:    The patient is a 50 y.o. male here for Endoscopy. Past Medical History:    Past Medical History:   Diagnosis Date    [facility]bral [facility] occlusion with [facility] infarction ([facility]     CKD (chronic kidney disease)     Diabetes mellitus (HCC)     GERD (gastroesophageal reflux disease)     Hypertension     Sleep apnea     no cpap     Past Surgical History:    Past Surgical History:   Procedure Laterality Date    CYSTOSCOPY  2019    FLEXIBLE CYSTOSCOPY, DILATION, POTTS CATHETER INSERTION performed by Jen Paredes MD at [facility]      right knee     SIGMOIDOSCOPY N/A 2021    SIGMOIDOSCOPY DIAGNOSTIC FLEXIBLE performed by Ashly Gottlieb MD at [facility] N/A 2019    LAPAROSCOPIC SLEEVE GASTRECTOMY-ETHICON performed by Simon Potter MD at [facility] N/A 2019    [facility]GD [facility]OPSY [facility]     Medications Prior to Admission:   No current facility-administered medications on file prior to encounter.      Current Outpatient Medications on File Prior to Encounter   Medication Sig Dispense Refill    omeprazole (PRILOSEC) 20 MG delayed release capsule TAKE 1 CAPSULE BY MOUTH ONE TIME A DAY 30 capsule 3    furosemide (LASIX) 20 MG tablet Take 20 mg by mouth daily as needed      Semaglutide,0.25 or 0.5MG/DOS, (OZEMPIC, 0.25 OR 0.5 MG/DOSE,) 2 MG/1.5ML SOPN Inject 0.5 mg into the skin once a week Switching from Victoza 1.8 mg daily 2 pen 5    Omega-3 Fatty Acids (OMEGA-3 FISH OIL PO) Take by mouth      MULTIPLE VITAMIN PO Take by mouth      atorvastatin (LIPITOR) 80 MG tablet TAKE 1 TABLET BY MOUTH ONE TIME A DAY 90 tablet 3    metFORMIN (GLUCOPHAGE-XR) 500 MG extended release tablet TAKE TWO TABLETS BY MOUTH TWICE A DAY WITH MEALS 360 tablet 5    LANTUS SOLOSTAR 100 UNIT/ML injection pen Inject 24 Units into the skin every morning 5 pen 2    blood glucose test strips (PRODIGY NO CODING BLOOD GLUC) strip 2 each by In Vitro route daily As needed. 200 each 3    Lancet Devices (PRODIGY LANCING DEVICE) MISC Use daily to manage blood sugar 1 each 1    lisinopril (PRINIVIL;ZESTRIL) 20 MG tablet Take 1 tablet by mouth daily 90 tablet 1    Blood Glucose Monitoring Suppl (PRODIGY POCKET BLOOD GLUCOSE) w/Device KIT Use daily to monitor blood sugar level 1 kit 1    Insulin Pen Needle (PEN NEEDLES) 32G X 4 MM MISC Use BID with insulin and victoza 200 each 3    aspirin EC 81 MG EC tablet Take 81 mg by mouth daily      NIFEdipine (PROCARDIA XL) 30 MG extended release tablet Take 1 tablet by mouth 2 times daily 60 tablet 0    metoprolol tartrate (LOPRESSOR) 50 MG tablet Take 1 tablet by mouth 2 times daily 60 tablet 0    PRODIGY LANCETS 28G MISC 3 each by Does not apply route daily 100 each 3    sildenafil (VIAGRA) 100 MG tablet Take 1 tablet by mouth daily as needed for Erectile Dysfunction 30 tablet 5        Allergies:  Hctz [hydrochlorothiazide] and Penicillins      Social History:   Social History     Tobacco Use    Smoking status: Never Smoker    Smokeless tobacco: Never Used   Substance Use Topics    Alcohol use: Yes     Alcohol/week: 1.0 standard drinks     Types: 1 Cans of beer per week     Comment: socially      Family History:   Family History   Problem Relation Age of Onset    Heart Attack Mother     Diabetes Mother     High Blood Pressure Mother     Colon Cancer Sister        PHYSICAL EXAM:      BP (!) 188/111   Pulse 66   Temp 96.9 °F (36.1 °C) (Temporal)   Resp 16   Ht 6' 1\" (1.854 m)   Wt 240 lb (108.9 kg)   SpO2 96%   BMI 31.66 kg/m²  I        Heart:  RRR, normal s1s2    Lungs:  CTA and normal effort    Abdomen:   Soft, nt nd.          ASSESSMENT

## 2021-06-24 NOTE — ANESTHESIA POSTPROCEDURE EVALUATION
Department of Anesthesiology  Postprocedure Note    Patient: Kim Mark  MRN: [id number]  YOB: 1965  Date of evaluation: 6/24/2021  Time:  9:47 AM     Procedure Summary     Date: 06/24/21 Room / Location: [address] [place] [address]    Anesthesia Start: 5320 Anesthesia Stop: 9432    Procedure: COLONOSCOPY POLYPECTOMY SNARE/COLD OF 1 DESCENDING COLON POLYP AND 2 RECTAL  POLYPS (N/A ) Diagnosis: (COLON CANCER SCREENING Z12.11)    Surgeons: Ilan Garcia MD Responsible Provider: Marolyn Cushing, MD    Anesthesia Type: MAC ASA Status: 3          Anesthesia Type: MAC    Herberth Phase I: Herberth Score: 10    Herberth Phase II:      Last vitals: Reviewed and per EMR flowsheets.        Anesthesia Post Evaluation    Level of consciousness: awake  Complications: no

## 2021-07-07 DIAGNOSIS — E11.59 TYPE 2 DIABETES MELLITUS WITH VASCULAR DISEASE (HCC): ICD-10-CM

## 2021-07-07 DIAGNOSIS — E11.3299 TYPE 2 DIABETES MELLITUS WITH BACKGROUND RETINOPATHY (HCC): ICD-10-CM

## 2021-07-07 RX ORDER — INSULIN GLARGINE 100 [IU]/ML
24 INJECTION, SOLUTION SUBCUTANEOUS EVERY MORNING
Qty: 5 PEN | Refills: 2 | OUTPATIENT
Start: 2021-07-07

## 2021-07-08 DIAGNOSIS — E11.59 TYPE 2 DIABETES MELLITUS WITH VASCULAR DISEASE (HCC): ICD-10-CM

## 2021-07-08 DIAGNOSIS — E78.5 DYSLIPIDEMIA ASSOCIATED WITH TYPE 2 DIABETES MELLITUS (HCC): ICD-10-CM

## 2021-07-08 DIAGNOSIS — E11.3299 TYPE 2 DIABETES MELLITUS WITH BACKGROUND RETINOPATHY (HCC): ICD-10-CM

## 2021-07-08 DIAGNOSIS — E11.69 DYSLIPIDEMIA ASSOCIATED WITH TYPE 2 DIABETES MELLITUS (HCC): ICD-10-CM

## 2021-07-08 LAB
CHOLESTEROL, TOTAL: 99 MG/DL (ref 0–199)
HDLC SERPL-MCNC: 47 MG/DL (ref 40–60)
LDL CHOLESTEROL CALCULATED: 39 MG/DL
TRIGL SERPL-MCNC: 66 MG/DL (ref 0–150)
VLDLC SERPL CALC-MCNC: 13 MG/DL

## 2021-07-09 LAB
ESTIMATED AVERAGE GLUCOSE: 159.9 MG/DL
HBA1C MFR BLD: 7.2 %

## 2021-07-12 DIAGNOSIS — E11.59 TYPE 2 DIABETES MELLITUS WITH VASCULAR DISEASE (HCC): ICD-10-CM

## 2021-07-12 DIAGNOSIS — E11.3299 TYPE 2 DIABETES MELLITUS WITH BACKGROUND RETINOPATHY (HCC): ICD-10-CM

## 2021-07-12 RX ORDER — INSULIN GLARGINE 100 [IU]/ML
24 INJECTION, SOLUTION SUBCUTANEOUS EVERY MORNING
Qty: 5 PEN | Refills: 0 | Status: SHIPPED | OUTPATIENT
Start: 2021-07-12 | End: 2021-09-16

## 2021-07-12 NOTE — TELEPHONE ENCOUNTER
PT requests refill for Lantus 90 day refill sent to [facility], he is scheduled for 8/4/21

## 2021-07-12 NOTE — TELEPHONE ENCOUNTER
Requested Prescriptions     Pending Prescriptions Disp Refills    LANTUS SOLOSTAR 100 UNIT/ML injection pen 8 pen 1     Sig: Inject 24 Units into the skin every morning     Last OV 4/12/21  Next OV 8/4/21  Last refill 2/4/21  Last labs 7/8/21

## 2021-08-04 ENCOUNTER — OFFICE VISIT (OUTPATIENT)
Dept: ENDOCRINOLOGY | Age: 56
End: 2021-08-04
Payer: COMMERCIAL

## 2021-08-04 VITALS
HEIGHT: 73 IN | BODY MASS INDEX: 32.07 KG/M2 | HEART RATE: 78 BPM | WEIGHT: 242 LBS | OXYGEN SATURATION: 98 % | SYSTOLIC BLOOD PRESSURE: 150 MMHG | DIASTOLIC BLOOD PRESSURE: 99 MMHG

## 2021-08-04 DIAGNOSIS — E78.5 DYSLIPIDEMIA ASSOCIATED WITH TYPE 2 DIABETES MELLITUS (HCC): ICD-10-CM

## 2021-08-04 DIAGNOSIS — I10 ESSENTIAL HYPERTENSION: ICD-10-CM

## 2021-08-04 DIAGNOSIS — E11.3299 TYPE 2 DIABETES MELLITUS WITH BACKGROUND RETINOPATHY (HCC): Primary | ICD-10-CM

## 2021-08-04 DIAGNOSIS — E11.59 TYPE 2 DIABETES MELLITUS WITH VASCULAR DISEASE (HCC): ICD-10-CM

## 2021-08-04 DIAGNOSIS — E11.69 DYSLIPIDEMIA ASSOCIATED WITH TYPE 2 DIABETES MELLITUS (HCC): ICD-10-CM

## 2021-08-04 DIAGNOSIS — E66.01 MORBID OBESITY DUE TO EXCESS CALORIES (HCC): ICD-10-CM

## 2021-08-04 PROCEDURE — 3051F HG A1C>EQUAL 7.0%<8.0%: CPT | Performed by: INTERNAL MEDICINE

## 2021-08-04 PROCEDURE — 99214 OFFICE O/P EST MOD 30 MIN: CPT | Performed by: INTERNAL MEDICINE

## 2021-08-04 RX ORDER — BLOOD SUGAR DIAGNOSTIC
2 STRIP MISCELLANEOUS DAILY
Qty: 200 EACH | Refills: 3 | Status: SHIPPED | OUTPATIENT
Start: 2021-08-04

## 2021-08-04 RX ORDER — SEMAGLUTIDE 1.34 MG/ML
INJECTION, SOLUTION SUBCUTANEOUS
Qty: 6 PEN | Refills: 0 | Status: SHIPPED | OUTPATIENT
Start: 2021-08-04 | End: 2021-11-08 | Stop reason: SDUPTHER

## 2021-08-04 RX ORDER — FLASH GLUCOSE SENSOR
KIT MISCELLANEOUS
Qty: 2 EACH | Refills: 5 | Status: SHIPPED | OUTPATIENT
Start: 2021-08-04 | End: 2021-12-02

## 2021-08-04 RX ORDER — FLASH GLUCOSE SCANNING READER
EACH MISCELLANEOUS
Qty: 1 EACH | Refills: 0 | Status: SHIPPED | OUTPATIENT
Start: 2021-08-04 | End: 2021-12-02

## 2021-08-04 NOTE — PROGRESS NOTES
Patient ID:   Cee Uribe is a 50 y.o. male    Chief Complaint:   Cee Uribe presents for an evaluation of Type 2 Diabetes Mellitus , Hyperlipidemia and hypertension. Subjective:   Type 2 Diabetes Mellitus diagnosed in 1998  On insulin since 2016     Previously followed by  endocrine and physician moved out. Records reviewed from care everywhere   Pioglitazone 30mg daily - stopped in Feb 2018   Pinkie Sharon stopped in summer 2018 for worsening CKD     S/p gastric sleeve in Sep 2019. Metformin 1000mg bid was stopped   Off Lantus since Sep 2019      Metformin ER 500mg, two tabs twice a day. Ozempic 0.5mg weekly   Lantus 22 units in am      Checks blood sugars 2 times per day. Reviewed her log book    AM:   Lunch: 68  Supper: Reportedly 120-130  HS:     Hypoglycemias: Morning hypoglycemias have resolved since moving lantus in am. No episodes now. Awareness present in 70's    Meals: Three, dinner is big. No snacks, diet soda once a day   Exercise: work out at [employer], 45-60 minutes on treadmill. Denies chest pain, exertional dyspnea. CVA in Aug 2016. No residual deficits. Denies smoking. Currently on ASA 81 mg daily     The following portions of the patient's history were reviewed and updated as appropriate:       Family History   Problem Relation Age of Onset    Heart Attack Mother     Diabetes Mother     High Blood Pressure Mother     Colon Cancer Sister          Social History     Socioeconomic History    Marital status:      Spouse name: Not on file    Number of children: Not on file    Years of education: Not on file    Highest education level: Not on file   Occupational History    Not on file   Tobacco Use    Smoking status: Never Smoker    Smokeless tobacco: Never Used   Vaping Use    Vaping Use: Never used   Substance and Sexual Activity    Alcohol use:  Yes     Alcohol/week: 1.0 standard drinks     Types: 1 Cans of beer per week     Comment: GASTRECTOMY-ETHICON performed by Andres aRsmussen MD at [facility]. N/A 4/12/2019    EGD BIOPSY performed by Andres Rasmussen MD at [address] [place] [address]         Allergies   Allergen Reactions    Hctz [Hydrochlorothiazide]      cramps    Penicillins Hives         Current Outpatient Medications:     Semaglutide, 1 MG/DOSE, (OZEMPIC, 1 MG/DOSE,) 2 MG/1.5ML SOPN, 1mg subcutaneously once a week, Disp: 6 pen, Rfl: 0    blood glucose test strips (PRODIGY NO CODING BLOOD GLUC) strip, 2 each by In Vitro route daily As needed. , Disp: 200 each, Rfl: 3    Continuous Blood Gluc  (FREESTYLE DONITA 2 READER) VALDEMAR, Use for personal CGMS., Disp: 1 each, Rfl: 0    Continuous Blood Gluc Sensor (FREESTYLE DONITA 2 SENSOR) [facility], Replace every 2 weeks, Disp: 2 each, Rfl: 5    LANTUS SOLOSTAR 100 UNIT/ML injection pen, Inject 24 Units into the skin every morning (Patient taking differently: Inject 22 Units into the skin every morning ), Disp: 5 pen, Rfl: 0    omeprazole (PRILOSEC) 20 MG delayed release capsule, TAKE 1 CAPSULE BY MOUTH ONE TIME A DAY, Disp: 30 capsule, Rfl: 3    sildenafil (VIAGRA) 100 MG tablet, Take 1 tablet by mouth daily as needed for Erectile Dysfunction, Disp: 30 tablet, Rfl: 5    Omega-3 Fatty Acids (OMEGA-3 FISH OIL PO), Take by mouth, Disp: , Rfl:     MULTIPLE VITAMIN PO, Take by mouth, Disp: , Rfl:     atorvastatin (LIPITOR) 80 MG tablet, TAKE 1 TABLET BY MOUTH ONE TIME A DAY, Disp: 90 tablet, Rfl: 3    metFORMIN (GLUCOPHAGE-XR) 500 MG extended release tablet, TAKE TWO TABLETS BY MOUTH TWICE A DAY WITH MEALS, Disp: 360 tablet, Rfl: 5    Lancet Devices (PRODIGY LANCING DEVICE) MISC, Use daily to manage blood sugar, Disp: 1 each, Rfl: 1    lisinopril (PRINIVIL;ZESTRIL) 20 MG tablet, Take 1 tablet by mouth daily, Disp: 90 tablet, Rfl: 1    Blood Glucose Monitoring Suppl (PRODIGY POCKET BLOOD GLUCOSE) w/Device KIT, Use daily to monitor blood sugar level, Disp: 1 kit, Rfl: 1    Insulin Pen Needle (PEN NEEDLES) 32G X 4 MM MISC, Use BID with insulin and victoza, Disp: 200 each, Rfl: 3    aspirin EC 81 MG EC tablet, Take 81 mg by mouth daily, Disp: , Rfl:     NIFEdipine (PROCARDIA XL) 30 MG extended release tablet, Take 1 tablet by mouth 2 times daily, Disp: 60 tablet, Rfl: 0    metoprolol tartrate (LOPRESSOR) 50 MG tablet, Take 1 tablet by mouth 2 times daily, Disp: 60 tablet, Rfl: 0    PRODIGY LANCETS 28G MISC, 3 each by Does not apply route daily, Disp: 100 each, Rfl: 3    furosemide (LASIX) 20 MG tablet, Take 20 mg by mouth daily as needed, Disp: , Rfl:       Review of Systems:    Constitutional: Negative for fever, chills, and unexpected weight change. HENT: Negative for congestion, ear pain, rhinorrhea,  sore throat and trouble swallowing. Eyes: Negative for photophobia, redness, itching. Respiratory: Negative for cough, shortness of breath and sputum. Cardiovascular: Negative for chest pain, palpitations and leg swelling. Gastrointestinal: Negative for nausea, vomiting, abdominal pain, diarrhea, constipation. Endocrine: Negative for cold intolerance, heat intolerance, polydipsia, polyphagia and polyuria. Genitourinary: Negative for dysuria, urgency, frequency, hematuria and flank pain. Musculoskeletal: Negative for myalgias, back pain, arthralgias and neck pain. Skin/Nail: Negative for rash, itching. Normal nails. Neurological: Negative for seizures, weakness, light-headedness, numbness and headaches. Hematological/ Lymph nodes: Negative for adenopathy. Does not bruise/bleed easily. Psychiatric/Behavioral: Negative for suicidal ideas, depression, anxiety, sleep disturbance and decreased concentration.           Objective:   Physical Exam:  BP (!) 160/102 (Site: Left Upper Arm, Position: Sitting, Cuff Size: Large Adult)   Pulse 78   Ht 6' 1\" (1.854 m)   Wt 242 lb (109.8 kg)   SpO2 98%   BMI 31.93 kg/m²   Constitutional: Patient is oriented to person, place, and time. Patient appears well-developed and well-nourished. Pulmonary/Chest: Effort normal    Neurological: Patient is alert and oriented to person, place, and time. Psychiatric: Patient has a normal mood and affect.  Patient behavior is normal.     Lab Review:    Office Visit on 08/04/2021   Component Date Value Ref Range Status    Diabetic Retinopathy 03/04/2021 Positive   Final   Orders Only on 07/08/2021   Component Date Value Ref Range Status    Hemoglobin A1C 07/08/2021 7.2  See comment % Final    eAG 07/08/2021 159.9  mg/dL Final    Cholesterol, Total 07/08/2021 99  0 - 199 mg/dL Final    Triglycerides 07/08/2021 66  0 - 150 mg/dL Final    HDL 07/08/2021 47  40 - 60 mg/dL Final    LDL Calculated 07/08/2021 39  <100 mg/dL Final    VLDL Cholesterol Calculated 07/08/2021 13  Not Established mg/dL Final   Admission on 06/24/2021, Discharged on 06/24/2021   Component Date Value Ref Range Status    POC Glucose 06/24/2021 129* 70 - 99 mg/dl Final    Performed on 06/24/2021 ACCU-CHEK   Final    POC Glucose 06/24/2021 124* 70 - 99 mg/dl Final    Performed on 06/24/2021 ACCU-CHEK   Final   Admission on 06/23/2021, Discharged on 06/23/2021   Component Date Value Ref Range Status    POC Glucose 06/23/2021 114* 70 - 99 mg/dl Final    Performed on 06/23/2021 ACCU-CHEK   Final   Office Visit on 04/22/2021   Component Date Value Ref Range Status    Testosterone 04/22/2021 399  220 - 1,000 ng/dL Final    Sex Hormone Binding 04/22/2021 30  11 - 80 nmol/L Final    Testosterone, Free 04/22/2021 85.7  47 - 244 pg/mL Final    PSA 04/22/2021 1.64  0.00 - 4.00 ng/mL Final   Orders Only on 04/06/2021   Component Date Value Ref Range Status    Hemoglobin A1C 04/06/2021 8.2  See comment % Final    eAG 04/06/2021 188.6  mg/dL Final    WBC 04/06/2021 5.4  4.0 - 11.0 K/uL Final    RBC 04/06/2021 4.86  4.20 - 5.90 M/uL Final    Hemoglobin 04/06/2021 13.2* 13.5 - 17.5 g/dL Final    Hematocrit 04/06/2021 40.2* 40.5 - 52.5 % Final    MCV 04/06/2021 82.8  80.0 - 100.0 fL Final    MCH 04/06/2021 27.1  26.0 - 34.0 pg Final    MCHC 04/06/2021 32.8  31.0 - 36.0 g/dL Final    RDW 04/06/2021 15.5* 12.4 - 15.4 % Final    Platelets 53/15/5620 178  135 - 450 K/uL Final    MPV 04/06/2021 9.8  5.0 - 10.5 fL Final    SLIDE REVIEW 04/06/2021 see below   Final    Neutrophils % 04/06/2021 19.0  % Final    Lymphocytes % 04/06/2021 74.0  % Final    Monocytes % 04/06/2021 6.0  % Final    Eosinophils % 04/06/2021 1.0  % Final    Basophils % 04/06/2021 0.0  % Final    Neutrophils Absolute 04/06/2021 1.0* 1.7 - 7.7 K/uL Final    Lymphocytes Absolute 04/06/2021 4.0  1.0 - 5.1 K/uL Final    Monocytes Absolute 04/06/2021 0.3  0.0 - 1.3 K/uL Final    Eosinophils Absolute 04/06/2021 0.1  0.0 - 0.6 K/uL Final    Basophils Absolute 04/06/2021 0.0  0.0 - 0.2 K/uL Final    Anisocytosis 04/06/2021 Occasional*  Final    Sodium 04/06/2021 136  136 - 145 mmol/L Final    Potassium 04/06/2021 3.9  3.5 - 5.1 mmol/L Final    Chloride 04/06/2021 100  99 - 110 mmol/L Final    CO2 04/06/2021 25  21 - 32 mmol/L Final    Anion Gap 04/06/2021 11  3 - 16 Final    Glucose 04/06/2021 84  70 - 99 mg/dL Final    BUN 04/06/2021 19  7 - 20 mg/dL Final    CREATININE 04/06/2021 1.3  0.9 - 1.3 mg/dL Final    GFR Non- 04/06/2021 58* >60 Final    GFR  04/06/2021 >60  >60 Final    Calcium 04/06/2021 9.5  8.3 - 10.6 mg/dL Final    Total Protein 04/06/2021 7.0  6.4 - 8.2 g/dL Final    Albumin 04/06/2021 4.4  3.4 - 5.0 g/dL Final    Albumin/Globulin Ratio 04/06/2021 1.7  1.1 - 2.2 Final    Total Bilirubin 04/06/2021 0.6  0.0 - 1.0 mg/dL Final    Alkaline Phosphatase 04/06/2021 81  40 - 129 U/L Final    ALT 04/06/2021 14  10 - 40 U/L Final    AST 04/06/2021 18  15 - 37 U/L Final    Globulin 04/06/2021 2.6  g/dL Final    Iron 04/06/2021 99  59 - 158 ug/dL Final    TIBC 04/06/2021 271  260 - 445 ug/dL Final    Iron Saturation 04/06/2021 37  20 - 50 % Final    Cholesterol, Total 04/06/2021 162  0 - 199 mg/dL Final    Triglycerides 04/06/2021 57  0 - 150 mg/dL Final    HDL 04/06/2021 48  40 - 60 mg/dL Final    LDL Calculated 04/06/2021 103* <100 mg/dL Final    VLDL Cholesterol Calculated 04/06/2021 11  Not Established mg/dL Final    TSH 04/06/2021 1.76  0.27 - 4.20 uIU/mL Final    Vitamin A 04/06/2021 0.66  0.30 - 1.20 mg/L Final    Vitamin A, Interp 04/06/2021 Normal   Final    RETINYL PALMITATE 04/06/2021 0.02  0.00 - 0.10 mg/L Final    Vitamin B1,Whole Blood 04/06/2021 174  70 - 180 nmol/L Final    Vitamin B-12 04/06/2021 684  211 - 911 pg/mL Final    Folate 04/06/2021 18.54  4.78 - 24.20 ng/mL Final    Vit D, 25-Hydroxy 04/06/2021 39.3  >=30 ng/mL Final    Alpha-Tocopherol 04/06/2021 10.0  5.5 - 18.0 mg/L Final    Gamma-Tocopherol 04/06/2021 1.0  0.0 - 6.0 mg/L Final   Orders Only on 12/28/2020   Component Date Value Ref Range Status    Hemoglobin A1C 12/28/2020 7.7  See comment % Final    eAG 12/28/2020 174.3  mg/dL Final    Cholesterol, Fasting 12/28/2020 93  0 - 199 mg/dL Final    Triglyceride, Fasting 12/28/2020 61  0 - 150 mg/dL Final    HDL 12/28/2020 44  40 - 60 mg/dL Final    LDL Calculated 12/28/2020 37  <100 mg/dL Final    VLDL Cholesterol Calculated 12/28/2020 12  Not Established mg/dL Final    Sodium 12/28/2020 143  136 - 145 mmol/L Final    Potassium 12/28/2020 4.4  3.5 - 5.1 mmol/L Final    Chloride 12/28/2020 107  99 - 110 mmol/L Final    CO2 12/28/2020 22  21 - 32 mmol/L Final    Anion Gap 12/28/2020 14  3 - 16 Final    Glucose 12/28/2020 82  70 - 99 mg/dL Final    BUN 12/28/2020 22* 7 - 20 mg/dL Final    CREATININE 12/28/2020 1.2  0.9 - 1.3 mg/dL Final    GFR Non- 12/28/2020 >60  >60 Final    GFR  12/28/2020 >60  >60 Final    Calcium 12/28/2020 9.5  8.3 - 10.6 mg/dL Final    Total Protein 12/28/2020 6.4  6.4 - 8.2 g/dL Final    Albumin 12/28/2020 4.1  3.4 - 5.0 g/dL Final    Albumin/Globulin Ratio 12/28/2020 1.8  1.1 - 2.2 Final    Total Bilirubin 12/28/2020 0.7  0.0 - 1.0 mg/dL Final    Alkaline Phosphatase 12/28/2020 86  40 - 129 U/L Final    ALT 12/28/2020 16  10 - 40 U/L Final    AST 12/28/2020 17  15 - 37 U/L Final    Globulin 12/28/2020 2.3  g/dL Final    Microalbumin, Random Urine 12/28/2020 <1.20  <2.0 mg/dL Final    Creatinine, Ur 12/28/2020 122.3  39.0 - 259.0 mg/dL Final    Microalbumin Creatinine Ratio 12/28/2020 see below  0.0 - 30.0 mg/g Final    Color, UA 12/28/2020 Yellow  Straw/Yellow Final    Clarity, UA 12/28/2020 Clear  Clear Final    Glucose, Ur 12/28/2020 Negative  Negative mg/dL Final    Bilirubin Urine 12/28/2020 Negative  Negative Final    Ketones, Urine 12/28/2020 Negative  Negative mg/dL Final    Specific [place], UA 12/28/2020 1.025  1.005 - 1.030 Final    Blood, Urine 12/28/2020 Negative  Negative Final    pH, UA 12/28/2020 5.5  5.0 - 8.0 Final    Protein, UA 12/28/2020 Negative  Negative mg/dL Final    Urobilinogen, Urine 12/28/2020 0.2  <2.0 E.U./dL Final    Nitrite, Urine 12/28/2020 Negative  Negative Final    Leukocyte Esterase, Urine 12/28/2020 TRACE* Negative Final    Microscopic Examination 12/28/2020 YES   Final    Urine Type 12/28/2020 Voided   Final    Urine Reflex to Culture 12/28/2020 Yes   Final    Mucus, UA 12/28/2020 Rare* None Seen /LPF Final    RBC, UA 12/28/2020 0-2  0 - 4 /HPF Final    Bacteria, UA 12/28/2020 2+* None Seen /HPF Final    Urinalysis Comments 12/28/2020 see below   Final    Hyaline Casts, UA 12/28/2020 1  0 - 8 /LPF Final    WBC, UA 12/28/2020 12* 0 - 5 /HPF Final    Epithelial Cells, UA 12/28/2020 0  0 - 5 /HPF Final    Organism 12/28/2020 Gram negative veronica*  Final    Urine Culture, Routine 12/28/2020    Final                    Value:<10,000 CFU/ml  No further workup             Assessment and Plan     Jasvir Simmons was seen today for diabetes. 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 ([facility]  -     Hemoglobin A1C; Future  -     Vitamin B12 & Folate; Future  -      DIABETES FOOT EXAM  -     Semaglutide, 1 MG/DOSE, (OZEMPIC, 1 MG/DOSE,) 2 MG/1.5ML SOPN; 1mg subcutaneously once a week  -     blood glucose test strips (PRODIGY NO CODING BLOOD GLUC) strip; 2 each by In Vitro route daily As needed. -     Continuous Blood Gluc  (FREESTYLE DONITA 2 READER) VALDEMAR; Use for personal CGMS. -     Continuous Blood Gluc Sensor (FREESTYLE DONITA 2 SENSOR) MISC; Replace every 2 weeks    Type 2 diabetes mellitus with vascular disease ([facility]  -     Hemoglobin A1C; Future  -     Vitamin B12 & Folate; Future  -      DIABETES FOOT EXAM  -     Semaglutide, 1 MG/DOSE, (OZEMPIC, 1 MG/DOSE,) 2 MG/1.5ML SOPN; 1mg subcutaneously once a week  -     blood glucose test strips (PRODIGY NO CODING BLOOD GLUC) strip; 2 each by In Vitro route daily As needed. -     Continuous Blood Gluc  (FREESTYLE DONITA 2 READER) VALDEMAR; Use for personal CGMS. 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 Check Blood sugars 1-2 times per day. Log them along with insulin and send them every 2 weeks. Call for blood sugars less than 60 or more than 400. Eye exam: Last exam in April 2021, has background  Foot exam:  Aug 2021   Deformity/amputation: absent  Skin lesions/pre-ulcerative calluses: absent  Edema: right- negative, left- negative  Sensory exam: Monofilament sensation: normal  Pulses: normal, Vibration (128 Hz): neuropathy     Check B12 folate as last time there was no neuropathy and today Aug 3rd exam showed signs of neuropathy)     Renal screen: high 184 Oct 2019  > Normal July 2020 and normal Dec 2020   TSH screen:  April 2021      2: HTN   High today . Did not take his meds yet   Follows with nephrologist   Runs <120/<85 at home      3: Hyperlipidemia   LDL: 39 < 103 < 37, HDL: 47, TGs: 66 - July 2021       C/w Atorvastatin 80mg     4: Morbid obesity   S/p sleeve   Gradually losing weight   Last weight 235 > 231 > 242 lbs  at home      RTC in 3 months with A1C, lipids       EDUCATION:   Greater than 50% of this follow-up visit was spent in general counseling regarding   obesity, diet, exercise, importance of adherence to insulin regime, recognition and treatment of hypo and hyperglycemia,  glucose logging, proper diabetes management, diabetic complications with poor management and the importance of glycemic control in order to avoid the complications of diabetes. Risks and potential complications of diabetes were reviewed with the patient. Diabetes health maintenance plan and follow-up were discussed and understood by the patient. We reviewed the importance of medication compliance and regular follow-up. Aggressive lifestyle modification was encouraged. Exercise Counselling: This patient is a candidate for regular physical exercise.  Instructions to perform the following types of exercise:  Swimming or water aerobic exercise  Brisk walking  Playing tennis  Stationary bicycle or elliptical indoor  Low impact aerobic exercise    Instructions given to exercise for the following duration:  30 minutes a day for five-seven days per week.     Following instructions for being active throughout the day in addition to formal exercise:  Walk instead of drive whenever possible  Take the stairs instead of the elevator  Work in the garden  Park to the far end of the parking lot to add more walking steps to destination      Electronically signed by Molly Garcia MD on 8/4/2021 at 4:16 PM

## 2021-08-23 RX ORDER — OMEPRAZOLE 20 MG/1
CAPSULE, DELAYED RELEASE ORAL
Qty: 30 CAPSULE | Refills: 3 | Status: SHIPPED | OUTPATIENT
Start: 2021-08-23 | End: 2021-11-23

## 2021-08-30 ENCOUNTER — OFFICE VISIT (OUTPATIENT)
Dept: INTERNAL MEDICINE CLINIC | Age: 56
End: 2021-08-30
Payer: COMMERCIAL

## 2021-08-30 VITALS
DIASTOLIC BLOOD PRESSURE: 84 MMHG | BODY MASS INDEX: 31.28 KG/M2 | HEIGHT: 73 IN | OXYGEN SATURATION: 94 % | SYSTOLIC BLOOD PRESSURE: 116 MMHG | TEMPERATURE: 97.3 F | HEART RATE: 64 BPM | WEIGHT: 236 LBS

## 2021-08-30 DIAGNOSIS — E11.59 TYPE 2 DIABETES MELLITUS WITH VASCULAR DISEASE (HCC): ICD-10-CM

## 2021-08-30 DIAGNOSIS — E11.3299 TYPE 2 DIABETES MELLITUS WITH BACKGROUND RETINOPATHY (HCC): ICD-10-CM

## 2021-08-30 PROCEDURE — 3051F HG A1C>EQUAL 7.0%<8.0%: CPT | Performed by: INTERNAL MEDICINE

## 2021-08-30 PROCEDURE — 99213 OFFICE O/P EST LOW 20 MIN: CPT | Performed by: INTERNAL MEDICINE

## 2021-08-30 NOTE — PROGRESS NOTES
Sudhakar Maria (:  1965) is a 50 y.o. male,Established patient, here for evaluation of the following chief complaint(s):  Diabetes         ASSESSMENT/PLAN:  1. 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 ([facility]  Improved  - continue ozempic and lantus  -continue metformin    2. Type 2 diabetes mellitus with vascular disease ([facility]  improved    Return in about 6 months (around 2022) for Annual Physical.         Subjective   SUBJECTIVE/OBJECTIVE:  HPI   Treatment Adherence:   Medication compliance:  compliant most of the time  Diet compliance:  compliant most of the time  Weight trend: decreasing  Current exercise: walks 3 time(s) per week  Barriers: none    Diabetes Mellitus Type 2: Current symptoms/problems include none. Home blood sugar records: fasting range: < 120  Any episodes of hypoglycemia? no  Eye exam current (within one year): no  Tobacco history: He  reports that he has never smoked. He has never used smokeless tobacco.   Daily Aspirin? Yes    Hypertension:  Home blood pressure monitoring: No.  He is adherent to a low sodium diet. Patient denies chest pain, shortness of breath and headache. Antihypertensive medication side effects: no medication side effects noted. Use of agents associated with hypertension: none. Hyperlipidemia:  No new myalgias or GI upset on atorvastatin (Lipitor).        Lab Results   Component Value Date    LABA1C 7.2 2021    LABA1C 8.2 2021    LABA1C 7.7 2020     Lab Results   Component Value Date    LABMICR <1.20 2020    LABMICR YES 2020    CREATININE 1.3 2021     Lab Results   Component Value Date    ALT 14 2021    AST 18 2021     Lab Results   Component Value Date    CHOL 99 2021    TRIG 66 2021    HDL 47 2021    LDLCALC 39 2021          Review of Systems       Objective    Vitals:    21 1511   BP: 116/84   Site: Left Upper Arm   Position: Sitting   Cuff Size: Large Adult

## 2021-09-16 DIAGNOSIS — E11.3299 TYPE 2 DIABETES MELLITUS WITH BACKGROUND RETINOPATHY (HCC): ICD-10-CM

## 2021-09-16 DIAGNOSIS — E11.59 TYPE 2 DIABETES MELLITUS WITH VASCULAR DISEASE (HCC): ICD-10-CM

## 2021-09-16 RX ORDER — INSULIN GLARGINE 100 [IU]/ML
18 INJECTION, SOLUTION SUBCUTANEOUS DAILY
Qty: 15 PEN | Refills: 0 | Status: SHIPPED | OUTPATIENT
Start: 2021-09-16 | End: 2021-12-02 | Stop reason: SDUPTHER

## 2021-11-08 DIAGNOSIS — E11.59 TYPE 2 DIABETES MELLITUS WITH VASCULAR DISEASE (HCC): ICD-10-CM

## 2021-11-08 DIAGNOSIS — E11.3299 TYPE 2 DIABETES MELLITUS WITH BACKGROUND RETINOPATHY (HCC): ICD-10-CM

## 2021-11-08 RX ORDER — SEMAGLUTIDE 1.34 MG/ML
INJECTION, SOLUTION SUBCUTANEOUS
Qty: 12 PEN | Refills: 0 | Status: SHIPPED | OUTPATIENT
Start: 2021-11-08 | End: 2022-08-18

## 2021-11-08 NOTE — TELEPHONE ENCOUNTER
PT requests call from [facility] April. He wants to also refill Ozempic pens with [facility] delivery.  He stated his dose was increased recently and needs the updated dose

## 2021-11-23 RX ORDER — OMEPRAZOLE 20 MG/1
CAPSULE, DELAYED RELEASE ORAL
Qty: 30 CAPSULE | Refills: 3 | Status: SHIPPED | OUTPATIENT
Start: 2021-11-23 | End: 2022-02-09

## 2021-11-29 DIAGNOSIS — E11.59 TYPE 2 DIABETES MELLITUS WITH VASCULAR DISEASE (HCC): ICD-10-CM

## 2021-11-29 DIAGNOSIS — E11.3299 TYPE 2 DIABETES MELLITUS WITH BACKGROUND RETINOPATHY (HCC): ICD-10-CM

## 2021-11-30 LAB
ESTIMATED AVERAGE GLUCOSE: 145.6 MG/DL
FOLATE: 3.89 NG/ML (ref 4.78–24.2)
HBA1C MFR BLD: 6.7 %
VITAMIN B-12: 579 PG/ML (ref 211–911)

## 2021-12-02 ENCOUNTER — OFFICE VISIT (OUTPATIENT)
Dept: ENDOCRINOLOGY | Age: 56
End: 2021-12-02
Payer: COMMERCIAL

## 2021-12-02 VITALS
SYSTOLIC BLOOD PRESSURE: 149 MMHG | BODY MASS INDEX: 30.8 KG/M2 | HEART RATE: 87 BPM | WEIGHT: 232.4 LBS | HEIGHT: 73 IN | OXYGEN SATURATION: 99 % | DIASTOLIC BLOOD PRESSURE: 98 MMHG

## 2021-12-02 DIAGNOSIS — E11.69 DYSLIPIDEMIA ASSOCIATED WITH TYPE 2 DIABETES MELLITUS (HCC): ICD-10-CM

## 2021-12-02 DIAGNOSIS — E11.59 TYPE 2 DIABETES MELLITUS WITH VASCULAR DISEASE (HCC): ICD-10-CM

## 2021-12-02 DIAGNOSIS — I10 ESSENTIAL HYPERTENSION: ICD-10-CM

## 2021-12-02 DIAGNOSIS — E78.5 DYSLIPIDEMIA ASSOCIATED WITH TYPE 2 DIABETES MELLITUS (HCC): ICD-10-CM

## 2021-12-02 DIAGNOSIS — E11.3299 TYPE 2 DIABETES MELLITUS WITH BACKGROUND RETINOPATHY (HCC): Primary | ICD-10-CM

## 2021-12-02 PROCEDURE — 99214 OFFICE O/P EST MOD 30 MIN: CPT | Performed by: INTERNAL MEDICINE

## 2021-12-02 RX ORDER — INSULIN GLARGINE 100 [IU]/ML
22 INJECTION, SOLUTION SUBCUTANEOUS EVERY MORNING
Qty: 5 PEN | Refills: 2 | Status: SHIPPED | OUTPATIENT
Start: 2021-12-02

## 2021-12-02 RX ORDER — LANOLIN ALCOHOL/MO/W.PET/CERES
400 CREAM (GRAM) TOPICAL DAILY
Qty: 90 TABLET | Refills: 3 | Status: SHIPPED | OUTPATIENT
Start: 2021-12-02

## 2021-12-02 NOTE — PROGRESS NOTES
Patient ID:   Phill Welsh is a 49 y.o. male    Chief Complaint:   Phill Welsh presents for an evaluation of Type 2 Diabetes Mellitus , Hyperlipidemia and hypertension. Subjective:   Type 2 Diabetes Mellitus diagnosed in 1998  On insulin since 2016     Previously followed by  endocrine and physician moved out. Records reviewed from care everywhere   Pioglitazone 30mg daily - stopped in Feb 2018   Sallye Floro stopped in summer 2018 for worsening CKD     S/p gastric sleeve in Sep 2019. presurgery weight 280 lbs. Now 232 lbs      Metformin ER 500mg, two tabs twice a day. Ozempic 1.0 mg weekly   Lantus 22 units in am      Checks blood sugars 2 times per day. Reportedly    AM: 90 or low 100's   Lunch:  Supper: Reportedly   HS:     Hypoglycemias: Morning hypoglycemias have resolved since moving lantus in am. No episodes now. Awareness present in 70's    Meals: Three, dinner is big. No snacks, diet soda once a day   Exercise: work out at [employer] [place], walking 45 minutes on treadmill, 3x per week. Denies chest pain, exertional dyspnea. CVA in Aug 2016. No residual deficits. Denies smoking. Currently on ASA 81 mg daily     The following portions of the patient's history were reviewed and updated as appropriate:       Family History   Problem Relation Age of Onset    Heart Attack Mother     Diabetes Mother     High Blood Pressure Mother     Colon Cancer Sister          Social History     Socioeconomic History    Marital status:      Spouse name: Not on file    Number of children: Not on file    Years of education: Not on file    Highest education level: Not on file   Occupational History    Not on file   Tobacco Use    Smoking status: Never Smoker    Smokeless tobacco: Never Used   Vaping Use    Vaping Use: Never used   Substance and Sexual Activity    Alcohol use:  Yes     Alcohol/week: 1.0 standard drink     Types: 1 Cans of beer per week     Comment: socially     Drug use: No    Sexual activity: Yes     Partners: Female   Other Topics Concern    Not on file   Social History Narrative    Not on file     Social Determinants of Health     Financial Resource Strain: Low Risk     Difficulty of Paying Living Expenses: Not hard at all   Food Insecurity: No Food Insecurity    Worried About Running Out of Food in the Last Year: Never true    [address] in the Last Year: Never true   Transportation Needs:     Lack of Transportation (Medical): Not on file    Lack of Transportation (Non-Medical):  Not on file   Physical Activity:     Days of Exercise per Week: Not on file    Minutes of Exercise per Session: Not on file   Stress:     Feeling of Stress : Not on file   Social Connections:     Frequency of Communication with Friends and Family: Not on file    Frequency of Social Gatherings with Friends and Family: Not on file    Attends Hindu Services: Not on file    Active Member of [address] [place] [address] or Organizations: Not on file    Attends Club or Organization Meetings: Not on file    Marital Status: Not on file   Intimate Partner Violence:     Fear of Current or Ex-Partner: Not on file    Emotionally Abused: Not on file    Physically Abused: Not on file    Sexually Abused: Not on file   Housing Stability:     Unable to Pay for Housing in the Last Year: Not on file    Number of [place] in the Last Year: Not on file    Unstable Housing in the Last Year: Not on file       Past Medical History:   Diagnosis Date    [facility]     CKD (chronic kidney disease)     Diabetes mellitus ([facility]     GERD (gastroesophageal reflux disease)     Hypertension     Sleep apnea     no cpap       Past Surgical History:   Procedure Laterality Date    COLONOSCOPY N/A 6/24/2021    COLONOSCOPY POLYPECTOMY SNARE/COLD OF 1 DESCENDING COLON POLYP AND 2 RECTAL  POLYPS performed by Heather Patel MD at [address] [place] [address] 9/25/2019    FLEXIBLE CYSTOSCOPY, DILATION, POTTS CATHETER INSERTION performed by Jie Rai MD at [facility]      right knee     SIGMOIDOSCOPY N/A 6/23/2021    SIGMOIDOSCOPY DIAGNOSTIC FLEXIBLE performed by Chanda Ng MD at [address] [place] [address] N/A 9/25/2019    LAPAROSCOPIC SLEEVE GASTRECTOMY-ETHICON performed by Devante Sandoval MD at [place] [address] N/A 4/12/2019    EGD BIOPSY performed by Devante Sandoval MD at [address] [place] [address]         Allergies   Allergen Reactions    Hctz [Hydrochlorothiazide]      cramps    Penicillins Hives         Current Outpatient Medications:     LANTUS SOLOSTAR 100 UNIT/ML injection pen, Inject 22 Units into the skin every morning, Disp: 5 pen, Rfl: 2    folic acid (FOLVITE) 494 MCG tablet, Take 1 tablet by mouth daily, Disp: 90 tablet, Rfl: 3    omeprazole (PRILOSEC) 20 MG delayed release capsule, TAKE 1 CAPSULE BY MOUTH ONE TIME A DAY, Disp: 30 capsule, Rfl: 3    Semaglutide, 1 MG/DOSE, (OZEMPIC, 1 MG/DOSE,) 2 MG/1.5ML SOPN, 1mg subcutaneously once a week, Disp: 12 pen, Rfl: 0    blood glucose test strips (PRODIGY NO CODING BLOOD GLUC) strip, 2 each by In Vitro route daily As needed. , Disp: 200 each, Rfl: 3    sildenafil (VIAGRA) 100 MG tablet, Take 1 tablet by mouth daily as needed for Erectile Dysfunction, Disp: 30 tablet, Rfl: 5    Omega-3 Fatty Acids (OMEGA-3 FISH OIL PO), Take by mouth, Disp: , Rfl:     MULTIPLE VITAMIN PO, Take by mouth, Disp: , Rfl:     atorvastatin (LIPITOR) 80 MG tablet, TAKE 1 TABLET BY MOUTH ONE TIME A DAY, Disp: 90 tablet, Rfl: 3    metFORMIN (GLUCOPHAGE-XR) 500 MG extended release tablet, TAKE TWO TABLETS BY MOUTH TWICE A DAY WITH MEALS, Disp: 360 tablet, Rfl: 5    Lancet Devices (PRODIGY LANCING DEVICE) MISC, Use daily to manage blood sugar, Disp: 1 each, Rfl: 1    lisinopril (PRINIVIL;ZESTRIL) 20 MG tablet, Take 1 tablet by mouth daily, Disp: 90 tablet, Rfl: 1   Blood Glucose Monitoring Suppl (PRODIGY POCKET BLOOD GLUCOSE) w/Device KIT, Use daily to monitor blood sugar level, Disp: 1 kit, Rfl: 1    Insulin Pen Needle (PEN NEEDLES) 32G X 4 MM MISC, Use BID with insulin and victoza, Disp: 200 each, Rfl: 3    aspirin EC 81 MG EC tablet, Take 81 mg by mouth daily, Disp: , Rfl:     NIFEdipine (PROCARDIA XL) 30 MG extended release tablet, Take 1 tablet by mouth 2 times daily, Disp: 60 tablet, Rfl: 0    metoprolol tartrate (LOPRESSOR) 50 MG tablet, Take 1 tablet by mouth 2 times daily, Disp: 60 tablet, Rfl: 0    PRODIGY LANCETS 28G MISC, 3 each by Does not apply route daily, Disp: 100 each, Rfl: 3    furosemide (LASIX) 20 MG tablet, Take 20 mg by mouth daily as needed, Disp: , Rfl:       Review of Systems:    Constitutional: Negative for fever, chills, and unexpected weight change. HENT: Negative for congestion, ear pain, rhinorrhea,  sore throat and trouble swallowing. Eyes: Negative for photophobia, redness, itching. Respiratory: Negative for cough, shortness of breath and sputum. Cardiovascular: Negative for chest pain, palpitations and leg swelling. Gastrointestinal: Negative for nausea, vomiting, abdominal pain, diarrhea, constipation. Endocrine: Negative for cold intolerance, heat intolerance, polydipsia, polyphagia and polyuria. Genitourinary: Negative for dysuria, urgency, frequency, hematuria and flank pain. Musculoskeletal: Negative for myalgias, back pain, arthralgias and neck pain. Skin/Nail: Negative for rash, itching. Normal nails. Neurological: Negative for seizures, weakness, light-headedness, numbness and headaches. Hematological/ Lymph nodes: Negative for adenopathy. Does not bruise/bleed easily. Psychiatric/Behavioral: Negative for suicidal ideas, depression, anxiety, sleep disturbance and decreased concentration.           Objective:   Physical Exam:  BP (!) 140/99 (Site: Left Upper Arm, Position: Sitting, Cuff Size: Large Adult) 85.7  47 - 244 pg/mL Final    PSA 04/22/2021 1.64  0.00 - 4.00 ng/mL Final   Orders Only on 04/06/2021   Component Date Value Ref Range Status    Hemoglobin A1C 04/06/2021 8.2  See comment % Final    eAG 04/06/2021 188.6  mg/dL Final    WBC 04/06/2021 5.4  4.0 - 11.0 K/uL Final    RBC 04/06/2021 4.86  4.20 - 5.90 M/uL Final    Hemoglobin 04/06/2021 13.2* 13.5 - 17.5 g/dL Final    Hematocrit 04/06/2021 40.2* 40.5 - 52.5 % Final    MCV 04/06/2021 82.8  80.0 - 100.0 fL Final    MCH 04/06/2021 27.1  26.0 - 34.0 pg Final    MCHC 04/06/2021 32.8  31.0 - 36.0 g/dL Final    RDW 04/06/2021 15.5* 12.4 - 15.4 % Final    Platelets 14/44/7196 178  135 - 450 K/uL Final    MPV 04/06/2021 9.8  5.0 - 10.5 fL Final    SLIDE REVIEW 04/06/2021 see below   Final    Neutrophils % 04/06/2021 19.0  % Final    Lymphocytes % 04/06/2021 74.0  % Final    Monocytes % 04/06/2021 6.0  % Final    Eosinophils % 04/06/2021 1.0  % Final    Basophils % 04/06/2021 0.0  % Final    Neutrophils Absolute 04/06/2021 1.0* 1.7 - 7.7 K/uL Final    Lymphocytes Absolute 04/06/2021 4.0  1.0 - 5.1 K/uL Final    Monocytes Absolute 04/06/2021 0.3  0.0 - 1.3 K/uL Final    Eosinophils Absolute 04/06/2021 0.1  0.0 - 0.6 K/uL Final    Basophils Absolute 04/06/2021 0.0  0.0 - 0.2 K/uL Final    Anisocytosis 04/06/2021 Occasional*  Final    Sodium 04/06/2021 136  136 - 145 mmol/L Final    Potassium 04/06/2021 3.9  3.5 - 5.1 mmol/L Final    Chloride 04/06/2021 100  99 - 110 mmol/L Final    CO2 04/06/2021 25  21 - 32 mmol/L Final    Anion Gap 04/06/2021 11  3 - 16 Final    Glucose 04/06/2021 84  70 - 99 mg/dL Final    BUN 04/06/2021 19  7 - 20 mg/dL Final    CREATININE 04/06/2021 1.3  0.9 - 1.3 mg/dL Final    GFR Non- 04/06/2021 58* >60 Final    GFR  04/06/2021 >60  >60 Final    Calcium 04/06/2021 9.5  8.3 - 10.6 mg/dL Final    Total Protein 04/06/2021 7.0  6.4 - 8.2 g/dL Final    Albumin 04/06/2021 4.4 3.4 - 5.0 g/dL Final    Albumin/Globulin Ratio 04/06/2021 1.7  1.1 - 2.2 Final    Total Bilirubin 04/06/2021 0.6  0.0 - 1.0 mg/dL Final    Alkaline Phosphatase 04/06/2021 81  40 - 129 U/L Final    ALT 04/06/2021 14  10 - 40 U/L Final    AST 04/06/2021 18  15 - 37 U/L Final    Globulin 04/06/2021 2.6  g/dL Final    Iron 04/06/2021 99  59 - 158 ug/dL Final    TIBC 04/06/2021 271  260 - 445 ug/dL Final    Iron Saturation 04/06/2021 37  20 - 50 % Final    Cholesterol, Total 04/06/2021 162  0 - 199 mg/dL Final    Triglycerides 04/06/2021 57  0 - 150 mg/dL Final    HDL 04/06/2021 48  40 - 60 mg/dL Final    LDL Calculated 04/06/2021 103* <100 mg/dL Final    VLDL Cholesterol Calculated 04/06/2021 11  Not Established mg/dL Final    TSH 04/06/2021 1.76  0.27 - 4.20 uIU/mL Final    Vitamin A 04/06/2021 0.66  0.30 - 1.20 mg/L Final    Vitamin A, Interp 04/06/2021 Normal   Final    RETINYL PALMITATE 04/06/2021 0.02  0.00 - 0.10 mg/L Final    Vitamin B1,Whole Blood 04/06/2021 174  70 - 180 nmol/L Final    Vitamin B-12 04/06/2021 684  211 - 911 pg/mL Final    Folate 04/06/2021 18.54  4.78 - 24.20 ng/mL Final    Vit D, 25-Hydroxy 04/06/2021 39.3  >=30 ng/mL Final    Alpha-Tocopherol 04/06/2021 10.0  5.5 - 18.0 mg/L Final    Gamma-Tocopherol 04/06/2021 1.0  0.0 - 6.0 mg/L Final   Orders Only on 12/28/2020   Component Date Value Ref Range Status    Hemoglobin A1C 12/28/2020 7.7  See comment % Final    eAG 12/28/2020 174.3  mg/dL Final    Cholesterol, Fasting 12/28/2020 93  0 - 199 mg/dL Final    Triglyceride, Fasting 12/28/2020 61  0 - 150 mg/dL Final    HDL 12/28/2020 44  40 - 60 mg/dL Final    LDL Calculated 12/28/2020 37  <100 mg/dL Final    VLDL Cholesterol Calculated 12/28/2020 12  Not Established mg/dL Final    Sodium 12/28/2020 143  136 - 145 mmol/L Final    Potassium 12/28/2020 4.4  3.5 - 5.1 mmol/L Final    Chloride 12/28/2020 107  99 - 110 mmol/L Final    CO2 12/28/2020 22  21 - 32 mmol/L Final    Anion Gap 12/28/2020 14  3 - 16 Final    Glucose 12/28/2020 82  70 - 99 mg/dL Final    BUN 12/28/2020 22* 7 - 20 mg/dL Final    CREATININE 12/28/2020 1.2  0.9 - 1.3 mg/dL Final    GFR Non- 12/28/2020 >60  >60 Final    GFR  12/28/2020 >60  >60 Final    Calcium 12/28/2020 9.5  8.3 - 10.6 mg/dL Final    Total Protein 12/28/2020 6.4  6.4 - 8.2 g/dL Final    Albumin 12/28/2020 4.1  3.4 - 5.0 g/dL Final    Albumin/Globulin Ratio 12/28/2020 1.8  1.1 - 2.2 Final    Total Bilirubin 12/28/2020 0.7  0.0 - 1.0 mg/dL Final    Alkaline Phosphatase 12/28/2020 86  40 - 129 U/L Final    ALT 12/28/2020 16  10 - 40 U/L Final    AST 12/28/2020 17  15 - 37 U/L Final    Globulin 12/28/2020 2.3  g/dL Final    Microalbumin, Random Urine 12/28/2020 <1.20  <2.0 mg/dL Final    Creatinine, Ur 12/28/2020 122.3  39.0 - 259.0 mg/dL Final    Microalbumin Creatinine Ratio 12/28/2020 see below  0.0 - 30.0 mg/g Final    Color, UA 12/28/2020 Yellow  Straw/Yellow Final    Clarity, UA 12/28/2020 Clear  Clear Final    Glucose, Ur 12/28/2020 Negative  Negative mg/dL Final    Bilirubin Urine 12/28/2020 Negative  Negative Final    Ketones, Urine 12/28/2020 Negative  Negative mg/dL Final    Specific [place], UA 12/28/2020 1.025  1.005 - 1.030 Final    Blood, Urine 12/28/2020 Negative  Negative Final    pH, UA 12/28/2020 5.5  5.0 - 8.0 Final    Protein, UA 12/28/2020 Negative  Negative mg/dL Final    Urobilinogen, Urine 12/28/2020 0.2  <2.0 E.U./dL Final    Nitrite, Urine 12/28/2020 Negative  Negative Final    Leukocyte Esterase, Urine 12/28/2020 TRACE* Negative Final    Microscopic Examination 12/28/2020 YES   Final    Urine Type 12/28/2020 Voided   Final    Urine Reflex to Culture 12/28/2020 Yes   Final    Mucus, UA 12/28/2020 Rare* None Seen /LPF Final    RBC, UA 12/28/2020 0-2  0 - 4 /HPF Final    Bacteria, UA 12/28/2020 2+* None Seen /HPF Final    Urinalysis Comments 12/28/2020 see below   Final    Hyaline Casts, UA 12/28/2020 1  0 - 8 /LPF Final    WBC, UA 12/28/2020 12* 0 - 5 /HPF Final    Epithelial Cells, UA 12/28/2020 0  0 - 5 /HPF Final    Organism 12/28/2020 Gram negative veronica*  Final    Urine Culture, Routine 12/28/2020    Final                    Value:<10,000 CFU/ml  No further workup             Assessment and Chidi Chand was seen today for diabetes. 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 (HCC)  -     LANTUS SOLOSTAR 100 UNIT/ML injection pen; Inject 22 Units into the skin every morning  -     folic acid (FOLVITE) 002 MCG tablet; Take 1 tablet by mouth daily  -     Hemoglobin A1C; Future  -     Microalbumin / Creatinine Urine Ratio; Future  -     Vitamin B12 & Folate; Future    Type 2 diabetes mellitus with vascular disease (HCC)  -     LANTUS SOLOSTAR 100 UNIT/ML injection pen; Inject 22 Units into the skin every morning  -     folic acid (FOLVITE) 963 MCG tablet; Take 1 tablet by mouth daily  -     Hemoglobin A1C; Future  -     Microalbumin / Creatinine Urine Ratio; Future  -     Vitamin B12 & Folate; Future    Dyslipidemia associated with type 2 diabetes mellitus ([facility]    Essential hypertension          1: 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 Check Blood sugars 1-2 times per day. Log them along with insulin and send them every 2 weeks. Call for blood sugars less than 60 or more than 400. Eye exam: Last exam in April 2021, has background  the day in addition to formal exercise:  Walk instead of drive whenever possible  Take the stairs instead of the elevator  Work in the garden  Park to the far end of the parking lot to add more walking steps to destination      Electronically signed by Vladimir Page MD on 12/2/2021 at 4:30 PM

## 2021-12-07 ENCOUNTER — TELEPHONE (OUTPATIENT)
Dept: PHARMACY | Facility: CLINIC | Age: 56
End: 2021-12-07

## 2021-12-07 NOTE — TELEPHONE ENCOUNTER
According to our records, patient is missing the following requirements that must be completed by December 31st, 2021:   Program Requirements that need to be completed by December 31st, 2021 to remain eligible for program:     Flu vaccination (once yearly)      Spoke to patient at home/cell number and advised them of the above information. Patient advised that he knows that he needs to complete the flu vaccination as one of the requirements for the DM Program and is going to schedule an appointment to receive it.     Denny De Anda, [facility]   Department, toll free: [phone] Option #3

## 2021-12-07 NOTE — TELEPHONE ENCOUNTER
For [place] in place:  No   Recommendation Provided To: Patient/Caregiver: 1 via Telephone   Gap Closed?: No    Intervention Accepted By: Patient/Caregiver: 0   Time Spent (min): 10

## 2022-01-06 ENCOUNTER — NURSE ONLY (OUTPATIENT)
Dept: INTERNAL MEDICINE CLINIC | Age: 57
End: 2022-01-06
Payer: COMMERCIAL

## 2022-01-06 DIAGNOSIS — Z23 NEED FOR INFLUENZA VACCINATION: Primary | ICD-10-CM

## 2022-01-06 PROCEDURE — 90674 CCIIV4 VAC NO PRSV 0.5 ML IM: CPT | Performed by: INTERNAL MEDICINE

## 2022-01-06 PROCEDURE — 99999 PR OFFICE/OUTPT VISIT,PROCEDURE ONLY: CPT | Performed by: INTERNAL MEDICINE

## 2022-01-06 PROCEDURE — 90471 IMMUNIZATION ADMIN: CPT | Performed by: INTERNAL MEDICINE

## 2022-01-06 NOTE — PROGRESS NOTES
Vaccine Information Sheet, \"Influenza - Inactivated\"  given to Ne Staton, or parent/legal guardian of  Ne Staton and verbalized understanding. Patient responses:    Have you ever had a reaction to a flu vaccine? No  Are you able to eat eggs without adverse effects? Yes  Do you have any current illness? No  Have you ever had Guillian Lawrence Syndrome? No    Flu vaccine given per order. Please see immunization tab.

## 2022-02-08 DIAGNOSIS — I10 ESSENTIAL HYPERTENSION: ICD-10-CM

## 2022-02-09 RX ORDER — LISINOPRIL 20 MG/1
TABLET ORAL
Qty: 90 TABLET | Refills: 1 | Status: SHIPPED | OUTPATIENT
Start: 2022-02-09 | End: 2022-08-29

## 2022-02-09 RX ORDER — OMEPRAZOLE 20 MG/1
CAPSULE, DELAYED RELEASE ORAL
Qty: 30 CAPSULE | Refills: 3 | Status: SHIPPED | OUTPATIENT
Start: 2022-02-09 | End: 2022-04-14

## 2022-02-09 NOTE — TELEPHONE ENCOUNTER
Requested Prescriptions     Pending Prescriptions Disp Refills    lisinopril (PRINIVIL;ZESTRIL) 20 MG tablet [Pharmacy Med Name: LISINOPRIL 20MG TABS] 90 tablet 1     Sig: TAKE 1 TABLET BY MOUTH ONE TIME A DAY     Last refilled:8/3/2020  Last seen: 12/2/2021  Follow up: 4/7/2022

## 2022-02-23 ENCOUNTER — TELEPHONE (OUTPATIENT)
Dept: PHARMACY | Facility: CLINIC | Age: 57
End: 2022-02-23

## 2022-02-23 NOTE — TELEPHONE ENCOUNTER
[facility],[address] Employee Diabetes Program - Be Well With Diabetes  =================================================================  Virginia Montes is a 49 y.o. male enrolled in the [address] [place] [address] with patient for yearly visit to discuss enrollment in program. Patient provided writer with verbal consent to remain in the program for this year. Program Requirements to be completed in 2022:  1. Two office visits in 2022 for diabetes (1st must be completed by 7/1/2022)  2. Two A1c levels in 2022 (1st must be completed by 7/1/2022)  3. Yearly lipid panel  4. Yearly urine microalbumin test  5. Diabetes education if A1c is over 8%  6. Taking an ACE/ARB medication if appropriate  7. Taking a statin medication if appropriate  8. Pneumonia vaccine up to date  5. Yearly flu shot (for 9882-0554 season)  10. Medication adherence over 70%. Patients who fall below 70% will be contacted by a pharmacist in the program to discuss any issues. Are you currently using [facility] (home delivery pharmacy) to get your prescriptions? Yes    Would you like to speak with a pharmacist about the program, your diabetes, and/or your prescriptions? No    [facility] Clinical [facility] Team  Phone: toll free [phone], option #3  Fax [phone]  Email: [email or web address]    For Pharmacy Admin Tracking Only    PHSO: Yes  Recommended intervention potential cost savings: 1  Time Spent (min): 15

## 2022-02-28 ENCOUNTER — OFFICE VISIT (OUTPATIENT)
Dept: INTERNAL MEDICINE CLINIC | Age: 57
End: 2022-02-28
Payer: COMMERCIAL

## 2022-02-28 VITALS
OXYGEN SATURATION: 97 % | WEIGHT: 228.4 LBS | TEMPERATURE: 97.7 F | HEART RATE: 79 BPM | SYSTOLIC BLOOD PRESSURE: 124 MMHG | DIASTOLIC BLOOD PRESSURE: 82 MMHG | HEIGHT: 73 IN | BODY MASS INDEX: 30.27 KG/M2

## 2022-02-28 DIAGNOSIS — G56.02 CARPAL TUNNEL SYNDROME OF LEFT WRIST: ICD-10-CM

## 2022-02-28 DIAGNOSIS — E11.3299 TYPE 2 DIABETES MELLITUS WITH BACKGROUND RETINOPATHY (HCC): ICD-10-CM

## 2022-02-28 DIAGNOSIS — Z23 NEED FOR VACCINATION AGAINST STREPTOCOCCUS PNEUMONIAE: ICD-10-CM

## 2022-02-28 DIAGNOSIS — Z23 NEED FOR SHINGLES VACCINE: ICD-10-CM

## 2022-02-28 DIAGNOSIS — Z00.00 WELL ADULT EXAM: Primary | ICD-10-CM

## 2022-02-28 PROBLEM — N18.4 ACUTE RENAL FAILURE SUPERIMPOSED ON STAGE 4 CHRONIC KIDNEY DISEASE (HCC): Status: RESOLVED | Noted: 2019-09-25 | Resolved: 2022-02-28

## 2022-02-28 PROBLEM — N17.9 ACUTE RENAL FAILURE SUPERIMPOSED ON STAGE 4 CHRONIC KIDNEY DISEASE (HCC): Status: RESOLVED | Noted: 2019-09-25 | Resolved: 2022-02-28

## 2022-02-28 PROCEDURE — 90750 HZV VACC RECOMBINANT IM: CPT | Performed by: INTERNAL MEDICINE

## 2022-02-28 PROCEDURE — 90732 PPSV23 VACC 2 YRS+ SUBQ/IM: CPT | Performed by: INTERNAL MEDICINE

## 2022-02-28 PROCEDURE — 90472 IMMUNIZATION ADMIN EACH ADD: CPT | Performed by: INTERNAL MEDICINE

## 2022-02-28 PROCEDURE — 90471 IMMUNIZATION ADMIN: CPT | Performed by: INTERNAL MEDICINE

## 2022-02-28 PROCEDURE — 99396 PREV VISIT EST AGE 40-64: CPT | Performed by: INTERNAL MEDICINE

## 2022-02-28 RX ORDER — MULTIVIT-MIN/IRON FUM/FOLIC AC 7.5 MG-4
1 TABLET ORAL DAILY
Qty: 30 TABLET | Refills: 11 | Status: SHIPPED | OUTPATIENT
Start: 2022-02-28

## 2022-02-28 NOTE — PROGRESS NOTES
2022    Deb Lynn (:  1965) is a 49 y.o. male, here for a preventive medicine evaluation. Patient  Has type 2 diabetes. He has been doing well. Was 6.7% in 2021. He reports some left arm weakness with decreased hand strength. Patient Active Problem List   Diagnosis    Essential hypertension    Late effect of cerebrovascular accident (CVA)    Chronic left shoulder pain    Central sleep apnea due to medical condition    Abnormal EKG    Cerebrovascular accident (CVA) due to thrombosis of basilar artery ([address]    Contact dermatitis and other eczema, due to unspecified cause    Family history of malignant neoplasm of gastrointestinal tract    Noncompliance    Obesity due to excess calories    Sciatica    Uncontrolled type 2 diabetes mellitus with diabetic nephropathy, with long-term current use of insulin (HCC)    Type 2 diabetes mellitus with vascular disease (HCC)    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 ([address]    Severe obesity (BMI 35.0-39. 9) with comorbidity ([facility])    Chronic GERD    Obstructive sleep apnea    Helicobacter pylori (H. pylori) infection    CKD (chronic kidney disease) stage 3, GFR 30-59 ml/min ([facility])    Cortical senile cataract, bilateral    Hypertensive retinopathy, bilateral    Lattice degeneration, right    Nuclear sclerotic cataract, bilateral    Round hole, right    Serous retinal detachment, right eye    Fatty liver    Dyslipidemia associated with type 2 diabetes mellitus ([facility])    EKG abnormalities    Thiamin deficiency    CKD (chronic kidney disease) stage 4, GFR 15-29 ml/min ([facility])    S/P laparoscopic sleeve gastrectomy    Obesity (BMI 30.0-34. 9)    Hyperlipidemia    Morbid obesity due to excess calories ([address]    Diabetes mellitus type 2 in obese ([address]    Anemia    Renal osteodystrophy     Patient has not been to the dentist in the past year.     Patient does not text and drive    Patient does wear his seatbelt when driving      Review of Systems   Constitutional: Negative for chills and diaphoresis. HENT: Negative for drooling and ear discharge. Eyes: Negative for pain and redness. Respiratory: Negative for cough and choking. Prior to Visit Medications    Medication Sig Taking? Authorizing Provider   Multiple Vitamins-Minerals (MULTIVITAMIN WITH MINERALS) tablet Take 1 tablet by mouth daily Yes Radha Calvert MD   omeprazole (PRILOSEC) 20 MG delayed release capsule TAKE 1 CAPSULE BY MOUTH ONE TIME A DAY Yes NATIVIDAD Pinzon CNP   lisinopril (PRINIVIL;ZESTRIL) 20 MG tablet TAKE 1 TABLET BY MOUTH ONE TIME A DAY Yes Georgia Wade MD   LANTUS SOLOSTAR 100 UNIT/ML injection pen Inject 22 Units into the skin every morning Yes Georgia Wade MD   folic acid (FOLVITE) 760 MCG tablet Take 1 tablet by mouth daily Yes Georgia Wade MD   Semaglutide, 1 MG/DOSE, (OZEMPIC, 1 MG/DOSE,) 2 MG/1.5ML SOPN 1mg subcutaneously once a week Yes Georgia Wade MD   blood glucose test strips (PRODIGY NO CODING BLOOD GLUC) strip 2 each by In Vitro route daily As needed.  Yes Georgia Wade MD   furosemide (LASIX) 20 MG tablet Take 20 mg by mouth daily as needed Yes Historical Provider, MD   sildenafil (VIAGRA) 100 MG tablet Take 1 tablet by mouth daily as needed for Erectile Dysfunction Yes Radha Calvert MD   Omega-3 Fatty Acids (OMEGA-3 FISH OIL PO) Take by mouth Yes Historical Provider, MD   MULTIPLE VITAMIN PO Take by mouth Yes Historical Provider, MD   atorvastatin (LIPITOR) 80 MG tablet TAKE 1 TABLET BY MOUTH ONE TIME A DAY Yes Georgia Wade MD   metFORMIN (GLUCOPHAGE-XR) 500 MG extended release tablet TAKE TWO TABLETS BY MOUTH TWICE A DAY WITH MEALS Yes Georgia Wade MD   Lancet Devices (PRODIGY LANCING DEVICE) MISC Use daily to manage blood sugar Yes Georgia Wade MD   Blood Glucose Monitoring Suppl (PRODIGY POCKET BLOOD GLUCOSE) w/Device KIT Use daily to monitor blood sugar level Yes Jeannine Haile MD   Insulin Pen Needle (PEN NEEDLES) 32G X 4 MM MISC Use BID with insulin and victoza Yes Jeannine Haile MD   aspirin EC 81 MG EC tablet Take 81 mg by mouth daily Yes Historical Provider, MD   NIFEdipine (PROCARDIA XL) 30 MG extended release tablet Take 1 tablet by mouth 2 times daily Yes NATIVIDAD [place] - BRITANY   metoprolol tartrate (LOPRESSOR) 50 MG tablet Take 1 tablet by mouth 2 times daily Yes NATIVIDAD [place] CNP   PRODIGY LANCETS 28G MISC 3 each by Does not apply route daily Yes Jeannine Haile MD        Allergies   Allergen Reactions    Hctz [Hydrochlorothiazide]      cramps    Penicillins Hives       Past Medical History:   Diagnosis Date    Cerebral artery occlusion with cerebral infarction ([facility])     CKD (chronic kidney disease)     Diabetes mellitus ([facility])     GERD (gastroesophageal reflux disease)     Hypertension     Sleep apnea     no cpap       Past Surgical History:   Procedure Laterality Date    COLONOSCOPY N/A 6/24/2021    COLONOSCOPY POLYPECTOMY SNARE/COLD OF 1 DESCENDING COLON POLYP AND 2 RECTAL  POLYPS performed by Leobardo Hernandez MD at [address] [place] [address]  9/25/2019    FLEXIBLE CYSTOSCOPY, DILATION, POTTS CATHETER INSERTION performed by Margarita Tejada MD at [place] [address].      right knee     SIGMOIDOSCOPY N/A 6/23/2021    SIGMOIDOSCOPY DIAGNOSTIC FLEXIBLE performed by Ziyad Medina MD at [address] [place] [address] N/A 9/25/2019    LAPAROSCOPIC SLEEVE GASTRECTOMY-ETHICON performed by Huma Thakur MD at [address] [place] [address] N/A 4/12/2019    [facility]  Marital status:      Spouse name: Not on file    Number of children: Not on file    Years of education: Not on file    Highest education level: Not on file   Occupational History    Not on file   Tobacco Use    Smoking status: Never Smoker    Smokeless tobacco: Never Used   Vaping Use    Vaping Use: Never used   Substance and Sexual Activity    Alcohol use: Yes     Alcohol/week: 1.0 standard drink     Types: 1 Cans of beer per week     Comment: socially     Drug use: No    Sexual activity: Yes     Partners: Female   Other Topics Concern    Not on file   Social History Narrative    Not on file     Social Determinants of Health     Financial Resource Strain: Low Risk     Difficulty of Paying Living Expenses: Not hard at all   Food Insecurity: No Food Insecurity    Worried About [facility] in the Last Year: Never true    [facility] in the Last Year: Never true   Transportation Needs:     Lack of Transportation (Medical): Not on file    Lack of Transportation (Non-Medical):  Not on file   Physical Activity:     Days of Exercise per Week: Not on file    Minutes of Exercise per Session: Not on file   Stress:     Feeling of Stress : Not on file   Social Connections:     Frequency of Communication with Friends and Family: Not on file    Frequency of Social Gatherings with Friends and Family: Not on file    Attends Holiness Services: Not on file    Active Member of [address] [place] [address] or Organizations: Not on file    Attends Club or Organization Meetings: Not on file    Marital Status: Not on file   Intimate Partner Violence:     Fear of Current or Ex-Partner: Not on file    Emotionally Abused: Not on file    Physically Abused: Not on file    Sexually Abused: Not on file   Housing Stability:     Unable to Pay for Housing in the Last Year: Not on file    Number of [place] in the Last Year: Not on file    Unstable Housing in the Last Year: Not on file        Family History   Problem Relation Age of Onset    Heart Attack Mother     Diabetes Mother     High Blood Pressure Mother     Colon Cancer Sister        ADVANCE DIRECTIVE: N, <no information>    Vitals:    02/28/22 1545   BP: 124/82   Site: Left Upper Arm   Position: Sitting   Cuff Size: Large Adult   Pulse: 79   Temp: 97.7 °F (36.5 °C)   TempSrc: Infrared   SpO2: 97%   Weight: 228 lb 6.4 oz (103.6 kg)   Height: 6' 1\" (1.854 m)     Estimated body mass index is 30.13 kg/m² as calculated from the following:    Height as of this encounter: 6' 1\" (1.854 m). Weight as of this encounter: 228 lb 6.4 oz (103.6 kg). Physical Exam  Constitutional:       General: He is not in acute distress. Appearance: Normal appearance. He is not ill-appearing. HENT:      Right Ear: Tympanic membrane and ear canal normal.      Left Ear: Tympanic membrane and ear canal normal.      Mouth/Throat:      Mouth: Mucous membranes are moist.      Pharynx: No oropharyngeal exudate or posterior oropharyngeal erythema. Cardiovascular:      Rate and Rhythm: Normal rate and regular rhythm. Pulmonary:      Effort: Pulmonary effort is normal. No respiratory distress. Breath sounds: No stridor. No wheezing or rhonchi. Musculoskeletal:      Left upper arm: No swelling, edema or deformity. Left hand: Decreased strength of finger abduction and wrist extension. Decreased sensation. Cervical back: No rigidity or tenderness. Neurological:      Mental Status: He is alert. No flowsheet data found.     Lab Results   Component Value Date    CHOL 99 07/08/2021    CHOL 162 04/06/2021    CHOL 96 02/28/2019    CHOLFAST 93 12/28/2020    CHOLFAST 75 07/17/2020    TRIG 66 07/08/2021    TRIG 57 04/06/2021    TRIG 77 02/28/2019    TRIGLYCFAST 61 12/28/2020    TRIGLYCFAST 57 07/17/2020    HDL 47 07/08/2021    HDL 48 04/06/2021    HDL 44 12/28/2020    LDLCALC 39 07/08/2021    LDLCALC 103 04/06/2021    LDLCALC 37 12/28/2020    GLUCOSE 84 04/06/2021    LABA1C 6.7 11/29/2021    LABA1C 7.2 07/08/2021    LABA1C 8.2 04/06/2021       The ASCVD Risk score (Jeevan Lebron, et al., 2013) failed to calculate for the following reasons: The patient has a prior MI or stroke diagnosis    Immunization History   Administered Date(s) Administered    COVID-19, Sherman Lowe, Primary or Immunocompromised, PF, 100mcg/0.5mL 04/21/2021, 05/19/2021    Influenza, MDCK Quadv, IM, PF (Flucelvax 2 yrs and older) 01/06/2022    Influenza, Quadv, IM, PF (6 mo and older Fluzone, Flulaval, Fluarix, and 3 yrs and older Afluria) 10/17/2018, 12/30/2019, 12/14/2020    Pneumococcal Polysaccharide ([id number]) 10/17/2018, 02/28/2022    Tdap (Boostrix, Adacel) 05/08/2015    Zoster Recombinant (Shingrix) 02/28/2022       Health Maintenance   Topic Date Due    Hepatitis C screen  Never done    Hepatitis B vaccine (1 of 3 - Risk 3-dose series) Never done    COVID-19 Vaccine (3 - Booster for Moderna series) 10/19/2021    Diabetic retinal exam  03/04/2022    Potassium monitoring  04/06/2022    Creatinine monitoring  04/06/2022    Depression Screen  04/22/2022    Shingles Vaccine (2 of 2) 04/25/2022    Colorectal Cancer Screen  06/24/2022    Lipid screen  07/08/2022    Diabetic foot exam  08/04/2022    A1C test (Diabetic or Prediabetic)  11/29/2022    Pneumococcal 0-64 years Vaccine (3 of 4 - PCV13) 02/28/2023    DTaP/Tdap/Td vaccine (2 - Td or Tdap) 05/08/2025    Flu vaccine  Completed    HIV screen  Completed    Hepatitis A vaccine  Aged Out    Hib vaccine  Aged Out    Meningococcal (ACWY) vaccine  Aged Out       Assessment & Plan   Well adult exam  -     Multiple Vitamins-Minerals (MULTIVITAMIN WITH MINERALS) tablet;  Take 1 tablet by mouth daily, Disp-30 tablet, R-11Normal  Need for vaccination against Streptococcus pneumoniae  -     PNEUMOVAX 23 subcutaneous/IM (Pneumococcal polysaccharide vaccine 23-valent >= 3yo)  Need for shingles vaccine  -     Zoster recombinant [facility])  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 ([address]  Stable  Continue current treatment    Carpal tunnel syndrome of left wrist  -     Amb External Referral To

## 2022-03-02 ASSESSMENT — ENCOUNTER SYMPTOMS
COUGH: 0
CHOKING: 0
EYE PAIN: 0
EYE REDNESS: 0

## 2022-03-31 DIAGNOSIS — E11.3299 TYPE 2 DIABETES MELLITUS WITH BACKGROUND RETINOPATHY (HCC): ICD-10-CM

## 2022-03-31 DIAGNOSIS — E11.59 TYPE 2 DIABETES MELLITUS WITH VASCULAR DISEASE (HCC): ICD-10-CM

## 2022-03-31 RX ORDER — METFORMIN HYDROCHLORIDE 500 MG/1
TABLET, EXTENDED RELEASE ORAL
Qty: 360 TABLET | Refills: 2 | Status: SHIPPED | OUTPATIENT
Start: 2022-03-31

## 2022-04-04 DIAGNOSIS — E11.3299 TYPE 2 DIABETES MELLITUS WITH BACKGROUND RETINOPATHY (HCC): ICD-10-CM

## 2022-04-04 DIAGNOSIS — E11.59 TYPE 2 DIABETES MELLITUS WITH VASCULAR DISEASE (HCC): ICD-10-CM

## 2022-04-05 LAB
CREATININE URINE: 196.3 MG/DL (ref 39–259)
ESTIMATED AVERAGE GLUCOSE: 151.3 MG/DL
FOLATE: 16.1 NG/ML (ref 4.78–24.2)
HBA1C MFR BLD: 6.9 %
MICROALBUMIN UR-MCNC: <1.2 MG/DL
MICROALBUMIN/CREAT UR-RTO: NORMAL MG/G (ref 0–30)
VITAMIN B-12: 613 PG/ML (ref 211–911)

## 2022-04-07 ENCOUNTER — OFFICE VISIT (OUTPATIENT)
Dept: ENDOCRINOLOGY | Age: 57
End: 2022-04-07
Payer: COMMERCIAL

## 2022-04-07 VITALS
OXYGEN SATURATION: 96 % | DIASTOLIC BLOOD PRESSURE: 99 MMHG | HEART RATE: 84 BPM | SYSTOLIC BLOOD PRESSURE: 132 MMHG | WEIGHT: 228 LBS | HEIGHT: 73 IN | BODY MASS INDEX: 30.22 KG/M2

## 2022-04-07 DIAGNOSIS — E78.5 DYSLIPIDEMIA ASSOCIATED WITH TYPE 2 DIABETES MELLITUS (HCC): ICD-10-CM

## 2022-04-07 DIAGNOSIS — E11.69 DYSLIPIDEMIA ASSOCIATED WITH TYPE 2 DIABETES MELLITUS (HCC): ICD-10-CM

## 2022-04-07 DIAGNOSIS — E11.59 TYPE 2 DIABETES MELLITUS WITH VASCULAR DISEASE (HCC): Primary | ICD-10-CM

## 2022-04-07 DIAGNOSIS — I10 ESSENTIAL HYPERTENSION: ICD-10-CM

## 2022-04-07 DIAGNOSIS — E11.3299 TYPE 2 DIABETES MELLITUS WITH BACKGROUND RETINOPATHY (HCC): ICD-10-CM

## 2022-04-07 PROCEDURE — 3044F HG A1C LEVEL LT 7.0%: CPT | Performed by: INTERNAL MEDICINE

## 2022-04-07 PROCEDURE — 99214 OFFICE O/P EST MOD 30 MIN: CPT | Performed by: INTERNAL MEDICINE

## 2022-04-07 NOTE — PROGRESS NOTES
Patient ID:   Benjamin Newby is a 49 y.o. male    Chief Complaint:   Benjamin Newby presents for an evaluation of Type 2 Diabetes Mellitus , Hyperlipidemia and hypertension. Subjective:   Type 2 Diabetes Mellitus diagnosed in 1998  On insulin since 2016     Previously followed by  endocrine and physician moved out. Records reviewed from care everywhere   Pioglitazone 30mg daily - stopped in Feb 2018   [facility] stopped in summer 2018 for worsening CKD     S/p gastric sleeve in Sep 2019. presurgery weight 280 lbs. Now 228 lbs      Metformin ER 500mg, two tabs twice a day. Ozempic 1.0 mg weekly on Monday   Lantus 22 units in am      Checks blood sugars 2 times per day. Reportedly    AM: 120 and under   Lunch:  Supper: Reportedly   HS:     Hypoglycemias: Morning hypoglycemias have resolved since moving lantus in am. No episodes now. Awareness present in 70's    Meals: Three, dinner is big. No snacks, diet soda once a day   Exercise: work out at in a community center. Lifting weights for toning up. Walking and cardio     Denies chest pain, exertional dyspnea. CVA in Aug 2016. No residual deficits. Denies smoking. Currently on ASA 81 mg daily     The following portions of the patient's history were reviewed and updated as appropriate:       Family History   Problem Relation Age of Onset    Heart Attack Mother     Diabetes Mother     High Blood Pressure Mother     Colon Cancer Sister          Social History     Socioeconomic History    Marital status:      Spouse name: Not on file    Number of children: Not on file    Years of education: Not on file    Highest education level: Not on file   Occupational History    Not on file   Tobacco Use    Smoking status: Never Smoker    Smokeless tobacco: Never Used   Vaping Use    Vaping Use: Never used   Substance and Sexual Activity    Alcohol use:  Yes     Alcohol/week: 1.0 standard drink     Types: 1 Cans of beer per week     Comment: CYSTOSCOPY  9/25/2019    FLEXIBLE CYSTOSCOPY, DILATION, POTTS CATHETER INSERTION performed by Melissa Sullivan MD at [facility].      right knee     SIGMOIDOSCOPY N/A 6/23/2021    SIGMOIDOSCOPY DIAGNOSTIC FLEXIBLE performed by Dominic Stauffer MD at [facility] N/A 9/25/2019    LAPAROSCOPIC SLEEVE GASTRECTOMY-ETHICON performed by Manoj Estrella MD at [facility] N/A 4/12/2019    EGD BIOPSY performed by Manoj Estrella MD at [address]         Allergies   Allergen Reactions    Hctz [Hydrochlorothiazide]      cramps    Penicillins Hives         Current Outpatient Medications:     metFORMIN (GLUCOPHAGE-XR) 500 MG extended release tablet, TAKE TWO TABLETS BY MOUTH TWICE A DAY WITH MEALS, Disp: 360 tablet, Rfl: 2    Multiple Vitamins-Minerals (MULTIVITAMIN WITH MINERALS) tablet, Take 1 tablet by mouth daily, Disp: 30 tablet, Rfl: 11    omeprazole (PRILOSEC) 20 MG delayed release capsule, TAKE 1 CAPSULE BY MOUTH ONE TIME A DAY, Disp: 30 capsule, Rfl: 3    lisinopril (PRINIVIL;ZESTRIL) 20 MG tablet, TAKE 1 TABLET BY MOUTH ONE TIME A DAY, Disp: 90 tablet, Rfl: 1    LANTUS SOLOSTAR 100 UNIT/ML injection pen, Inject 22 Units into the skin every morning, Disp: 5 pen, Rfl: 2    folic acid (FOLVITE) 912 MCG tablet, Take 1 tablet by mouth daily, Disp: 90 tablet, Rfl: 3    Semaglutide, 1 MG/DOSE, (OZEMPIC, 1 MG/DOSE,) 2 MG/1.5ML SOPN, 1mg subcutaneously once a week, Disp: 12 pen, Rfl: 0    blood glucose test strips (PRODIGY NO CODING BLOOD GLUC) strip, 2 each by In Vitro route daily As needed. , Disp: 200 each, Rfl: 3    sildenafil (VIAGRA) 100 MG tablet, Take 1 tablet by mouth daily as needed for Erectile Dysfunction, Disp: 30 tablet, Rfl: 5    Omega-3 Fatty Acids (OMEGA-3 FISH OIL PO), Take by mouth, Disp: , Rfl:     atorvastatin (LIPITOR) 80 MG tablet, TAKE 1 TABLET BY MOUTH ONE TIME A DAY, Disp: 90 tablet, Rfl: 3    Lancet Devices (PRODIGY LANCING DEVICE) MISC, Use daily to manage blood sugar, Disp: 1 each, Rfl: 1    Blood Glucose Monitoring Suppl (PRODIGY POCKET BLOOD GLUCOSE) w/Device KIT, Use daily to monitor blood sugar level, Disp: 1 kit, Rfl: 1    Insulin Pen Needle (PEN NEEDLES) 32G X 4 MM MISC, Use BID with insulin and victoza, Disp: 200 each, Rfl: 3    aspirin EC 81 MG EC tablet, Take 81 mg by mouth daily, Disp: , Rfl:     NIFEdipine (PROCARDIA XL) 30 MG extended release tablet, Take 1 tablet by mouth 2 times daily, Disp: 60 tablet, Rfl: 0    metoprolol tartrate (LOPRESSOR) 50 MG tablet, Take 1 tablet by mouth 2 times daily, Disp: 60 tablet, Rfl: 0    PRODIGY LANCETS 28G MISC, 3 each by Does not apply route daily, Disp: 100 each, Rfl: 3    furosemide (LASIX) 20 MG tablet, Take 20 mg by mouth daily as needed, Disp: , Rfl:       Review of Systems:    Constitutional: Negative for fever, chills, and unexpected weight change. HENT: Negative for congestion, ear pain, rhinorrhea,  sore throat and trouble swallowing. Eyes: Negative for photophobia, redness, itching. Respiratory: Negative for cough, shortness of breath and sputum. Cardiovascular: Negative for chest pain, palpitations and leg swelling. Gastrointestinal: Negative for nausea, vomiting, abdominal pain, diarrhea, constipation. Endocrine: Negative for cold intolerance, heat intolerance, polydipsia, polyphagia and polyuria. Genitourinary: Negative for dysuria, urgency, frequency, hematuria and flank pain. Musculoskeletal: Negative for myalgias, back pain, arthralgias and neck pain. Skin/Nail: Negative for rash, itching. Normal nails. Neurological: Negative for seizures, weakness, light-headedness, numbness and headaches. Hematological/ Lymph nodes: Negative for adenopathy. Does not bruise/bleed easily. Psychiatric/Behavioral: Negative for suicidal ideas, depression, anxiety, sleep disturbance and decreased concentration.           Objective: Physical Exam:  BP (!) 133/93 (Site: Right Upper Arm, Position: Sitting, Cuff Size: Large Adult)   Pulse 84   Ht 6' 1\" (1.854 m)   Wt 228 lb (103.4 kg)   SpO2 96%   BMI 30.08 kg/m²   Constitutional: Patient is oriented to person, place, and time. Patient appears well-developed and well-nourished. Cardiac: Normal heart sounds   Pulmonary/Chest: Effort normal and good air entry     Neurological: Patient is alert and oriented to person, place, and time. Psychiatric: Patient has a normal mood and affect.  Patient behavior is normal.     Lab Review:    Orders Only on 04/04/2022   Component Date Value Ref Range Status    Hemoglobin A1C 04/04/2022 6.9  See comment % Final    eAG 04/04/2022 151.3  mg/dL Final    Microalbumin, Random Urine 04/04/2022 <1.20  <2.0 mg/dL Final    Creatinine, Ur 04/04/2022 196.3  39.0 - 259.0 mg/dL Final    Microalbumin Creatinine Ratio 04/04/2022 see below  0.0 - 30.0 mg/g Final    Vitamin B-12 04/04/2022 613  211 - 911 pg/mL Final    Folate 04/04/2022 16.10  4.78 - 24.20 ng/mL Final   Orders Only on 11/29/2021   Component Date Value Ref Range Status    Hemoglobin A1C 11/29/2021 6.7  See comment % Final    eAG 11/29/2021 145.6  mg/dL Final    Vitamin B-12 11/29/2021 579  211 - 911 pg/mL Final    Folate 11/29/2021 3.89* 4.78 - 24.20 ng/mL Final   Office Visit on 08/04/2021   Component Date Value Ref Range Status    Diabetic Retinopathy 03/04/2021 Positive   Final   Orders Only on 07/08/2021   Component Date Value Ref Range Status    Hemoglobin A1C 07/08/2021 7.2  See comment % Final    eAG 07/08/2021 159.9  mg/dL Final    Cholesterol, Total 07/08/2021 99  0 - 199 mg/dL Final    Triglycerides 07/08/2021 66  0 - 150 mg/dL Final    HDL 07/08/2021 47  40 - 60 mg/dL Final    LDL Calculated 07/08/2021 39  <100 mg/dL Final    VLDL Cholesterol Calculated 07/08/2021 13  Not Established mg/dL Final   Admission on 06/24/2021, Discharged on 06/24/2021   Component Date Value Ref Range Status    POC Glucose 06/24/2021 129* 70 - 99 mg/dl Final    Performed on 06/24/2021 ACCU-CHEK   Final    POC Glucose 06/24/2021 124* 70 - 99 mg/dl Final    Performed on 06/24/2021 ACCU-CHEK   Final   Admission on 06/23/2021, Discharged on 06/23/2021   Component Date Value Ref Range Status    POC Glucose 06/23/2021 114* 70 - 99 mg/dl Final    Performed on 06/23/2021 ACCU-CHEK   Final   Office Visit on 04/22/2021   Component Date Value Ref Range Status    Testosterone 04/22/2021 399  220 - 1,000 ng/dL Final    Sex Hormone Binding 04/22/2021 30  11 - 80 nmol/L Final    Testosterone, Free 04/22/2021 85.7  47 - 244 pg/mL Final    PSA 04/22/2021 1.64  0.00 - 4.00 ng/mL Final           Assessment and Plan     Da Murray was seen today for diabetes. Diagnoses and all orders for this visit:    Type 2 diabetes mellitus with vascular disease ([facility]  -     Hemoglobin A1C; Future  -     Lipid, Fasting; Future    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 ([facility]  -     Hemoglobin A1C; Future  -     Lipid, Fasting; Future    Essential hypertension  -     Hemoglobin A1C; Future  -     Lipid, Fasting; Future    Dyslipidemia associated with type 2 diabetes mellitus ([facility]  -     Hemoglobin A1C; Future  -     Lipid, Fasting; Future          1: Type 2 DM complicated with macrovascular disease, nephropathy, retinopathy, neuropathy, nephropathy    Controlled A1C 6.9% <  6.7% < 7.2% < 8.2% < 7.7%  7.4% <10.1% < 7.8% <  8.6%  < 11.2% <  8.6% < 7.9% < 10%    Cr 1.3, GFR 58- April 2021      A1C of <8 would be acceptable because of microvascular and macrovascular complications     Blood sugars have improved     C/w Metformin Er 500mg two twice a day   C/w Ozempic 1mg weekly     Change Lantus to 20 units in the morning       All instructions provided in written. Check Blood sugars 1-2 times per day. Log them along with insulin and send them every 2 weeks. Call for blood sugars less than 60 or more than 400.      Eye exam: Last exam in April 2021, has background DR. Busch for eye exam in June 2022   Foot exam:  Aug 2021   Deformity/amputation: absent  Skin lesions/pre-ulcerative calluses: absent  Edema: right- negative, left- negative  Sensory exam: Monofilament sensation: normal  Pulses: normal, Vibration (128 Hz): neuropathy     Renal screen: April 2022   TSH screen:  April 2021      2: HTN   High today . He just took meds before coming in for appointment today    Follows with nephrologist   Runs <120/<85 at home      3: Hyperlipidemia   LDL: 39 < 103 < 37, HDL: 47, TGs: 66 - July 2021       C/w Atorvastatin 80mg     4: Morbid obesity   S/p sleeve   Gradually losing weight   Last weight 235 > 231 > 242 > 232 > 228 lbs     4: Folate deficiency   3.89 > 16 - April 7520   C/w folic acid 4 mg daily   Recheck in one year     RTC in 4 months with A1C, lipids     EDUCATION:   Greater than 50% of this follow-up visit was spent in general counseling regarding   obesity, diet, exercise, importance of adherence to insulin regime, recognition and treatment of hypo and hyperglycemia,  glucose logging, proper diabetes management, diabetic complications with poor management and the importance of glycemic control in order to avoid the complications of diabetes. Risks and potential complications of diabetes were reviewed with the patient. Diabetes health maintenance plan and follow-up were discussed and understood by the patient. We reviewed the importance of medication compliance and regular follow-up. Aggressive lifestyle modification was encouraged. Exercise Counselling: This patient is a candidate for regular physical exercise.  Instructions to perform the following types of exercise:  Swimming or water aerobic exercise  Brisk walking  Playing tennis  Stationary bicycle or elliptical indoor  Low impact aerobic exercise    Instructions given to exercise for the following duration:  30 minutes a day for five-seven days per week.    Following instructions for being active throughout the day in addition to formal exercise:  Walk instead of drive whenever possible  Take the stairs instead of the elevator  Work in the garden  Park to the far end of the parking lot to add more walking steps to destination      Electronically signed by Raymond Barrios MD on 4/7/2022 at 4:34 PM

## 2022-04-14 DIAGNOSIS — E11.3299 TYPE 2 DIABETES MELLITUS WITH BACKGROUND RETINOPATHY (HCC): ICD-10-CM

## 2022-04-14 DIAGNOSIS — E11.59 TYPE 2 DIABETES MELLITUS WITH VASCULAR DISEASE (HCC): ICD-10-CM

## 2022-04-14 DIAGNOSIS — E11.69 DYSLIPIDEMIA ASSOCIATED WITH TYPE 2 DIABETES MELLITUS (HCC): ICD-10-CM

## 2022-04-14 DIAGNOSIS — E78.5 DYSLIPIDEMIA ASSOCIATED WITH TYPE 2 DIABETES MELLITUS (HCC): ICD-10-CM

## 2022-04-14 RX ORDER — OMEPRAZOLE 20 MG/1
CAPSULE, DELAYED RELEASE ORAL
Qty: 30 CAPSULE | Refills: 3 | Status: SHIPPED | OUTPATIENT
Start: 2022-04-14 | End: 2022-05-16

## 2022-04-14 RX ORDER — ATORVASTATIN CALCIUM 80 MG/1
TABLET, FILM COATED ORAL
Qty: 90 TABLET | Refills: 3 | Status: SHIPPED | OUTPATIENT
Start: 2022-04-14

## 2022-05-16 RX ORDER — OMEPRAZOLE 20 MG/1
CAPSULE, DELAYED RELEASE ORAL
Qty: 30 CAPSULE | Refills: 3 | Status: SHIPPED | OUTPATIENT
Start: 2022-05-16

## 2022-06-26 ENCOUNTER — PATIENT MESSAGE (OUTPATIENT)
Dept: INTERNAL MEDICINE CLINIC | Age: 57
End: 2022-06-26

## 2022-06-27 RX ORDER — METOPROLOL TARTRATE 50 MG/1
50 TABLET, FILM COATED ORAL 2 TIMES DAILY
Qty: 60 TABLET | Refills: 0 | Status: SHIPPED | OUTPATIENT
Start: 2022-06-27 | End: 2022-08-01

## 2022-06-27 NOTE — TELEPHONE ENCOUNTER
From: Mary Lou Higuera  To: Dr. Orlando Harvey  Sent: 6/26/2022 9:56 PM EDT  Subject: Medication refill     Hi, I need my metoprolo refilled can you call in a prescription for me.   Veronica Cruz

## 2022-07-21 LAB — DIABETIC RETINOPATHY: NEGATIVE

## 2022-08-01 RX ORDER — METOPROLOL TARTRATE 50 MG/1
TABLET, FILM COATED ORAL
Qty: 60 TABLET | Refills: 0 | Status: SHIPPED | OUTPATIENT
Start: 2022-08-01 | End: 2022-08-29

## 2022-08-01 NOTE — TELEPHONE ENCOUNTER
Recent Visits  Date Type Provider Dept   02/28/22 Office Visit Edwige Epperson MD [facility]   08/30/21 Office Visit Edwige Epperson MD [facility]   05/21/21 Office Visit Edwige Epperson MD [facility]   04/22/21 Office Visit Edwige Epperson MD [facility]   Showing recent visits within past 540 days with a meds authorizing provider and meeting all other requirements  Future Appointments  No visits were found meeting these conditions.   Showing future appointments within next 150 days with a meds authorizing provider and meeting all other requirements     2/28/2022

## 2022-08-03 ENCOUNTER — TELEPHONE (OUTPATIENT)
Dept: INTERNAL MEDICINE CLINIC | Age: 57
End: 2022-08-03

## 2022-08-03 NOTE — TELEPHONE ENCOUNTER
Called [facility] and spoke with Renetta she states patient last refill was metoprolol tartrate 50 mg. But his previous PCP  had patient on Metoprolol succinate 50 mg.     Please advise

## 2022-08-03 NOTE — TELEPHONE ENCOUNTER
Patient's pharmacy would like to receive a call regarding the prescription for metoprolol, they want to make sure it was meant to be changed to the lopressor.

## 2022-08-15 DIAGNOSIS — E11.3299 TYPE 2 DIABETES MELLITUS WITH BACKGROUND RETINOPATHY (HCC): ICD-10-CM

## 2022-08-15 DIAGNOSIS — E11.69 DYSLIPIDEMIA ASSOCIATED WITH TYPE 2 DIABETES MELLITUS (HCC): ICD-10-CM

## 2022-08-15 DIAGNOSIS — E78.5 DYSLIPIDEMIA ASSOCIATED WITH TYPE 2 DIABETES MELLITUS (HCC): ICD-10-CM

## 2022-08-15 DIAGNOSIS — I10 ESSENTIAL HYPERTENSION: ICD-10-CM

## 2022-08-15 DIAGNOSIS — E11.59 TYPE 2 DIABETES MELLITUS WITH VASCULAR DISEASE (HCC): ICD-10-CM

## 2022-08-15 LAB
CHOLESTEROL, FASTING: 87 MG/DL (ref 0–199)
HDLC SERPL-MCNC: 44 MG/DL (ref 40–60)
LDL CHOLESTEROL CALCULATED: 34 MG/DL
TRIGLYCERIDE, FASTING: 46 MG/DL (ref 0–150)
VLDLC SERPL CALC-MCNC: 9 MG/DL

## 2022-08-16 LAB
ESTIMATED AVERAGE GLUCOSE: 151.3 MG/DL
HBA1C MFR BLD: 6.9 %

## 2022-08-18 ENCOUNTER — OFFICE VISIT (OUTPATIENT)
Dept: ENDOCRINOLOGY | Age: 57
End: 2022-08-18
Payer: COMMERCIAL

## 2022-08-18 VITALS
WEIGHT: 232.6 LBS | HEART RATE: 80 BPM | BODY MASS INDEX: 30.83 KG/M2 | OXYGEN SATURATION: 100 % | DIASTOLIC BLOOD PRESSURE: 104 MMHG | HEIGHT: 73 IN | SYSTOLIC BLOOD PRESSURE: 154 MMHG

## 2022-08-18 DIAGNOSIS — E11.59 TYPE 2 DIABETES MELLITUS WITH VASCULAR DISEASE (HCC): Primary | ICD-10-CM

## 2022-08-18 DIAGNOSIS — E78.5 DYSLIPIDEMIA ASSOCIATED WITH TYPE 2 DIABETES MELLITUS (HCC): ICD-10-CM

## 2022-08-18 DIAGNOSIS — E11.69 DYSLIPIDEMIA ASSOCIATED WITH TYPE 2 DIABETES MELLITUS (HCC): ICD-10-CM

## 2022-08-18 DIAGNOSIS — I10 ESSENTIAL HYPERTENSION: ICD-10-CM

## 2022-08-18 DIAGNOSIS — E11.3299 TYPE 2 DIABETES MELLITUS WITH BACKGROUND RETINOPATHY (HCC): ICD-10-CM

## 2022-08-18 PROCEDURE — 3044F HG A1C LEVEL LT 7.0%: CPT | Performed by: INTERNAL MEDICINE

## 2022-08-18 PROCEDURE — 99214 OFFICE O/P EST MOD 30 MIN: CPT | Performed by: INTERNAL MEDICINE

## 2022-08-18 NOTE — PROGRESS NOTES
Patient ID:   Jarocho Kearns is a 49 y.o. male    Chief Complaint:   Jarocho Kearns presents for an evaluation of Type 2 Diabetes Mellitus , Hyperlipidemia and hypertension. Subjective:   Type 2 Diabetes Mellitus diagnosed in 1998  On insulin since 2016     Previously followed by  endocrine and physician moved out. Records reviewed from care everywhere   Pioglitazone 30mg daily - stopped in Feb 2018   Jennet Begin stopped in summer 2018 for worsening CKD     S/p gastric sleeve in Sep 2019. presurgery weight 280 lbs. Now 232 lbs, up 4 lbs from LOV. It went up recently with his travelling as grandson is playing football      Metformin ER 500mg, two tabs twice a day. Ozempic 1.0 mg weekly on Monday   Lantus 18-20 units in am      Checks blood sugars 2 times per day. Reviewed log book   AM:    Lunch:  Supper:    HS:     Hypoglycemias: Morning hypoglycemias have resolved since moving lantus in am. No episodes now. Awareness present in 70's    Meals: Three, dinner is big. No snacks, diet soda once a day   Exercise: work out at in a community center. Lifting weights for toning up. Walking and cardio     Denies chest pain, exertional dyspnea. CVA in Aug 2016. No residual deficits. Denies smoking. Currently on ASA 81 mg daily     The following portions of the patient's history were reviewed and updated as appropriate:       Family History   Problem Relation Age of Onset    Heart Attack Mother     Diabetes Mother     High Blood Pressure Mother     Colon Cancer Sister          Social History     Socioeconomic History    Marital status:      Spouse name: Not on file    Number of children: Not on file    Years of education: Not on file    Highest education level: Not on file   Occupational History    Not on file   Tobacco Use    Smoking status: Never    Smokeless tobacco: Never   Vaping Use    Vaping Use: Never used   Substance and Sexual Activity    Alcohol use:  Yes     Alcohol/week: 1.0 standard drink     Types: 1 Cans of beer per week     Comment: socially     Drug use: No    Sexual activity: Yes     Partners: Female   Other Topics Concern    Not on file   Social History Narrative    Not on file     Social Determinants of Health     Financial Resource Strain: Not on file   Food Insecurity: Not on file   Transportation Needs: Not on file   Physical Activity: Not on file   Stress: Not on file   Social Connections: Not on file   Intimate Partner Violence: Not on file   Housing Stability: Not on file       Past Medical History:   Diagnosis Date    Cerebral artery occlusion with cerebral infarction ([facility]     CKD (chronic kidney disease)     Diabetes mellitus ([facility]     GERD (gastroesophageal reflux disease)     Hypertension     Sleep apnea     no cpap       Past Surgical History:   Procedure Laterality Date    COLONOSCOPY N/A 6/24/2021    COLONOSCOPY POLYPECTOMY SNARE/COLD OF 1 DESCENDING COLON POLYP AND 2 RECTAL  POLYPS performed by Elisabeth Fink MD at [address] [place] [address]  9/25/2019    FLEXIBLE CYSTOSCOPY, DILATION, POTTS CATHETER INSERTION performed by Aiden Garcia MD at [address]      right knee     LEG AMPUTATION N/A 9/25/2019    LAPAROSCOPIC SLEEVE [facility] performed by Jerardo Vega MD at [address] [facility] [place] N/A 6/23/2021    SIGMOIDOSCOPY DIAGNOSTIC FLEXIBLE performed by Julio C Aguiar MD at [facility] N/A 4/12/2019    EGD BIOPSY performed by Jerardo Vega MD at [address]         Allergies   Allergen Reactions    Hctz [Hydrochlorothiazide]      cramps    Penicillins Hives         Current Outpatient Medications:     metoprolol tartrate (LOPRESSOR) 50 MG tablet, TAKE 1 TABLET BY MOUTH 2 TIMES A DAY, Disp: 60 tablet, Rfl: 0    omeprazole (PRILOSEC) 20 MG delayed release capsule, TAKE 1 CAPSULE BY MOUTH ONE TIME A DAY, Disp: 30 capsule, Rfl: 3    atorvastatin (LIPITOR) 80 MG tablet, TAKE 1 TABLET BY MOUTH ONE TIME A DAY, Disp: 90 tablet, Rfl: 3    metFORMIN (GLUCOPHAGE-XR) 500 MG extended release tablet, TAKE TWO TABLETS BY MOUTH TWICE A DAY WITH MEALS, Disp: 360 tablet, Rfl: 2    Multiple Vitamins-Minerals (MULTIVITAMIN WITH MINERALS) tablet, Take 1 tablet by mouth daily, Disp: 30 tablet, Rfl: 11    lisinopril (PRINIVIL;ZESTRIL) 20 MG tablet, TAKE 1 TABLET BY MOUTH ONE TIME A DAY, Disp: 90 tablet, Rfl: 1    LANTUS SOLOSTAR 100 UNIT/ML injection pen, Inject 22 Units into the skin every morning, Disp: 5 pen, Rfl: 2    folic acid (FOLVITE) 882 MCG tablet, Take 1 tablet by mouth daily, Disp: 90 tablet, Rfl: 3    Semaglutide, 1 MG/DOSE, (OZEMPIC, 1 MG/DOSE,) 2 MG/1.5ML SOPN, 1mg subcutaneously once a week, Disp: 12 pen, Rfl: 0    blood glucose test strips (PRODIGY NO CODING BLOOD GLUC) strip, 2 each by In Vitro route daily As needed. , Disp: 200 each, Rfl: 3    furosemide (LASIX) 20 MG tablet, Take 20 mg by mouth daily as needed, Disp: , Rfl:     sildenafil (VIAGRA) 100 MG tablet, Take 1 tablet by mouth daily as needed for Erectile Dysfunction, Disp: 30 tablet, Rfl: 5    Omega-3 Fatty Acids (OMEGA-3 FISH OIL PO), Take by mouth, Disp: , Rfl:     Lancet Devices (PRODIGY LANCING DEVICE) MISC, Use daily to manage blood sugar, Disp: 1 each, Rfl: 1    Blood Glucose Monitoring Suppl (PRODIGY POCKET BLOOD GLUCOSE) w/Device KIT, Use daily to monitor blood sugar level, Disp: 1 kit, Rfl: 1    Insulin Pen Needle (PEN NEEDLES) 32G X 4 MM MISC, Use BID with insulin and victoza, Disp: 200 each, Rfl: 3    aspirin EC 81 MG EC tablet, Take 81 mg by mouth daily, Disp: , Rfl:     NIFEdipine (PROCARDIA XL) 30 MG extended release tablet, Take 1 tablet by mouth 2 times daily, Disp: 60 tablet, Rfl: 0    PRODIGY LANCETS 28G MISC, 3 each by Does not apply route daily, Disp: 100 each, Rfl: 3      Review of Systems:    Constitutional: Negative for fever, chills, and unexpected weight change.    HENT: Negative for congestion, ear pain, rhinorrhea,  sore throat and trouble swallowing. Eyes: Negative for photophobia, redness, itching. Respiratory: Negative for cough, shortness of breath and sputum. Cardiovascular: Negative for chest pain, palpitations and leg swelling. Gastrointestinal: Negative for nausea, vomiting, abdominal pain, diarrhea, constipation. Endocrine: Negative for cold intolerance, heat intolerance, polydipsia, polyphagia and polyuria. Genitourinary: Negative for dysuria, urgency, frequency, hematuria and flank pain. Musculoskeletal: Negative for myalgias, back pain, arthralgias and neck pain. Skin/Nail: Negative for rash, itching. Normal nails. Neurological: Negative for seizures, weakness, light-headedness, numbness and headaches. Hematological/ Lymph nodes: Negative for adenopathy. Does not bruise/bleed easily. Psychiatric/Behavioral: Negative for suicidal ideas, depression, anxiety, sleep disturbance and decreased concentration. Objective:   Physical Exam:  There were no vitals taken for this visit. Constitutional: Patient is oriented to person, place, and time. Patient appears well-developed and well-nourished. Cardiac: Normal heart sounds   Pulmonary/Chest: Effort normal and good air entry     Neurological: Patient is alert and oriented to person, place, and time. Psychiatric: Patient has a normal mood and affect.  Patient behavior is normal.     Lab Review:    Orders Only on 08/15/2022   Component Date Value Ref Range Status    Hemoglobin A1C 08/15/2022 6.9  See comment % Final    eAG 08/15/2022 151.3  mg/dL Final    Cholesterol, Fasting 08/15/2022 87  0 - 199 mg/dL Final    Triglyceride, Fasting 08/15/2022 46  0 - 150 mg/dL Final    HDL 08/15/2022 44  40 - 60 mg/dL Final    LDL Calculated 08/15/2022 34  <100 mg/dL Final    VLDL Cholesterol Calculated 08/15/2022 9  Not Established mg/dL Final   Orders Only on 04/04/2022   Component Date Value Ref Range Status    Hemoglobin A1C 04/04/2022 6.9  See comment % Final    eAG 04/04/2022 151.3  mg/dL Final    Microalbumin, Random Urine 04/04/2022 <1.20  <2.0 mg/dL Final    Creatinine, Ur 04/04/2022 196.3  39.0 - 259.0 mg/dL Final    Microalbumin Creatinine Ratio 04/04/2022 see below  0.0 - 30.0 mg/g Final    Vitamin B-12 04/04/2022 613  211 - 911 pg/mL Final    Folate 04/04/2022 16.10  4.78 - 24.20 ng/mL Final   Orders Only on 11/29/2021   Component Date Value Ref Range Status    Hemoglobin A1C 11/29/2021 6.7  See comment % Final    eAG 11/29/2021 145.6  mg/dL Final    Vitamin B-12 11/29/2021 579  211 - 911 pg/mL Final    Folate 11/29/2021 3.89 (A) 4.78 - 24.20 ng/mL Final           Assessment and Plan     There are no diagnoses linked to this encounter. 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 Check Blood sugars 1-2 times per day. Log them along with insulin and send them every 2 weeks. Call for blood sugars less than 60 or more than 400. Eye exam: Last exam in June 2022, has mild NPDR. Stable. Follow up in 6 months    Foot exam:  Aug 2022   Deformity/amputation: absent  Skin lesions/pre-ulcerative calluses: absent  Edema: right- negative, left- negative  Sensory exam: Monofilament sensation: normal  Pulses: normal, Vibration (128 Hz): mild impaired on right, moderately impaired on left      Renal screen: April 2022   TSH screen:  April 2021      2: HTN   High today . He just took meds , 30 minutes ago.      Follows with nephrologist   Runs <120/<85 at home      3: Hyperlipidemia   LDL: 34, HDL: 44, TGs: 46 - Aug 2022        C/w Atorvastatin 80mg     4: Morbid obesity   S/p sleeve   Gradually losing weight      4: Folate deficiency   3.89 > 16 - April 0381   C/w folic acid 4 mg daily   Recheck in one year     RTC in 4 months with A1C     EDUCATION:   Greater than 50% of this follow-up visit was spent in general counseling regarding   obesity, diet, exercise, importance of adherence to insulin regime, recognition and treatment of hypo and hyperglycemia,  glucose logging, proper diabetes management, diabetic complications with poor management and the importance of glycemic control in order to avoid the complications of diabetes. Risks and potential complications of diabetes were reviewed with the patient. Diabetes health maintenance plan and follow-up were discussed and understood by the patient. We reviewed the importance of medication compliance and regular follow-up. Aggressive lifestyle modification was encouraged. Exercise Counselling: This patient is a candidate for regular physical exercise. Instructions to perform the following types of exercise:  Swimming or water aerobic exercise  Brisk walking  Playing tennis  Stationary bicycle or elliptical indoor  Low impact aerobic exercise    Instructions given to exercise for the following duration:  30 minutes a day for five-seven days per week.     Following instructions for being active throughout the day in addition to formal exercise:  Walk instead of drive whenever possible  Take the stairs instead of the elevator  Work in the garden  Park to the far end of the parking lot to add more walking steps to destination      Electronically signed by Belem Nichole MD on 8/18/2022 at 2:32 PM

## 2022-08-29 DIAGNOSIS — I10 ESSENTIAL HYPERTENSION: ICD-10-CM

## 2022-08-29 RX ORDER — METOPROLOL TARTRATE 50 MG/1
TABLET, FILM COATED ORAL
Qty: 60 TABLET | Refills: 0 | Status: SHIPPED | OUTPATIENT
Start: 2022-08-29 | End: 2022-09-29 | Stop reason: SDUPTHER

## 2022-08-29 RX ORDER — LISINOPRIL 20 MG/1
TABLET ORAL
Qty: 90 TABLET | Refills: 0 | Status: SHIPPED | OUTPATIENT
Start: 2022-08-29 | End: 2022-09-29 | Stop reason: SDUPTHER

## 2022-08-29 NOTE — TELEPHONE ENCOUNTER
Requested Refill:   Requested Prescriptions     Pending Prescriptions Disp Refills    lisinopril (PRINIVIL;ZESTRIL) 20 MG tablet [Pharmacy Med Name: LISINOPRIL 20MG TABS] 90 tablet 1     Sig: TAKE 1 TABLET BY MOUTH ONE TIME A DAY       Last refilled: 2/9/2022 # 90 with 1 refill     Last Appt: 8/18/2022  Next Appt: 12/20/2022

## 2022-08-29 NOTE — TELEPHONE ENCOUNTER
Recent Visits  Date Type Provider Dept   02/28/22 Office Visit Hernan Glil MD [facility]   08/30/21 Office Visit Hernan Gill MD [facility]   05/21/21 Office Visit Hernan Gill MD [facility]   04/22/21 Office Visit Hernan Gill MD [facility]   Showing recent visits within past 540 days with a meds authorizing provider and meeting all other requirements  Future Appointments  No visits were found meeting these conditions.   Showing future appointments within next 150 days with a meds authorizing provider and meeting all other requirements     2/28/2022

## 2022-09-14 ENCOUNTER — TELEPHONE (OUTPATIENT)
Dept: INTERNAL MEDICINE CLINIC | Age: 57
End: 2022-09-14

## 2022-09-14 NOTE — TELEPHONE ENCOUNTER
----- Message from Scar Madsen sent at 9/14/2022  8:22 AM EDT -----  Subject: Appointment Request    Reason for Call: Established Patient Appointment needed: Routine Existing   Condition Follow Up    QUESTIONS    Reason for appointment request? No appointments available during search     Additional Information for Provider?  Pt needs 2nd shot rubella and   shingles shot  ---------------------------------------------------------------------------  --------------  Mary KULKARNI  [phone]; OK to leave message on voicemail  ---------------------------------------------------------------------------  --------------  SCRIPT ANSWERS  COVID Screen: Dola Friday

## 2022-09-14 NOTE — TELEPHONE ENCOUNTER
----- Message from Branden Martinez sent at 9/14/2022  8:22 AM EDT -----  Subject: Appointment Request    Reason for Call: Established Patient Appointment needed: Routine Existing   Condition Follow Up    QUESTIONS    Reason for appointment request? No appointments available during search     Additional Information for Provider?  Pt needs 2nd shot rubella and   shingles shot  ---------------------------------------------------------------------------  --------------  Loren Staton INFO  [phone]; OK to leave message on voicemail  ---------------------------------------------------------------------------  --------------  SCRIPT ANSWERS  COVID Screen: Yajaira Fried

## 2022-09-29 ENCOUNTER — OFFICE VISIT (OUTPATIENT)
Dept: INTERNAL MEDICINE CLINIC | Age: 57
End: 2022-09-29
Payer: COMMERCIAL

## 2022-09-29 VITALS
DIASTOLIC BLOOD PRESSURE: 74 MMHG | WEIGHT: 227 LBS | BODY MASS INDEX: 29.95 KG/M2 | HEART RATE: 88 BPM | TEMPERATURE: 97.4 F | SYSTOLIC BLOOD PRESSURE: 122 MMHG | OXYGEN SATURATION: 100 % | RESPIRATION RATE: 16 BRPM

## 2022-09-29 DIAGNOSIS — I10 ESSENTIAL HYPERTENSION: Primary | ICD-10-CM

## 2022-09-29 DIAGNOSIS — Z23 NEED FOR SHINGLES VACCINE: ICD-10-CM

## 2022-09-29 PROCEDURE — 99213 OFFICE O/P EST LOW 20 MIN: CPT | Performed by: INTERNAL MEDICINE

## 2022-09-29 PROCEDURE — 90750 HZV VACC RECOMBINANT IM: CPT | Performed by: INTERNAL MEDICINE

## 2022-09-29 PROCEDURE — 90471 IMMUNIZATION ADMIN: CPT | Performed by: INTERNAL MEDICINE

## 2022-09-29 RX ORDER — METOPROLOL TARTRATE 50 MG/1
TABLET, FILM COATED ORAL
Qty: 180 TABLET | Refills: 1 | Status: CANCELLED | OUTPATIENT
Start: 2022-09-29

## 2022-09-29 RX ORDER — LISINOPRIL 40 MG/1
40 TABLET ORAL DAILY
Qty: 90 TABLET | Refills: 3 | Status: SHIPPED | OUTPATIENT
Start: 2022-09-29

## 2022-09-29 RX ORDER — NIFEDIPINE 30 MG/1
30 TABLET, EXTENDED RELEASE ORAL 2 TIMES DAILY
Qty: 180 TABLET | Refills: 1 | Status: CANCELLED | OUTPATIENT
Start: 2022-09-29

## 2022-09-29 RX ORDER — METOPROLOL TARTRATE 50 MG/1
50 TABLET, FILM COATED ORAL 2 TIMES DAILY
Qty: 180 TABLET | Refills: 3 | Status: SHIPPED | OUTPATIENT
Start: 2022-09-29

## 2022-09-29 RX ORDER — FUROSEMIDE 20 MG/1
20 TABLET ORAL DAILY PRN
Qty: 30 TABLET | Refills: 3 | Status: SHIPPED | OUTPATIENT
Start: 2022-09-29 | End: 2023-07-24

## 2022-09-29 SDOH — ECONOMIC STABILITY: FOOD INSECURITY: WITHIN THE PAST 12 MONTHS, THE FOOD YOU BOUGHT JUST DIDN'T LAST AND YOU DIDN'T HAVE MONEY TO GET MORE.: NEVER TRUE

## 2022-09-29 SDOH — ECONOMIC STABILITY: FOOD INSECURITY: WITHIN THE PAST 12 MONTHS, YOU WORRIED THAT YOUR FOOD WOULD RUN OUT BEFORE YOU GOT MONEY TO BUY MORE.: NEVER TRUE

## 2022-09-29 ASSESSMENT — PATIENT HEALTH QUESTIONNAIRE - PHQ9
2. FEELING DOWN, DEPRESSED OR HOPELESS: 0
SUM OF ALL RESPONSES TO PHQ QUESTIONS 1-9: 0
SUM OF ALL RESPONSES TO PHQ QUESTIONS 1-9: 0
SUM OF ALL RESPONSES TO PHQ9 QUESTIONS 1 & 2: 0
SUM OF ALL RESPONSES TO PHQ QUESTIONS 1-9: 0
SUM OF ALL RESPONSES TO PHQ QUESTIONS 1-9: 0
1. LITTLE INTEREST OR PLEASURE IN DOING THINGS: 0

## 2022-09-29 ASSESSMENT — SOCIAL DETERMINANTS OF HEALTH (SDOH): HOW HARD IS IT FOR YOU TO PAY FOR THE VERY BASICS LIKE FOOD, HOUSING, MEDICAL CARE, AND HEATING?: NOT HARD AT ALL

## 2022-09-29 NOTE — PROGRESS NOTES
Sue De La Cruz (:  1965) is a 49 y.o. male,Established patient, here for evaluation of the following chief complaint(s):  Diabetes (Discuss medications )         ASSESSMENT/PLAN:  1. Essential hypertension  -     metoprolol tartrate (LOPRESSOR) 50 MG tablet; Take 1 tablet by mouth 2 times daily, Disp-180 tablet, R-3Normal  -     lisinopril (PRINIVIL;ZESTRIL) 40 MG tablet; Take 1 tablet by mouth daily, Disp-90 tablet, R-3Normal  2. Need for shingles vaccine  -     Zoster, SHINGRIX, (18 yrs +), IM    Return in about 4 months (around 2023). Subjective   SUBJECTIVE/OBJECTIVE:  HPIHypertension:  Home blood pressure monitoring: No.  He is adherent to a low sodium diet. Patient denies chest pain, shortness of breath, and headache. Antihypertensive medication side effects: no medication side effects noted. Use of agents associated with hypertension: none. Sodium (mmol/L)   Date Value   2021 136    BUN (mg/dL)   Date Value   2021 19    Glucose (mg/dL)   Date Value   2021 84      Potassium (mmol/L)   Date Value   2021 3.9     Potassium reflex Magnesium (mmol/L)   Date Value   2019 4.3    Creatinine (mg/dL)   Date Value   2021 1.3            Review of Systems       Objective   Vitals:    22 1539   BP: 122/74   Pulse: 88   Resp: 16   Temp: 97.4 °F (36.3 °C)   TempSrc: Temporal   SpO2: 100%   Weight: 227 lb (103 kg)      Wt Readings from Last 3 Encounters:   22 227 lb (103 kg)   22 232 lb 9.6 oz (105.5 kg)   22 228 lb (103.4 kg)     BP Readings from Last 3 Encounters:   22 122/74   22 (!) 154/104   22 (!) 132/99     Body mass index is 29.95 kg/m². Facility age limit for growth percentiles is 20 years. Physical Exam  Constitutional:       Appearance: Normal appearance. HENT:      Nose: Nose normal. No congestion or rhinorrhea.       Mouth/Throat:      Mouth: Mucous membranes are moist. Pharynx: No oropharyngeal exudate or posterior oropharyngeal erythema. Eyes:      General:         Right eye: No discharge. Left eye: No discharge. Extraocular Movements: Extraocular movements intact. Pupils: Pupils are equal, round, and reactive to light. Cardiovascular:      Rate and Rhythm: Normal rate and regular rhythm. Heart sounds: No murmur heard. No friction rub. Pulmonary:      Effort: Pulmonary effort is normal. No respiratory distress. Breath sounds: No stridor. No wheezing or rhonchi. Neurological:      Mental Status: He is alert. An electronic signature was used to authenticate this note.     --Hilary Nicole MD

## 2022-10-22 ENCOUNTER — IMMUNIZATION (OUTPATIENT)
Dept: INTERNAL MEDICINE CLINIC | Age: 57
End: 2022-10-22
Payer: COMMERCIAL

## 2022-10-22 DIAGNOSIS — Z23 FLU VACCINE NEED: Primary | ICD-10-CM

## 2022-10-22 PROCEDURE — 90674 CCIIV4 VAC NO PRSV 0.5 ML IM: CPT | Performed by: INTERNAL MEDICINE

## 2022-10-22 PROCEDURE — 90471 IMMUNIZATION ADMIN: CPT | Performed by: INTERNAL MEDICINE

## 2022-11-07 ENCOUNTER — TELEPHONE (OUTPATIENT)
Dept: ENDOCRINOLOGY | Age: 57
End: 2022-11-07

## 2022-11-07 DIAGNOSIS — E11.59 TYPE 2 DIABETES MELLITUS WITH VASCULAR DISEASE (HCC): Primary | ICD-10-CM

## 2022-11-07 RX ORDER — DULAGLUTIDE 4.5 MG/.5ML
4.5 INJECTION, SOLUTION SUBCUTANEOUS WEEKLY
Qty: 6 ML | Refills: 1 | Status: SHIPPED | OUTPATIENT
Start: 2022-11-07

## 2022-11-07 NOTE — TELEPHONE ENCOUNTER
Call from [facility] at [facility] stating that Rx Ozempic is on back order     She is wanting to know if Dr. Lauren Lockhart would like to send in a new script for Rx Trulicity     Latosha stated that she currently has Trulicity 1.30, 1.5 or 7.34 available     Please advise   CB# [id number]

## 2022-11-16 DIAGNOSIS — E11.59 TYPE 2 DIABETES MELLITUS WITH VASCULAR DISEASE (HCC): ICD-10-CM

## 2022-11-16 DIAGNOSIS — E11.3299 TYPE 2 DIABETES MELLITUS WITH BACKGROUND RETINOPATHY (HCC): ICD-10-CM

## 2022-11-16 RX ORDER — LANOLIN ALCOHOL/MO/W.PET/CERES
CREAM (GRAM) TOPICAL
Qty: 90 TABLET | Refills: 2 | Status: SHIPPED | OUTPATIENT
Start: 2022-11-16

## 2022-11-21 DIAGNOSIS — E11.59 TYPE 2 DIABETES MELLITUS WITH VASCULAR DISEASE (HCC): ICD-10-CM

## 2022-11-21 DIAGNOSIS — E11.3299 TYPE 2 DIABETES MELLITUS WITH BACKGROUND RETINOPATHY (HCC): ICD-10-CM

## 2022-11-21 RX ORDER — INSULIN GLARGINE 100 [IU]/ML
18 INJECTION, SOLUTION SUBCUTANEOUS EVERY MORNING
Qty: 15 ML | Refills: 1 | Status: SHIPPED | OUTPATIENT
Start: 2022-11-21

## 2022-12-01 RX ORDER — OMEPRAZOLE 20 MG/1
CAPSULE, DELAYED RELEASE ORAL
Qty: 30 CAPSULE | Refills: 3 | OUTPATIENT
Start: 2022-12-01

## 2022-12-20 RX ORDER — OMEPRAZOLE 20 MG/1
20 CAPSULE, DELAYED RELEASE ORAL DAILY
Qty: 30 CAPSULE | Refills: 0 | Status: SHIPPED | OUTPATIENT
Start: 2022-12-20 | End: 2023-01-18 | Stop reason: SDUPTHER

## 2022-12-21 ENCOUNTER — TELEPHONE (OUTPATIENT)
Dept: FAMILY MEDICINE CLINIC | Age: 57
End: 2022-12-21

## 2022-12-21 NOTE — TELEPHONE ENCOUNTER
Spoke to pt to get him rescheduled, Pt agreed to come in for an appt on 1/31/22 but stated that he is unable to wait until 1/31/22 for the omeprazole 20mg because his stomach is bothering him and he doesn't understand why he had to be seen to get this medication    I let the pt know that since he hasn't been seen since 4/21/21 vv per Ruth Gosselin so In order to get a refill, we would need to update a few things in his chart    Pt wanted a note to be placed to see if it was possible before for med to still be prescribed before 1/31/22

## 2022-12-22 RX ORDER — OMEPRAZOLE 20 MG/1
20 CAPSULE, DELAYED RELEASE ORAL
Qty: 30 CAPSULE | Refills: 0 | Status: CANCELLED | OUTPATIENT
Start: 2022-12-22 | End: 2023-01-22

## 2022-12-22 NOTE — TELEPHONE ENCOUNTER
Lvm for pt to let him know that the prescription was placed and the pharmacy we have on file is [id number] [facility]

## 2023-01-03 DIAGNOSIS — E11.59 TYPE 2 DIABETES MELLITUS WITH VASCULAR DISEASE (HCC): ICD-10-CM

## 2023-01-03 DIAGNOSIS — E11.3299 TYPE 2 DIABETES MELLITUS WITH BACKGROUND RETINOPATHY (HCC): ICD-10-CM

## 2023-01-03 RX ORDER — METFORMIN HYDROCHLORIDE 500 MG/1
TABLET, EXTENDED RELEASE ORAL
Qty: 360 TABLET | Refills: 2 | Status: SHIPPED | OUTPATIENT
Start: 2023-01-03

## 2023-01-03 NOTE — TELEPHONE ENCOUNTER
Requested Refill:   Requested Prescriptions     Pending Prescriptions Disp Refills    metFORMIN (GLUCOPHAGE-XR) 500 MG extended release tablet [Pharmacy Med Name: METFORMIN HCL ER 500MG TB24] 360 tablet 2     Sig: TAKE TWO TABLETS BY MOUTH TWICE A DAY WITH MEALS       Last refilled: 3/31/2022 # 360 with 2 refills     Last Appt: 8/18/2022  Next Appt: 1/18/2023

## 2023-01-13 ENCOUNTER — TELEPHONE (OUTPATIENT)
Dept: PHARMACY | Facility: CLINIC | Age: 58
End: 2023-01-13

## 2023-01-13 NOTE — TELEPHONE ENCOUNTER
Pharmacy Pop Care Documentation:   2023 Annual Pharmacy  Visit     Called patient to complete yearly pharmacy appointment to discuss the 2023 Diabetes Management Program.     No answer. Left VM. Please call back at [phone] Option #3.       Sue , [address]  Clinical Pharmacy   Phone: toll free [phone], option 3

## 2023-01-18 ENCOUNTER — TELEMEDICINE (OUTPATIENT)
Dept: ENDOCRINOLOGY | Age: 58
End: 2023-01-18
Payer: COMMERCIAL

## 2023-01-18 ENCOUNTER — TELEPHONE (OUTPATIENT)
Dept: BARIATRICS/WEIGHT MGMT | Age: 58
End: 2023-01-18

## 2023-01-18 DIAGNOSIS — I10 ESSENTIAL HYPERTENSION: ICD-10-CM

## 2023-01-18 DIAGNOSIS — E11.59 TYPE 2 DIABETES MELLITUS WITH VASCULAR DISEASE (HCC): Primary | ICD-10-CM

## 2023-01-18 DIAGNOSIS — E11.59 TYPE 2 DIABETES MELLITUS WITH VASCULAR DISEASE (HCC): ICD-10-CM

## 2023-01-18 DIAGNOSIS — E78.5 DYSLIPIDEMIA ASSOCIATED WITH TYPE 2 DIABETES MELLITUS (HCC): ICD-10-CM

## 2023-01-18 DIAGNOSIS — E11.3299 TYPE 2 DIABETES MELLITUS WITH BACKGROUND RETINOPATHY (HCC): ICD-10-CM

## 2023-01-18 DIAGNOSIS — E11.69 DYSLIPIDEMIA ASSOCIATED WITH TYPE 2 DIABETES MELLITUS (HCC): ICD-10-CM

## 2023-01-18 DIAGNOSIS — K21.9 CHRONIC GERD: Primary | ICD-10-CM

## 2023-01-18 PROCEDURE — 99214 OFFICE O/P EST MOD 30 MIN: CPT | Performed by: INTERNAL MEDICINE

## 2023-01-18 RX ORDER — OMEPRAZOLE 20 MG/1
20 CAPSULE, DELAYED RELEASE ORAL DAILY
Qty: 30 CAPSULE | Refills: 1 | Status: SHIPPED | OUTPATIENT
Start: 2023-01-18

## 2023-01-18 NOTE — TELEPHONE ENCOUNTER
Refill sent and it should cover him until after his next appointment on 2/21/2023.   Thank you,  DELFINA BetancurC

## 2023-01-18 NOTE — PROGRESS NOTES
Patient ID:   Melina Archuleta is a 56 y.o. male    Chief Complaint:   Melina Archuleta presents for an evaluation of Type 2 Diabetes Mellitus , Hyperlipidemia and hypertension. The patient (or guardian if applicable) is aware that this is a billable service, which includes applicable co-pays. This Virtual Visit was conducted with patient's (and/or legal guardian's) consent. The visit was conducted pursuant to the emergency declaration under the [facility], [address] [place] [address] authority and the [email or web address] Act. Patient identification was verified, and a caregiver was present when appropriate. Because this was a Telehealth visit, evaluation of the following organ systems was limited: Vitals, Constitutional, EENT, Resp, CV, GI, , MS, Neuro, Skin. Heme. Lymph, Imm. Melina Archuleta was at home. Provider was at [facility] office. The patient has also been advised to contact this office for worsening conditions or problems, and seek emergency medical treatment and/or call 911 if deemed necessary. Subjective:   Type 2 Diabetes Mellitus diagnosed in 1998  On insulin since 2016     Previously followed by  endocrine and physician moved out. Records reviewed from care everywhere   Pioglitazone 30mg daily - stopped in Feb 2018   Tay Yan stopped in summer 2018 for worsening CKD     S/p gastric sleeve in Sep 2019. presurgery weight 280 lbs. Now 232 lbs, up 4 lbs from LOV. It went up recently with his travelling as grandson is playing football      Metformin ER 500mg, two tabs twice a day. Ozempic 2.0 mg weekly on Sunday or Monday   Lantus 18 units in am      Checks blood sugars 1 times per day. Reportedly    AM:  . None less than 90. Highest 130. Lunch:  Supper:    HS:     Hypoglycemias: Morning hypoglycemias have resolved since moving lantus in am. No episodes now. Awareness present in 70's    Meals:  Three, dinner is big. No snacks, diet soda once a day   Exercise: work out at in a community center. Lifting weights for toning up. Walking and cardio     Denies chest pain, exertional dyspnea. CVA in Aug 2016. No residual deficits. Denies smoking. Currently on ASA 81 mg daily     The following portions of the patient's history were reviewed and updated as appropriate:       Family History   Problem Relation Age of Onset    Heart Attack Mother     Diabetes Mother     High Blood Pressure Mother     Colon Cancer Sister          Social History     Socioeconomic History    Marital status:      Spouse name: Not on file    Number of children: Not on file    Years of education: Not on file    Highest education level: Not on file   Occupational History    Not on file   Tobacco Use    Smoking status: Never    Smokeless tobacco: Never   Vaping Use    Vaping Use: Never used   Substance and Sexual Activity    Alcohol use:  Yes     Alcohol/week: 1.0 standard drink     Types: 1 Cans of beer per week     Comment: socially     Drug use: No    Sexual activity: Yes     Partners: Female   Other Topics Concern    Not on file   Social History Narrative    Not on file     Social Determinants of Health     Financial Resource Strain: Low Risk     Difficulty of Paying Living Expenses: Not hard at all   Food Insecurity: No Food Insecurity    Worried About Running Out of Food in the Last Year: Never true    Ran Out of Food in the Last Year: Never true   Transportation Needs: Not on file   Physical Activity: Not on file   Stress: Not on file   Social Connections: Not on file   Intimate Partner Violence: Not on file   Housing Stability: Not on file       Past Medical History:   Diagnosis Date    [facility]     CKD (chronic kidney disease)     Diabetes mellitus ([facility]     GERD (gastroesophageal reflux disease)     Hypertension     Sleep apnea     no cpap       Past Surgical History:   Procedure Laterality Date    COLONOSCOPY N/A 6/24/2021    COLONOSCOPY POLYPECTOMY SNARE/COLD OF 1 DESCENDING COLON POLYP AND 2 RECTAL  POLYPS performed by Kelli Blanc MD at [address]  9/25/2019    FLEXIBLE CYSTOSCOPY, DILATION, POTTS CATHETER INSERTION performed by Celeste Gary MD at [address] [facility]      right knee     SIGMOIDOSCOPY N/A 6/23/2021    SIGMOIDOSCOPY DIAGNOSTIC FLEXIBLE performed by Sweetie Quirgoa MD at [address] N/A 9/25/2019    LAPAROSCOPIC SLEEVE GASTRECTOMY-ETHICON performed by Villa Mcdaniels MD at [address] N/A 4/12/2019    EGD BIOPSY performed by Villa Mcdaniels MD at [address] [facility]         Allergies   Allergen Reactions    Hctz [Hydrochlorothiazide]      cramps    Penicillins Hives         Current Outpatient Medications:     omeprazole (PRILOSEC) 20 MG delayed release capsule, Take 1 capsule by mouth Daily, Disp: 30 capsule, Rfl: 1    metFORMIN (GLUCOPHAGE-XR) 500 MG extended release tablet, TAKE TWO TABLETS BY MOUTH TWICE A DAY WITH MEALS, Disp: 360 tablet, Rfl: 2    LANTUS SOLOSTAR 100 UNIT/ML injection pen, Inject 18 Units into the skin every morning, Disp: 15 mL, Rfl: 1    folic acid (FOLVITE) 921 MCG tablet, TAKE 1 TABLET BY MOUTH ONE TIME A DAY, Disp: 90 tablet, Rfl: 2    Dulaglutide (TRULICITY) 4.5 GL/9.6FH SOPN, Inject 4.5 mg into the skin once a week, Disp: 6 mL, Rfl: 1    metoprolol tartrate (LOPRESSOR) 50 MG tablet, Take 1 tablet by mouth 2 times daily, Disp: 180 tablet, Rfl: 3    lisinopril (PRINIVIL;ZESTRIL) 40 MG tablet, Take 1 tablet by mouth daily, Disp: 90 tablet, Rfl: 3    furosemide (LASIX) 20 MG tablet, Take 1 tablet by mouth daily as needed (pedal edema), Disp: 30 tablet, Rfl: 3    Semaglutide, 2 MG/DOSE, 8 MG/3ML SOPN, 2mg weekly, Disp: 9 mL, Rfl: 2    atorvastatin (LIPITOR) 80 MG tablet, TAKE 1 TABLET BY MOUTH ONE TIME A DAY, Disp: 90 tablet, Rfl: 3    Multiple Vitamins-Minerals (MULTIVITAMIN WITH MINERALS) tablet, Take 1 tablet by mouth daily, Disp: 30 tablet, Rfl: 11    blood glucose test strips (PRODIGY NO CODING BLOOD GLUC) strip, 2 each by In Vitro route daily As needed. , Disp: 200 each, Rfl: 3    sildenafil (VIAGRA) 100 MG tablet, Take 1 tablet by mouth daily as needed for Erectile Dysfunction (Patient not taking: Reported on 9/29/2022), Disp: 30 tablet, Rfl: 5    Lancet Devices (PRODIGY LANCING DEVICE) MISC, Use daily to manage blood sugar, Disp: 1 each, Rfl: 1    Blood Glucose Monitoring Suppl (PRODIGY POCKET BLOOD GLUCOSE) w/Device KIT, Use daily to monitor blood sugar level, Disp: 1 kit, Rfl: 1    Insulin Pen Needle (PEN NEEDLES) 32G X 4 MM MISC, Use BID with insulin and victoza, Disp: 200 each, Rfl: 3    aspirin EC 81 MG EC tablet, Take 81 mg by mouth daily, Disp: , Rfl:     PRODIGY LANCETS 28G MISC, 3 each by Does not apply route daily, Disp: 100 each, Rfl: 3      Review of Systems:    Constitutional: Negative for fever, chills, and unexpected weight change. HENT: Negative for congestion, ear pain, rhinorrhea,  sore throat and trouble swallowing. Eyes: Negative for photophobia, redness, itching. Respiratory: Negative for cough, shortness of breath and sputum. Cardiovascular: Negative for chest pain, palpitations and leg swelling. Gastrointestinal: Negative for nausea, vomiting, abdominal pain, diarrhea, constipation. Endocrine: Negative for cold intolerance, heat intolerance, polydipsia, polyphagia and polyuria. Genitourinary: Negative for dysuria, urgency, frequency, hematuria and flank pain. Musculoskeletal: Negative for myalgias, back pain, arthralgias and neck pain. Skin/Nail: Negative for rash, itching. Normal nails. Neurological: Negative for seizures, weakness, light-headedness, numbness and headaches. Hematological/ Lymph nodes: Negative for adenopathy. Does not bruise/bleed easily.    Psychiatric/Behavioral: Negative for suicidal ideas, depression, anxiety, sleep disturbance and decreased concentration. Objective:   Physical Exam:  There were no vitals taken for this visit. Constitutional: Patient is oriented to person, place, and time. Patient appears well-developed and well-nourished. Neurological: Patient is alert and oriented to person, place, and time. Psychiatric: Patient has a normal mood and affect. Patient behavior is normal.     Lab Review:    Office Visit on 08/18/2022   Component Date Value Ref Range Status    Diabetic Retinopathy 07/21/2022 Negative   Final   Orders Only on 08/15/2022   Component Date Value Ref Range Status    Hemoglobin A1C 08/15/2022 6.9  See comment % Final    eAG 08/15/2022 151.3  mg/dL Final    Cholesterol, Fasting 08/15/2022 87  0 - 199 mg/dL Final    Triglyceride, Fasting 08/15/2022 46  0 - 150 mg/dL Final    HDL 08/15/2022 44  40 - 60 mg/dL Final    LDL Calculated 08/15/2022 34  <100 mg/dL Final    VLDL Cholesterol Calculated 08/15/2022 9  Not Established mg/dL Final   Orders Only on 04/04/2022   Component Date Value Ref Range Status    Hemoglobin A1C 04/04/2022 6.9  See comment % Final    eAG 04/04/2022 151.3  mg/dL Final    Microalbumin, Random Urine 04/04/2022 <1.20  <2.0 mg/dL Final    Creatinine, Ur 04/04/2022 196.3  39.0 - 259.0 mg/dL Final    Microalbumin Creatinine Ratio 04/04/2022 see below  0.0 - 30.0 mg/g Final    Vitamin B-12 04/04/2022 613  211 - 911 pg/mL Final    Folate 04/04/2022 16.10  4.78 - 24.20 ng/mL Final           Assessment and Plan     There are no diagnoses linked to this encounter.         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 Check Blood sugars 1-2 times per day. Log them along with insulin and send them every 2 weeks. Call for blood sugars less than 60 or more than 400. Eye exam: Last exam in June 2022, has mild NPDR. Stable. Next exam in spring 2023    Foot exam:  Aug 2022   Deformity/amputation: absent  Skin lesions/pre-ulcerative calluses: absent  Edema: right- negative, left- negative  Sensory exam: Monofilament sensation: normal  Pulses: normal, Vibration (128 Hz): mild impaired on right, moderately impaired on left      Renal screen: April 2022   TSH screen:  April 2021      2: HTN   Follows with nephrologist   Runs <120/<85 at home      3: Hyperlipidemia   LDL: 34, HDL: 44, TGs: 46 - Aug 2022        C/w Atorvastatin 80mg     4: Morbid obesity   S/p sleeve   Gradually losing weight      4: Folate deficiency   3.89 > 16 - April 0368   C/w folic acid 4 mg daily   Recheck in one year     A1C pending     RTC in 5 months with A1C, CMP, MACR, folate      EDUCATION:   Greater than 50% of this follow-up visit was spent in general counseling regarding   obesity, diet, exercise, importance of adherence to insulin regime, recognition and treatment of hypo and hyperglycemia,  glucose logging, proper diabetes management, diabetic complications with poor management and the importance of glycemic control in order to avoid the complications of diabetes. Risks and potential complications of diabetes were reviewed with the patient. Diabetes health maintenance plan and follow-up were discussed and understood by the patient. We reviewed the importance of medication compliance and regular follow-up. Aggressive lifestyle modification was encouraged. Exercise Counselling: This patient is a candidate for regular physical exercise.  Instructions to perform the following types of exercise:  Swimming or water aerobic exercise  Brisk walking  Playing tennis  Stationary bicycle or elliptical indoor  Low impact aerobic exercise    Instructions given to exercise for the following duration:  30 minutes a day for five-seven days per week.     Following instructions for being active throughout the day in addition to formal exercise:  Walk instead of drive whenever possible  Take the stairs instead of the elevator  Work in the garden  Park to the far end of the parking lot to add more walking steps to destination      Electronically signed by Bobo Sousa MD on 1/18/2023 at 4:30 PM

## 2023-01-18 NOTE — TELEPHONE ENCOUNTER
Needed to move patients appt on 1/31 per Dr. Chowdhury Manual. Patient is in need of medication refill for Omeprazole 20mg.     [address], [facility], [address] [address]

## 2023-01-19 LAB
ESTIMATED AVERAGE GLUCOSE: 148.5 MG/DL
HBA1C MFR BLD: 6.8 %

## 2023-01-23 RX ORDER — FUROSEMIDE 20 MG/1
TABLET ORAL
Qty: 30 TABLET | Refills: 3 | Status: SHIPPED | OUTPATIENT
Start: 2023-01-23

## 2023-01-23 NOTE — TELEPHONE ENCOUNTER
Recent Visits  Date Type Provider Dept   09/29/22 Office Visit Tank Sevilla MD [facility]   02/28/22 Office Visit Tank Sevilla MD [facility]   08/30/21 Office Visit Tank Sevilla MD [facility]   Showing recent visits within past 540 days with a meds authorizing provider and meeting all other requirements  Future Appointments  Date Type Provider Dept   01/30/23 Appointment Tank Sevilla MD [facility]   Showing future appointments within next 150 days with a meds authorizing provider and meeting all other requirements     9/29/2022

## 2023-01-30 ENCOUNTER — OFFICE VISIT (OUTPATIENT)
Dept: INTERNAL MEDICINE CLINIC | Age: 58
End: 2023-01-30
Payer: COMMERCIAL

## 2023-01-30 VITALS
WEIGHT: 234 LBS | OXYGEN SATURATION: 98 % | DIASTOLIC BLOOD PRESSURE: 102 MMHG | HEART RATE: 74 BPM | BODY MASS INDEX: 30.87 KG/M2 | SYSTOLIC BLOOD PRESSURE: 188 MMHG | RESPIRATION RATE: 16 BRPM | TEMPERATURE: 97.4 F

## 2023-01-30 DIAGNOSIS — I10 ESSENTIAL HYPERTENSION: Primary | ICD-10-CM

## 2023-01-30 PROCEDURE — 99214 OFFICE O/P EST MOD 30 MIN: CPT | Performed by: INTERNAL MEDICINE

## 2023-01-30 PROCEDURE — 3078F DIAST BP <80 MM HG: CPT | Performed by: INTERNAL MEDICINE

## 2023-01-30 PROCEDURE — 3074F SYST BP LT 130 MM HG: CPT | Performed by: INTERNAL MEDICINE

## 2023-01-30 RX ORDER — AMLODIPINE AND VALSARTAN 5; 160 MG/1; MG/1
1 TABLET ORAL DAILY
Qty: 90 TABLET | Refills: 3 | Status: SHIPPED | OUTPATIENT
Start: 2023-01-30

## 2023-01-30 RX ORDER — FUROSEMIDE 20 MG/1
TABLET ORAL
Qty: 90 TABLET | Refills: 2 | Status: SHIPPED | OUTPATIENT
Start: 2023-01-30

## 2023-01-30 ASSESSMENT — PATIENT HEALTH QUESTIONNAIRE - PHQ9
SUM OF ALL RESPONSES TO PHQ9 QUESTIONS 1 & 2: 0
SUM OF ALL RESPONSES TO PHQ QUESTIONS 1-9: 0
2. FEELING DOWN, DEPRESSED OR HOPELESS: 0
1. LITTLE INTEREST OR PLEASURE IN DOING THINGS: 0
SUM OF ALL RESPONSES TO PHQ QUESTIONS 1-9: 0

## 2023-01-30 NOTE — PROGRESS NOTES
Yosef Brooks (:  1965) is a 56 y.o. male,Established patient, here for evaluation of the following chief complaint(s):  Hypertension (Follow up HTN )         ASSESSMENT/PLAN:  1. Essential hypertension  worsening  -     furosemide (LASIX) 20 MG tablet; TAKE 1 TABLET BY MOUTH ONE TIME A DAY AS NEEDED ( PEDAL EDEMA), Disp-90 tablet, R-2Normal  -     amLODIPine-valsartan (EXFORGE) 5-160 MG per tablet; Take 1 tablet by mouth daily, Disp-90 tablet, R-3Normal    Return in about 2 months (around 3/30/2023) for hypertension. Subjective   SUBJECTIVE/OBJECTIVE:  HPI  Hypertension:  Home blood pressure monitoring: No.  He is adherent to a low sodium diet. Patient denies chest pain, shortness of breath, and headache. Antihypertensive medication side effects: no medication side effects noted. Use of agents associated with hypertension: none. Sodium (mmol/L)   Date Value   2021 136    BUN (mg/dL)   Date Value   2021 19    Glucose (mg/dL)   Date Value   2021 84      Potassium (mmol/L)   Date Value   2021 3.9     Potassium reflex Magnesium (mmol/L)   Date Value   2019 4.3    Creatinine (mg/dL)   Date Value   2021 1.3            Review of Systems       Objective   Vitals:    23 1625 23 1629   BP: (!) 192/106 (!) 188/102   Pulse: 74    Resp: 16    Temp: 97.4 °F (36.3 °C)    TempSrc: Temporal    SpO2: 98%    Weight: 234 lb (106.1 kg)       Wt Readings from Last 3 Encounters:   23 234 lb (106.1 kg)   22 227 lb (103 kg)   22 232 lb 9.6 oz (105.5 kg)     BP Readings from Last 3 Encounters:   23 (!) 188/102   22 122/74   22 (!) 154/104     Body mass index is 30.87 kg/m². Facility age limit for growth percentiles is 20 years. Physical Exam  Constitutional:       General: He is not in acute distress. Appearance: Normal appearance. He is not ill-appearing.    HENT:      Right Ear: Tympanic membrane normal.      Left Ear: Tympanic membrane normal.      Nose: Nose normal. No congestion or rhinorrhea. Mouth/Throat:      Mouth: Mucous membranes are moist.      Pharynx: No oropharyngeal exudate or posterior oropharyngeal erythema. Eyes:      General:         Right eye: No discharge. Left eye: No discharge. Pupils: Pupils are equal, round, and reactive to light. Cardiovascular:      Rate and Rhythm: Normal rate and regular rhythm. Pulmonary:      Effort: Pulmonary effort is normal. No respiratory distress. Breath sounds: No stridor. No wheezing or rhonchi. Musculoskeletal:      Cervical back: Normal range of motion. No rigidity. Neurological:      Mental Status: He is alert. Hemoglobin A1C   Date Value Ref Range Status   01/18/2023 6.8 See comment % Final     Comment:     Comment:  Diagnosis of Diabetes: > or = 6.5%  Increased risk of diabetes (Prediabetes): 5.7-6.4%  Glycemic Control: Nonpregnant Adults: <7.0%                    Pregnant: <6.0%                    An electronic signature was used to authenticate this note.     --Beecher Shone, MD

## 2023-02-21 ENCOUNTER — OFFICE VISIT (OUTPATIENT)
Dept: BARIATRICS/WEIGHT MGMT | Age: 58
End: 2023-02-21
Payer: COMMERCIAL

## 2023-02-21 VITALS
WEIGHT: 231 LBS | HEIGHT: 73 IN | SYSTOLIC BLOOD PRESSURE: 132 MMHG | RESPIRATION RATE: 18 BRPM | OXYGEN SATURATION: 98 % | BODY MASS INDEX: 30.62 KG/M2 | DIASTOLIC BLOOD PRESSURE: 78 MMHG | HEART RATE: 89 BPM

## 2023-02-21 DIAGNOSIS — G47.37 CENTRAL SLEEP APNEA DUE TO MEDICAL CONDITION: ICD-10-CM

## 2023-02-21 DIAGNOSIS — I10 ESSENTIAL HYPERTENSION: ICD-10-CM

## 2023-02-21 DIAGNOSIS — N18.4 CKD (CHRONIC KIDNEY DISEASE) STAGE 4, GFR 15-29 ML/MIN (HCC): ICD-10-CM

## 2023-02-21 DIAGNOSIS — K21.9 CHRONIC GERD: ICD-10-CM

## 2023-02-21 DIAGNOSIS — Z98.84 S/P LAPAROSCOPIC SLEEVE GASTRECTOMY: Primary | ICD-10-CM

## 2023-02-21 PROCEDURE — 3075F SYST BP GE 130 - 139MM HG: CPT | Performed by: SURGERY

## 2023-02-21 PROCEDURE — 99214 OFFICE O/P EST MOD 30 MIN: CPT | Performed by: SURGERY

## 2023-02-21 PROCEDURE — 3078F DIAST BP <80 MM HG: CPT | Performed by: SURGERY

## 2023-02-21 RX ORDER — OMEPRAZOLE 20 MG/1
20 CAPSULE, DELAYED RELEASE ORAL DAILY
Qty: 90 CAPSULE | Refills: 1 | Status: SHIPPED | OUTPATIENT
Start: 2023-02-21 | End: 2023-08-20

## 2023-02-21 NOTE — PROGRESS NOTES
Dietary Assessment Note      Vitals:   Vitals:    23 1349   BP: 132/78   Pulse: 89   Resp: 18   SpO2: 98%   Weight: 231 lb (104.8 kg)   Height: 6' 1\" (1.854 m)    Patient stable / unchanged. Total Weight Loss: 46.3 lbs    Labs reviewed: no lab studies available for review at time of visit    Protein intake: Patient not tracking     Fluid intake: >64 oz/day 4-5 bottles water    Multivitamin/mineral intake:  MVI 1/day    Calcium intake: no    Other: no    Exercise: going to the gym 3-4x/wk & walking 45-60 min. Nutrition Assessment: 3 year 5 months post-op visit. Breakfast: Coffee + 2 scrambled eggs w/ cheese + 1 slice toast lite butter  Snack: Banana/grapes  Lunch: 1/2 Chix quesadilla   Snack: Chips   Dinner: Chix/fish + veggies + small amount starch  Snack: Cookies     Amount able to eat per sittin cup    Following 30/30/30 rule: Avoids eating and drinking at the same time.     Food Intolerances/issues: bread, peppers, vegetables w/ skin    Client Concerns: None    Goals: 225 lbs  - Continue current eating patterns including protein and plants with all meals and snacks  - Take 1 Fusion capsule with Iron/day and 2 Fusion calcium soft chews/day    Plan: Follow up at 4 years post op and as needed    Ana Rosas RD, LD

## 2023-02-21 NOTE — PATIENT INSTRUCTIONS
Patient received dietary handouts and education.     Goals: 225 lbs  - Continue current eating patterns including protein and plants with all meals and snacks  - Take 1 Fusion capsule with Iron/day and 2 Fusion calcium soft chews/day

## 2023-02-21 NOTE — PROGRESS NOTES
[facility] [place]   [facility]  Mohsen Whitney MD, [address], [address] [facility], [address] [place], [place] [address] [id number] . Phone: [phone]  Fax: [phone]            Chief Complaint   Patient presents with    Bariatrics Post Op Follow Up     3yr2mo s/p sleeve 9/25/19           HPI:    Sriram Holland is a very pleasant 56 y.o.  male , Body mass index is 30.48 kg/m². Sallyanne Chain And multiple medical problems who is presenting for bariatric follow up care. Mark Palumbo is s/p laparoscopic sleeve gastrectomy by me 9/2019. Initial Weight: 277.5 lbs, Weight Loss: 46 lbs. Comes today to the clinic without any complaints. Patient denies any nausea, vomiting, fevers, chills, shortness of breath, chest pain, constipation or urinary symptoms. Denies any heartburn nor dysphagia. Patient informed us today that they are taking the multivitamins as instructed. Patient denies any tingling, weakness,  numbness nor any neurological symptoms. Mark Palumbo is feeling very well, and is very active. Patient is very pleased with the weight loss and resolution of co-morbid conditions.       Pain Assessment   Denies any abdominal pain     Past Medical History:   Diagnosis Date    Cerebral artery occlusion with cerebral infarction ([facility]     CKD (chronic kidney disease)     Diabetes mellitus ([facility]     GERD (gastroesophageal reflux disease)     Hypertension     Sleep apnea     no cpap     Past Surgical History:   Procedure Laterality Date    COLONOSCOPY N/A 6/24/2021    COLONOSCOPY POLYPECTOMY SNARE/COLD OF 1 DESCENDING COLON POLYP AND 2 RECTAL  POLYPS performed by Bharath Rivas MD at [address] [place] [address]  9/25/2019    FLEXIBLE CYSTOSCOPY, DILATION, POTTS CATHETER INSERTION performed by Genesis Eckert MD at [address] [facility]      right knee     SIGMOIDOSCOPY N/A 6/23/2021    SIGMOIDOSCOPY DIAGNOSTIC FLEXIBLE performed by Nelson House MD at [facility]. 9/25/2019    LAPAROSCOPIC SLEEVE GASTRECTOMY-ETHICON performed by Alex Hernandez MD at [address] [facility] N/A 4/12/2019    EGD BIOPSY performed by Alex Hernandez MD at [address]     Family History   Problem Relation Age of Onset    Heart Attack Mother     Diabetes Mother     High Blood Pressure Mother     Colon Cancer Sister      Social History     Tobacco Use    Smoking status: Never    Smokeless tobacco: Never   Substance Use Topics    Alcohol use: Yes     Alcohol/week: 1.0 standard drink     Types: 1 Cans of beer per week     Comment: socially      I counseled the patient on the importance of not smoking and risks of ETOH. Allergies   Allergen Reactions    Hctz [Hydrochlorothiazide]      cramps    Penicillins Hives     Vitals:    02/21/23 1349   BP: 132/78   Pulse: 89   Resp: 18   SpO2: 98%   Weight: 231 lb (104.8 kg)   Height: 6' 1\" (1.854 m)       Body mass index is 30.48 kg/m².       Lab Results   Component Value Date/Time    WBC 5.4 04/06/2021 01:34 PM    RBC 4.86 04/06/2021 01:34 PM    HGB 13.2 04/06/2021 01:34 PM    HCT 40.2 04/06/2021 01:34 PM    MCV 82.8 04/06/2021 01:34 PM    MCH 27.1 04/06/2021 01:34 PM    MCHC 32.8 04/06/2021 01:34 PM    MPV 9.8 04/06/2021 01:34 PM    NEUTOPHILPCT 19.0 04/06/2021 01:34 PM    LYMPHOPCT 74.0 04/06/2021 01:34 PM    MONOPCT 6.0 04/06/2021 01:34 PM    EOSRELPCT 1.0 04/06/2021 01:34 PM    BASOPCT 0.0 04/06/2021 01:34 PM    NEUTROABS 1.0 04/06/2021 01:34 PM    LYMPHSABS 4.0 04/06/2021 01:34 PM    MONOSABS 0.3 04/06/2021 01:34 PM    EOSABS 0.1 04/06/2021 01:34 PM     Lab Results   Component Value Date/Time     04/06/2021 01:34 PM    K 3.9 04/06/2021 01:34 PM    K 4.3 09/26/2019 06:14 AM     04/06/2021 01:34 PM    CO2 25 04/06/2021 01:34 PM    ANIONGAP 11 04/06/2021 01:34 PM    GLUCOSE 84 04/06/2021 01:34 PM    BUN 19 04/06/2021 01:34 PM    CREATININE 1.3 04/06/2021 01:34 PM    LABGLOM 58 04/06/2021 01:34 PM    GFRAA >60 04/06/2021 01:34 PM    CALCIUM 9.5 04/06/2021 01:34 PM    PROT 7.0 04/06/2021 01:34 PM    LABALBU 4.4 04/06/2021 01:34 PM    AGRATIO 1.7 04/06/2021 01:34 PM    BILITOT 0.6 04/06/2021 01:34 PM    ALKPHOS 81 04/06/2021 01:34 PM    ALT 14 04/06/2021 01:34 PM    AST 18 04/06/2021 01:34 PM    GLOB 2.6 04/06/2021 01:34 PM     Lab Results   Component Value Date/Time    CHOL 99 07/08/2021 11:52 AM    TRIG 66 07/08/2021 11:52 AM    HDL 44 08/15/2022 03:18 PM    LDLCALC 34 08/15/2022 03:18 PM    LABVLDL 9 08/15/2022 03:18 PM     Lab Results   Component Value Date/Time    TSHREFLEX 1.76 04/06/2021 01:34 PM     Lab Results   Component Value Date/Time    IRON 99 04/06/2021 01:34 PM    TIBC 271 04/06/2021 01:34 PM    LABIRON 37 04/06/2021 01:34 PM     Lab Results   Component Value Date/Time    WSKBBBAE94 613 04/04/2022 03:46 PM    FOLATE 16.10 04/04/2022 03:46 PM     Lab Results   Component Value Date/Time    VITD25 39.3 04/06/2021 01:34 PM     Lab Results   Component Value Date/Time    LABA1C 6.8 01/18/2023 04:04 PM    .5 01/18/2023 04:04 PM         Current Outpatient Medications:     furosemide (LASIX) 20 MG tablet, TAKE 1 TABLET BY MOUTH ONE TIME A DAY AS NEEDED ( PEDAL EDEMA), Disp: 90 tablet, Rfl: 2    amLODIPine-valsartan (EXFORGE) 5-160 MG per tablet, Take 1 tablet by mouth daily, Disp: 90 tablet, Rfl: 3    omeprazole (PRILOSEC) 20 MG delayed release capsule, Take 1 capsule by mouth Daily, Disp: 30 capsule, Rfl: 1    Semaglutide, 2 MG/DOSE, 8 MG/3ML SOPN, 2mg weekly, Disp: 9 mL, Rfl: 5    metFORMIN (GLUCOPHAGE-XR) 500 MG extended release tablet, TAKE TWO TABLETS BY MOUTH TWICE A DAY WITH MEALS, Disp: 360 tablet, Rfl: 2    LANTUS SOLOSTAR 100 UNIT/ML injection pen, Inject 18 Units into the skin every morning, Disp: 15 mL, Rfl: 1    folic acid (FOLVITE) 057 MCG tablet, TAKE 1 TABLET BY MOUTH ONE TIME A DAY, Disp: 90 tablet, Rfl: 2    metoprolol tartrate (LOPRESSOR) 50 MG tablet, Take 1 tablet by mouth 2 times daily, Disp: 180 tablet, Rfl: 3 atorvastatin (LIPITOR) 80 MG tablet, TAKE 1 TABLET BY MOUTH ONE TIME A DAY, Disp: 90 tablet, Rfl: 3    Multiple Vitamins-Minerals (MULTIVITAMIN WITH MINERALS) tablet, Take 1 tablet by mouth daily, Disp: 30 tablet, Rfl: 11    blood glucose test strips (PRODIGY NO CODING BLOOD GLUC) strip, 2 each by In Vitro route daily As needed. , Disp: 200 each, Rfl: 3    sildenafil (VIAGRA) 100 MG tablet, Take 1 tablet by mouth daily as needed for Erectile Dysfunction, Disp: 30 tablet, Rfl: 5    Lancet Devices (PRODIGY LANCING DEVICE) MISC, Use daily to manage blood sugar, Disp: 1 each, Rfl: 1    Blood Glucose Monitoring Suppl (PRODIGY POCKET BLOOD GLUCOSE) w/Device KIT, Use daily to monitor blood sugar level, Disp: 1 kit, Rfl: 1    Insulin Pen Needle (PEN NEEDLES) 32G X 4 MM MISC, Use BID with insulin and victoza, Disp: 200 each, Rfl: 3    aspirin EC 81 MG EC tablet, Take 81 mg by mouth daily, Disp: , Rfl:     PRODIGY LANCETS 28G MISC, 3 each by Does not apply route daily, Disp: 100 each, Rfl: 3      Review of Systems - History obtained from the patient  General ROS: negative  Psychological ROS: negative  Ophthalmic ROS: negative  Neurological ROS: negative  ENT ROS: negative  Allergy and Immunology ROS: negative  Hematological and Lymphatic ROS: negative  Endocrine ROS: negative  Breast ROS: negative  Respiratory ROS: negative  Cardiovascular ROS: negative  Gastrointestinal ROS:negative  Genito-Urinary ROS: negative  Musculoskeletal ROS: negative   Skin ROS: negative    Physical Exam   Vitals Reviewed   Constitutional: Patient is oriented to person, place, and time. Patient appears well-developed and well-nourished. Patient is active and cooperative. Non-toxic appearance. No distress. HENT:   Head: Normocephalic and atraumatic. Head is without abrasion and without laceration. Hair is normal.   Right Ear: External ear normal. No lacerations. No drainage, swelling . Left Ear: External ear normal. No lacerations.  No drainage, swelling. Mouth / Nose: face mask in place  Eyes: Conjunctivae, EOM and lids are normal. Right eye exhibits no discharge. No foreign body present in the right eye. Left eye exhibits no discharge. No foreign body present in the left eye. No scleral icterus. Neck: Trachea normal and normal range of motion. No JVD present. Pulmonary/Chest: Effort normal. No accessory muscle usage or stridor. No apnea. No respiratory distress. Cardiovascular: Normal rate and no JVD. Abdominal: Normal appearance. Patient exhibits no distension. Abdomen is soft, obese, non tender. Musculoskeletal: Normal range of motion. Patient exhibits no edema. Neurological: Patient is alert and oriented to person, place, and time. Patient has normal strength. GCS eye subscore is 4. GCS verbal subscore is 5. GCS motor subscore is 6. Skin: Skin is warm and dry. No abrasion and no rash noted. Patient is not diaphoretic. No cyanosis or erythema. Psychiatric: Patient has a normal mood and affect. Speech is normal and behavior is normal. Cognition and memory are normal.       A/P:    Mark Palumbo was seen today for bariatrics post op follow up. Diagnoses and all orders for this visit:    S/P laparoscopic sleeve gastrectomy    Essential hypertension          Sriram Holland is 56 y.o. male , now with Body mass index is 30.48 kg/m². s/p Sleeve gastrectomy, has lost 0 lbs since last visit, total of 4631 lbs weight loss. The patient underwent dietary counseling with registered dietician. I have reviewed, discussed and agree with the dietary plan. Patient is trying hard to keep good dietary and behavior modifications. Patient is monitoring portion sizes, food choices and liquid calories. Patient is trying to exercise regularly. Patient pleased with the surgery outcomes. I encouraged the patient to continue exercise and keeping healthy eating habits. Also counseled the patient extensively on post surgery care.            Total encounter time: 32 minutes, including any number of the following: Bariatric Post operative work up/protocols, review of labs, imaging, provider notes, outside hospital records, performing examination/evaluation, counseling patient and/or family, ordering medications/tests, placing referrals and communication with referring physicians, coordination of care; discussing dietary plan/recall with the patient as well with registered dietitian and documentation in the EHR. Of note, the above was done during same day of the actual patient encounter. Emilee Patel is here for his 3-year 2-month status post sleeve gastrectomy overall doing well. Patient staying active exercising 4 days a week. Patient maintains a overall healthy balanced diet. Patient has no complaints. Patient is down to fewer blood pressure medications as well as fewer blood sugar medications. Patient will globin A1c at 1 point was up to 14 now is down to 6.8. Graduated the patient has success so far and encouraged him to stay the course. Refill for his Prilosec will be provided as well today. We will see the patient back in 1 year. I did explain thoroughly to the patient that compliance with pre- and post op diet and other recommendations are integral part to improve the chances of successful weight loss and also not following it could end with serious health complications. Some strategies discussed today include but not limited to : 30/30/30 minutes rule, food diary, avoid fast food and packing/planing ahead, & increasing exercise. Also stressed to the patient importance of taking the multivitamins as instructed, otherwise risk significant complications. Obesity as a disease is considered a high risk to patients overall health and should therefore be considered a high risk disease state. Advised the patient that not getting there weight under control, which hopefully would help with getting some of the comorbidities under control.  That could [FreeTextEntry6] : 17 year old male for results\par Hx:CPE on 5/3/21 for sports/basketball and working papers\par no complaints today\par covid screen negative\par allergy:PCN\par denies anxiety, depression, suicidal ideation\par  increase risk of complications/worsening of those conditions on the long-term. Now with Covid-19 pandemic, CDC and health authorities does classify obese patients as vulnerable and high risk as well. Which makes weight loss a priority for improvement of their wellbeing and overall health. We discussed how his excess weight affects his overall health and importance of weight loss, healthy diet and active lifestyle to alleviate those co morbid conditions, otherwise risk deterioration.       - RTC in 12 months  - Nutrition labs         Patient advised that its their responsibility to follow up for care, studies, referrals and/or labs ordered today. Please note that some or all of this report was generated using voice recognition software. Please notify me in case of any questions about the content of this document, as some errors in transcription may have occurred .

## 2023-03-06 ENCOUNTER — TELEPHONE (OUTPATIENT)
Dept: PHARMACY | Facility: CLINIC | Age: 58
End: 2023-03-06

## 2023-03-06 NOTE — TELEPHONE ENCOUNTER
Pharmacy Pop Care Documentation:   2023 Annual Pharmacy  Visit     Called patient to complete yearly pharmacy appointment to discuss the 2023 Diabetes Management Program.     No answer. Busy tone. Please call back at [phone] Option #3.       Imogene Dancer, [address]  Clinical Pharmacy   Phone: toll free [phone], option 3

## 2023-03-13 NOTE — TELEPHONE ENCOUNTER
Second attempt made to contact patient to complete 2023 yearly pharmacy appointment for Diabetes Management Program.    No answer. Left VM. [employer] message sent to patient.         Curtis Ahumada, [facility]   Phone: [phone], option #3     For Pharmacy Admin Tracking Only    Program: [address]:  No  Gap Closed?: No   Time Spent (min): 10

## 2023-03-14 NOTE — TELEPHONE ENCOUNTER
[employer] Diabetes Program - Be Well With Diabetes  =================================================================  Jill Garcia is a 56 y.o. male enrolled in the [address] [place] [address] with patient for yearly visit to discuss enrollment in program. Patient provided writer with verbal consent to remain in the program for this year. Program Requirements to be completed in 2023:  Two office visits in 2023 for diabetes (1st must be completed by 7/1/2023)  Two A1c levels in 2023 (1st must be completed by 7/1/2023)  Yearly lipid panel  Yearly urine microalbumin test  Diabetes education if A1c is over 8%  Taking an ACE/ARB medication if appropriate  Taking a statin medication if appropriate  Pneumonia vaccine up to date. Guidelines changed, talk with provider. Yearly flu shot (for 3980-6017 season)  Medication adherence over 70%. Patients who fall below 70% will be contacted by a pharmacist in the program to discuss any issues. Are you currently using [address] [place] [address] (home delivery pharmacy) to get your prescriptions? Yes    Would you like to speak with a pharmacist about the program, your diabetes, and/or your prescriptions? No    [facility] Clinical Pharmacy Team  Phone: toll free [phone], option #3  Fax [phone]  Email: [email or web address]       For Pharmacy Admin Tracking Only    Program: [address] place:  No  Gap Closed?: Yes   Time Spent (min): 10

## 2023-03-23 DIAGNOSIS — E11.3299 TYPE 2 DIABETES MELLITUS WITH BACKGROUND RETINOPATHY (HCC): ICD-10-CM

## 2023-03-23 DIAGNOSIS — E11.59 TYPE 2 DIABETES MELLITUS WITH VASCULAR DISEASE (HCC): ICD-10-CM

## 2023-03-23 RX ORDER — INSULIN GLARGINE 100 [IU]/ML
INJECTION, SOLUTION SUBCUTANEOUS
Qty: 15 ML | Refills: 0 | Status: SHIPPED | OUTPATIENT
Start: 2023-03-23 | End: 2023-05-22

## 2023-03-30 ENCOUNTER — OFFICE VISIT (OUTPATIENT)
Dept: INTERNAL MEDICINE CLINIC | Age: 58
End: 2023-03-30
Payer: COMMERCIAL

## 2023-03-30 VITALS
SYSTOLIC BLOOD PRESSURE: 132 MMHG | OXYGEN SATURATION: 98 % | WEIGHT: 231.2 LBS | BODY MASS INDEX: 30.5 KG/M2 | HEART RATE: 99 BPM | DIASTOLIC BLOOD PRESSURE: 80 MMHG

## 2023-03-30 DIAGNOSIS — I10 ESSENTIAL HYPERTENSION: Primary | ICD-10-CM

## 2023-03-30 PROCEDURE — 3074F SYST BP LT 130 MM HG: CPT | Performed by: INTERNAL MEDICINE

## 2023-03-30 PROCEDURE — 99213 OFFICE O/P EST LOW 20 MIN: CPT | Performed by: INTERNAL MEDICINE

## 2023-03-30 PROCEDURE — 3078F DIAST BP <80 MM HG: CPT | Performed by: INTERNAL MEDICINE

## 2023-03-30 ASSESSMENT — PATIENT HEALTH QUESTIONNAIRE - PHQ9
SUM OF ALL RESPONSES TO PHQ QUESTIONS 1-9: 0
7. TROUBLE CONCENTRATING ON THINGS, SUCH AS READING THE NEWSPAPER OR WATCHING TELEVISION: 0
8. MOVING OR SPEAKING SO SLOWLY THAT OTHER PEOPLE COULD HAVE NOTICED. OR THE OPPOSITE, BEING SO FIGETY OR RESTLESS THAT YOU HAVE BEEN MOVING AROUND A LOT MORE THAN USUAL: 0
6. FEELING BAD ABOUT YOURSELF - OR THAT YOU ARE A FAILURE OR HAVE LET YOURSELF OR YOUR FAMILY DOWN: 0
3. TROUBLE FALLING OR STAYING ASLEEP: 0
1. LITTLE INTEREST OR PLEASURE IN DOING THINGS: 0
SUM OF ALL RESPONSES TO PHQ QUESTIONS 1-9: 0
SUM OF ALL RESPONSES TO PHQ QUESTIONS 1-9: 0
SUM OF ALL RESPONSES TO PHQ9 QUESTIONS 1 & 2: 0
2. FEELING DOWN, DEPRESSED OR HOPELESS: 0
9. THOUGHTS THAT YOU WOULD BE BETTER OFF DEAD, OR OF HURTING YOURSELF: 0
10. IF YOU CHECKED OFF ANY PROBLEMS, HOW DIFFICULT HAVE THESE PROBLEMS MADE IT FOR YOU TO DO YOUR WORK, TAKE CARE OF THINGS AT HOME, OR GET ALONG WITH OTHER PEOPLE: 0
SUM OF ALL RESPONSES TO PHQ QUESTIONS 1-9: 0
5. POOR APPETITE OR OVEREATING: 0
4. FEELING TIRED OR HAVING LITTLE ENERGY: 0

## 2023-03-30 ASSESSMENT — ANXIETY QUESTIONNAIRES
IF YOU CHECKED OFF ANY PROBLEMS ON THIS QUESTIONNAIRE, HOW DIFFICULT HAVE THESE PROBLEMS MADE IT FOR YOU TO DO YOUR WORK, TAKE CARE OF THINGS AT HOME, OR GET ALONG WITH OTHER PEOPLE: NOT DIFFICULT AT ALL
1. FEELING NERVOUS, ANXIOUS, OR ON EDGE: 0
GAD7 TOTAL SCORE: 0
6. BECOMING EASILY ANNOYED OR IRRITABLE: 0
4. TROUBLE RELAXING: 0
2. NOT BEING ABLE TO STOP OR CONTROL WORRYING: 0
3. WORRYING TOO MUCH ABOUT DIFFERENT THINGS: 0
7. FEELING AFRAID AS IF SOMETHING AWFUL MIGHT HAPPEN: 0
5. BEING SO RESTLESS THAT IT IS HARD TO SIT STILL: 0

## 2023-03-30 NOTE — PROGRESS NOTES
Monique Bolanos (:  1965) is a 56 y.o. male,Established patient, here for evaluation of the following chief complaint(s):  No chief complaint on file. ASSESSMENT/PLAN:  1. Essential hypertension  Stable  -  continue the exforge and metoprolol  -  continue ozempic 2 mg weekly    Return in about 6 months (around 2023) for Annual Physical.         Subjective   SUBJECTIVE/OBJECTIVE:  HPI  Hypertension:  Home blood pressure monitoring: No.  He is adherent to a low sodium diet. Patient denies chest pain. Antihypertensive medication side effects: no medication side effects noted. Use of agents associated with hypertension: none. Sodium (mmol/L)   Date Value   2021 136    BUN (mg/dL)   Date Value   2021 19    Glucose (mg/dL)   Date Value   2021 84      Potassium (mmol/L)   Date Value   2021 3.9     Potassium reflex Magnesium (mmol/L)   Date Value   2019 4.3    Creatinine (mg/dL)   Date Value   2021 1.3            Review of Systems       Objective   Vitals:    23 1546   BP: 132/80   Pulse: 99   SpO2: 98%   Weight: 231 lb 3.2 oz (104.9 kg)      Wt Readings from Last 3 Encounters:   23 231 lb 3.2 oz (104.9 kg)   23 231 lb (104.8 kg)   23 234 lb (106.1 kg)     BP Readings from Last 3 Encounters:   23 132/80   23 132/78   23 (!) 188/102     Body mass index is 30.5 kg/m². Facility age limit for growth percentiles is 20 years. Physical Exam  Constitutional:       Appearance: Normal appearance. HENT:      Head: Normocephalic and atraumatic. Left Ear: There is no impacted cerumen. Nose: Nose normal. No congestion or rhinorrhea. Mouth/Throat:      Mouth: Mucous membranes are moist.      Pharynx: No oropharyngeal exudate or posterior oropharyngeal erythema. Eyes:      Extraocular Movements: Extraocular movements intact.       Pupils: Pupils are equal, round, and reactive to

## 2023-04-25 DIAGNOSIS — E11.3299 TYPE 2 DIABETES MELLITUS WITH BACKGROUND RETINOPATHY (HCC): ICD-10-CM

## 2023-04-25 DIAGNOSIS — I10 ESSENTIAL HYPERTENSION: ICD-10-CM

## 2023-04-25 DIAGNOSIS — E78.5 DYSLIPIDEMIA ASSOCIATED WITH TYPE 2 DIABETES MELLITUS (HCC): ICD-10-CM

## 2023-04-25 DIAGNOSIS — E11.59 TYPE 2 DIABETES MELLITUS WITH VASCULAR DISEASE (HCC): ICD-10-CM

## 2023-04-25 DIAGNOSIS — E11.69 DYSLIPIDEMIA ASSOCIATED WITH TYPE 2 DIABETES MELLITUS (HCC): ICD-10-CM

## 2023-04-25 LAB
ALBUMIN SERPL-MCNC: 4.2 G/DL (ref 3.4–5)
ALBUMIN/GLOB SERPL: 1.7 {RATIO} (ref 1.1–2.2)
ALP SERPL-CCNC: 83 U/L (ref 40–129)
ALT SERPL-CCNC: 22 U/L (ref 10–40)
ANION GAP SERPL CALCULATED.3IONS-SCNC: 12 MMOL/L (ref 3–16)
AST SERPL-CCNC: 23 U/L (ref 15–37)
BILIRUB SERPL-MCNC: 0.8 MG/DL (ref 0–1)
BUN SERPL-MCNC: 20 MG/DL (ref 7–20)
CALCIUM SERPL-MCNC: 9.5 MG/DL (ref 8.3–10.6)
CHLORIDE SERPL-SCNC: 107 MMOL/L (ref 99–110)
CO2 SERPL-SCNC: 28 MMOL/L (ref 21–32)
CREAT SERPL-MCNC: 1.3 MG/DL (ref 0.9–1.3)
CREAT UR-MCNC: 171.6 MG/DL (ref 39–259)
FOLATE SERPL-MCNC: >20 NG/ML (ref 4.78–24.2)
GFR SERPLBLD CREATININE-BSD FMLA CKD-EPI: >60 ML/MIN/{1.73_M2}
GLUCOSE SERPL-MCNC: 129 MG/DL (ref 70–99)
MICROALBUMIN UR DL<=1MG/L-MCNC: <1.2 MG/DL
MICROALBUMIN/CREAT UR: NORMAL MG/G (ref 0–30)
POTASSIUM SERPL-SCNC: 5 MMOL/L (ref 3.5–5.1)
PROT SERPL-MCNC: 6.7 G/DL (ref 6.4–8.2)
SODIUM SERPL-SCNC: 147 MMOL/L (ref 136–145)
VIT B12 SERPL-MCNC: 1305 PG/ML (ref 211–911)

## 2023-04-26 LAB
EST. AVERAGE GLUCOSE BLD GHB EST-MCNC: 171.4 MG/DL
HBA1C MFR BLD: 7.6 %

## 2023-05-16 DIAGNOSIS — E11.59 TYPE 2 DIABETES MELLITUS WITH VASCULAR DISEASE (HCC): ICD-10-CM

## 2023-05-16 DIAGNOSIS — E11.3299 TYPE 2 DIABETES MELLITUS WITH BACKGROUND RETINOPATHY (HCC): ICD-10-CM

## 2023-05-16 RX ORDER — BLOOD SUGAR DIAGNOSTIC
STRIP MISCELLANEOUS
Qty: 200 STRIP | Refills: 3 | OUTPATIENT
Start: 2023-05-16

## 2023-05-20 DIAGNOSIS — E11.59 TYPE 2 DIABETES MELLITUS WITH VASCULAR DISEASE (HCC): ICD-10-CM

## 2023-05-20 DIAGNOSIS — E11.3299 TYPE 2 DIABETES MELLITUS WITH BACKGROUND RETINOPATHY (HCC): ICD-10-CM

## 2023-05-22 RX ORDER — INSULIN GLARGINE 100 [IU]/ML
INJECTION, SOLUTION SUBCUTANEOUS
Qty: 15 ML | Refills: 0 | Status: SHIPPED | OUTPATIENT
Start: 2023-05-22 | End: 2023-05-25 | Stop reason: SDUPTHER

## 2023-05-22 NOTE — TELEPHONE ENCOUNTER
Requested Refill:   Requested Prescriptions     Pending Prescriptions Disp Refills    LANTUS SOLOSTAR 100 UNIT/ML injection pen [Pharmacy Med Name: Galina Lloyd 100UNIT/ML (15ML=1BOX)] 15 mL 0     Sig: INJECT 18 UNITS UNDER THE SKIN EVERY MORNING     Last refilled: 3/23/2023 # 15 ml no refills     Last Appt: VV 1/18/2023  Next Appt: 5/25/2023

## 2023-05-25 ENCOUNTER — OFFICE VISIT (OUTPATIENT)
Dept: ENDOCRINOLOGY | Age: 58
End: 2023-05-25
Payer: COMMERCIAL

## 2023-05-25 VITALS
WEIGHT: 237 LBS | OXYGEN SATURATION: 99 % | HEIGHT: 73 IN | BODY MASS INDEX: 31.41 KG/M2 | HEART RATE: 83 BPM | SYSTOLIC BLOOD PRESSURE: 151 MMHG | DIASTOLIC BLOOD PRESSURE: 114 MMHG

## 2023-05-25 DIAGNOSIS — E11.3299 TYPE 2 DIABETES MELLITUS WITH BACKGROUND RETINOPATHY (HCC): Primary | ICD-10-CM

## 2023-05-25 DIAGNOSIS — I10 ESSENTIAL HYPERTENSION: ICD-10-CM

## 2023-05-25 DIAGNOSIS — E11.59 TYPE 2 DIABETES MELLITUS WITH VASCULAR DISEASE (HCC): ICD-10-CM

## 2023-05-25 DIAGNOSIS — E78.5 DYSLIPIDEMIA ASSOCIATED WITH TYPE 2 DIABETES MELLITUS (HCC): ICD-10-CM

## 2023-05-25 DIAGNOSIS — E66.01 MORBID OBESITY DUE TO EXCESS CALORIES (HCC): ICD-10-CM

## 2023-05-25 DIAGNOSIS — E11.69 DYSLIPIDEMIA ASSOCIATED WITH TYPE 2 DIABETES MELLITUS (HCC): ICD-10-CM

## 2023-05-25 PROCEDURE — 3051F HG A1C>EQUAL 7.0%<8.0%: CPT | Performed by: INTERNAL MEDICINE

## 2023-05-25 PROCEDURE — 3077F SYST BP >= 140 MM HG: CPT | Performed by: INTERNAL MEDICINE

## 2023-05-25 PROCEDURE — 99214 OFFICE O/P EST MOD 30 MIN: CPT | Performed by: INTERNAL MEDICINE

## 2023-05-25 PROCEDURE — 3080F DIAST BP >= 90 MM HG: CPT | Performed by: INTERNAL MEDICINE

## 2023-05-25 RX ORDER — BLOOD SUGAR DIAGNOSTIC
2 STRIP MISCELLANEOUS DAILY
Qty: 200 EACH | Refills: 3 | Status: SHIPPED | OUTPATIENT
Start: 2023-05-25

## 2023-05-25 RX ORDER — INSULIN GLARGINE 100 [IU]/ML
INJECTION, SOLUTION SUBCUTANEOUS
Qty: 15 ML | Refills: 0 | Status: SHIPPED | OUTPATIENT
Start: 2023-05-25

## 2023-05-25 RX ORDER — PEN NEEDLE, DIABETIC 30 GX3/16"
NEEDLE, DISPOSABLE MISCELLANEOUS
Qty: 200 EACH | Refills: 3 | Status: SHIPPED | OUTPATIENT
Start: 2023-05-25

## 2023-05-25 RX ORDER — LANOLIN ALCOHOL/MO/W.PET/CERES
CREAM (GRAM) TOPICAL
Qty: 65 TABLET | Refills: 2 | Status: SHIPPED | OUTPATIENT
Start: 2023-05-25

## 2023-05-25 NOTE — PROGRESS NOTES
Patient ID:   Fleta Hamman is a 56 y.o. male    Chief Complaint:   Fleta Hamman presents for an evaluation of Type 2 Diabetes Mellitus , Hyperlipidemia and hypertension. Subjective:   Type 2 Diabetes Mellitus diagnosed in 1998  On insulin since 2016     Previously followed by  endocrine and physician moved out. Records reviewed from care everywhere   Pioglitazone 30mg daily - stopped in Feb 2018   Reyes Verde stopped in summer 2018 for worsening CKD     S/p gastric sleeve in Sep 2019. presurgery weight 280 lbs. Now 237 lbs, up 5 lbs from LOV. It went up recently with his travelling as grandson is playing football      Metformin ER 500mg, two tabs twice a day. A month ago he was taking one twice a day , trying to reduce his pills. Ozempic 2.0 mg weekly on Sunday or Monday   Lantus 18 units in am      Checks blood sugars 1 times per day. Reportedly    AM: 97 -141 . Supper:    HS:     Hypoglycemias: Morning hypoglycemias have resolved since moving lantus in am. No episodes now. Awareness present in 70's    Meals: Three, dinner is big. No snacks, diet soda once a day   Exercise: work out at in a [employer] center. Lifting weights for toning up. Walking and cardio     Denies chest pain, exertional dyspnea. CVA in Aug 2016. No residual deficits. Denies smoking.      Currently on ASA 81 mg daily     The following portions of the patient's history were reviewed and updated as appropriate:       Family History   Problem Relation Age of Onset    Heart Attack Mother     Diabetes Mother     High Blood Pressure Mother     Colon Cancer Sister          Social History     Socioeconomic History    Marital status:      Spouse name: Not on file    Number of children: Not on file    Years of education: Not on file    Highest education level: Not on file   Occupational History    Not on file   Tobacco Use    Smoking status: Never    Smokeless tobacco: Never   Vaping Use    Vaping Use: Never used   Substance

## 2023-07-10 DIAGNOSIS — E78.5 DYSLIPIDEMIA ASSOCIATED WITH TYPE 2 DIABETES MELLITUS (HCC): ICD-10-CM

## 2023-07-10 DIAGNOSIS — E11.59 TYPE 2 DIABETES MELLITUS WITH VASCULAR DISEASE (HCC): ICD-10-CM

## 2023-07-10 DIAGNOSIS — E11.69 DYSLIPIDEMIA ASSOCIATED WITH TYPE 2 DIABETES MELLITUS (HCC): ICD-10-CM

## 2023-07-10 DIAGNOSIS — E11.3299 TYPE 2 DIABETES MELLITUS WITH BACKGROUND RETINOPATHY (HCC): ICD-10-CM

## 2023-07-10 RX ORDER — ATORVASTATIN CALCIUM 80 MG/1
TABLET, FILM COATED ORAL
Qty: 90 TABLET | Refills: 1 | Status: SHIPPED | OUTPATIENT
Start: 2023-07-10

## 2023-07-10 NOTE — TELEPHONE ENCOUNTER
Medication:   Requested Prescriptions     Pending Prescriptions Disp Refills    atorvastatin (LIPITOR) 80 MG tablet [Pharmacy Med Name: ATORVASTATIN CALCIUM 80MG TABS] 90 tablet 3     Sig: TAKE 1 TABLET BY MOUTH ONE TIME A DAY       Last Filled:      Patient Phone Number: [phone] (home)     Last appt: 5/25/2023   Next appt: 8/16/2023    Last Lipid:   Lab Results   Component Value Date/Time    CHOL 99 07/08/2021 11:52 AM    TRIG 66 07/08/2021 11:52 AM    HDL 44 08/15/2022 03:18 PM    LDLCALC 34 08/15/2022 03:18 PM

## 2023-07-13 DIAGNOSIS — E11.59 TYPE 2 DIABETES MELLITUS WITH VASCULAR DISEASE (HCC): ICD-10-CM

## 2023-07-13 DIAGNOSIS — E11.3299 TYPE 2 DIABETES MELLITUS WITH BACKGROUND RETINOPATHY (HCC): ICD-10-CM

## 2023-07-13 RX ORDER — INSULIN GLARGINE 100 [IU]/ML
INJECTION, SOLUTION SUBCUTANEOUS
Qty: 15 ML | Refills: 0 | Status: SHIPPED | OUTPATIENT
Start: 2023-07-13

## 2023-07-13 NOTE — TELEPHONE ENCOUNTER
Requested Prescriptions     Pending Prescriptions Disp Refills    LANTUS SOLOSTAR 100 UNIT/ML injection pen [Pharmacy Med Name: Epi Joyce 100UNIT/ML (15ML=1BOX)] 15 mL 0     Sig: INJECT 18 UNITS UNDER THE SKIN EVERY MORNING     LOV: 5/25/23  NOV: 8/16/23  Last Refilled: 5/25/23

## 2023-08-03 DIAGNOSIS — E66.01 MORBID OBESITY DUE TO EXCESS CALORIES (HCC): ICD-10-CM

## 2023-08-03 DIAGNOSIS — E11.59 TYPE 2 DIABETES MELLITUS WITH VASCULAR DISEASE (HCC): ICD-10-CM

## 2023-08-03 DIAGNOSIS — E78.5 DYSLIPIDEMIA ASSOCIATED WITH TYPE 2 DIABETES MELLITUS (HCC): ICD-10-CM

## 2023-08-03 DIAGNOSIS — I10 ESSENTIAL HYPERTENSION: ICD-10-CM

## 2023-08-03 DIAGNOSIS — E11.69 DYSLIPIDEMIA ASSOCIATED WITH TYPE 2 DIABETES MELLITUS (HCC): ICD-10-CM

## 2023-08-03 DIAGNOSIS — E11.3299 TYPE 2 DIABETES MELLITUS WITH BACKGROUND RETINOPATHY (HCC): ICD-10-CM

## 2023-08-04 LAB
CHOLEST SERPL-MCNC: 82 MG/DL (ref 0–199)
EST. AVERAGE GLUCOSE BLD GHB EST-MCNC: 157.1 MG/DL
HBA1C MFR BLD: 7.1 %
HDLC SERPL-MCNC: 37 MG/DL (ref 40–60)
LDL CHOLESTEROL CALCULATED: 31 MG/DL
TRIGL SERPL-MCNC: 72 MG/DL (ref 0–150)
VLDLC SERPL CALC-MCNC: 14 MG/DL

## 2023-08-16 ENCOUNTER — OFFICE VISIT (OUTPATIENT)
Dept: ENDOCRINOLOGY | Age: 58
End: 2023-08-16
Payer: COMMERCIAL

## 2023-08-16 VITALS
DIASTOLIC BLOOD PRESSURE: 124 MMHG | HEIGHT: 73 IN | BODY MASS INDEX: 31.27 KG/M2 | SYSTOLIC BLOOD PRESSURE: 189 MMHG | OXYGEN SATURATION: 100 %

## 2023-08-16 DIAGNOSIS — I10 ESSENTIAL HYPERTENSION: ICD-10-CM

## 2023-08-16 DIAGNOSIS — E11.59 TYPE 2 DIABETES MELLITUS WITH VASCULAR DISEASE (HCC): ICD-10-CM

## 2023-08-16 DIAGNOSIS — E11.3299 TYPE 2 DIABETES MELLITUS WITH BACKGROUND RETINOPATHY (HCC): Primary | ICD-10-CM

## 2023-08-16 DIAGNOSIS — E78.5 DYSLIPIDEMIA ASSOCIATED WITH TYPE 2 DIABETES MELLITUS (HCC): ICD-10-CM

## 2023-08-16 DIAGNOSIS — E11.69 DYSLIPIDEMIA ASSOCIATED WITH TYPE 2 DIABETES MELLITUS (HCC): ICD-10-CM

## 2023-08-16 PROCEDURE — 3051F HG A1C>EQUAL 7.0%<8.0%: CPT | Performed by: INTERNAL MEDICINE

## 2023-08-16 PROCEDURE — 99214 OFFICE O/P EST MOD 30 MIN: CPT | Performed by: INTERNAL MEDICINE

## 2023-08-16 PROCEDURE — 3077F SYST BP >= 140 MM HG: CPT | Performed by: INTERNAL MEDICINE

## 2023-08-16 PROCEDURE — 3080F DIAST BP >= 90 MM HG: CPT | Performed by: INTERNAL MEDICINE

## 2023-08-16 NOTE — PROGRESS NOTES
Patient ID:   Tomás Nam is a 56 y.o. male    Chief Complaint:   Tomás Nam presents for an evaluation of Type 2 Diabetes Mellitus , Hyperlipidemia and hypertension. Subjective:   Type 2 Diabetes Mellitus diagnosed in 1998  On insulin since 2016     Previously followed by  endocrine and physician moved out. Records reviewed from care everywhere   Pioglitazone 30mg daily - stopped in Feb 2018   Do [place] stopped in summer 2018 for worsening CKD     S/p gastric sleeve in Sep 2019. presurgery weight 280 lbs. Now 237 lbs. It went up recently with his travelling as grandson is playing football      Metformin ER 500mg, two tabs twice a day. Ozempic 2.0 mg weekly on Sunday   Lantus 18 units in am      Checks blood sugars 1 times per day. Reviewed. And scanned. Blood sugars are variable depending on activity. He says eating habits never change   AM:     Supper:    HS:     Hypoglycemias: Morning hypoglycemias have resolved since moving lantus in am. No episodes now. Awareness present in 70's    Meals: Three, dinner is big. No snacks, diet soda once a day   Exercise: work out at in a [employer] center. Lifting weights for toning up. Walking and cardio     Denies chest pain, exertional dyspnea. CVA in Aug 2016. No residual deficits. Denies smoking.      Currently on ASA 81 mg daily     The following portions of the patient's history were reviewed and updated as appropriate:       Family History   Problem Relation Age of Onset    Heart Attack Mother     Diabetes Mother     High Blood Pressure Mother     Colon Cancer Sister          Social History     Socioeconomic History    Marital status:      Spouse name: Not on file    Number of children: Not on file    Years of education: Not on file    Highest education level: Not on file   Occupational History    Not on file   Tobacco Use    Smoking status: Never    Smokeless tobacco: Never   Vaping Use    Vaping Use: Never used   Substance and

## 2023-09-12 ENCOUNTER — TELEPHONE (OUTPATIENT)
Dept: ENDOCRINOLOGY | Age: 58
End: 2023-09-12

## 2023-09-12 NOTE — TELEPHONE ENCOUNTER
Submitted PA for Ozempic  [facility] (Key: [id number]) STATUS: PENDING. Follow up done daily; if no response in three days we will refax for status check. If another three days goes by with no response we will call the insurance for status.

## 2023-09-17 ENCOUNTER — OFFICE VISIT (OUTPATIENT)
Age: 58
End: 2023-09-17

## 2023-09-17 VITALS
BODY MASS INDEX: 30.91 KG/M2 | HEART RATE: 78 BPM | DIASTOLIC BLOOD PRESSURE: 118 MMHG | TEMPERATURE: 98 F | WEIGHT: 233.2 LBS | HEIGHT: 73 IN | OXYGEN SATURATION: 99 % | SYSTOLIC BLOOD PRESSURE: 169 MMHG

## 2023-09-17 DIAGNOSIS — M70.62 TROCHANTERIC BURSITIS OF LEFT HIP: Primary | ICD-10-CM

## 2023-09-17 DIAGNOSIS — I10 PRIMARY HYPERTENSION: ICD-10-CM

## 2023-09-17 RX ORDER — IBUPROFEN 800 MG/1
800 TABLET ORAL
Qty: 90 TABLET | Refills: 0 | Status: SHIPPED | OUTPATIENT
Start: 2023-09-17

## 2023-09-17 RX ORDER — PREDNISONE 10 MG/1
TABLET ORAL
Qty: 20 TABLET | Refills: 0 | Status: SHIPPED | OUTPATIENT
Start: 2023-09-17 | End: 2023-09-25

## 2023-09-22 DIAGNOSIS — E11.3299 TYPE 2 DIABETES MELLITUS WITH BACKGROUND RETINOPATHY (HCC): ICD-10-CM

## 2023-09-22 DIAGNOSIS — E11.59 TYPE 2 DIABETES MELLITUS WITH VASCULAR DISEASE (HCC): ICD-10-CM

## 2023-09-22 RX ORDER — INSULIN GLARGINE 100 [IU]/ML
INJECTION, SOLUTION SUBCUTANEOUS
Qty: 15 ML | Refills: 0 | Status: SHIPPED | OUTPATIENT
Start: 2023-09-22

## 2023-09-22 NOTE — TELEPHONE ENCOUNTER
Requested Refill:   Requested Prescriptions     Pending Prescriptions Disp Refills    LANTUS SOLOSTAR 100 UNIT/ML injection pen [Pharmacy Med Name: Brinda Dong 100UNIT/ML (15ML=1BOX)] 15 mL 0     Sig: INJECT 18 UNITS UNDER THE SKIN EVERY MORNING       Last refilled: 7/13/2023 # 15 ml no refills     Last Appt: 8/16/2023  Next Appt: 12/14/2023

## 2023-09-24 DIAGNOSIS — E11.59 TYPE 2 DIABETES MELLITUS WITH VASCULAR DISEASE (HCC): ICD-10-CM

## 2023-09-24 DIAGNOSIS — E11.3299 TYPE 2 DIABETES MELLITUS WITH BACKGROUND RETINOPATHY (HCC): ICD-10-CM

## 2023-09-24 DIAGNOSIS — K21.9 CHRONIC GERD: ICD-10-CM

## 2023-09-25 RX ORDER — METFORMIN HYDROCHLORIDE 500 MG/1
TABLET, EXTENDED RELEASE ORAL
Qty: 360 TABLET | Refills: 2 | Status: SHIPPED | OUTPATIENT
Start: 2023-09-25

## 2023-09-25 RX ORDER — LANOLIN ALCOHOL/MO/W.PET/CERES
CREAM (GRAM) TOPICAL
Qty: 90 TABLET | Refills: 2 | Status: SHIPPED | OUTPATIENT
Start: 2023-09-25

## 2023-09-25 RX ORDER — OMEPRAZOLE 20 MG/1
CAPSULE, DELAYED RELEASE ORAL
Qty: 90 CAPSULE | Refills: 1 | Status: SHIPPED | OUTPATIENT
Start: 2023-09-25

## 2023-09-25 NOTE — TELEPHONE ENCOUNTER
Did orthostatic blood pressure with patient. Lying for 5 minutes and then sitting for 2 minutes and then standing for 1 minute and then 2 minutes. BP and HR in chart and shown to Dr. Peggy Green. Medication:   Requested Prescriptions     Pending Prescriptions Disp Refills    metFORMIN (GLUCOPHAGE-XR) 500 MG extended release tablet [Pharmacy Med Name: METFORMIN HCL ER 500MG TB24] 360 tablet 2     Sig: TAKE TWO TABLETS BY MOUTH TWICE A DAY WITH MEALS    folic acid (Felipe Gilbert) 540 MCG tablet [Pharmacy Med Name: FOLIC ACID 265MAS TABS] 90 tablet 2     Sig: TAKE ONE TABLET BY MOUTH ONE TIME A DAY       Last Filled:      Patient Phone Number: [phone] (home)     Last appt: 8/16/2023   Next appt: 12/14/2023    Last Labs DM:   Lab Results   Component Value Date/Time    LABA1C 7.1 08/03/2023 03:13 PM

## 2023-10-02 ENCOUNTER — OFFICE VISIT (OUTPATIENT)
Dept: INTERNAL MEDICINE CLINIC | Age: 58
End: 2023-10-02
Payer: COMMERCIAL

## 2023-10-02 VITALS
OXYGEN SATURATION: 98 % | HEART RATE: 76 BPM | DIASTOLIC BLOOD PRESSURE: 84 MMHG | HEIGHT: 73 IN | WEIGHT: 234 LBS | BODY MASS INDEX: 31.01 KG/M2 | RESPIRATION RATE: 16 BRPM | SYSTOLIC BLOOD PRESSURE: 122 MMHG

## 2023-10-02 DIAGNOSIS — M25.552 LEFT HIP PAIN: ICD-10-CM

## 2023-10-02 DIAGNOSIS — Z23 NEED FOR INFLUENZA VACCINATION: ICD-10-CM

## 2023-10-02 DIAGNOSIS — Z00.00 WELL ADULT EXAM: Primary | ICD-10-CM

## 2023-10-02 PROBLEM — A04.8 HELICOBACTER PYLORI (H. PYLORI) INFECTION: Status: RESOLVED | Noted: 2019-04-25 | Resolved: 2023-10-02

## 2023-10-02 PROBLEM — E51.9 THIAMIN DEFICIENCY: Status: RESOLVED | Noted: 2019-07-12 | Resolved: 2023-10-02

## 2023-10-02 PROCEDURE — 99396 PREV VISIT EST AGE 40-64: CPT | Performed by: INTERNAL MEDICINE

## 2023-10-02 PROCEDURE — 90471 IMMUNIZATION ADMIN: CPT | Performed by: INTERNAL MEDICINE

## 2023-10-02 PROCEDURE — 3078F DIAST BP <80 MM HG: CPT | Performed by: INTERNAL MEDICINE

## 2023-10-02 PROCEDURE — 90674 CCIIV4 VAC NO PRSV 0.5 ML IM: CPT | Performed by: INTERNAL MEDICINE

## 2023-10-02 PROCEDURE — 3074F SYST BP LT 130 MM HG: CPT | Performed by: INTERNAL MEDICINE

## 2023-10-02 SDOH — ECONOMIC STABILITY: INCOME INSECURITY: HOW HARD IS IT FOR YOU TO PAY FOR THE VERY BASICS LIKE FOOD, HOUSING, MEDICAL CARE, AND HEATING?: NOT HARD AT ALL

## 2023-10-02 SDOH — ECONOMIC STABILITY: FOOD INSECURITY: WITHIN THE PAST 12 MONTHS, THE FOOD YOU BOUGHT JUST DIDN'T LAST AND YOU DIDN'T HAVE MONEY TO GET MORE.: NEVER TRUE

## 2023-10-02 SDOH — ECONOMIC STABILITY: HOUSING INSECURITY
IN THE LAST 12 MONTHS, WAS THERE A TIME WHEN YOU DID NOT HAVE A STEADY PLACE TO SLEEP OR SLEPT IN A SHELTER (INCLUDING NOW)?: NO

## 2023-10-02 SDOH — ECONOMIC STABILITY: FOOD INSECURITY: WITHIN THE PAST 12 MONTHS, YOU WORRIED THAT YOUR FOOD WOULD RUN OUT BEFORE YOU GOT MONEY TO BUY MORE.: NEVER TRUE

## 2023-10-02 ASSESSMENT — PATIENT HEALTH QUESTIONNAIRE - PHQ9
2. FEELING DOWN, DEPRESSED OR HOPELESS: 0
SUM OF ALL RESPONSES TO PHQ QUESTIONS 1-9: 0
1. LITTLE INTEREST OR PLEASURE IN DOING THINGS: 0
SUM OF ALL RESPONSES TO PHQ QUESTIONS 1-9: 0
9. THOUGHTS THAT YOU WOULD BE BETTER OFF DEAD, OR OF HURTING YOURSELF: 0
5. POOR APPETITE OR OVEREATING: 0
SUM OF ALL RESPONSES TO PHQ9 QUESTIONS 1 & 2: 0
SUM OF ALL RESPONSES TO PHQ QUESTIONS 1-9: 0
6. FEELING BAD ABOUT YOURSELF - OR THAT YOU ARE A FAILURE OR HAVE LET YOURSELF OR YOUR FAMILY DOWN: 0
3. TROUBLE FALLING OR STAYING ASLEEP: 0
8. MOVING OR SPEAKING SO SLOWLY THAT OTHER PEOPLE COULD HAVE NOTICED. OR THE OPPOSITE, BEING SO FIGETY OR RESTLESS THAT YOU HAVE BEEN MOVING AROUND A LOT MORE THAN USUAL: 0
SUM OF ALL RESPONSES TO PHQ QUESTIONS 1-9: 0
7. TROUBLE CONCENTRATING ON THINGS, SUCH AS READING THE NEWSPAPER OR WATCHING TELEVISION: 0
10. IF YOU CHECKED OFF ANY PROBLEMS, HOW DIFFICULT HAVE THESE PROBLEMS MADE IT FOR YOU TO DO YOUR WORK, TAKE CARE OF THINGS AT HOME, OR GET ALONG WITH OTHER PEOPLE: 0
4. FEELING TIRED OR HAVING LITTLE ENERGY: 0

## 2023-10-02 ASSESSMENT — ANXIETY QUESTIONNAIRES
1. FEELING NERVOUS, ANXIOUS, OR ON EDGE: 0
2. NOT BEING ABLE TO STOP OR CONTROL WORRYING: 0
3. WORRYING TOO MUCH ABOUT DIFFERENT THINGS: 0
4. TROUBLE RELAXING: 0
GAD7 TOTAL SCORE: 0
6. BECOMING EASILY ANNOYED OR IRRITABLE: 0
5. BEING SO RESTLESS THAT IT IS HARD TO SIT STILL: 0
7. FEELING AFRAID AS IF SOMETHING AWFUL MIGHT HAPPEN: 0
IF YOU CHECKED OFF ANY PROBLEMS ON THIS QUESTIONNAIRE, HOW DIFFICULT HAVE THESE PROBLEMS MADE IT FOR YOU TO DO YOUR WORK, TAKE CARE OF THINGS AT HOME, OR GET ALONG WITH OTHER PEOPLE: NOT DIFFICULT AT ALL

## 2023-10-02 NOTE — PROGRESS NOTES
Abdominal:      General: Abdomen is flat. Palpations: Abdomen is soft. Musculoskeletal:      Cervical back: Normal range of motion and neck supple. No rigidity or tenderness. Right hip: No deformity or tenderness. Left hip: Tenderness present. No lacerations. Decreased strength. Legs:    Neurological:      Mental Status: He is alert. Latest Ref Rng & Units 8/3/2023     3:13 PM 4/25/2023     8:26 AM 1/18/2023     4:04 PM   LAB PRIMARY CARE   A1C See comment % 7.1  7.6  6.8    A1C POC See comment % 7.1  7.6  6.8    GLU random 70 - 99 mg/dL  129     CHOL fast 0 - 199 mg/dL 82      TRUG fast 0 - 150 mg/dL 72      HDL 40 - 60 mg/dL 37      LDL CALC <100 mg/dL 31       - 145 mmol/L  147     K 3.5 - 5.1 mmol/L  5.0     BUN 7 - 20 mg/dL  20     CR 0.9 - 1.3 mg/dL  1.3     CA 8.3 - 10.6 mg/dL  9.5     ALT 10 - 40 U/L  22     AST 15 - 37 U/L  23         Lab Results   Component Value Date/Time    CHOL 99 07/08/2021 11:52 AM    CHOL 162 04/06/2021 01:34 PM    CHOL 96 02/28/2019 01:23 PM    CHOLFAST 82 08/03/2023 03:13 PM    CHOLFAST 87 08/15/2022 03:18 PM    CHOLFAST 93 12/28/2020 07:30 AM    TRIG 66 07/08/2021 11:52 AM    TRIG 57 04/06/2021 01:34 PM    TRIG 77 02/28/2019 01:23 PM    TRIGLYCFAST 72 08/03/2023 03:13 PM    TRIGLYCFAST 46 08/15/2022 03:18 PM    TRIGLYCFAST 61 12/28/2020 07:30 AM    HDL 37 08/03/2023 03:13 PM    HDL 44 08/15/2022 03:18 PM    HDL 47 07/08/2021 11:52 AM    LDLCALC 31 08/03/2023 03:13 PM    LDLCALC 34 08/15/2022 03:18 PM    LDLCALC 39 07/08/2021 11:52 AM    GLUCOSE 129 04/25/2023 08:26 AM    LABA1C 7.1 08/03/2023 03:13 PM    LABA1C 7.6 04/25/2023 08:26 AM    LABA1C 6.8 01/18/2023 04:04 PM       The ASCVD Risk score (Amandeep COVINGTON, et al., 2019) failed to calculate for the following reasons:     The patient has a prior MI or stroke diagnosis    Immunization History   Administered Date(s) Administered    COVID-19, MODERNA BLUE border, Primary or Immunocompromised,

## 2023-10-04 RX ORDER — DICLOFENAC SODIUM 75 MG/1
75 TABLET, DELAYED RELEASE ORAL 2 TIMES DAILY
Qty: 60 TABLET | Refills: 3 | Status: SHIPPED | OUTPATIENT
Start: 2023-10-04 | End: 2023-10-04 | Stop reason: SDUPTHER

## 2023-10-04 RX ORDER — DICLOFENAC SODIUM 75 MG/1
75 TABLET, DELAYED RELEASE ORAL 2 TIMES DAILY
Qty: 60 TABLET | Refills: 3 | Status: SHIPPED | OUTPATIENT
Start: 2023-10-04 | End: 2023-11-08

## 2023-10-29 DIAGNOSIS — I10 ESSENTIAL HYPERTENSION: ICD-10-CM

## 2023-10-30 RX ORDER — METOPROLOL TARTRATE 50 MG/1
50 TABLET, FILM COATED ORAL 2 TIMES DAILY
Qty: 180 TABLET | Refills: 3 | Status: SHIPPED | OUTPATIENT
Start: 2023-10-30

## 2023-11-07 NOTE — TELEPHONE ENCOUNTER
Recent Visits  Date Type Provider Dept   10/02/23 Office Visit Tl Barber MD [facility]   03/30/23 Office Visit Tl Barber MD [facility]   01/30/23 Office Visit Tl Barber MD [facility]   09/29/22 Office Visit Tl Barber MD [facility]   Showing recent visits within past 540 days with a meds authorizing provider and meeting all other requirements  Future Appointments  Date Type Provider Dept   04/03/24 Appointment Tl Barber MD [facility]   Showing future appointments within next 150 days with a meds authorizing provider and meeting all other requirements     10/2/2023

## 2023-11-08 RX ORDER — DICLOFENAC SODIUM 75 MG/1
75 TABLET, DELAYED RELEASE ORAL 2 TIMES DAILY
Qty: 60 TABLET | Refills: 2 | Status: SHIPPED | OUTPATIENT
Start: 2023-11-08

## 2023-11-12 DIAGNOSIS — I10 ESSENTIAL HYPERTENSION: ICD-10-CM

## 2023-11-13 RX ORDER — FUROSEMIDE 20 MG/1
TABLET ORAL
Qty: 90 TABLET | Refills: 2 | Status: SHIPPED | OUTPATIENT
Start: 2023-11-13

## 2023-11-19 DIAGNOSIS — E11.3299 TYPE 2 DIABETES MELLITUS WITH BACKGROUND RETINOPATHY (HCC): ICD-10-CM

## 2023-11-19 DIAGNOSIS — E11.59 TYPE 2 DIABETES MELLITUS WITH VASCULAR DISEASE (HCC): ICD-10-CM

## 2023-11-20 RX ORDER — INSULIN GLARGINE 100 [IU]/ML
INJECTION, SOLUTION SUBCUTANEOUS
Qty: 15 ML | Refills: 0 | Status: SHIPPED | OUTPATIENT
Start: 2023-11-20

## 2023-11-20 NOTE — TELEPHONE ENCOUNTER
Requested Refill:   Requested Prescriptions     Pending Prescriptions Disp Refills    LANTUS SOLOSTAR 100 UNIT/ML injection pen [Pharmacy Med Name: Michell Delvalle 100UNIT/ML (15ML=1BOX)] 15 mL 0     Sig: INJECT 18 UNITS UNDER THE SKIN EVERY MORNING     Last refilled: 9/22/2023 # 15 ml with no refills     Last Appt: 8/16/2023  Next Appt: 12/14/2023

## 2023-12-12 DIAGNOSIS — I10 ESSENTIAL HYPERTENSION: ICD-10-CM

## 2023-12-12 DIAGNOSIS — E11.69 DYSLIPIDEMIA ASSOCIATED WITH TYPE 2 DIABETES MELLITUS (HCC): ICD-10-CM

## 2023-12-12 DIAGNOSIS — E11.59 TYPE 2 DIABETES MELLITUS WITH VASCULAR DISEASE (HCC): ICD-10-CM

## 2023-12-12 DIAGNOSIS — E78.5 DYSLIPIDEMIA ASSOCIATED WITH TYPE 2 DIABETES MELLITUS (HCC): ICD-10-CM

## 2023-12-12 DIAGNOSIS — E11.3299 TYPE 2 DIABETES MELLITUS WITH BACKGROUND RETINOPATHY (HCC): ICD-10-CM

## 2023-12-13 LAB
EST. AVERAGE GLUCOSE BLD GHB EST-MCNC: 137 MG/DL
HBA1C MFR BLD: 6.4 %

## 2023-12-14 ENCOUNTER — OFFICE VISIT (OUTPATIENT)
Dept: ENDOCRINOLOGY | Age: 58
End: 2023-12-14
Payer: COMMERCIAL

## 2023-12-14 VITALS
OXYGEN SATURATION: 99 % | DIASTOLIC BLOOD PRESSURE: 105 MMHG | SYSTOLIC BLOOD PRESSURE: 169 MMHG | BODY MASS INDEX: 31.12 KG/M2 | HEART RATE: 86 BPM | HEIGHT: 73 IN | WEIGHT: 234.8 LBS

## 2023-12-14 DIAGNOSIS — E11.3299 TYPE 2 DIABETES MELLITUS WITH BACKGROUND RETINOPATHY (HCC): Primary | ICD-10-CM

## 2023-12-14 DIAGNOSIS — E78.5 DYSLIPIDEMIA ASSOCIATED WITH TYPE 2 DIABETES MELLITUS (HCC): ICD-10-CM

## 2023-12-14 DIAGNOSIS — E11.59 TYPE 2 DIABETES MELLITUS WITH VASCULAR DISEASE (HCC): ICD-10-CM

## 2023-12-14 DIAGNOSIS — I10 ESSENTIAL HYPERTENSION: ICD-10-CM

## 2023-12-14 DIAGNOSIS — E11.69 DYSLIPIDEMIA ASSOCIATED WITH TYPE 2 DIABETES MELLITUS (HCC): ICD-10-CM

## 2023-12-14 PROCEDURE — 99214 OFFICE O/P EST MOD 30 MIN: CPT | Performed by: INTERNAL MEDICINE

## 2023-12-14 PROCEDURE — 3044F HG A1C LEVEL LT 7.0%: CPT | Performed by: INTERNAL MEDICINE

## 2023-12-14 PROCEDURE — 3080F DIAST BP >= 90 MM HG: CPT | Performed by: INTERNAL MEDICINE

## 2023-12-14 PROCEDURE — 3077F SYST BP >= 140 MM HG: CPT | Performed by: INTERNAL MEDICINE

## 2023-12-14 NOTE — PATIENT INSTRUCTIONS
C/w Metformin Er 500mg two twice a day   C/w Ozempic 2 mg weekly   C/w Lantus 18 units in the morning. If sugars drop 80 or under cut down insulin to 16 units.

## 2023-12-14 NOTE — PROGRESS NOTES
Patient ID:   Tomás Nam is a 66 y.o. male    Chief Complaint:   Tomás Nam presents for an evaluation of Type 2 Diabetes Mellitus , Hyperlipidemia and hypertension. Subjective:   Type 2 Diabetes Mellitus diagnosed in 1998  On insulin since 2016     Previously followed by  endocrine and physician moved out. Records reviewed from care everywhere   Pioglitazone 30mg daily - stopped in Feb 2018   Do Oka stopped in summer 2018 for worsening CKD     S/p gastric sleeve in Sep 2019. presurgery weight 280 lbs. Now 237 lbs. It went up recently with his travelling as grandson is playing football      Metformin ER 500mg, two tabs twice a day. Ozempic 2.0 mg weekly on Sunday   Lantus 18 units in am      Forgot the meter     Checks blood sugars 1 times per day. Reportedly 120 and under. Lowest sugars of 90. AM:    Supper:    HS:      Hypoglycemias: Morning hypoglycemias have resolved since moving lantus in am. No episodes now. Awareness present in 70's    Meals: Three, dinner is big. 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 Lifting weights for toning up. Walking and cardio     Denies chest pain, exertional dyspnea. CVA in Aug 2016. No residual deficits. Denies smoking.      Currently on ASA 81 mg daily     The following portions of the patient's history were reviewed and updated as appropriate:       Family History   Problem Relation Age of Onset    Heart Attack Mother     Diabetes Mother     High Blood Pressure Mother     Colon Cancer Sister          Social History     Socioeconomic History    Marital status:      Spouse name: Not on file    Number of children: Not on file    Years of education: Not on file    Highest education level: Not on file   Occupational History    Not on file   Tobacco Use    Smoking status: Never    Smokeless tobacco: Never   Vaping Use    Vaping Use: Never used   Substance and Sexual Activity    Alcohol use:

## 2024-01-02 DIAGNOSIS — E11.3299 TYPE 2 DIABETES MELLITUS WITH BACKGROUND RETINOPATHY (HCC): ICD-10-CM

## 2024-01-02 DIAGNOSIS — E78.5 DYSLIPIDEMIA ASSOCIATED WITH TYPE 2 DIABETES MELLITUS (HCC): ICD-10-CM

## 2024-01-02 DIAGNOSIS — E11.69 DYSLIPIDEMIA ASSOCIATED WITH TYPE 2 DIABETES MELLITUS (HCC): ICD-10-CM

## 2024-01-02 DIAGNOSIS — E11.59 TYPE 2 DIABETES MELLITUS WITH VASCULAR DISEASE (HCC): ICD-10-CM

## 2024-01-02 RX ORDER — ATORVASTATIN CALCIUM 80 MG/1
TABLET, FILM COATED ORAL
Qty: 90 TABLET | Refills: 1 | Status: SHIPPED | OUTPATIENT
Start: 2024-01-02

## 2024-01-10 ENCOUNTER — TELEPHONE (OUTPATIENT)
Dept: PHARMACY | Facility: CLINIC | Age: 59
End: 2024-01-10

## 2024-01-10 NOTE — TELEPHONE ENCOUNTER
Patient is [facility]  Called patient to schedule 2024 yearly pharmacist appointment to discuss medications for Diabetes Management Program.    No answer. Left VM on mobile TAD: Please call back at [phone] Option #3.    Jacklyn Veliz CPhT.   Population Health Clinical   [facility]  Toll free: [phone] Option 3

## 2024-01-15 NOTE — TELEPHONE ENCOUNTER
Patient is [facility]    Second attempt made to contact patient to schedule 2024 yearly pharmacist appointment to discuss medications for Diabetes Management Program.      No answer. Left VM on home/mobile TAD: Please call back at [phone] Option #3.     Precipiot message sent to patient.  If unread, letter will be mailed to home.       Jacklyn Veliz CPhT.   Population Health Clinical   [facility]  Toll free: [phone] Option 3      For Pharmacy Admin Tracking Only    Program: [employer]  CPA in place:  No  Gap Closed?: No   Time Spent (min): 15

## 2024-01-23 DIAGNOSIS — I10 ESSENTIAL HYPERTENSION: ICD-10-CM

## 2024-01-23 RX ORDER — AMLODIPINE AND VALSARTAN 5; 160 MG/1; MG/1
1 TABLET ORAL DAILY
Qty: 90 TABLET | Refills: 3 | Status: SHIPPED | OUTPATIENT
Start: 2024-01-23

## 2024-01-23 NOTE — TELEPHONE ENCOUNTER
Patient called in regarding the following medication  -amLODIPine-valsartan (EXFORGE) 5-160 MG per tablet     Patient is stating that he only has 2 tablets left

## 2024-01-23 NOTE — TELEPHONE ENCOUNTER
Recent Visits  Date Type Provider Dept   10/02/23 Office Visit Tank Sevilla MD [facility]   03/30/23 Office Visit Tank Sevilla MD [facility]   01/30/23 Office Visit Tank Sevilla MD [facility]   09/29/22 Office Visit Tank Sevilla MD [facility]   Showing recent visits within past 540 days with a meds authorizing provider and meeting all other requirements  Future Appointments  Date Type Provider Dept   04/03/24 Appointment Tank Sevilla MD [facility]   Showing future appointments within next 150 days with a meds authorizing provider and meeting all other requirements     10/2/2023

## 2024-02-08 ENCOUNTER — TELEPHONE (OUTPATIENT)
Dept: PHARMACY | Facility: CLINIC | Age: 59
End: 2024-02-08

## 2024-02-08 NOTE — TELEPHONE ENCOUNTER
diabetic complications: nephropathy, retinopathy, peripheral neuropathy, and cerebrovascular disease  - Eye exam current (within one year): yes  - Foot exam current (within one year): yes   - Medication changes since last A1c: none  - Medications/Classes already tried/failed: none  - Therapy Optimization:  Is having some bloating and gas with ozempic  - Daily aspirin? Yes  - Medication compliance: compliant all of the time  - Diet compliance: compliant most of the time  - Current exercise: 30 min 3-4 times per week      Other Considerations:  - Blood Pressure Goal:   Patient prescribed a ACEi/ARB:yes - amlodipine/valsartan  BP less than 130/80 mmHg due to history of DM:  elevated at endo visits but normal at pcp visits  Recommendation: discussed with patient. Patient states BP normal outside of endo office. History of CVA, cont to monitor BP    PLAN:    - DM program gaps identified:   Requirements recommended to be completed by 7/1: Provider Visit for DM (1st) and A1c (1st)   Requirements due by 12/31: Provider visit for DM (2nd), A1c (2nd), Lipid panel, Urine microalbumin, and Influenza vaccination for [id number]   - Education to patient: Addressed diet and exercise, Reminded to get yearly retinal exam, and Overview of Diabetes program- Benefits/Requirements   - Follow up: PCP for identified gaps or as scheduled below and Endocrinologist for identified gaps or as scheduled below    - Upcoming appointments:   Future Appointments   Date Time Provider Department Center   2/20/2024  2:00 PM Misael Ching MD [facility]   4/3/2024  2:30 PM Tank Sevilla MD [facility] [place] [facility]   5/16/2024  4:40 PM Imani Sofia MD [facility]     Karena Coyne, PharmD, BCPS  Population Health Pharmacy  [facility] Clinical Pharmacist  Department: [phone]    For Pharmacy Admin Tracking Only    Program: [employer]  CPA in place:  No  Recommendation Provided To: Patient/Caregiver: 1 via

## 2024-02-20 ENCOUNTER — OFFICE VISIT (OUTPATIENT)
Dept: BARIATRICS/WEIGHT MGMT | Age: 59
End: 2024-02-20
Payer: COMMERCIAL

## 2024-02-20 VITALS
OXYGEN SATURATION: 97 % | HEART RATE: 85 BPM | SYSTOLIC BLOOD PRESSURE: 142 MMHG | WEIGHT: 234 LBS | BODY MASS INDEX: 31.01 KG/M2 | RESPIRATION RATE: 15 BRPM | HEIGHT: 73 IN | DIASTOLIC BLOOD PRESSURE: 102 MMHG

## 2024-02-20 DIAGNOSIS — K21.9 CHRONIC GERD: ICD-10-CM

## 2024-02-20 DIAGNOSIS — Z98.84 S/P LAPAROSCOPIC SLEEVE GASTRECTOMY: Primary | ICD-10-CM

## 2024-02-20 DIAGNOSIS — E11.69 DIABETES MELLITUS TYPE 2 IN OBESE (HCC): ICD-10-CM

## 2024-02-20 DIAGNOSIS — I10 ESSENTIAL HYPERTENSION: ICD-10-CM

## 2024-02-20 DIAGNOSIS — E66.9 DIABETES MELLITUS TYPE 2 IN OBESE (HCC): ICD-10-CM

## 2024-02-20 PROCEDURE — 3080F DIAST BP >= 90 MM HG: CPT | Performed by: SURGERY

## 2024-02-20 PROCEDURE — 99214 OFFICE O/P EST MOD 30 MIN: CPT | Performed by: SURGERY

## 2024-02-20 PROCEDURE — 3077F SYST BP >= 140 MM HG: CPT | Performed by: SURGERY

## 2024-02-20 NOTE — PROGRESS NOTES
[facility]   [employer]  Misael Ching MD, FACS, [address], [address]    [facility] [address] .  Phone: [phone]  Fax: [phone]            Chief Complaint   Patient presents with    Follow-up     4yr 2 mo f/u sleeve           HPI:    Juaquin Delaney is a very pleasant 58 y.o.  male , Body mass index is 30.87 kg/m².. And multiple medical problems who is presenting for bariatric follow up care.   Juaquin is s/p laparoscopic sleeve gastrectomy by me 9/2019. Initial Weight: 278 lbs, Weight Loss: 43 lbs.   Comes today to the clinic without any complaints. Patient denies any nausea, vomiting, fevers, chills, shortness of breath, chest pain, constipation or urinary symptoms. Denies any heartburn nor dysphagia.  Patient informed us today that they are taking the multivitamins as instructed.  Patient denies any tingling, weakness,  numbness nor any neurological symptoms.  Juaquin is feeling very well, and is very active. Patient is very pleased with the weight loss and resolution of co-morbid conditions.      Pain Assessment   Denies any abdominal pain     Past Medical History:   Diagnosis Date    Cerebral artery occlusion with cerebral infarction (HCC)     CKD (chronic kidney disease)     Diabetes mellitus (HCC)     GERD (gastroesophageal reflux disease)     Helicobacter pylori (H. pylori) infection 4/25/2019    Hypertension     Sleep apnea     no cpap     Past Surgical History:   Procedure Laterality Date    COLONOSCOPY N/A 6/24/2021    COLONOSCOPY POLYPECTOMY SNARE/COLD OF 1 DESCENDING COLON POLYP AND 2 RECTAL  POLYPS performed by Camron Garcia MD at [facility] ENDOSCOPY    CYSTOSCOPY  9/25/2019    FLEXIBLE CYSTOSCOPY, DILATION, POTTS CATHETER INSERTION performed by Tank Ortiz MD at [facility] OR    KNEE SURGERY      right knee     SIGMOIDOSCOPY N/A 6/23/2021    SIGMOIDOSCOPY DIAGNOSTIC FLEXIBLE performed by Babak Fontana MD at [facility] ENDOSCOPY    SLEEVE GASTRECTOMY  <-- Click to add NO pertinent Past Medical History

## 2024-02-20 NOTE — PROGRESS NOTES
Dietary Assessment Note      Vitals:   Vitals:    24 1413   BP: (!) 172/115   Site: Right Upper Arm   Position: Sitting   Pulse: 85   Resp: 15   SpO2: 97%   Weight: 106.1 kg (234 lb)   Height: 1.854 m (6' 1\")    Patient gained 3 lbs over ~1 yr.    Total Weight Loss: 43.3 lbs    Labs reviewed: no lab studies available for review at time of visit    Protein intake: 60-80 grams/day     Fluid intake: 48-64 oz/day water + gatorade zero +    Multivitamin/mineral intake: yes MVI with Ca - pt states his wife takes care of all that he's not sure of brand     Calcium intake:  none    Other: none    Exercise: yes 30-45 min 3-4x per week treadmill    Nutrition Assessment: 4 yr 2 mos post-op visit.  Breakfast: Coffee + 2 scrambled eggs w/ cheese + 1 slice toast lite butter  Snack: Banana/grapes  Lunch: 1/2 Chix quesadilla   Snack: Chips   Dinner: Chix/fish + veggies + small amount starch  Snack: Cookies     Amount able to eat per sittin cup     Following  rule: yes     Food Intolerances/issues:  red meat     Client Concerns: gas/bloating - taking Ozempic    Goals:   - Continue diet and exercise    Plan: f/u per provider    LISBET HILTON, MS, RD, LD

## 2024-02-26 DIAGNOSIS — E11.3299 TYPE 2 DIABETES MELLITUS WITH BACKGROUND RETINOPATHY (HCC): ICD-10-CM

## 2024-02-26 DIAGNOSIS — E11.59 TYPE 2 DIABETES MELLITUS WITH VASCULAR DISEASE (HCC): ICD-10-CM

## 2024-02-26 RX ORDER — SEMAGLUTIDE 2.68 MG/ML
INJECTION, SOLUTION SUBCUTANEOUS
Qty: 9 ML | Refills: 5 | OUTPATIENT
Start: 2024-02-26

## 2024-02-27 ENCOUNTER — TELEPHONE (OUTPATIENT)
Dept: ENDOCRINOLOGY | Age: 59
End: 2024-02-27

## 2024-03-04 DIAGNOSIS — E11.59 TYPE 2 DIABETES MELLITUS WITH VASCULAR DISEASE (HCC): ICD-10-CM

## 2024-03-04 DIAGNOSIS — E11.3299 TYPE 2 DIABETES MELLITUS WITH BACKGROUND RETINOPATHY (HCC): ICD-10-CM

## 2024-03-04 RX ORDER — INSULIN GLARGINE 100 [IU]/ML
INJECTION, SOLUTION SUBCUTANEOUS
Qty: 15 ML | Refills: 0 | Status: SHIPPED | OUTPATIENT
Start: 2024-03-04

## 2024-03-04 NOTE — TELEPHONE ENCOUNTER
Requested Prescriptions     Pending Prescriptions Disp Refills    semaglutide, 2 MG/DOSE, (OZEMPIC) 8 MG/3ML SOPN sc injection 9 mL 5     Simg weekly       [facility] - [place], [place] - [address] [facility] [address], [address] - P [phone] - F [phone]  [address], [address]  [facility] [address]  Phone: [phone] Fax: [phone]    Last OV 2023  Next OV 2024

## 2024-03-04 NOTE — TELEPHONE ENCOUNTER
Requested Prescriptions     Pending Prescriptions Disp Refills    LANTUS SOLOSTAR 100 UNIT/ML injection pen [Pharmacy Med Name: LANTUS SOLOSTAR 100UNIT/ML (15ML=1BOX)] 15 mL 0     Sig: INJECT 18 UNITS UNDER THE SKIN EVERY MORNING       [facility] - [place], [place] - [address], [address] - P [phone] - F [phone]  [address] [facility] [address], [address]  [facility] [address]  Phone: [phone] Fax: [phone]    Last OV 12/14/2023  Next OV 05/16/2024      Lantus 18 units in am

## 2024-03-04 NOTE — TELEPHONE ENCOUNTER
Patient called requesting a refill     Rx- Semaglutide, 2 MG/DOSE, 8 MG/3ML    Pharmacy- [facility]     LOV- 12/14/23   NOV- 5/16/24    Please advise

## 2024-04-03 ENCOUNTER — OFFICE VISIT (OUTPATIENT)
Dept: INTERNAL MEDICINE CLINIC | Age: 59
End: 2024-04-03
Payer: COMMERCIAL

## 2024-04-03 VITALS
TEMPERATURE: 97.2 F | BODY MASS INDEX: 31.3 KG/M2 | RESPIRATION RATE: 18 BRPM | HEART RATE: 84 BPM | OXYGEN SATURATION: 96 % | HEIGHT: 73 IN | WEIGHT: 236.2 LBS | SYSTOLIC BLOOD PRESSURE: 130 MMHG | DIASTOLIC BLOOD PRESSURE: 80 MMHG

## 2024-04-03 DIAGNOSIS — Z11.59 NEED FOR HEPATITIS B SCREENING TEST: ICD-10-CM

## 2024-04-03 DIAGNOSIS — E78.2 MIXED HYPERLIPIDEMIA: Primary | ICD-10-CM

## 2024-04-03 DIAGNOSIS — E11.59 TYPE 2 DIABETES MELLITUS WITH VASCULAR DISEASE (HCC): ICD-10-CM

## 2024-04-03 DIAGNOSIS — N18.4 CKD (CHRONIC KIDNEY DISEASE) STAGE 4, GFR 15-29 ML/MIN (HCC): ICD-10-CM

## 2024-04-03 DIAGNOSIS — I10 ESSENTIAL HYPERTENSION: ICD-10-CM

## 2024-04-03 DIAGNOSIS — E11.3299 TYPE 2 DIABETES MELLITUS WITH BACKGROUND RETINOPATHY (HCC): ICD-10-CM

## 2024-04-03 DIAGNOSIS — M25.512 CHRONIC LEFT SHOULDER PAIN: ICD-10-CM

## 2024-04-03 DIAGNOSIS — G89.29 CHRONIC LEFT SHOULDER PAIN: ICD-10-CM

## 2024-04-03 DIAGNOSIS — E78.2 MIXED HYPERLIPIDEMIA: ICD-10-CM

## 2024-04-03 DIAGNOSIS — K21.9 CHRONIC GERD: ICD-10-CM

## 2024-04-03 LAB
ALBUMIN SERPL-MCNC: 4.4 G/DL (ref 3.4–5)
ALBUMIN/GLOB SERPL: 1.8 {RATIO} (ref 1.1–2.2)
ALP SERPL-CCNC: 82 U/L (ref 40–129)
ALT SERPL-CCNC: 22 U/L (ref 10–40)
ANION GAP SERPL CALCULATED.3IONS-SCNC: 11 MMOL/L (ref 3–16)
AST SERPL-CCNC: 25 U/L (ref 15–37)
BILIRUB SERPL-MCNC: 0.7 MG/DL (ref 0–1)
BUN SERPL-MCNC: 15 MG/DL (ref 7–20)
CALCIUM SERPL-MCNC: 9.7 MG/DL (ref 8.3–10.6)
CHLORIDE SERPL-SCNC: 101 MMOL/L (ref 99–110)
CHOLEST SERPL-MCNC: 96 MG/DL (ref 0–199)
CO2 SERPL-SCNC: 29 MMOL/L (ref 21–32)
CREAT SERPL-MCNC: 1.3 MG/DL (ref 0.9–1.3)
CREAT UR-MCNC: 201.5 MG/DL (ref 39–259)
GFR SERPLBLD CREATININE-BSD FMLA CKD-EPI: 64 ML/MIN/{1.73_M2}
GLUCOSE SERPL-MCNC: 97 MG/DL (ref 70–99)
HBV SURFACE AB SERPL IA-ACNC: <3.5 MIU/ML
HCV AB SERPL QL IA: NORMAL
HDLC SERPL-MCNC: 46 MG/DL (ref 40–60)
LDLC SERPL CALC-MCNC: 40 MG/DL
MICROALBUMIN UR DL<=1MG/L-MCNC: <1.2 MG/DL
MICROALBUMIN/CREAT UR: NORMAL MG/G (ref 0–30)
POTASSIUM SERPL-SCNC: 4.4 MMOL/L (ref 3.5–5.1)
PROT SERPL-MCNC: 6.9 G/DL (ref 6.4–8.2)
SODIUM SERPL-SCNC: 141 MMOL/L (ref 136–145)
TRIGL SERPL-MCNC: 52 MG/DL (ref 0–150)
VLDLC SERPL CALC-MCNC: 10 MG/DL

## 2024-04-03 PROCEDURE — 3079F DIAST BP 80-89 MM HG: CPT | Performed by: INTERNAL MEDICINE

## 2024-04-03 PROCEDURE — 3075F SYST BP GE 130 - 139MM HG: CPT | Performed by: INTERNAL MEDICINE

## 2024-04-03 PROCEDURE — 99214 OFFICE O/P EST MOD 30 MIN: CPT | Performed by: INTERNAL MEDICINE

## 2024-04-03 RX ORDER — OMEPRAZOLE 20 MG/1
CAPSULE, DELAYED RELEASE ORAL
Qty: 90 CAPSULE | Refills: 1 | Status: SHIPPED | OUTPATIENT
Start: 2024-04-03

## 2024-04-03 RX ORDER — INSULIN GLARGINE 100 [IU]/ML
18 INJECTION, SOLUTION SUBCUTANEOUS NIGHTLY
Qty: 15 ML | Refills: 3 | Status: SHIPPED | OUTPATIENT
Start: 2024-04-03

## 2024-04-03 RX ORDER — OMEGA-3 FATTY ACIDS/FISH OIL 300-1000MG
1 CAPSULE ORAL 2 TIMES DAILY
Qty: 60 CAPSULE | Refills: 11 | Status: SHIPPED | OUTPATIENT
Start: 2024-04-03

## 2024-04-03 ASSESSMENT — ANXIETY QUESTIONNAIRES
3. WORRYING TOO MUCH ABOUT DIFFERENT THINGS: NOT AT ALL
5. BEING SO RESTLESS THAT IT IS HARD TO SIT STILL: NOT AT ALL
2. NOT BEING ABLE TO STOP OR CONTROL WORRYING: NOT AT ALL
1. FEELING NERVOUS, ANXIOUS, OR ON EDGE: NOT AT ALL
6. BECOMING EASILY ANNOYED OR IRRITABLE: NOT AT ALL
IF YOU CHECKED OFF ANY PROBLEMS ON THIS QUESTIONNAIRE, HOW DIFFICULT HAVE THESE PROBLEMS MADE IT FOR YOU TO DO YOUR WORK, TAKE CARE OF THINGS AT HOME, OR GET ALONG WITH OTHER PEOPLE: NOT DIFFICULT AT ALL
GAD7 TOTAL SCORE: 0
4. TROUBLE RELAXING: NOT AT ALL
7. FEELING AFRAID AS IF SOMETHING AWFUL MIGHT HAPPEN: NOT AT ALL

## 2024-04-03 ASSESSMENT — PATIENT HEALTH QUESTIONNAIRE - PHQ9
SUM OF ALL RESPONSES TO PHQ QUESTIONS 1-9: 0
2. FEELING DOWN, DEPRESSED OR HOPELESS: NOT AT ALL
SUM OF ALL RESPONSES TO PHQ QUESTIONS 1-9: 0
SUM OF ALL RESPONSES TO PHQ QUESTIONS 1-9: 0
SUM OF ALL RESPONSES TO PHQ9 QUESTIONS 1 & 2: 0
1. LITTLE INTEREST OR PLEASURE IN DOING THINGS: NOT AT ALL
SUM OF ALL RESPONSES TO PHQ QUESTIONS 1-9: 0

## 2024-04-03 NOTE — PROGRESS NOTES
Juaquin Delaney (:  1965) is a 58 y.o. male,Established patient, here for evaluation of the following chief complaint(s):  Diabetes         ASSESSMENT/PLAN:  1. Mixed hyperlipidemia  improved  -     Lipid Panel; Future  -     continue statin    2. 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 (HCC)  -     LANTUS SOLOSTAR 100 UNIT/ML injection pen; Inject 18 Units into the skin nightly, Disp-15 mL, R-3, DAWNormal  -     Hemoglobin A1C; Future  3. Type 2 diabetes mellitus with vasc  ular disease (HCC)  stable  -     LANTUS SOLOSTAR 100 UNIT/ML injection pen; Inject 18 Units into the skin nightly, Disp-15 mL, R-3, DAWNormal    4. CKD (chronic kidney disease) stage 4, GFR 15-29 ml/min ([facility])  Stable  -     Microalbumin / Creatinine Urine Ratio; Future    5. Essential hypertension  stable  -     Comprehensive Metabolic Panel; Future    6. Need for hepatitis B screening test  -     Hepatitis C Antibody; Future  -     Hepatitis B Surface Antibody; Future  7. Chronic left shoulder pain  -     Mirza Freire MD, Orthopedics and Sports Medicine (Hip; Knee; Shoulder), [facility]      Return in about 3 months (around 7/3/2024) for left shoulder pain.         Subjective   SUBJECTIVE/OBJECTIVE:  Diabetes  Treatment Adherence:   Medication compliance:  compliant most of the time  Diet compliance:  compliant most of the time  Weight trend: stable  Current exercise: no regular exercise  Barriers: none    Diabetes Mellitus Type 2: Current symptoms/problems include none.    Home blood sugar records: fasting range: 130-170  Any episodes of hypoglycemia? no  Eye exam current (within one year): yes  Tobacco history: He  reports that he has never smoked. He has never used smokeless tobacco.   Daily Aspirin? Yes    Hypertension:  Home blood pressure monitoring: No.  He is adherent to a low sodium diet. Patient denies chest pain, shortness of breath, and headache.  Antihypertensive medication side effects: no

## 2024-04-04 LAB
EST. AVERAGE GLUCOSE BLD GHB EST-MCNC: 174.3 MG/DL
HBA1C MFR BLD: 7.7 %

## 2024-04-05 ENCOUNTER — OFFICE VISIT (OUTPATIENT)
Dept: ORTHOPEDIC SURGERY | Age: 59
End: 2024-04-05

## 2024-04-05 VITALS — BODY MASS INDEX: 31.28 KG/M2 | HEIGHT: 73 IN | WEIGHT: 236 LBS | RESPIRATION RATE: 16 BRPM

## 2024-04-05 DIAGNOSIS — M75.82 TENDINITIS OF LEFT ROTATOR CUFF: ICD-10-CM

## 2024-04-05 DIAGNOSIS — G89.29 CHRONIC LEFT SHOULDER PAIN: Primary | ICD-10-CM

## 2024-04-05 DIAGNOSIS — M25.512 CHRONIC LEFT SHOULDER PAIN: Primary | ICD-10-CM

## 2024-04-05 NOTE — PROGRESS NOTES
ORTHOPAEDIC CONSULTATION NOTE    Chief Complaint   Patient presents with    Shoulder Pain     Left shoulder         HPI  4/5/24  58 y.o. male RHD seen in consultation at the request of Tank Sevilla MD for evaluation of left shoulder pain:  I previously saw him for the same shoulder back in 2018, and he was diagnosed with adhesive capsulitis and rotator cuff tendinitis  He reports he did physical therapy back then  He reports he has had persistent pain in the left shoulder since  The pain is described superior and lateral  He does describe intermittent radiation down the left upper extremity  Associated with intermittent left hand numbness and tingling as well, he describes the whole hand      3/2/18  52 y.o. male RHD seen in consultation at the request of SARI ANDRADE MD for evaluation of left shoulder pain.  Symptoms started on 10/27/2017 when he went to pull himself up with the left arm and felt something \"pop\".  Since then, he has noticed progressive stiffness in the left shoulder.  Pain is described anterosuperior.  Worse with any movement.  Denies history of left shoulder issues prior to this.  Right side ok.  Mild neck pain, but denies radicular symptoms.  States mild N/T in both hands.  Has been doing home exercsis, tylenol, ibuprofen without much relief  Hx of stroke couple years ago, with residual right sided weakness/gait problems.  Type 2 DM on lantus, sugars recently improved, but most recent A1c >10            Allergies   Allergen Reactions    Hctz [Hydrochlorothiazide]      cramps    Penicillins Hives        Current Outpatient Medications   Medication Sig Dispense Refill    omeprazole (PRILOSEC) 20 MG delayed release capsule TAKE ONE CAPSULE BY MOUTH ONCE A DAY 90 capsule 1    LANTUS SOLOSTAR 100 UNIT/ML injection pen Inject 18 Units into the skin nightly 15 mL 3    Omega 3 1000 MG CAPS Take 1 capsule by mouth in the morning and at bedtime 60 capsule 11    semaglutide, 2 MG/DOSE,

## 2024-04-15 ENCOUNTER — HOSPITAL ENCOUNTER (OUTPATIENT)
Dept: MRI IMAGING | Age: 59
Discharge: HOME | End: 2024-04-15
Attending: ORTHOPAEDIC SURGERY
Payer: COMMERCIAL

## 2024-04-15 DIAGNOSIS — G89.29 CHRONIC LEFT SHOULDER PAIN: ICD-10-CM

## 2024-04-15 DIAGNOSIS — M25.512 CHRONIC LEFT SHOULDER PAIN: ICD-10-CM

## 2024-04-15 DIAGNOSIS — M75.82 TENDINITIS OF LEFT ROTATOR CUFF: ICD-10-CM

## 2024-04-15 PROCEDURE — 73221 MRI JOINT UPR EXTREM W/O DYE: CPT

## 2024-04-19 ENCOUNTER — PREP FOR PROCEDURE (OUTPATIENT)
Dept: ORTHOPEDIC SURGERY | Age: 59
End: 2024-04-19

## 2024-04-19 ENCOUNTER — OFFICE VISIT (OUTPATIENT)
Dept: ORTHOPEDIC SURGERY | Age: 59
End: 2024-04-19
Payer: COMMERCIAL

## 2024-04-19 DIAGNOSIS — G89.29 CHRONIC LEFT SHOULDER PAIN: Primary | ICD-10-CM

## 2024-04-19 DIAGNOSIS — M75.122 COMPLETE TEAR OF LEFT ROTATOR CUFF, UNSPECIFIED WHETHER TRAUMATIC: ICD-10-CM

## 2024-04-19 DIAGNOSIS — E11.59 TYPE 2 DIABETES MELLITUS WITH VASCULAR DISEASE (HCC): ICD-10-CM

## 2024-04-19 DIAGNOSIS — M25.512 CHRONIC LEFT SHOULDER PAIN: Primary | ICD-10-CM

## 2024-04-19 PROCEDURE — 3051F HG A1C>EQUAL 7.0%<8.0%: CPT | Performed by: ORTHOPAEDIC SURGERY

## 2024-04-19 PROCEDURE — 99214 OFFICE O/P EST MOD 30 MIN: CPT | Performed by: ORTHOPAEDIC SURGERY

## 2024-04-19 NOTE — PROGRESS NOTES
required prior to surgery and I will see the patient back in clinic for the first post-operative visit.    Maintain tight glycemic control for overall health, and to facilitate surgical healing and minimize risk for complications      Mirza Joseph MD

## 2024-04-20 RX ORDER — SODIUM CHLORIDE 0.9 % (FLUSH) 0.9 %
5-40 SYRINGE (ML) INJECTION PRN
Status: CANCELLED | OUTPATIENT
Start: 2024-04-20

## 2024-04-20 RX ORDER — SODIUM CHLORIDE 9 MG/ML
INJECTION, SOLUTION INTRAVENOUS PRN
Status: CANCELLED | OUTPATIENT
Start: 2024-04-20

## 2024-04-20 RX ORDER — ACETAMINOPHEN 325 MG/1
650 TABLET ORAL ONCE
Status: CANCELLED | OUTPATIENT
Start: 2024-04-20 | End: 2024-04-20

## 2024-04-20 RX ORDER — SODIUM CHLORIDE 0.9 % (FLUSH) 0.9 %
5-40 SYRINGE (ML) INJECTION EVERY 12 HOURS SCHEDULED
Status: CANCELLED | OUTPATIENT
Start: 2024-04-20

## 2024-04-24 ENCOUNTER — CLINICAL DOCUMENTATION (OUTPATIENT)
Dept: PHARMACY | Facility: CLINIC | Age: 59
End: 2024-04-24

## 2024-04-24 NOTE — PROGRESS NOTES
1st Quarterly Reminder sent to patient for the DM Program - See Mychart message or Letter for more information.    Kayley Wyatt CphT  [facility]  Clinical Pharmacy   Department, toll free: [phone] Option #3      For Pharmacy Admin Tracking Only    Program: [employer]  CPA in place:  No  Gap Closed?: Yes   Time Spent (min): 5

## 2024-05-15 DIAGNOSIS — E78.5 DYSLIPIDEMIA ASSOCIATED WITH TYPE 2 DIABETES MELLITUS (HCC): ICD-10-CM

## 2024-05-15 DIAGNOSIS — I10 ESSENTIAL HYPERTENSION: ICD-10-CM

## 2024-05-15 DIAGNOSIS — E11.59 TYPE 2 DIABETES MELLITUS WITH VASCULAR DISEASE (HCC): ICD-10-CM

## 2024-05-15 DIAGNOSIS — E11.3299 TYPE 2 DIABETES MELLITUS WITH BACKGROUND RETINOPATHY (HCC): ICD-10-CM

## 2024-05-15 DIAGNOSIS — E11.69 DYSLIPIDEMIA ASSOCIATED WITH TYPE 2 DIABETES MELLITUS (HCC): ICD-10-CM

## 2024-05-15 LAB
ALBUMIN SERPL-MCNC: 4.1 G/DL (ref 3.4–5)
ALBUMIN/GLOB SERPL: 1.4 {RATIO} (ref 1.1–2.2)
ALP SERPL-CCNC: 86 U/L (ref 40–129)
ALT SERPL-CCNC: 24 U/L (ref 10–40)
ANION GAP SERPL CALCULATED.3IONS-SCNC: 14 MMOL/L (ref 3–16)
AST SERPL-CCNC: 29 U/L (ref 15–37)
BILIRUB SERPL-MCNC: 0.9 MG/DL (ref 0–1)
BUN SERPL-MCNC: 18 MG/DL (ref 7–20)
CALCIUM SERPL-MCNC: 9.6 MG/DL (ref 8.3–10.6)
CHLORIDE SERPL-SCNC: 103 MMOL/L (ref 99–110)
CHOLEST SERPL-MCNC: 97 MG/DL (ref 0–199)
CO2 SERPL-SCNC: 26 MMOL/L (ref 21–32)
CREAT SERPL-MCNC: 1.4 MG/DL (ref 0.9–1.3)
CREAT UR-MCNC: 75.8 MG/DL (ref 39–259)
FOLATE SERPL-MCNC: >20 NG/ML (ref 4.78–24.2)
GFR SERPLBLD CREATININE-BSD FMLA CKD-EPI: 59 ML/MIN/{1.73_M2}
GLUCOSE SERPL-MCNC: 120 MG/DL (ref 70–99)
HDLC SERPL-MCNC: 47 MG/DL (ref 40–60)
LDL CHOLESTEROL: 40 MG/DL
MICROALBUMIN UR DL<=1MG/L-MCNC: 27.6 MG/DL
MICROALBUMIN/CREAT UR: 364.1 MG/G (ref 0–30)
POTASSIUM SERPL-SCNC: 4.6 MMOL/L (ref 3.5–5.1)
PROT SERPL-MCNC: 7 G/DL (ref 6.4–8.2)
SODIUM SERPL-SCNC: 143 MMOL/L (ref 136–145)
TRIGL SERPL-MCNC: 52 MG/DL (ref 0–150)
VIT B12 SERPL-MCNC: 1560 PG/ML (ref 211–911)
VLDLC SERPL CALC-MCNC: 10 MG/DL

## 2024-05-16 LAB
EST. AVERAGE GLUCOSE BLD GHB EST-MCNC: 185.8 MG/DL
HBA1C MFR BLD: 8.1 %

## 2024-05-17 ENCOUNTER — TELEPHONE (OUTPATIENT)
Dept: ENDOCRINOLOGY | Age: 59
End: 2024-05-17

## 2024-05-17 DIAGNOSIS — E11.3299 TYPE 2 DIABETES MELLITUS WITH BACKGROUND RETINOPATHY (HCC): ICD-10-CM

## 2024-05-17 DIAGNOSIS — E11.59 TYPE 2 DIABETES MELLITUS WITH VASCULAR DISEASE (HCC): ICD-10-CM

## 2024-05-17 NOTE — TELEPHONE ENCOUNTER
[facility] called requesting a refill     Rx- LANTUS SOLOSTAR 100 UNIT/ML injection pen    Pharmacy- [facility]     LOV-  NOV- 6/18/24    Please advise

## 2024-05-19 RX ORDER — INSULIN GLARGINE 100 [IU]/ML
18 INJECTION, SOLUTION SUBCUTANEOUS NIGHTLY
Qty: 20 ML | Refills: 1 | Status: SHIPPED | OUTPATIENT
Start: 2024-05-19 | End: 2025-05-19

## 2024-05-25 ENCOUNTER — HOSPITAL ENCOUNTER (INPATIENT)
Age: 59
LOS: 4 days | Discharge: HOME HEALTH | DRG: 065 | End: 2024-05-29
Attending: INTERNAL MEDICINE | Admitting: INTERNAL MEDICINE
Payer: COMMERCIAL

## 2024-05-25 ENCOUNTER — APPOINTMENT (OUTPATIENT)
Dept: GENERAL RADIOLOGY | Age: 59
DRG: 065 | End: 2024-05-25
Payer: COMMERCIAL

## 2024-05-25 ENCOUNTER — APPOINTMENT (OUTPATIENT)
Dept: CT IMAGING | Age: 59
DRG: 065 | End: 2024-05-25
Payer: COMMERCIAL

## 2024-05-25 DIAGNOSIS — I69.30 LATE EFFECT OF CEREBROVASCULAR ACCIDENT (CVA): ICD-10-CM

## 2024-05-25 DIAGNOSIS — R47.1 DYSARTHRIA: Primary | ICD-10-CM

## 2024-05-25 DIAGNOSIS — I63.02 CEREBROVASCULAR ACCIDENT (CVA) DUE TO THROMBOSIS OF BASILAR ARTERY (HCC): ICD-10-CM

## 2024-05-25 DIAGNOSIS — R27.0 ATAXIA: ICD-10-CM

## 2024-05-25 PROBLEM — I63.9 ACUTE CVA (CEREBROVASCULAR ACCIDENT) (HCC): Status: ACTIVE | Noted: 2024-05-25

## 2024-05-25 LAB
ALBUMIN SERPL-MCNC: 3.9 G/DL (ref 3.4–5)
ALBUMIN/GLOB SERPL: 1.3 {RATIO} (ref 1.1–2.2)
ALP SERPL-CCNC: 68 U/L (ref 40–129)
ALT SERPL-CCNC: 22 U/L (ref 10–40)
AMPHETAMINES UR QL SCN>1000 NG/ML: NORMAL
ANION GAP SERPL CALCULATED.3IONS-SCNC: 16 MMOL/L (ref 3–16)
AST SERPL-CCNC: 32 U/L (ref 15–37)
BACTERIA URNS QL MICRO: ABNORMAL /HPF
BARBITURATES UR QL SCN>200 NG/ML: NORMAL
BASOPHILS # BLD: 0 K/UL (ref 0–0.2)
BASOPHILS NFR BLD: 0.5 %
BENZODIAZ UR QL SCN>200 NG/ML: NORMAL
BILIRUB SERPL-MCNC: 0.7 MG/DL (ref 0–1)
BILIRUB UR QL STRIP.AUTO: NEGATIVE
BUN SERPL-MCNC: 21 MG/DL (ref 7–20)
CALCIUM SERPL-MCNC: 9.3 MG/DL (ref 8.3–10.6)
CANNABINOIDS UR QL SCN>50 NG/ML: NORMAL
CHLORIDE SERPL-SCNC: 100 MMOL/L (ref 99–110)
CLARITY UR: CLEAR
CO2 SERPL-SCNC: 22 MMOL/L (ref 21–32)
COCAINE UR QL SCN: NORMAL
COLOR UR: YELLOW
CREAT SERPL-MCNC: 1.1 MG/DL (ref 0.9–1.3)
DEPRECATED RDW RBC AUTO: 15.2 % (ref 12.4–15.4)
DRUG SCREEN COMMENT UR-IMP: NORMAL
EKG ATRIAL RATE: 82 BPM
EKG DIAGNOSIS: NORMAL
EKG P AXIS: 48 DEGREES
EKG P-R INTERVAL: 212 MS
EKG Q-T INTERVAL: 366 MS
EKG QRS DURATION: 100 MS
EKG QTC CALCULATION (BAZETT): 427 MS
EKG R AXIS: -42 DEGREES
EKG T AXIS: 53 DEGREES
EKG VENTRICULAR RATE: 82 BPM
EOSINOPHIL # BLD: 0 K/UL (ref 0–0.6)
EOSINOPHIL NFR BLD: 0.7 %
EPI CELLS #/AREA URNS AUTO: 1 /HPF (ref 0–5)
ETHANOLAMINE SERPL-MCNC: NORMAL MG/DL (ref 0–0.08)
FENTANYL SCREEN, URINE: NORMAL
GFR SERPLBLD CREATININE-BSD FMLA CKD-EPI: 79 ML/MIN/{1.73_M2}
GLUCOSE BLD-MCNC: 121 MG/DL (ref 70–99)
GLUCOSE BLD-MCNC: 122 MG/DL (ref 70–99)
GLUCOSE BLD-MCNC: 194 MG/DL (ref 70–99)
GLUCOSE BLD-MCNC: 97 MG/DL (ref 70–99)
GLUCOSE SERPL-MCNC: 211 MG/DL (ref 70–99)
GLUCOSE UR STRIP.AUTO-MCNC: NEGATIVE MG/DL
HCG SERPL QL: NEGATIVE
HCT VFR BLD AUTO: 34 % (ref 40.5–52.5)
HGB BLD-MCNC: 10.7 G/DL (ref 13.5–17.5)
HGB UR QL STRIP.AUTO: NEGATIVE
HYALINE CASTS #/AREA URNS AUTO: 2 /LPF (ref 0–8)
INR PPP: 0.98 (ref 0.85–1.15)
KETONES UR STRIP.AUTO-MCNC: ABNORMAL MG/DL
LEUKOCYTE ESTERASE UR QL STRIP.AUTO: ABNORMAL
LYMPHOCYTES # BLD: 2.4 K/UL (ref 1–5.1)
LYMPHOCYTES NFR BLD: 43.5 %
MCH RBC QN AUTO: 25.8 PG (ref 26–34)
MCHC RBC AUTO-ENTMCNC: 31.6 G/DL (ref 31–36)
MCV RBC AUTO: 81.6 FL (ref 80–100)
METHADONE UR QL SCN>300 NG/ML: NORMAL
MONOCYTES # BLD: 0.3 K/UL (ref 0–1.3)
MONOCYTES NFR BLD: 5.7 %
NEUTROPHILS # BLD: 2.8 K/UL (ref 1.7–7.7)
NEUTROPHILS NFR BLD: 49.6 %
NITRITE UR QL STRIP.AUTO: POSITIVE
OPIATES UR QL SCN>300 NG/ML: NORMAL
OXYCODONE UR QL SCN: NORMAL
PCP UR QL SCN>25 NG/ML: NORMAL
PERFORMED ON: ABNORMAL
PERFORMED ON: NORMAL
PH UR STRIP.AUTO: 5.5 [PH] (ref 5–8)
PH UR STRIP: 5 [PH]
PLATELET # BLD AUTO: 191 K/UL (ref 135–450)
PMV BLD AUTO: 9.1 FL (ref 5–10.5)
POTASSIUM SERPL-SCNC: 4.3 MMOL/L (ref 3.5–5.1)
POTASSIUM SERPL-SCNC: 4.3 MMOL/L (ref 3.5–5.1)
PROT SERPL-MCNC: 6.8 G/DL (ref 6.4–8.2)
PROT UR STRIP.AUTO-MCNC: NEGATIVE MG/DL
PROTHROMBIN TIME: 13.1 SEC (ref 11.9–14.9)
RBC # BLD AUTO: 4.17 M/UL (ref 4.2–5.9)
RBC CLUMPS #/AREA URNS AUTO: 0 /HPF (ref 0–4)
SODIUM SERPL-SCNC: 138 MMOL/L (ref 136–145)
SP GR UR STRIP.AUTO: 1.02 (ref 1–1.03)
TROPONIN, HIGH SENSITIVITY: 19 NG/L (ref 0–22)
UA COMPLETE W REFLEX CULTURE PNL UR: ABNORMAL
UA DIPSTICK W REFLEX MICRO PNL UR: YES
URN SPEC COLLECT METH UR: ABNORMAL
UROBILINOGEN UR STRIP-ACNC: 1 E.U./DL
WBC # BLD AUTO: 5.6 K/UL (ref 4–11)
WBC #/AREA URNS AUTO: 2 /HPF (ref 0–5)

## 2024-05-25 PROCEDURE — 4A03X5D MEASUREMENT OF ARTERIAL FLOW, INTRACRANIAL, EXTERNAL APPROACH: ICD-10-PCS | Performed by: STUDENT IN AN ORGANIZED HEALTH CARE EDUCATION/TRAINING PROGRAM

## 2024-05-25 PROCEDURE — 71045 X-RAY EXAM CHEST 1 VIEW: CPT

## 2024-05-25 PROCEDURE — 6360000002 HC RX W HCPCS: Performed by: INTERNAL MEDICINE

## 2024-05-25 PROCEDURE — 6360000004 HC RX CONTRAST MEDICATION: Performed by: INTERNAL MEDICINE

## 2024-05-25 PROCEDURE — 92526 ORAL FUNCTION THERAPY: CPT

## 2024-05-25 PROCEDURE — 2580000003 HC RX 258: Performed by: INTERNAL MEDICINE

## 2024-05-25 PROCEDURE — 85610 PROTHROMBIN TIME: CPT

## 2024-05-25 PROCEDURE — 82077 ASSAY SPEC XCP UR&BREATH IA: CPT

## 2024-05-25 PROCEDURE — 84484 ASSAY OF TROPONIN QUANT: CPT

## 2024-05-25 PROCEDURE — 92610 EVALUATE SWALLOWING FUNCTION: CPT

## 2024-05-25 PROCEDURE — 81001 URINALYSIS AUTO W/SCOPE: CPT

## 2024-05-25 PROCEDURE — 70450 CT HEAD/BRAIN W/O DYE: CPT

## 2024-05-25 PROCEDURE — 99285 EMERGENCY DEPT VISIT HI MDM: CPT

## 2024-05-25 PROCEDURE — 96374 THER/PROPH/DIAG INJ IV PUSH: CPT

## 2024-05-25 PROCEDURE — 80053 COMPREHEN METABOLIC PANEL: CPT

## 2024-05-25 PROCEDURE — 6370000000 HC RX 637 (ALT 250 FOR IP): Performed by: INTERNAL MEDICINE

## 2024-05-25 PROCEDURE — 93010 ELECTROCARDIOGRAM REPORT: CPT | Performed by: INTERNAL MEDICINE

## 2024-05-25 PROCEDURE — 80307 DRUG TEST PRSMV CHEM ANLYZR: CPT

## 2024-05-25 PROCEDURE — 85025 COMPLETE CBC W/AUTO DIFF WBC: CPT

## 2024-05-25 PROCEDURE — 36415 COLL VENOUS BLD VENIPUNCTURE: CPT

## 2024-05-25 PROCEDURE — 70496 CT ANGIOGRAPHY HEAD: CPT

## 2024-05-25 PROCEDURE — 93005 ELECTROCARDIOGRAM TRACING: CPT | Performed by: INTERNAL MEDICINE

## 2024-05-25 PROCEDURE — 2060000000 HC ICU INTERMEDIATE R&B

## 2024-05-25 PROCEDURE — 84703 CHORIONIC GONADOTROPIN ASSAY: CPT

## 2024-05-25 RX ORDER — INSULIN GLARGINE 100 [IU]/ML
18 INJECTION, SOLUTION SUBCUTANEOUS NIGHTLY
Status: DISCONTINUED | OUTPATIENT
Start: 2024-05-25 | End: 2024-05-29 | Stop reason: HOSPADM

## 2024-05-25 RX ORDER — PANTOPRAZOLE SODIUM 40 MG/1
40 TABLET, DELAYED RELEASE ORAL
Status: DISCONTINUED | OUTPATIENT
Start: 2024-05-26 | End: 2024-05-29 | Stop reason: HOSPADM

## 2024-05-25 RX ORDER — SODIUM CHLORIDE 0.9 % (FLUSH) 0.9 %
5-40 SYRINGE (ML) INJECTION PRN
Status: DISCONTINUED | OUTPATIENT
Start: 2024-05-25 | End: 2024-05-29 | Stop reason: HOSPADM

## 2024-05-25 RX ORDER — POLYETHYLENE GLYCOL 3350 17 G/17G
17 POWDER, FOR SOLUTION ORAL DAILY PRN
Status: DISCONTINUED | OUTPATIENT
Start: 2024-05-25 | End: 2024-05-29 | Stop reason: HOSPADM

## 2024-05-25 RX ORDER — AMLODIPINE AND VALSARTAN 5; 160 MG/1; MG/1
1 TABLET ORAL DAILY
Status: DISCONTINUED | OUTPATIENT
Start: 2024-05-25 | End: 2024-05-25

## 2024-05-25 RX ORDER — THIAMINE HYDROCHLORIDE 100 MG/ML
500 INJECTION, SOLUTION INTRAMUSCULAR; INTRAVENOUS ONCE
Status: COMPLETED | OUTPATIENT
Start: 2024-05-25 | End: 2024-05-25

## 2024-05-25 RX ORDER — ASPIRIN 81 MG/1
81 TABLET ORAL DAILY
Status: DISCONTINUED | OUTPATIENT
Start: 2024-05-26 | End: 2024-05-29 | Stop reason: HOSPADM

## 2024-05-25 RX ORDER — SODIUM CHLORIDE 0.9 % (FLUSH) 0.9 %
5-40 SYRINGE (ML) INJECTION EVERY 12 HOURS SCHEDULED
Status: DISCONTINUED | OUTPATIENT
Start: 2024-05-25 | End: 2024-05-29 | Stop reason: HOSPADM

## 2024-05-25 RX ORDER — GLUCAGON 1 MG/ML
1 KIT INJECTION PRN
Status: DISCONTINUED | OUTPATIENT
Start: 2024-05-25 | End: 2024-05-28 | Stop reason: SDUPTHER

## 2024-05-25 RX ORDER — ONDANSETRON 4 MG/1
4 TABLET, ORALLY DISINTEGRATING ORAL EVERY 8 HOURS PRN
Status: DISCONTINUED | OUTPATIENT
Start: 2024-05-25 | End: 2024-05-29 | Stop reason: HOSPADM

## 2024-05-25 RX ORDER — METOPROLOL TARTRATE 50 MG/1
50 TABLET, FILM COATED ORAL 2 TIMES DAILY
Status: DISCONTINUED | OUTPATIENT
Start: 2024-05-25 | End: 2024-05-29 | Stop reason: HOSPADM

## 2024-05-25 RX ORDER — AMLODIPINE BESYLATE 5 MG/1
5 TABLET ORAL DAILY
Status: DISCONTINUED | OUTPATIENT
Start: 2024-05-26 | End: 2024-05-29 | Stop reason: HOSPADM

## 2024-05-25 RX ORDER — ATORVASTATIN CALCIUM 80 MG/1
80 TABLET, FILM COATED ORAL NIGHTLY
Status: DISCONTINUED | OUTPATIENT
Start: 2024-05-25 | End: 2024-05-29 | Stop reason: HOSPADM

## 2024-05-25 RX ORDER — ONDANSETRON 2 MG/ML
4 INJECTION INTRAMUSCULAR; INTRAVENOUS EVERY 6 HOURS PRN
Status: DISCONTINUED | OUTPATIENT
Start: 2024-05-25 | End: 2024-05-29 | Stop reason: HOSPADM

## 2024-05-25 RX ORDER — ENOXAPARIN SODIUM 100 MG/ML
30 INJECTION SUBCUTANEOUS 2 TIMES DAILY
Status: DISCONTINUED | OUTPATIENT
Start: 2024-05-25 | End: 2024-05-29 | Stop reason: HOSPADM

## 2024-05-25 RX ORDER — M-VIT,TX,IRON,MINS/CALC/FOLIC 27MG-0.4MG
1 TABLET ORAL DAILY
Status: DISCONTINUED | OUTPATIENT
Start: 2024-05-26 | End: 2024-05-29 | Stop reason: HOSPADM

## 2024-05-25 RX ORDER — DEXTROSE MONOHYDRATE 100 MG/ML
INJECTION, SOLUTION INTRAVENOUS CONTINUOUS PRN
Status: DISCONTINUED | OUTPATIENT
Start: 2024-05-25 | End: 2024-05-28 | Stop reason: SDUPTHER

## 2024-05-25 RX ORDER — OMEGA-3 FATTY ACIDS/FISH OIL 300-1000MG
1 CAPSULE ORAL 2 TIMES DAILY
Status: DISCONTINUED | OUTPATIENT
Start: 2024-05-25 | End: 2024-05-25 | Stop reason: RX

## 2024-05-25 RX ORDER — SODIUM CHLORIDE 9 MG/ML
INJECTION, SOLUTION INTRAVENOUS PRN
Status: DISCONTINUED | OUTPATIENT
Start: 2024-05-25 | End: 2024-05-29 | Stop reason: HOSPADM

## 2024-05-25 RX ORDER — VALSARTAN 40 MG/1
160 TABLET ORAL DAILY
Status: DISCONTINUED | OUTPATIENT
Start: 2024-05-26 | End: 2024-05-29 | Stop reason: HOSPADM

## 2024-05-25 RX ADMIN — THIAMINE HYDROCHLORIDE 500 MG: 100 INJECTION, SOLUTION INTRAMUSCULAR; INTRAVENOUS at 09:03

## 2024-05-25 RX ADMIN — SODIUM CHLORIDE, PRESERVATIVE FREE 10 ML: 5 INJECTION INTRAVENOUS at 20:30

## 2024-05-25 RX ADMIN — ATORVASTATIN CALCIUM 80 MG: 80 TABLET, FILM COATED ORAL at 20:30

## 2024-05-25 RX ADMIN — METOPROLOL TARTRATE 50 MG: 50 TABLET, FILM COATED ORAL at 20:30

## 2024-05-25 RX ADMIN — IOPAMIDOL 75 ML: 755 INJECTION, SOLUTION INTRAVENOUS at 07:57

## 2024-05-25 RX ADMIN — ENOXAPARIN SODIUM 30 MG: 100 INJECTION SUBCUTANEOUS at 20:30

## 2024-05-25 NOTE — PROGRESS NOTES
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 male with history of CVA, hypertension, type 2 diabetes, GERD, stage III CKD, hyperlipidemia, obesity who presented with reports of dysarthria and ataxia since waking up around 6:30 AM this morning.  Patient is reported to have been seen last well last night around 2:30 AM coming from a party.  He is noted to having slurred speech with unsteady gait.  He denies any headache, no double vision.  No nausea or vomiting.  In the ER, CT brain with no acute pathology  CT angiogram of the head and neck noted for age-indeterminate right vertebral artery occlusion as well as left mid V4 stenosis.  Urinalysis noted for bacteriuria  EKG sinus rhythm    Imaging:  Chest X-ray:  24:  IMPRESSION:  No acute process.   Bibasilar hypoaeration    Head CT: 24:  IMPRESSION:  1. No acute intracranial abnormality within constraints of CT acquisition.  2. Findings suggestive of moderate to severe chronic microvascular ischemia.  The findings were sent to the Radiology Results Communication Center at 8:09  am on 2024 to be communicated to a licensed caregiver, received by Dr. Das at 8:12 am.      History/Prior Level of Function:   Living Status: lives with their spouse  Prior Dysphagia History: Pt has a history of dysphagia, was last seen by speech therapy in 2016 and was placed on a regular/thin diet   Reason for referral: SLP evaluation orders received due to CVA protocol , prior hx

## 2024-05-25 NOTE — ED NOTES
Report given to Leidy KNUTSON RN, v/u and denies any questions at this time.    Benzoyl Peroxide Pregnancy And Lactation Text: This medication is Pregnancy Category C. It is unknown if benzoyl peroxide is excreted in breast milk.

## 2024-05-25 NOTE — ED NOTES
How does patient ambulate?   []Low Fall Risk (ambulates by themselves without support)  []Stand by assist   []Contact Guard   []Front wheel walker  []Wheelchair   []Steady  []Bed bound  []History of Lower Extremity Amputation  []Unknown, did not assess in the emergency department   How does patient take pills?  [x]Whole with Water  []Crushed in applesauce  []Crushed in pudding  []Other  []Unknown no oral medications were given in the ED  Is patient alert?   [x]Alert  []Drowsy but responds to voice  []Doesn't respond to voice but responds to painful stimuli  []Unresponsive  Is patient oriented?   [x]To person  [x]To place  [x]To time  [x]To situation  []Confused  []Agitated  [x]Follows commands  If patient is disoriented or from a Skill Nursing Facility has family been notified of admission?   []Yes   [x]No  Patient belongings?   []Cell phone  []Wallet   []Dentures  [x]Clothing  Any specific patient or family belongings/needs/dynamics?   N/A  Miscellaneous comments/pending orders?  Patient had previous stroke in 2016 with unsteady gait as deficit according to wife. Patient is ataxic and very unsteady on his feet, stood him up and he was barley able to take on step.      If there are any additional questions please reach out to the Emergency Department.

## 2024-05-25 NOTE — ED TRIAGE NOTES
Patient oriented to room and ED throughput process.  Safety measures with ED bed locked in lowest position and call light in reach.  Patient educated on all orders, including any medications.  Patient educated on chief complaint/symptoms. Patient encouraged to ask questions regarding care, medications or treatment plan.  Patient aware of how to reach staff with questions/concerns.      Bed alarm on

## 2024-05-25 NOTE — ED NOTES
Patient arrives by [facility], Dr. Das at bedside to assess patient, stroke alert called by Dr. Das, patient to CT at this time.

## 2024-05-25 NOTE — ED NOTES
Patient states his wife picked him up from a party around 0230 where he reportedly drank 3 beers and had a shot.     Patient has previous stroke hx about 10 years ago, no known deficits.

## 2024-05-25 NOTE — ED NOTES
Patient to be transported to unit by this RN in stable condition on 3L NC on bedside monitor with all patient belongings.

## 2024-05-25 NOTE — ED NOTES
Wife at bedside demanding updates and demanding to speak to MD, informed waiting for UA and chest xray to result, wanting to know how much longer she will have to be waiting. Informed exact time frame cannot be given, depends on how fast test/samples are performed, received and resulted.     Wife informed MD will be in room to go over all test results once urine and imaging have resulted, wife unhappy with answer, rolled her eyes at this RN and groaned.

## 2024-05-25 NOTE — H&P
Hospital Medicine History & Physical      PCP: Tank Sevilla MD    Date of Admission: 5/25/2024    Date of Service: Pt seen/examined on 5/25/2024 and Admitted to Inpatient with expected LOS greater than two midnights due to medical therapy.     Chief Complaint:    Chief Complaint   Patient presents with    Altered Mental Status     Patient in by [facility], per wife on scene, states patient had slowed speech, unsteady gait and was slow to respond, LKW 0230.          History Of Present Illness:    The patient is a 58 y.o. male with history of CVA, hypertension, type 2 diabetes, GERD, stage III CKD, hyperlipidemia, obesity who presented with reports of dysarthria and ataxia since waking up around 6:30 AM this morning.  Patient is reported to have been seen last well last night around 2:30 AM coming from a party.  He is noted to having slurred speech with unsteady gait.  He denies any headache, no double vision.  No nausea or vomiting.  In the ER, CT brain with no acute pathology  CT angiogram of the head and neck noted for age-indeterminate right vertebral artery occlusion as well as left mid V4 stenosis.  Urinalysis noted for bacteriuria  EKG sinus rhythm    Past Medical History:        Diagnosis Date    Cerebral artery occlusion with cerebral infarction (HCC)     CKD (chronic kidney disease)     Diabetes mellitus (HCC)     GERD (gastroesophageal reflux disease)     Helicobacter pylori (H. pylori) infection 4/25/2019    Hypertension     Sleep apnea     no cpap       Past Surgical History:        Procedure Laterality Date    COLONOSCOPY N/A 6/24/2021    COLONOSCOPY POLYPECTOMY SNARE/COLD OF 1 DESCENDING COLON POLYP AND 2 RECTAL  POLYPS performed by Camron Garcia MD at [facility] ENDOSCOPY    CYSTOSCOPY  9/25/2019    FLEXIBLE CYSTOSCOPY, DILATION, POTTS CATHETER INSERTION performed by Tank Ortiz MD at [facility] OR    KNEE SURGERY      right knee     SIGMOIDOSCOPY N/A 6/23/2021    SIGMOIDOSCOPY  stage III CKD, hyperlipidemia, obesity who presented with dysarthria and ataxia since waking up this morning concerning for cerebellar pathology/CVA    Plan:  - Continue aspirin and statin  - Get MRI brain  - Echocardiogram  - Neurology consult  - Speech pathology, PT and OT evaluations  - Sliding-scale insulin for glycemic control with glargine  - Hold oral hypoglycemic agents  - Continue on Protonix for GERD      DVT Prophylaxis: Subcut enoxaparin   Diet: Diabetic diet  Code Status: Full code       Jacinto Carpenter MD    Thank you Tank Sevilla MD for the opportunity to be involved in this patient's care. If you have any questions or concerns please feel free to contact me at [phone].

## 2024-05-25 NOTE — ED NOTES
Patient amble to use urinal in bed to provide urine sample.     This RN asked wife at bedside if patient had any deficits from previous stroke in 2016, she states \"his balance has been off since but that's it\".

## 2024-05-25 NOTE — ED NOTES
Patient's wife called out stating she wants an update, informed Dr. Das will update patient and family once all test results are back. Wife demanding to speak to Charge RN and states \"all you guys keep telling me is I will see the doctor when all test results are back, this is insufficient information and communication, I need to know the results now.\"       Dr. Das informed and at bedside.

## 2024-05-25 NOTE — ED PROVIDER NOTES
EMERGENCY MEDICINE PROVIDER NOTE    Patient Identification  Pt Name: Juaquin Delaney  MRN: [id number]  Birthdate 1965  Date of evaluation: 5/25/2024  Provider: DENTON ALMAZAN DO  PCP: Tank Sevilla MD    Chief Complaint  Altered Mental Status (Patient in by [facility], per wife on scene, states patient had slowed speech, unsteady gait and was slow to respond, LKW 0230. )      HPI  (History provided by patient and EMS)  This is a 58 y.o. male who was brought in by EMS transportation for dysarthria and unsteady gait that started at 2:30 AM this morning.  According to EMS, the patient's symptoms were noticed by his wife.  He had a similar stroke 10 years ago with similar symptoms.    I have reviewed the following nursing documentation:  Allergies: Hctz [hydrochlorothiazide] and Penicillins    Past medical history:   Past Medical History:   Diagnosis Date    Cerebral artery occlusion with cerebral infarction (HCC)     CKD (chronic kidney disease)     Diabetes mellitus (HCC)     GERD (gastroesophageal reflux disease)     Helicobacter pylori (H. pylori) infection 4/25/2019    Hypertension     Sleep apnea     no cpap     Past surgical history:   Past Surgical History:   Procedure Laterality Date    COLONOSCOPY N/A 6/24/2021    COLONOSCOPY POLYPECTOMY SNARE/COLD OF 1 DESCENDING COLON POLYP AND 2 RECTAL  POLYPS performed by Camron Garcia MD at [facility] ENDOSCOPY    CYSTOSCOPY  9/25/2019    FLEXIBLE CYSTOSCOPY, DILATION, POTTS CATHETER INSERTION performed by Tank Ortiz MD at [facility] OR    KNEE SURGERY      right knee     SIGMOIDOSCOPY N/A 6/23/2021    SIGMOIDOSCOPY DIAGNOSTIC FLEXIBLE performed by Babak Fontana MD at [facility] ENDOSCOPY    SLEEVE GASTRECTOMY N/A 9/25/2019    LAPAROSCOPIC SLEEVE GASTRECTOMY-ETHICON performed by Misael Ching MD at [facility] OR    UPPER GASTROINTESTINAL ENDOSCOPY N/A 4/12/2019    EGD BIOPSY performed by Misael Ching MD at [facility] ENDOSCOPY       Home medications:   Current  with vascular disease (HCC); 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 ([facility])      folic acid (FOLVITE) 400 MCG tablet TAKE ONE TABLET BY MOUTH ONE TIME A DAY  Qty: 90 tablet, Refills: 2    Associated Diagnoses: 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 (HCC); Type 2 diabetes mellitus with vascular disease (HCC)      blood glucose test strips (PRODIGY NO CODING BLOOD GLUC) strip 2 each by In Vitro route daily As needed.  Qty: 200 each, Refills: 3    Associated Diagnoses: 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 (HCC); Type 2 diabetes mellitus with vascular disease (HCC)      Insulin Pen Needle (PEN NEEDLES) 32G X 4 MM MISC Use BID with insulin and victoza  Qty: 200 each, Refills: 3    Associated Diagnoses: 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 (HCC); Type 2 diabetes mellitus with vascular disease ([facility]); Essential hypertension; Dyslipidemia associated with type 2 diabetes mellitus ([facility]); Morbid obesity due to excess calories ([facility])      Multiple Vitamins-Minerals (MULTIVITAMIN WITH MINERALS) tablet Take 1 tablet by mouth daily  Qty: 30 tablet, Refills: 11    Associated Diagnoses: Well adult exam      sildenafil (VIAGRA) 100 MG tablet Take 1 tablet by mouth daily as needed for Erectile Dysfunction  Qty: 30 tablet, Refills: 5    Associated Diagnoses: Erectile dysfunction due to diseases classified elsewhere      Lancet Devices (PRODIGY LANCING DEVICE) MISC Use daily to manage blood sugar  Qty: 1 each, Refills: 1      Blood Glucose Monitoring Suppl (PRODIGY POCKET BLOOD GLUCOSE) w/Device KIT Use daily to monitor blood sugar level  Qty: 1 kit, Refills: 1      aspirin EC 81 MG EC tablet Take 1 tablet by mouth daily      PRODIGY LANCETS 28G MISC 3 each by Does not apply route daily  Qty: 100 each, Refills: 3             Social history:  reports that he has never smoked. He has never used smokeless tobacco. He reports current alcohol use of about 1.0 standard drink of alcohol

## 2024-05-26 LAB
CHOLEST SERPL-MCNC: 90 MG/DL (ref 0–199)
DEPRECATED RDW RBC AUTO: 15.8 % (ref 12.4–15.4)
EST. AVERAGE GLUCOSE BLD GHB EST-MCNC: 180 MG/DL
GLUCOSE BLD-MCNC: 106 MG/DL (ref 70–99)
GLUCOSE BLD-MCNC: 109 MG/DL (ref 70–99)
GLUCOSE BLD-MCNC: 111 MG/DL (ref 70–99)
GLUCOSE BLD-MCNC: 112 MG/DL (ref 70–99)
GLUCOSE BLD-MCNC: 180 MG/DL (ref 70–99)
GLUCOSE BLD-MCNC: 269 MG/DL (ref 70–99)
HBA1C MFR BLD: 7.9 %
HCT VFR BLD AUTO: 34.8 % (ref 40.5–52.5)
HDLC SERPL-MCNC: 43 MG/DL (ref 40–60)
HGB BLD-MCNC: 11.1 G/DL (ref 13.5–17.5)
LDLC SERPL CALC-MCNC: 32 MG/DL
MCH RBC QN AUTO: 25.8 PG (ref 26–34)
MCHC RBC AUTO-ENTMCNC: 31.9 G/DL (ref 31–36)
MCV RBC AUTO: 80.7 FL (ref 80–100)
PERFORMED ON: ABNORMAL
PLATELET # BLD AUTO: 206 K/UL (ref 135–450)
PMV BLD AUTO: 9.1 FL (ref 5–10.5)
RBC # BLD AUTO: 4.3 M/UL (ref 4.2–5.9)
TRIGL SERPL-MCNC: 74 MG/DL (ref 0–150)
VLDLC SERPL CALC-MCNC: 15 MG/DL
WBC # BLD AUTO: 4.9 K/UL (ref 4–11)

## 2024-05-26 PROCEDURE — 83036 HEMOGLOBIN GLYCOSYLATED A1C: CPT

## 2024-05-26 PROCEDURE — 2580000003 HC RX 258: Performed by: INTERNAL MEDICINE

## 2024-05-26 PROCEDURE — 2060000000 HC ICU INTERMEDIATE R&B

## 2024-05-26 PROCEDURE — 85027 COMPLETE CBC AUTOMATED: CPT

## 2024-05-26 PROCEDURE — 80061 LIPID PANEL: CPT

## 2024-05-26 PROCEDURE — 97535 SELF CARE MNGMENT TRAINING: CPT

## 2024-05-26 PROCEDURE — 97162 PT EVAL MOD COMPLEX 30 MIN: CPT

## 2024-05-26 PROCEDURE — 6370000000 HC RX 637 (ALT 250 FOR IP): Performed by: INTERNAL MEDICINE

## 2024-05-26 PROCEDURE — 97530 THERAPEUTIC ACTIVITIES: CPT

## 2024-05-26 PROCEDURE — 6360000002 HC RX W HCPCS: Performed by: INTERNAL MEDICINE

## 2024-05-26 PROCEDURE — 97165 OT EVAL LOW COMPLEX 30 MIN: CPT

## 2024-05-26 PROCEDURE — 36415 COLL VENOUS BLD VENIPUNCTURE: CPT

## 2024-05-26 PROCEDURE — 99223 1ST HOSP IP/OBS HIGH 75: CPT | Performed by: PSYCHIATRY & NEUROLOGY

## 2024-05-26 PROCEDURE — 97116 GAIT TRAINING THERAPY: CPT

## 2024-05-26 RX ORDER — GLUCAGON 1 MG/ML
1 KIT INJECTION PRN
Status: DISCONTINUED | OUTPATIENT
Start: 2024-05-26 | End: 2024-05-29 | Stop reason: HOSPADM

## 2024-05-26 RX ORDER — INSULIN LISPRO 100 [IU]/ML
0-16 INJECTION, SOLUTION INTRAVENOUS; SUBCUTANEOUS
Status: DISCONTINUED | OUTPATIENT
Start: 2024-05-26 | End: 2024-05-29 | Stop reason: HOSPADM

## 2024-05-26 RX ORDER — INSULIN LISPRO 100 [IU]/ML
0-4 INJECTION, SOLUTION INTRAVENOUS; SUBCUTANEOUS NIGHTLY
Status: DISCONTINUED | OUTPATIENT
Start: 2024-05-26 | End: 2024-05-29 | Stop reason: HOSPADM

## 2024-05-26 RX ORDER — DEXTROSE MONOHYDRATE 100 MG/ML
INJECTION, SOLUTION INTRAVENOUS CONTINUOUS PRN
Status: DISCONTINUED | OUTPATIENT
Start: 2024-05-26 | End: 2024-05-29 | Stop reason: HOSPADM

## 2024-05-26 RX ADMIN — ENOXAPARIN SODIUM 30 MG: 100 INJECTION SUBCUTANEOUS at 22:02

## 2024-05-26 RX ADMIN — ATORVASTATIN CALCIUM 80 MG: 80 TABLET, FILM COATED ORAL at 22:01

## 2024-05-26 RX ADMIN — ASPIRIN 81 MG: 81 TABLET, COATED ORAL at 09:46

## 2024-05-26 RX ADMIN — INSULIN GLARGINE 18 UNITS: 100 INJECTION, SOLUTION SUBCUTANEOUS at 22:01

## 2024-05-26 RX ADMIN — SODIUM CHLORIDE, PRESERVATIVE FREE 10 ML: 5 INJECTION INTRAVENOUS at 22:09

## 2024-05-26 RX ADMIN — PANTOPRAZOLE SODIUM 40 MG: 40 TABLET, DELAYED RELEASE ORAL at 07:03

## 2024-05-26 RX ADMIN — ENOXAPARIN SODIUM 30 MG: 100 INJECTION SUBCUTANEOUS at 09:46

## 2024-05-26 RX ADMIN — METOPROLOL TARTRATE 50 MG: 50 TABLET, FILM COATED ORAL at 22:01

## 2024-05-26 RX ADMIN — SODIUM CHLORIDE, PRESERVATIVE FREE 10 ML: 5 INJECTION INTRAVENOUS at 09:51

## 2024-05-26 RX ADMIN — Medication 1 TABLET: at 09:46

## 2024-05-26 ASSESSMENT — PAIN SCALES - GENERAL: PAINLEVEL_OUTOF10: 0

## 2024-05-26 NOTE — CONSULTS
right compared to left.   Good range of motion.  No muscle atrophy.    Tone: Normal tone.   Reflexes: Symmetric 2+ in the arms and 2+ in the legs   Planters: Flexor bilaterally  Coordination: no pronator drift, no dysmetria with FNF and normal REM  Sensation: normal in both arms and legs.  Gait/Posture: steady gait with normal posturing and station.         Medical decision making:      A. Problems (any 1)    High:    [x] Acute/Chronic Illness/injury posing threat to life or bodily function: Acute CVA  [] Severe exacerbation of chronic illness:      Moderate:    []     1 or more chronic illness with exacerbation, progression or side effect of treatment or  []     2 or more stable chronic illnesses or  []     1 acute illness with systemic symptoms     ---------------------------------------------------------------------  B. Risk of Treatment (any 1)   [x] Drugs/treatments that require intensive monitoring for toxicity include:    [] Change in code status:    [] Decision to escalate care:    [] Major surgery/procedure with associated risk factors:    [] Prescription drug management  ----------------------------------------------------------------------  [x] High (any 2)  [] Moderate (any 1)    C. Data (any 2 for high and any 1 for moderate)  [] Discussed management of the case with:    [x] Imaging personally reviewed and interpreted, includes:    [x] Data Review (any 3)  [] Collateral history obtained from:    [x] All available Consultant notes from yesterday/today were reviewed  [x] All current labs were reviewed and interpreted for clinical significance   [x] Appropriate follow-up labs were ordered    Data: reviewed   LABS:   Lab Results   Component Value Date/Time     05/25/2024 08:05 AM    K 4.3 05/25/2024 08:05 AM    K 4.3 05/25/2024 08:05 AM     05/25/2024 08:05 AM    CO2 22 05/25/2024 08:05 AM    BUN 21 05/25/2024 08:05 AM    CREATININE 1.1 05/25/2024 08:05 AM    GFRAA >60 04/06/2021 01:34 PM

## 2024-05-26 NOTE — PROGRESS NOTES
Hospitalist Progress Note      PCP: Tank Sevilla MD    Date of Admission: 5/25/2024    LOS: 1    Chief Complaint:   Chief Complaint   Patient presents with    Altered Mental Status     Patient in by [facility], per wife on scene, states patient had slowed speech, unsteady gait and was slow to respond, LKW 0230.        Case Summary:   58-year-old gentleman with history of hypertension, type 2 diabetes, stage III CKD, GERD, history of CVA, hyperlipidemia and obesity who presented with ataxia and dysarthria concerning for cerebellar CVA      Active Hospital Problems    Diagnosis Date Noted    Acute CVA (cerebrovascular accident) (HCC) [I63.9] 05/25/2024    Hyperlipidemia [E78.5] 02/03/2020    Morbid obesity due to excess calories (HCC) [E66.01] 02/03/2020    CKD (chronic kidney disease) stage 3, GFR 30-59 ml/min ([facility]) [N18.30] 04/25/2019    Chronic GERD [K21.9] 02/26/2019    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 ([facility]) [E11.3299] 03/26/2018         Principal Problem:    Acute CVA (cerebrovascular accident) (HCC): He improved speech this morning.  Still with right dysmetria and improved left dysmetria.  CT brain unrevealing of acute pathology and CT angiogram of the head and neck noted with age-indeterminate right vertebral artery occlusion at V4 stenosis   - Continue aspirin and statin  - Await neurology consult  - Await echocardiogram and MRI brain  - Continue PT OT and speech pathology evaluation    Active Problems:    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 (HCC): On sliding scale insulin    Chronic GERD: Stable    CKD (chronic kidney disease) stage 3, GFR 30-59 ml/min ([facility]): Stable renal function    Hyperlipidemia: On statin    Essential hypertension: Stable with metoprolol, amlodipine and valsartan    Morbid obesity due to excess calories ([facility])        Medications:  Reviewed  Infusion Medications    dextrose      sodium chloride      dextrose       Scheduled

## 2024-05-26 NOTE — PROGRESS NOTES
[facility] - Inpatient Rehabilitation Department   Phone: [phone]    Occupational Therapy    [x] Initial Evaluation            [] Daily Treatment Note         [] Discharge Summary      Patient: Juaquin Delaney   : 1965   MRN: [id number]   Date of Service:  2024    Admitting Diagnosis:  Acute CVA (cerebrovascular accident) (HCC)  Current Admission Summary: The patient is a 58 y.o. male with history of CVA, hypertension, type 2 diabetes, GERD, stage III CKD, hyperlipidemia, obesity who presented with reports of dysarthria and ataxia since waking up around 6:30 AM this morning.  Patient is reported to have been seen last well last night around 2:30 AM coming from a party.  He is noted to having slurred speech with unsteady gait.  He denies any headache, no double vision.  No nausea or vomiting.  In the ER, CT brain with no acute pathology  CT angiogram of the head and neck noted for age-indeterminate right vertebral artery occlusion as well as left mid V4 stenosis.  Urinalysis noted for bacteriuria  EKG sinus rhythm    MRI pending   Past Medical History:  has a past medical history of Cerebral artery occlusion with cerebral infarction (HCC), CKD (chronic kidney disease), Diabetes mellitus (HCC), GERD (gastroesophageal reflux disease), Helicobacter pylori (H. pylori) infection, Hypertension, and Sleep apnea.  Past Surgical History:  has a past surgical history that includes knee surgery; Upper gastrointestinal endoscopy (N/A, 2019); Sleeve Gastrectomy (N/A, 2019); Cystoscopy (2019); sigmoidoscopy (N/A, 2021); and Colonoscopy (N/A, 2021).    Discharge Recommendations: Juaquin Delaney scored a 20/24 on the AM-PAC ADL Inpatient form. Current research shows that an AM-PAC score of 17 or less is typically not associated with a discharge to the patient's home setting. Based on the patient's AM-PAC score and their current ADL deficits, it is recommended that the patient have

## 2024-05-26 NOTE — PLAN OF CARE
Problem: Discharge Planning  Goal: Discharge to home or other facility with appropriate resources  5/26/2024 0045 by Miranda Ya RN  Outcome: Progressing  5/25/2024 1808 by Leidy Kemp RN  Outcome: Progressing  Flowsheets (Taken 5/25/2024 1257)  Discharge to home or other facility with appropriate resources: Identify barriers to discharge with patient and caregiver     Problem: Skin/Tissue Integrity  Goal: Absence of new skin breakdown  Description: 1.  Monitor for areas of redness and/or skin breakdown  2.  Assess vascular access sites hourly  3.  Every 4-6 hours minimum:  Change oxygen saturation probe site  4.  Every 4-6 hours:  If on nasal continuous positive airway pressure, respiratory therapy assess nares and determine need for appliance change or resting period.  5/26/2024 0045 by Miranda Ya RN  Outcome: Progressing  5/25/2024 1808 by Leidy Kemp RN  Outcome: Progressing     Problem: Pain  Goal: Verbalizes/displays adequate comfort level or baseline comfort level  5/26/2024 0045 by Miranda Ya RN  Outcome: Progressing  Flowsheets (Taken 5/25/2024 2000)  Verbalizes/displays adequate comfort level or baseline comfort level:   Encourage patient to monitor pain and request assistance   Assess pain using appropriate pain scale   Administer analgesics based on type and severity of pain and evaluate response   Implement non-pharmacological measures as appropriate and evaluate response   Consider cultural and social influences on pain and pain management   Notify Licensed Independent Practitioner if interventions unsuccessful or patient reports new pain  5/25/2024 1808 by Leidy Kemp RN  Outcome: Progressing

## 2024-05-26 NOTE — PROGRESS NOTES
[facility] - Inpatient Rehabilitation Department   Phone: [phone]    Physical Therapy    [x] Initial Evaluation            [] Daily Treatment Note         [] Discharge Summary      Patient: Juaquin Delaney   : 1965   MRN: [id number]   Date of Service:  2024  Admitting Diagnosis: Acute CVA (cerebrovascular accident) (HCC)  Current Admission Summary: This is a 58 y.o. male who was brought in by EMS transportation for dysarthria and unsteady gait that started at 2:30 AM this morning. According to EMS, the patient's symptoms were noticed by his wife.  He had a similar stroke 10 years ago with similar symptoms.      *CT head: \"1. Age indeterminate occlusion of the right vertebral artery. 2. Moderate stenosis of the left mid V4 segment related to calcified plaque.\"     *MRI brain pending  Past Medical History:  has a past medical history of Cerebral artery occlusion with cerebral infarction (HCC), CKD (chronic kidney disease), Diabetes mellitus (HCC), GERD (gastroesophageal reflux disease), Helicobacter pylori (H. pylori) infection, Hypertension, and Sleep apnea.  Past Surgical History:  has a past surgical history that includes knee surgery; Upper gastrointestinal endoscopy (N/A, 2019); Sleeve Gastrectomy (N/A, 2019); Cystoscopy (2019); sigmoidoscopy (N/A, 2021); and Colonoscopy (N/A, 2021).  Discharge Recommendations: Juaquin Delaney scored a 17/24 on the AM-PAC short mobility form. Current research shows that an AM-PAC score of 17 or less is typically not associated with a discharge to the patient's home setting. Based on the patient's AM-PAC score and their current functional mobility deficits, it is recommended that the patient have 5-7 sessions per week of Physical Therapy at d/c to increase the patient's independence.  At this time, this patient demonstrates complex nursing, medical, and rehabilitative needs, and would benefit from intensive rehabilitation services    Mild forward head, rounded shoulders, and increased thoracic kyphosis in sitting and standing.  Sensation:   accurately detects gross touch to all extremities and denies numbness and tingling  Proprioception:    diminished proprioception in (B) LE  Tone:   Normotonic  Coordination Testing:   Coordination and Movement Description: fine motor impairments, gross motor impairments, ataxia, decreased speed, decreased accuracy, dysmetria  Finger to Nose: Impaired on Right  Alternating Pronation/Supination: Impaired on Right  Finger/Thumb Opposition: WFL  Heel to Shin: WFL  Alternating Toe Tapping: WFL    ROM:   (B) LE AROM WFL  Strength:   (B) LE strength grossly WFL  Therapist Clinical Decision Making (Complexity): medium complexity  Clinical Presentation: evolving      Subjective  General: Pt semi-fowlers in bed on arrival, agreeable to participate in PT/OT initial evaluation. Pt feels that his speech is back to normal.  Pain: 0/10  Pain Interventions: not applicable     Functional Mobility  Bed Mobility:  Supine to Sit: stand by assistance  Scooting: stand by assistance- toward EOB  Comments: Supine to sit: HOB elevated  Transfers:  Sit to stand transfer: contact guard assistance  Stand to sit transfer: contact guard assistance  Toilet transfer: contact guard assistance- use of L grab bar  Comments: RW used initially for transfers, verbal cues required for hand placement. Mild posterior lean during transfers and static standing.  Ambulation:  Surface:level surface  Assistive Device: rolling walker  Assistance: contact guard assistance  Distance: 15' + 15'  Gait Mechanics: Ataxic, unsteady with no overt losses of balance, decreased speed  Comments:    Ambulation Trial 2  Surface:level surface  Assistive Device: no device  Other Appliance: wheelchair follow  Assistance: minimal assistance  Distance: 40'  Gait Mechanics: Ataxic, inconsistent step lengths, wide ARTHUR, increased M/L sway, starts and stops during the gait

## 2024-05-26 NOTE — PLAN OF CARE
Problem: Discharge Planning  Goal: Discharge to home or other facility with appropriate resources  5/26/2024 1305 by Leidy Kemp, RN  Outcome: Progressing  Flowsheets (Taken 5/26/2024 0823)  Discharge to home or other facility with appropriate resources: Identify barriers to discharge with patient and caregiver     Problem: Skin/Tissue Integrity  Goal: Absence of new skin breakdown  Description: 1.  Monitor for areas of redness and/or skin breakdown  2.  Assess vascular access sites hourly  3.  Every 4-6 hours minimum:  Change oxygen saturation probe site  4.  Every 4-6 hours:  If on nasal continuous positive airway pressure, respiratory therapy assess nares and determine need for appliance change or resting period.  5/26/2024 1305 by Leidy Kemp RN  Outcome: Progressing     Problem: Pain  Goal: Verbalizes/displays adequate comfort level or baseline comfort level  5/26/2024 1305 by Leidy Kemp, RN  Outcome: Progressing  Flowsheets (Taken 5/26/2024 0530 by Miranda Ya, RN)  Verbalizes/displays adequate comfort level or baseline comfort level:   Encourage patient to monitor pain and request assistance   Assess pain using appropriate pain scale   Administer analgesics based on type and severity of pain and evaluate response   Implement non-pharmacological measures as appropriate and evaluate response   Consider cultural and social influences on pain and pain management   Notify Licensed Independent Practitioner if interventions unsuccessful or patient reports new pain     Problem: Safety - Adult  Goal: Free from fall injury  Outcome: Progressing

## 2024-05-27 LAB
GLUCOSE BLD-MCNC: 113 MG/DL (ref 70–99)
GLUCOSE BLD-MCNC: 245 MG/DL (ref 70–99)
GLUCOSE BLD-MCNC: 268 MG/DL (ref 70–99)
GLUCOSE BLD-MCNC: 74 MG/DL (ref 70–99)
PERFORMED ON: ABNORMAL
PERFORMED ON: NORMAL

## 2024-05-27 PROCEDURE — 2060000000 HC ICU INTERMEDIATE R&B

## 2024-05-27 PROCEDURE — 6360000002 HC RX W HCPCS: Performed by: INTERNAL MEDICINE

## 2024-05-27 PROCEDURE — 6370000000 HC RX 637 (ALT 250 FOR IP): Performed by: INTERNAL MEDICINE

## 2024-05-27 PROCEDURE — 2580000003 HC RX 258: Performed by: INTERNAL MEDICINE

## 2024-05-27 RX ADMIN — METOPROLOL TARTRATE 50 MG: 50 TABLET, FILM COATED ORAL at 08:45

## 2024-05-27 RX ADMIN — SODIUM CHLORIDE, PRESERVATIVE FREE 10 ML: 5 INJECTION INTRAVENOUS at 08:46

## 2024-05-27 RX ADMIN — AMLODIPINE BESYLATE 5 MG: 5 TABLET ORAL at 08:45

## 2024-05-27 RX ADMIN — INSULIN GLARGINE 18 UNITS: 100 INJECTION, SOLUTION SUBCUTANEOUS at 22:53

## 2024-05-27 RX ADMIN — ENOXAPARIN SODIUM 30 MG: 100 INJECTION SUBCUTANEOUS at 22:52

## 2024-05-27 RX ADMIN — METOPROLOL TARTRATE 50 MG: 50 TABLET, FILM COATED ORAL at 22:52

## 2024-05-27 RX ADMIN — ASPIRIN 81 MG: 81 TABLET, COATED ORAL at 08:46

## 2024-05-27 RX ADMIN — ENOXAPARIN SODIUM 30 MG: 100 INJECTION SUBCUTANEOUS at 08:46

## 2024-05-27 RX ADMIN — Medication 1 TABLET: at 08:45

## 2024-05-27 RX ADMIN — SODIUM CHLORIDE, PRESERVATIVE FREE 10 ML: 5 INJECTION INTRAVENOUS at 22:53

## 2024-05-27 RX ADMIN — INSULIN LISPRO 4 UNITS: 100 INJECTION, SOLUTION INTRAVENOUS; SUBCUTANEOUS at 18:26

## 2024-05-27 RX ADMIN — VALSARTAN 160 MG: 40 TABLET, FILM COATED ORAL at 08:45

## 2024-05-27 RX ADMIN — PANTOPRAZOLE SODIUM 40 MG: 40 TABLET, DELAYED RELEASE ORAL at 07:13

## 2024-05-27 RX ADMIN — ATORVASTATIN CALCIUM 80 MG: 80 TABLET, FILM COATED ORAL at 22:53

## 2024-05-27 ASSESSMENT — PAIN SCALES - GENERAL: PAINLEVEL_OUTOF10: 0

## 2024-05-27 NOTE — PLAN OF CARE
Problem: Pain  Goal: Verbalizes/displays adequate comfort level or baseline comfort level  Outcome: Progressing     Problem: Hematologic - Adult  Goal: Maintains hematologic stability  Outcome: Progressing      05/26/24 2003   Vitals   Temp 98.5 °F (36.9 °C)   Temp Source Oral   Pulse 84   Heart Rate Source Radial;Brachial   Respirations 14   /76   MAP (Calculated) 89   BP Location Left upper arm   BP Upper/Lower Upper   BP Method Automatic   Patient Position Semi fowlers   Cardiac Rhythm 1° AV Block   Pain Assessment   Pain Assessment 0-10   Pain Level 0   Opioid-Induced Sedation   POSS Score 1   RASS   [place] Agitation Sedation Scale (RASS) 0   Oxygen Therapy   SpO2 96 %   Pulse Oximetry Type Intermittent   Pulse Oximeter Device Location Finger   O2 Device None (Room air)

## 2024-05-27 NOTE — PLAN OF CARE
Problem: Discharge Planning  Goal: Discharge to home or other facility with appropriate resources  Outcome: Progressing     Problem: Skin/Tissue Integrity  Goal: Absence of new skin breakdown  Description: 1.  Monitor for areas of redness and/or skin breakdown  2.  Assess vascular access sites hourly  3.  Every 4-6 hours minimum:  Change oxygen saturation probe site  4.  Every 4-6 hours:  If on nasal continuous positive airway pressure, respiratory therapy assess nares and determine need for appliance change or resting period.  Outcome: Progressing     Problem: Pain  Goal: Verbalizes/displays adequate comfort level or baseline comfort level  5/27/2024 1526 by Leidy Kemp, RN  Outcome: Progressing     Problem: Safety - Adult  Goal: Free from fall injury  Outcome: Progressing     Problem: Hematologic - Adult  Goal: Maintains hematologic stability  5/27/2024 1526 by Leidy Kemp, RN  Outcome: Progressing

## 2024-05-27 NOTE — PLAN OF CARE
Problem: Hematologic - Adult  Goal: Maintains hematologic stability  Outcome: Progressing      05/26/24 2338   Vitals   Temp 97.9 °F (36.6 °C)   Temp Source Oral   Pulse 65   Heart Rate Source Brachial;Monitor   Respirations 14   /81   MAP (Calculated) 99   BP Location Left upper arm   BP Upper/Lower Upper   BP Method Automatic   Patient Position Semi fowlers   Cardiac Rhythm 1° AV Block;Sinus rhythm   Oxygen Therapy   SpO2 93 %   Pulse Oximetry Type Intermittent   Pulse Oximeter Device Location Finger   O2 Device None (Room air)

## 2024-05-27 NOTE — PLAN OF CARE
Problem: Pain  Goal: Verbalizes/displays adequate comfort level or baseline comfort level  Outcome: Progressing     Problem: Hematologic - Adult  Goal: Maintains hematologic stability  Outcome: Progressing      05/27/24 0341   Vitals   Temp 97.9 °F (36.6 °C)   Temp Source Oral   Pulse 79   Heart Rate Source Brachial;Radial   Respirations 14   BP (!) 145/98   MAP (Calculated) 114   BP Location Left upper arm   BP Upper/Lower Upper   BP Method Automatic   Patient Position Semi fowlers   Cardiac Rhythm 1° AV Block;Sinus rhythm   Pain Assessment   Pain Assessment 0-10   Pain Level 0   Oxygen Therapy   SpO2 94 %   Pulse Oximetry Type Intermittent   Pulse Oximeter Device Location Finger   O2 Device None (Room air)

## 2024-05-27 NOTE — PROGRESS NOTES
Hospitalist Progress Note      PCP: Tank Sevilla MD    Date of Admission: 5/25/2024    LOS: 2    Chief Complaint:   Chief Complaint   Patient presents with    Altered Mental Status     Patient in by [facility], per wife on scene, states patient had slowed speech, unsteady gait and was slow to respond, LKW 0230.        Case Summary:   58-year-old gentleman with history of hypertension, type 2 diabetes, stage III CKD, GERD, history of CVA, hyperlipidemia and obesity who presented with ataxia and dysarthria concerning for cerebellar CVA    CT brain unrevealing of acute pathology   CT angiogram of the head and neck noted with age-indeterminate right vertebral artery occlusion at V4 stenosis     Active Hospital Problems    Diagnosis Date Noted    Acute CVA (cerebrovascular accident) (HCC) [I63.9] 05/25/2024    Hyperlipidemia [E78.5] 02/03/2020    Morbid obesity due to excess calories (HCC) [E66.01] 02/03/2020    CKD (chronic kidney disease) stage 3, GFR 30-59 ml/min ([facility]) [N18.30] 04/25/2019    Chronic GERD [K21.9] 02/26/2019    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 (HCC) [E11.3299] 03/26/2018         Principal Problem:    Acute CVA (cerebrovascular accident) (HCC): Reports feeling much better today.  Still with dysmetria of right upper extremity.  Seen PT OT and speech pathology as well as neurology  - Continue secondary prevention with aspirin and statin  - Awaiting echocardiogram and MRI brain  - Continue PT OT  - Neurology following with recommendations    Active Problems:    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 (HCC): On sliding scale insulin    Chronic GERD: Stable    CKD (chronic kidney disease) stage 3, GFR 30-59 ml/min (HCC): Stable renal function    Hyperlipidemia: On statin    Essential hypertension: Stable with metoprolol, amlodipine and valsartan    Morbid obesity due to excess calories (HCC)        Medications:  Reviewed  Infusion Medications    dextrose

## 2024-05-28 ENCOUNTER — APPOINTMENT (OUTPATIENT)
Age: 59
DRG: 065 | End: 2024-05-28
Attending: INTERNAL MEDICINE
Payer: COMMERCIAL

## 2024-05-28 ENCOUNTER — APPOINTMENT (OUTPATIENT)
Dept: MRI IMAGING | Age: 59
DRG: 065 | End: 2024-05-28
Payer: COMMERCIAL

## 2024-05-28 LAB
ALBUMIN SERPL-MCNC: 3.8 G/DL (ref 3.4–5)
ALBUMIN/GLOB SERPL: 1.2 {RATIO} (ref 1.1–2.2)
ALP SERPL-CCNC: 72 U/L (ref 40–129)
ALT SERPL-CCNC: 22 U/L (ref 10–40)
ANION GAP SERPL CALCULATED.3IONS-SCNC: 10 MMOL/L (ref 3–16)
AST SERPL-CCNC: 23 U/L (ref 15–37)
BASOPHILS # BLD: 0.1 K/UL (ref 0–0.2)
BASOPHILS NFR BLD: 0.9 %
BILIRUB SERPL-MCNC: 0.7 MG/DL (ref 0–1)
BUN SERPL-MCNC: 26 MG/DL (ref 7–20)
CALCIUM SERPL-MCNC: 9.4 MG/DL (ref 8.3–10.6)
CHLORIDE SERPL-SCNC: 108 MMOL/L (ref 99–110)
CO2 SERPL-SCNC: 24 MMOL/L (ref 21–32)
CREAT SERPL-MCNC: 1.5 MG/DL (ref 0.9–1.3)
DEPRECATED RDW RBC AUTO: 15.8 % (ref 12.4–15.4)
ECHO AO ASC DIAM: 3.9 CM
ECHO AO ASCENDING AORTA INDEX: 1.69 CM/M2
ECHO AO ROOT DIAM: 3.6 CM
ECHO AO ROOT INDEX: 1.56 CM/M2
ECHO AV AREA PEAK VELOCITY: 2.2 CM2
ECHO AV AREA VTI: 2.3 CM2
ECHO AV AREA/BSA PEAK VELOCITY: 1 CM2/M2
ECHO AV AREA/BSA VTI: 1 CM2/M2
ECHO AV MEAN GRADIENT: 3 MMHG
ECHO AV MEAN VELOCITY: 0.7 M/S
ECHO AV PEAK GRADIENT: 5 MMHG
ECHO AV PEAK VELOCITY: 1.1 M/S
ECHO AV VELOCITY RATIO: 0.64
ECHO AV VTI: 22.1 CM
ECHO BSA: 2.35 M2
ECHO EST RA PRESSURE: 3 MMHG
ECHO IVC PROX: 1.6 CM
ECHO LA AREA 2C: 23.1 CM2
ECHO LA AREA 4C: 21.7 CM2
ECHO LA DIAMETER INDEX: 1.82 CM/M2
ECHO LA DIAMETER: 4.2 CM
ECHO LA MAJOR AXIS: 5.4 CM
ECHO LA MINOR AXIS: 5.6 CM
ECHO LA TO AORTIC ROOT RATIO: 1.17
ECHO LA VOL BP: 73 ML (ref 18–58)
ECHO LA VOL MOD A2C: 78 ML (ref 18–58)
ECHO LA VOL MOD A4C: 67 ML (ref 18–58)
ECHO LA VOL/BSA BIPLANE: 32 ML/M2 (ref 16–34)
ECHO LA VOLUME INDEX MOD A2C: 34 ML/M2 (ref 16–34)
ECHO LA VOLUME INDEX MOD A4C: 29 ML/M2 (ref 16–34)
ECHO LV E' LATERAL VELOCITY: 9 CM/S
ECHO LV E' SEPTAL VELOCITY: 6 CM/S
ECHO LV EDV A2C: 131 ML
ECHO LV EDV A4C: 158 ML
ECHO LV EDV INDEX A4C: 68 ML/M2
ECHO LV EDV NDEX A2C: 57 ML/M2
ECHO LV EJECTION FRACTION A2C: 51 %
ECHO LV EJECTION FRACTION A4C: 56 %
ECHO LV EJECTION FRACTION BIPLANE: 54 % (ref 55–100)
ECHO LV ESV A2C: 64 ML
ECHO LV ESV A4C: 69 ML
ECHO LV ESV INDEX A2C: 28 ML/M2
ECHO LV ESV INDEX A4C: 30 ML/M2
ECHO LV FRACTIONAL SHORTENING: 26 % (ref 28–44)
ECHO LV GLOBAL LONGITUDINAL STRAIN (GLS): -11.8 %
ECHO LV INTERNAL DIMENSION DIASTOLE INDEX: 2.03 CM/M2
ECHO LV INTERNAL DIMENSION DIASTOLIC: 4.7 CM (ref 4.2–5.9)
ECHO LV INTERNAL DIMENSION SYSTOLIC INDEX: 1.52 CM/M2
ECHO LV INTERNAL DIMENSION SYSTOLIC: 3.5 CM
ECHO LV IVSD: 1.4 CM (ref 0.6–1)
ECHO LV MASS 2D: 265.2 G (ref 88–224)
ECHO LV MASS INDEX 2D: 114.8 G/M2 (ref 49–115)
ECHO LV POSTERIOR WALL DIASTOLIC: 1.4 CM (ref 0.6–1)
ECHO LV RELATIVE WALL THICKNESS RATIO: 0.6
ECHO LVOT AREA: 3.5 CM2
ECHO LVOT AV VTI INDEX: 0.65
ECHO LVOT DIAM: 2.1 CM
ECHO LVOT MEAN GRADIENT: 1 MMHG
ECHO LVOT PEAK GRADIENT: 2 MMHG
ECHO LVOT PEAK VELOCITY: 0.7 M/S
ECHO LVOT STROKE VOLUME INDEX: 21.6 ML/M2
ECHO LVOT SV: 49.9 ML
ECHO LVOT VTI: 14.4 CM
ECHO MV A VELOCITY: 0.57 M/S
ECHO MV AREA VTI: 3.2 CM2
ECHO MV E DECELERATION TIME (DT): 165 MS
ECHO MV E VELOCITY: 0.53 M/S
ECHO MV E/A RATIO: 0.93
ECHO MV E/E' LATERAL: 5.89
ECHO MV E/E' RATIO (AVERAGED): 7.36
ECHO MV E/E' SEPTAL: 8.83
ECHO MV LVOT VTI INDEX: 1.09
ECHO MV MAX VELOCITY: 0.6 M/S
ECHO MV MEAN GRADIENT: 1 MMHG
ECHO MV MEAN VELOCITY: 0.4 M/S
ECHO MV PEAK GRADIENT: 2 MMHG
ECHO MV VTI: 15.7 CM
ECHO PULMONARY ARTERY END DIASTOLIC PRESSURE: 8 MMHG
ECHO PV MAX VELOCITY: 0.7 M/S
ECHO PV PEAK GRADIENT: 2 MMHG
ECHO PV REGURGITANT MAX VELOCITY: 1.4 M/S
ECHO RA AREA 4C: 18.3 CM2
ECHO RA END SYSTOLIC VOLUME APICAL 4 CHAMBER INDEX BSA: 20 ML/M2
ECHO RA VOLUME: 47 ML
ECHO RIGHT VENTRICULAR SYSTOLIC PRESSURE (RVSP): 18 MMHG
ECHO RV BASAL DIMENSION: 3.9 CM
ECHO RV FREE WALL PEAK S': 10 CM/S
ECHO RV LONGITUDINAL DIMENSION: 6.2 CM
ECHO RV MID DIMENSION: 3.1 CM
ECHO RV TAPSE: 1.7 CM (ref 1.7–?)
ECHO TV REGURGITANT MAX VELOCITY: 1.92 M/S
ECHO TV REGURGITANT PEAK GRADIENT: 15 MMHG
EOSINOPHIL # BLD: 0.1 K/UL (ref 0–0.6)
EOSINOPHIL NFR BLD: 2.3 %
GFR SERPLBLD CREATININE-BSD FMLA CKD-EPI: 54 ML/MIN/{1.73_M2}
GLUCOSE BLD-MCNC: 129 MG/DL (ref 70–99)
GLUCOSE BLD-MCNC: 231 MG/DL (ref 70–99)
GLUCOSE BLD-MCNC: 259 MG/DL (ref 70–99)
GLUCOSE BLD-MCNC: 97 MG/DL (ref 70–99)
GLUCOSE SERPL-MCNC: 99 MG/DL (ref 70–99)
HCT VFR BLD AUTO: 38.3 % (ref 40.5–52.5)
HGB BLD-MCNC: 12.1 G/DL (ref 13.5–17.5)
LYMPHOCYTES # BLD: 2.6 K/UL (ref 1–5.1)
LYMPHOCYTES NFR BLD: 46 %
MAGNESIUM SERPL-MCNC: 1.7 MG/DL (ref 1.8–2.4)
MCH RBC QN AUTO: 25.8 PG (ref 26–34)
MCHC RBC AUTO-ENTMCNC: 31.5 G/DL (ref 31–36)
MCV RBC AUTO: 81.8 FL (ref 80–100)
MONOCYTES # BLD: 0.4 K/UL (ref 0–1.3)
MONOCYTES NFR BLD: 6.8 %
NEUTROPHILS # BLD: 2.5 K/UL (ref 1.7–7.7)
NEUTROPHILS NFR BLD: 44 %
PERFORMED ON: ABNORMAL
PERFORMED ON: NORMAL
PLATELET # BLD AUTO: 215 K/UL (ref 135–450)
PMV BLD AUTO: 9.1 FL (ref 5–10.5)
POTASSIUM SERPL-SCNC: 4.4 MMOL/L (ref 3.5–5.1)
PROT SERPL-MCNC: 6.9 G/DL (ref 6.4–8.2)
RBC # BLD AUTO: 4.69 M/UL (ref 4.2–5.9)
SODIUM SERPL-SCNC: 142 MMOL/L (ref 136–145)
WBC # BLD AUTO: 5.6 K/UL (ref 4–11)

## 2024-05-28 PROCEDURE — 1200000000 HC SEMI PRIVATE

## 2024-05-28 PROCEDURE — 6370000000 HC RX 637 (ALT 250 FOR IP): Performed by: INTERNAL MEDICINE

## 2024-05-28 PROCEDURE — 93306 TTE W/DOPPLER COMPLETE: CPT | Performed by: STUDENT IN AN ORGANIZED HEALTH CARE EDUCATION/TRAINING PROGRAM

## 2024-05-28 PROCEDURE — 93356 MYOCRD STRAIN IMG SPCKL TRCK: CPT | Performed by: STUDENT IN AN ORGANIZED HEALTH CARE EDUCATION/TRAINING PROGRAM

## 2024-05-28 PROCEDURE — 6360000002 HC RX W HCPCS: Performed by: INTERNAL MEDICINE

## 2024-05-28 PROCEDURE — 2060000000 HC ICU INTERMEDIATE R&B

## 2024-05-28 PROCEDURE — 85025 COMPLETE CBC W/AUTO DIFF WBC: CPT

## 2024-05-28 PROCEDURE — 99232 SBSQ HOSP IP/OBS MODERATE 35: CPT

## 2024-05-28 PROCEDURE — 92526 ORAL FUNCTION THERAPY: CPT

## 2024-05-28 PROCEDURE — 36415 COLL VENOUS BLD VENIPUNCTURE: CPT

## 2024-05-28 PROCEDURE — 97116 GAIT TRAINING THERAPY: CPT

## 2024-05-28 PROCEDURE — 70551 MRI BRAIN STEM W/O DYE: CPT

## 2024-05-28 PROCEDURE — 83735 ASSAY OF MAGNESIUM: CPT

## 2024-05-28 PROCEDURE — 80053 COMPREHEN METABOLIC PANEL: CPT

## 2024-05-28 PROCEDURE — 97112 NEUROMUSCULAR REEDUCATION: CPT

## 2024-05-28 PROCEDURE — 93306 TTE W/DOPPLER COMPLETE: CPT

## 2024-05-28 PROCEDURE — 6360000002 HC RX W HCPCS: Performed by: STUDENT IN AN ORGANIZED HEALTH CARE EDUCATION/TRAINING PROGRAM

## 2024-05-28 PROCEDURE — 2580000003 HC RX 258: Performed by: INTERNAL MEDICINE

## 2024-05-28 PROCEDURE — 97110 THERAPEUTIC EXERCISES: CPT

## 2024-05-28 PROCEDURE — 97535 SELF CARE MNGMENT TRAINING: CPT

## 2024-05-28 RX ORDER — MAGNESIUM SULFATE IN WATER 40 MG/ML
2000 INJECTION, SOLUTION INTRAVENOUS ONCE
Status: COMPLETED | OUTPATIENT
Start: 2024-05-28 | End: 2024-05-28

## 2024-05-28 RX ADMIN — INSULIN LISPRO 4 UNITS: 100 INJECTION, SOLUTION INTRAVENOUS; SUBCUTANEOUS at 16:47

## 2024-05-28 RX ADMIN — Medication 1 TABLET: at 10:45

## 2024-05-28 RX ADMIN — ASPIRIN 81 MG: 81 TABLET, COATED ORAL at 10:44

## 2024-05-28 RX ADMIN — INSULIN GLARGINE 18 UNITS: 100 INJECTION, SOLUTION SUBCUTANEOUS at 20:28

## 2024-05-28 RX ADMIN — METOPROLOL TARTRATE 50 MG: 50 TABLET, FILM COATED ORAL at 20:29

## 2024-05-28 RX ADMIN — AMLODIPINE BESYLATE 5 MG: 5 TABLET ORAL at 10:45

## 2024-05-28 RX ADMIN — SODIUM CHLORIDE, PRESERVATIVE FREE 10 ML: 5 INJECTION INTRAVENOUS at 10:45

## 2024-05-28 RX ADMIN — MAGNESIUM SULFATE HEPTAHYDRATE 2000 MG: 40 INJECTION, SOLUTION INTRAVENOUS at 14:28

## 2024-05-28 RX ADMIN — ENOXAPARIN SODIUM 30 MG: 100 INJECTION SUBCUTANEOUS at 20:29

## 2024-05-28 RX ADMIN — ATORVASTATIN CALCIUM 80 MG: 80 TABLET, FILM COATED ORAL at 20:29

## 2024-05-28 RX ADMIN — VALSARTAN 160 MG: 40 TABLET, FILM COATED ORAL at 10:44

## 2024-05-28 RX ADMIN — METOPROLOL TARTRATE 50 MG: 50 TABLET, FILM COATED ORAL at 10:45

## 2024-05-28 RX ADMIN — ENOXAPARIN SODIUM 30 MG: 100 INJECTION SUBCUTANEOUS at 10:45

## 2024-05-28 NOTE — PROGRESS NOTES
[facility] - Inpatient Rehabilitation Department   Phone: [phone]    Occupational Therapy    [] Initial Evaluation            [x] Daily Treatment Note         [] Discharge Summary      Patient: Juaquin Delaney   : 1965   MRN: [id number]   Date of Service:  2024    Admitting Diagnosis:  Acute CVA (cerebrovascular accident) (HCC)  Current Admission Summary: The patient is a 58 y.o. male with history of CVA, hypertension, type 2 diabetes, GERD, stage III CKD, hyperlipidemia, obesity who presented with reports of dysarthria and ataxia since waking up around 6:30 AM this morning.  Patient is reported to have been seen last well last night around 2:30 AM coming from a party.  He is noted to having slurred speech with unsteady gait.  He denies any headache, no double vision.  No nausea or vomiting.  In the ER, CT brain with no acute pathology  CT angiogram of the head and neck noted for age-indeterminate right vertebral artery occlusion as well as left mid V4 stenosis.  Urinalysis noted for bacteriuria  EKG sinus rhythm    MRI pending   Past Medical History:  has a past medical history of Cerebral artery occlusion with cerebral infarction (HCC), CKD (chronic kidney disease), Diabetes mellitus (HCC), GERD (gastroesophageal reflux disease), Helicobacter pylori (H. pylori) infection, Hypertension, and Sleep apnea.  Past Surgical History:  has a past surgical history that includes knee surgery; Upper gastrointestinal endoscopy (N/A, 2019); Sleeve Gastrectomy (N/A, 2019); Cystoscopy (2019); sigmoidoscopy (N/A, 2021); and Colonoscopy (N/A, 2021).    Discharge Recommendations: Juaquin Delaney scored a 20/24 on the AM-PAC ADL Inpatient form. Current research shows that an AM-PAC score of 17 or less is typically not associated with a discharge to the patient's home setting. Based on the patient's AM-PAC score and their current ADL deficits, it is recommended that the patient have  required to implement solutions  Initiation: requires cues for some  Sequencing: requires cues for some - more delayed at beginning of session but did improve as session continued, continued to be slow and deliberate with motor tasks throughout     Orientation:    alert and oriented x 4  Command Following:   WFL     Education  Barriers To Learning: none  Patient Education: patient educated on goals, OT role and benefits, plan of care, ADL adaptive strategies, discharge recommendations  Learning Assessment:  patient verbalizes understanding, would benefit from continued reinforcement    Assessment  Activity Tolerance: tolerated well - no complaint of dizziness or fatigue   Impairments Requiring Therapeutic Intervention: decreased functional mobility, decreased ADL status, decreased endurance, decreased balance, decreased fine motor control, decreased coordination  Prognosis: good  Clinical Assessment: Patient presents to hospital with dysarthria and unsteady gait. Typically independent at baseline. Pt required CGA for ADLs and functional mobility and transfers this date. Continued skilled occupational therapy recommended to facilitate return to OF and increase independence.    Consider 9 hole peg test for further assessment of FM.     Recommend use of RW and gait belt for transfers at this time.     Safety Interventions: patient left in chair, chair alarm in place, call light within reach, and nurse notified    Plan  Frequency: 5-7 x/week  Current Treatment Recommendations: balance training, functional mobility training, transfer training, patient/caregiver education, ADL/self-care training, safety education, and equipment evaluation/education    Goals  Patient Goals: to be able to walk normally    Short Term Goals:  Time Frame: discharge   Patient will complete upper body ADL at modified independent   Patient will complete lower body ADL at supervision   Patient will complete toileting at supervision   Patient

## 2024-05-28 NOTE — PROGRESS NOTES
[facility] - Inpatient Rehabilitation Department   Phone: [phone]    Physical Therapy    [] Initial Evaluation            [x] Daily Treatment Note         [] Discharge Summary      Patient: Juaquin Delaney   : 1965   MRN: [id number]   Date of Service:  2024  Admitting Diagnosis: Acute CVA (cerebrovascular accident) (HCC)  Current Admission Summary: Per IE, \"This is a 58 y.o. male who was brought in by EMS transportation for dysarthria and unsteady gait that started at 2:30 AM this morning, 24. According to EMS, the patient's symptoms were noticed by his wife.  He had a similar stroke 10 years ago with similar symptoms.\"     *CT head: \"1. Age indeterminate occlusion of the right vertebral artery. 2. Moderate stenosis of the left mid V4 segment related to calcified plaque.\"     *MRI brain pending  Past Medical History:  has a past medical history of Cerebral artery occlusion with cerebral infarction (HCC), CKD (chronic kidney disease), Diabetes mellitus (HCC), GERD (gastroesophageal reflux disease), Helicobacter pylori (H. pylori) infection, Hypertension, and Sleep apnea.  Past Surgical History:  has a past surgical history that includes knee surgery; Upper gastrointestinal endoscopy (N/A, 2019); Sleeve Gastrectomy (N/A, 2019); Cystoscopy (2019); sigmoidoscopy (N/A, 2021); and Colonoscopy (N/A, 2021).  Discharge Recommendations:Juaquin Delaney scored a 18/24 on the AM-PAC short mobility form. Current research shows that an AM-PAC score of 18 or greater is typically associated with a discharge to the patient's home setting. Based on the patient's AM-PAC score and their current functional mobility deficits, it is recommended that the patient have 2-3 sessions per week of Physical Therapy at d/c to increase the patient's independence.  At this time, this patient demonstrates the endurance and safety to discharge home with OPPT and a follow up treatment frequency of

## 2024-05-28 NOTE — PROGRESS NOTES
Occupational Therapy  PT off of floor for testing @9:00 this AM. OT will follow up as schedule allows.    Thanks,  JEAN-PIERRE Prado OTR/L [id number]

## 2024-05-28 NOTE — CARE COORDINATION
Case Management Assessment  Initial Evaluation    Date/Time of Evaluation: 5/28/2024 7:45 PM  Assessment Completed by: ANNE So, STACEYW    If patient is discharged prior to next notation, then this note serves as note for discharge by case management.    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 Yes    Chart Reviewed: Yes      History Provided by: Patient  Patient Orientation: Alert and Oriented    Patient Cognition: Alert    Hospitalization in the last 30 days (Readmission):  No    If yes, Readmission Assessment in CM Navigator will be completed.    Advance Directives:      Code Status: Full Code   Patient's Primary Decision Maker is:        Discharge Planning:    Patient lives with: Spouse/Significant Other, Children, Family Members Type of Home: House (1 level - 2 steps)  Primary Care Giver: Self  Patient Support Systems include: Family Members, Spouse/Significant Other   Current Financial resources: None  Current community resources: None  Current services prior to admission: None            Current DME:              Type of Home Care services:  OT, PT, Nursing Services (referral made to [facility])    ADLS  Prior functional level: Independent in ADLs/IADLs  Current functional level: Mobility    PT AM-PAC: 18 /24  OT AM-PAC: 20 /24    Family can provide assistance at DC: Yes  Would you like Case Management to discuss the discharge plan with any other family members/significant others, and if so, who? Yes (w/emergency  contacts)  Plans to Return to Present Housing: Yes  Other Identified Issues/Barriers to RETURNING to current housing: None  Potential Assistance needed at discharge: Home Care (Pt declined ARU and wants HHC)            Potential DME:    Patient expects to discharge to: House  Plan for transportation at discharge: Family    Financial    Payor: R / Plan: R [employer] EM[employer]YEES / Product Type: *No Product type* /     Potential assistance Purchasing Medications: No  Meds-to-Beds request: Yes      [facility] - Home Delivery - [place] - [address], [address] - P [phone] - F [phone]  [address], [address]  [place] [address]  Phone: [phone] Fax: [phone]      Notes:    Factors facilitating achievement of predicted outcomes: Family support and Pleasant    Barriers to discharge: None    Additional Case Management Notes:  Patient declines ARU or SNF and wants to discharge home w/HHC.  Pt did not have a preference in providers.  Referral made to Miranda w/[facility].  Waiting to hear back if can accept.      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 Freedom of choice list with basic dialogue that supports the patient's individualized plan of care/goals and shares the quality data associated with the providers was provided to: Patient   Patient Representative Name:       The Patient and/or Patient Representative Agree with the Discharge Plan? Yes    ANNE So LSW  Case Management Department    Electronically signed by NANE So LSW on 5/28/2024 at 7:46 PM

## 2024-05-28 NOTE — PROGRESS NOTES
Juaquin Delaney  Neurology Follow-up  [facility]    Date of Service: 5/28/2024    Subjective:   CC: Follow up today regarding: Acute confusion    Events noted. Chart and lab reviewed.  Patient seen this morning, he is resting in bed.  He reports improvement in his speech.  He reports he has been ambulating from the bed to the bathroom in the room without any difficulty.  He denies headache, dizziness, dysphagia, other review of systems unremarkable      ROS : A 10-12 system review obtained and updated today and is unremarkable except as mentioned  in my interval history.     Past medical history, social history, medication and family history reviewed.       Objective:  Exam:   Constitutional:   Vitals:    05/28/24 0750 05/28/24 0815 05/28/24 0858 05/28/24 1137   BP:  (!) 148/96 (!) 147/95 (!) 162/105   Pulse:    70   Resp:  18  18   Temp:  98.2 °F (36.8 °C)  98.2 °F (36.8 °C)   TempSrc:  Oral  Oral   SpO2:  94%  95%   Weight: 107.6 kg (237 lb 3.2 oz)  107.5 kg (237 lb)    Height:   1.854 m (6' 1\")      General appearance:  Normal development and appear in no acute distress.   Mental Status:   Oriented to person, place, problem  Memory: Fair immediate recall.  Intact remote memory  Good attention span and concentration.  Language: Mild dysarthria  Good fund of Knowledge.   Cranial Nerves:   II:   Pupils: equal, round, reactive to light  III,IV,VI: Extra Ocular Movements are intact. No nystagmus  V: Facial sensation is intact  VII: Facial strength and movements: intact and symmetric  XII: Tongue movements are normal  Musculoskeletal: Mild weakness right compared to left.  5/5 left side.  Good range of motion.  Tone: Normal tone.   Reflexes: Symmetric 2+ in both arms and legs.  Coordination: no pronator drift, no dysmetria with FNF  Sensation: normal.  Gait/Posture: steady gait    MDM:      A. Problems (any 1)    High:    [x] Acute/Chronic Illness/injury posing threat to life or bodily function: Acute

## 2024-05-28 NOTE — PROGRESS NOTES
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 (HCC) [I63.02]  Late effect of cerebrovascular accident (CVA) [I69.30]  Treatment Diagnosis: Oropharyngeal Dysphagia   Pain: Did not state                                              Diet and Treatment Recommendations 2024:  Diet Solids Recommendation:  Regular texture diet  Liquid Consistency Recommendation:  Thin liquids  Recommended form of Meds: Meds whole with water or Meds in puree           Compensatory strategies:   Alternate solids/liquids , Upright as possible with all PO intake , Small bites/sips , Eat/feed slowly, Remain upright 30-45 min     Assessment of Texture Tolerance:  Diet level prior to treatment: Regular texture diet , Thin liquids   Tolerance of Current Diet Level:Per chart, no noted difficulty with current diet level  RN reported pt appears to be tolerating current diet level     Impressions: Pt was positioned Upright in bed , awake and alert with his wife present. Following pt's wife's request, SLP completed education to the purpose/benefits of ongoing ST services to continue assessing his swallow response and tolerance of his current diet. Pt currently on room air. Trials of thin liquids and regular solids  were provided to assess swallow function. Pt was able to feed himself and he took large bites of the provided trials. Pt presented with prolonged mastication/manipulation of the bolus and took consecutive sips of thin liquids via cup rim but he was able to achieve full oral clearance with no overt signs/symptoms of penetration/aspiration. Pt demonstrates increased risk for aspiration due to co morbidities , prior hx of

## 2024-05-28 NOTE — PROGRESS NOTES
Admit Date: 5/25/2024  Diet: ADULT DIET; Regular; 5 carb choices (75 gm/meal)    CC: Acute CVA    Interval history:   Overnight, there were no acute events. Patient's vitals remained stable    Patient was seen this morning in bed, eating lunch.  He endorses that he is awaiting his MRI.  He does not have any new complaints.  Patient denies fevers, chills, nausea, vomiting, chest pain, shortness of breath, diarrhea, constipation, dysuria, urinary frequency or urgency.     Plan:     -MRI brain  -Echo  -PT/OT  -Will await any further recs from neurology after MRI has resulted    Assessment:   Juaquin Delaney is a 58 y.o. male with PMH of hypertension, type 2 diabetes, stage III CKD, GERD, CVA, hyperlipidemia and obesity who was admitted with Acute CVA    Acute CVA   Patient was admitted with acute slurred speech and confusion.  CT head showed findings suggestive of moderate to severe chronic microvascular ischemia.   - Neurology consulted     Type 2 diabetes mellitus-uncontrolled  Patient's last HgbA1c on 5/2024 = 7.9%.  At home, patient is on semaglutide.    Hyperlipidemia:   At home, patient is on atorvastatin     Essential hypertension  At home, patient is on metoprolol, amlodipine and valsartan    Code Status: Full Code   FEN: ADULT DIET; Regular; 5 carb choices (75 gm/meal)   DVT PPX: [x]Lovenox, []Heparin, []Coumadin, []Eliquis, []Xarelto, []SCD  DISPO: Continue management of acute issues     Roger Santos MD  5/28/2024,  1:39 PM    This note was likely completed using voice recognition technology and may contain unintended errors.     Objective:   Vitals:   T-max:  Patient Vitals for the past 8 hrs:   BP Temp Temp src Pulse Resp SpO2 Height Weight   05/28/24 1137 (!) 162/105 98.2 °F (36.8 °C) Oral 70 18 95 % -- --   05/28/24 0858 (!) 147/95 -- -- -- -- -- 1.854 m (6' 1\") 107.5 kg (237 lb)   05/28/24 0815 (!) 148/96 98.2 °F (36.8 °C) Oral -- 18 94 % -- --   05/28/24 0750 -- -- -- -- -- -- -- 107.6 kg (237 lb

## 2024-05-29 VITALS
OXYGEN SATURATION: 96 % | HEIGHT: 73 IN | HEART RATE: 71 BPM | TEMPERATURE: 98.3 F | RESPIRATION RATE: 18 BRPM | BODY MASS INDEX: 31.9 KG/M2 | DIASTOLIC BLOOD PRESSURE: 89 MMHG | WEIGHT: 240.7 LBS | SYSTOLIC BLOOD PRESSURE: 149 MMHG

## 2024-05-29 LAB
ALBUMIN SERPL-MCNC: 3.8 G/DL (ref 3.4–5)
ALBUMIN/GLOB SERPL: 1.3 {RATIO} (ref 1.1–2.2)
ALP SERPL-CCNC: 70 U/L (ref 40–129)
ALT SERPL-CCNC: 21 U/L (ref 10–40)
ANION GAP SERPL CALCULATED.3IONS-SCNC: 9 MMOL/L (ref 3–16)
AST SERPL-CCNC: 20 U/L (ref 15–37)
BASOPHILS # BLD: 0 K/UL (ref 0–0.2)
BASOPHILS NFR BLD: 0.9 %
BILIRUB SERPL-MCNC: 0.7 MG/DL (ref 0–1)
BUN SERPL-MCNC: 20 MG/DL (ref 7–20)
CALCIUM SERPL-MCNC: 9.3 MG/DL (ref 8.3–10.6)
CHLORIDE SERPL-SCNC: 108 MMOL/L (ref 99–110)
CO2 SERPL-SCNC: 25 MMOL/L (ref 21–32)
CREAT SERPL-MCNC: 1.3 MG/DL (ref 0.9–1.3)
DEPRECATED RDW RBC AUTO: 15.8 % (ref 12.4–15.4)
EOSINOPHIL # BLD: 0.2 K/UL (ref 0–0.6)
EOSINOPHIL NFR BLD: 2.9 %
GFR SERPLBLD CREATININE-BSD FMLA CKD-EPI: 64 ML/MIN/{1.73_M2}
GLUCOSE BLD-MCNC: 106 MG/DL (ref 70–99)
GLUCOSE BLD-MCNC: 209 MG/DL (ref 70–99)
GLUCOSE SERPL-MCNC: 95 MG/DL (ref 70–99)
HCT VFR BLD AUTO: 36.3 % (ref 40.5–52.5)
HGB BLD-MCNC: 11.6 G/DL (ref 13.5–17.5)
LYMPHOCYTES # BLD: 2.5 K/UL (ref 1–5.1)
LYMPHOCYTES NFR BLD: 45.9 %
MAGNESIUM SERPL-MCNC: 1.5 MG/DL (ref 1.8–2.4)
MCH RBC QN AUTO: 26.1 PG (ref 26–34)
MCHC RBC AUTO-ENTMCNC: 32.1 G/DL (ref 31–36)
MCV RBC AUTO: 81.2 FL (ref 80–100)
MONOCYTES # BLD: 0.4 K/UL (ref 0–1.3)
MONOCYTES NFR BLD: 6.8 %
NEUTROPHILS # BLD: 2.4 K/UL (ref 1.7–7.7)
NEUTROPHILS NFR BLD: 43.5 %
PERFORMED ON: ABNORMAL
PERFORMED ON: ABNORMAL
PLATELET # BLD AUTO: 199 K/UL (ref 135–450)
PMV BLD AUTO: 8.6 FL (ref 5–10.5)
POTASSIUM SERPL-SCNC: 4 MMOL/L (ref 3.5–5.1)
PROT SERPL-MCNC: 6.8 G/DL (ref 6.4–8.2)
RBC # BLD AUTO: 4.47 M/UL (ref 4.2–5.9)
SODIUM SERPL-SCNC: 142 MMOL/L (ref 136–145)
WBC # BLD AUTO: 5.4 K/UL (ref 4–11)

## 2024-05-29 PROCEDURE — 97530 THERAPEUTIC ACTIVITIES: CPT

## 2024-05-29 PROCEDURE — APPNB30 APP NON BILLABLE TIME 0-30 MINS

## 2024-05-29 PROCEDURE — 6370000000 HC RX 637 (ALT 250 FOR IP): Performed by: STUDENT IN AN ORGANIZED HEALTH CARE EDUCATION/TRAINING PROGRAM

## 2024-05-29 PROCEDURE — 80053 COMPREHEN METABOLIC PANEL: CPT

## 2024-05-29 PROCEDURE — 2580000003 HC RX 258: Performed by: INTERNAL MEDICINE

## 2024-05-29 PROCEDURE — 6370000000 HC RX 637 (ALT 250 FOR IP): Performed by: INTERNAL MEDICINE

## 2024-05-29 PROCEDURE — 6360000002 HC RX W HCPCS: Performed by: INTERNAL MEDICINE

## 2024-05-29 PROCEDURE — 83735 ASSAY OF MAGNESIUM: CPT

## 2024-05-29 PROCEDURE — 97112 NEUROMUSCULAR REEDUCATION: CPT

## 2024-05-29 PROCEDURE — 85025 COMPLETE CBC W/AUTO DIFF WBC: CPT

## 2024-05-29 PROCEDURE — 36415 COLL VENOUS BLD VENIPUNCTURE: CPT

## 2024-05-29 PROCEDURE — APPSS30 APP SPLIT SHARED TIME 16-30 MINUTES

## 2024-05-29 PROCEDURE — 97116 GAIT TRAINING THERAPY: CPT

## 2024-05-29 RX ORDER — LANOLIN ALCOHOL/MO/W.PET/CERES
800 CREAM (GRAM) TOPICAL ONCE
Status: COMPLETED | OUTPATIENT
Start: 2024-05-29 | End: 2024-05-29

## 2024-05-29 RX ORDER — LANOLIN ALCOHOL/MO/W.PET/CERES
6 CREAM (GRAM) TOPICAL NIGHTLY PRN
Status: DISCONTINUED | OUTPATIENT
Start: 2024-05-29 | End: 2024-05-29 | Stop reason: HOSPADM

## 2024-05-29 RX ORDER — MAGNESIUM SULFATE IN WATER 40 MG/ML
4000 INJECTION, SOLUTION INTRAVENOUS ONCE
Status: DISCONTINUED | OUTPATIENT
Start: 2024-05-29 | End: 2024-05-29 | Stop reason: HOSPADM

## 2024-05-29 RX ADMIN — SODIUM CHLORIDE, PRESERVATIVE FREE 10 ML: 5 INJECTION INTRAVENOUS at 08:06

## 2024-05-29 RX ADMIN — Medication 800 MG: at 12:06

## 2024-05-29 RX ADMIN — AMLODIPINE BESYLATE 5 MG: 5 TABLET ORAL at 08:06

## 2024-05-29 RX ADMIN — ASPIRIN 81 MG: 81 TABLET, COATED ORAL at 08:06

## 2024-05-29 RX ADMIN — METOPROLOL TARTRATE 50 MG: 50 TABLET, FILM COATED ORAL at 08:06

## 2024-05-29 RX ADMIN — VALSARTAN 160 MG: 40 TABLET, FILM COATED ORAL at 08:10

## 2024-05-29 RX ADMIN — ENOXAPARIN SODIUM 30 MG: 100 INJECTION SUBCUTANEOUS at 08:07

## 2024-05-29 RX ADMIN — Medication 1 TABLET: at 08:06

## 2024-05-29 RX ADMIN — PANTOPRAZOLE SODIUM 40 MG: 40 TABLET, DELAYED RELEASE ORAL at 08:06

## 2024-05-29 ASSESSMENT — PAIN SCALES - GENERAL: PAINLEVEL_OUTOF10: 0

## 2024-05-29 NOTE — PROGRESS NOTES
Juaquin Delaney  Neurology Follow-up  [facility]    Date of Service: 5/29/2024    Subjective:   CC: Follow up today regarding: Acute confusion    Events noted. Chart and lab reviewed.  Patient seen this morning.  He reports he is back to his baseline.  We discussed MRI results which showed left hemispheric ischemic stroke.  Pressure has remained elevated systolic 160-170s.  He denies headache, dizziness, dysarthria dysphagia.  Other review of systems unremarkable      ROS : A 10-12 system review obtained and updated today and is unremarkable except as mentioned  in my interval history.     Past medical history, social history, medication and family history reviewed.       Objective:  Exam:   Constitutional:   Vitals:    05/29/24 0205 05/29/24 0527 05/29/24 0749 05/29/24 0800   BP: (!) 172/100 (!) 161/103  (!) 162/108   Pulse: 76 68  (!) 7   Resp:  16  18   Temp:  97.5 °F (36.4 °C)  98.2 °F (36.8 °C)   TempSrc:  Oral  Oral   SpO2:  97%  95%   Weight:   109.2 kg (240 lb 11.2 oz)    Height:         General appearance:  Normal development and appear in no acute distress.   Mental Status:   Oriented to person, place, problem  Memory: Good immediate recall.  Intact remote memory  Good attention span and concentration.  Language: intact naming, repeating and fluency   Good fund of Knowledge.   Cranial Nerves:   II:   Pupils: equal, round, reactive to light  III,IV,VI: Extra Ocular Movements are intact. No nystagmus  V: Facial sensation is intact  VII: Facial strength and movements: intact and symmetric  XII: Tongue movements are normal  Musculoskeletal:  5/5 all 4 extremities.  Good range of motion.  Tone: Normal tone.   Reflexes: Symmetric 2+ in both arms and legs.  Coordination: no pronator drift, no dysmetria with FNF  Sensation: normal.  Gait/Posture: steady gait    MDM:      A. Problems (any 1)    High:    [x] Acute/Chronic Illness/injury posing threat to life or bodily function: Acute CVA  [] Severe

## 2024-05-29 NOTE — PROGRESS NOTES
5-7 sessions per week of Occupational Therapy at d/c to increase the patient's independence.  At this time, this patient demonstrates complex nursing, medical, and rehabilitative needs, and would benefit from intensive rehabilitation services upon discharge from the Inpatient setting.  This patient demonstrates the ability to participate in and benefit from an intensive therapy program with a coordinated interdisciplinary team approach to foster frequent, structured, and documented communication among disciplines, who will work together to establish, prioritize, and achieve treatment goals. Please see assessment section for further patient specific details.    If patient discharges prior to next session this note will serve as a discharge summary.  Please see below for the latest assessment towards goals.      Juaquin Delaney scored a 20/24 on the AM-[facility] ADL Inpatient form.  At this time, no further OT is recommended upon discharge due to pt is at his baseline level of occupational function. Pt may benefit from further PT.  Recommend patient returns to prior setting with prior services.     DME Required For Discharge: rolling walker, tub transfer bench v. Shower chair, grab bars in shower and by toilet     Precautions/Restrictions: medium fall risk  Weight Bearing Restrictions: no restrictions  [] Right Upper Extremity  [] Left Upper Extremity [] Right Lower Extremity  [] Left Lower Extremity     Required Braces/Orthotics: no braces required   [] Right  [] Left  Positional Restrictions:no positional restrictions    Pre-Admission Information   Lives With: spouse, son, daughter, 4 grandchildren      Type of Home: house  Home Layout: one level, laundry in basement - railing on L side descending to basement   Home Access:  1-2  step to enter without rails   Bathroom Layout: tub/shower unit  Bathroom Equipment:  none  Toilet Height: standard height  Home Equipment: no prior equipment  Transfer Assistance: Independent  without use of device  Ambulation Assistance:Independent without use of device  ADL Assistance: independent with all ADL's  IADL Assistance: independent with homemaking tasks  Active :        [x] Yes  [] No  Hand Dominance: [] Left  [x] Right  Current Employment: full time employment.  Occupation:    Hobbies: \"nothing\"/likes to relax   Recent Falls: no falls in the last 6 months     Examination   Observation:   General Observation:  initially on RA   Coordination Testing:   WFL  Finger to Nose: WFL  Alternating Pronation/Supination: WFL  Finger/Thumb Opposition: WFL    Pt reported no difficulty handling utensils while eating his food, able to brush teeth with no issues. Pt demonstrated tying his shoes.  ROM:   (B) UE AROM WFL - chronic mild shoulder ROM deficits bilaterally.   Strength:   (B) UE strength grossly WFL    Therapist Clinical Decision Making (Complexity): low complexity  Clinical Presentation: stable      Subjective  General: Patient reclined in bed upon arrival; Pt reported that he is having no deficits and feels that he is back to normal. He saw no need for showering/cleaning up today, as he did so yesterday. Son present in room with pt.  - requesting to shower - cleared with RN - wife in room at start of session.  Pain: 0/10  Pain Interventions: not applicable        Activities of Daily Living  Basic Activities of Daily Living  Dressing Comments: Pt tied gym shoes without any issues.  General Comments: Pt declined further ADLs, stating that he got dressed, groomed, and used the toilet independently. Educated regarding sitting to don pants, donning t-shirt over L UE first due to rotator cuff injury, and possible shower chair for shower. Pt reports that he is thinking of turning his tub into a walk-in shower.  Instrumental Activities of Daily Living  No IADL completed on this date.    Functional Mobility  Bed Mobility:  Supine to Sit: modified independent, HOB

## 2024-05-29 NOTE — PLAN OF CARE
Problem: Safety - Adult  Goal: Free from fall injury  Note: No attempts to get out of bed without assistance. Pt reminded to call for assist before ambulating. Nonskid socks on. Bed locked and in lowest position. Side rails up x3. Exit alarm in use. Call light in reach. Remains free from injury. Will cont to monitor safety.

## 2024-05-29 NOTE — PROGRESS NOTES
Data- discharge order received, pt verbalized agreement to discharge, disposition to previous residence, no needs for HHC/DME.     Action- discharge instructions prepared and given to patient, pt verbalized understanding. Medication information packet given r/t NEW and/or CHANGED prescriptions emphasizing name/purpose/side effects, pt verbalized understanding. Discharge instruction summary: Diet- carb control, Activity- as tolerated, Primary Care Physician as follows: Tank Sevilla -[phone] f/u appointment 1 week, prescription medications filled personal pharmacy.       1. WEIGHT: Admit Weight - Scale: 115.4 kg (254 lb 6.4 oz) (05/25/24 0809)        Today  Weight - Scale: 109.2 kg (240 lb 11.2 oz) (05/29/24 0749)       2. O2 SAT.: SpO2: 96 % (05/29/24 1130)    Response- Pt belongings gathered, IV removed. Disposition is home (no HHC/DME needs), transported with personal belongings, taken to lobby via w/c, no complications.

## 2024-05-29 NOTE — CONSULTS
Juaquin Delaney  5/28/2024  [id number]    Chief Complaint: Acute CVA (cerebrovascular accident) (HCC)    Subjective   HPI: Juaquin Delaney is a 58 y.o. male with PMHx notable for HTN, DM2, CKD, prior CVA who presented on 5/25 with confusion and slurred speech. CT Head without acute intracranial findings. CTA Head/Neck with right vertebral artery occlusion, moderate left V4 segment vertebral artery stenosis. MRI Brain with left frontal infarct adjacent to left ventricular atrium, as well as significant burden of chronic microvascular disease. Neurology consulted, recommending ASA + statin, BP and glucose control. Hospital course complicated by: HTN, HLD.     Currently, patient reports that he is doing well. He feels he is back at his baseline. Denies headache, vision changes, cognitive changes, dysphagia, focal weakness/numbness/paresthesia, impaired balance or coordination.     Past Medical History:   Diagnosis Date    Cerebral artery occlusion with cerebral infarction (HCC)     CKD (chronic kidney disease)     Diabetes mellitus (HCC)     GERD (gastroesophageal reflux disease)     Helicobacter pylori (H. pylori) infection 4/25/2019    Hypertension     Sleep apnea     no cpap       Past Surgical History:   Procedure Laterality Date    COLONOSCOPY N/A 6/24/2021    COLONOSCOPY POLYPECTOMY SNARE/COLD OF 1 DESCENDING COLON POLYP AND 2 RECTAL  POLYPS performed by Camron Garcia MD at [facility] ENDOSCOPY    CYSTOSCOPY  9/25/2019    FLEXIBLE CYSTOSCOPY, DILATION, POTTS CATHETER INSERTION performed by Tank Ortiz MD at [facility] OR    KNEE SURGERY      right knee     SIGMOIDOSCOPY N/A 6/23/2021    SIGMOIDOSCOPY DIAGNOSTIC FLEXIBLE performed by Babak Fontana MD at [facility] ENDOSCOPY    SLEEVE GASTRECTOMY N/A 9/25/2019    LAPAROSCOPIC SLEEVE GASTRECTOMY-ETHICON performed by Misael Ching MD at [facility] OR    UPPER GASTROINTESTINAL ENDOSCOPY N/A 4/12/2019    EGD BIOPSY performed by Misael Ching MD at [facility] ENDOSCOPY  of therapy per day or 900 minutes per week in rehabilitation. The patient requires multidisciplinary rehabilitation treatment including medical management by a PM&R physician, 24 hour rehabilitation nursing, PT/OT/SLP, rehabilitation social work, and nutrition services. Patient and family also require education in post-hospital precautions and home exercise routine, adaptive techniques and deficit compensation strategies, strengthening and conditioning, equipment prescription and instructions in use. Provision of services in a less intensive environment risks significant complications and more limited functional outcomes.    Trino Burns MD 5/28/2024, 8:07 PM    * This document was created using dictation software.  While all precautions were taken to ensure accuracy, errors may have occurred.  Please disregard any typographical errors.

## 2024-05-29 NOTE — PLAN OF CARE
Problem: Discharge Planning  Goal: Discharge to home or other facility with appropriate resources  Outcome: Progressing     Problem: Skin/Tissue Integrity  Goal: Absence of new skin breakdown  Description: 1.  Monitor for areas of redness and/or skin breakdown  2.  Assess vascular access sites hourly  3.  Every 4-6 hours minimum:  Change oxygen saturation probe site  4.  Every 4-6 hours:  If on nasal continuous positive airway pressure, respiratory therapy assess nares and determine need for appliance change or resting period.  Outcome: Progressing     Problem: Pain  Goal: Verbalizes/displays adequate comfort level or baseline comfort level  Outcome: Progressing     Problem: Safety - Adult  Goal: Free from fall injury  5/29/2024 1106 by Elise Schwartz, RN  Outcome: Progressing     Problem: Hematologic - Adult  Goal: Maintains hematologic stability  Outcome: Progressing     Problem: Metabolic/Fluid and Electrolytes - Adult  Goal: Electrolytes maintained within normal limits  Outcome: Progressing     Problem: Neurosensory - Adult  Goal: Achieves stable or improved neurological status  Outcome: Progressing     Problem: Cardiovascular - Adult  Goal: Maintains optimal cardiac output and hemodynamic stability  Outcome: Progressing     Problem: Musculoskeletal - Adult  Goal: Return mobility to safest level of function  Outcome: Progressing

## 2024-05-29 NOTE — DISCHARGE SUMMARY
V2.0  Discharge Summary    Name:  Juaquin Delaney /Age/Sex: 1965 (58 y.o. male)   Admit Date: 2024  Discharging Provider: Roger Santos MD   MRN & CSN:  [id number] & [id number] Attending:  Roger Santos MD       Brief HPI: Juaquin Delaney is a 58 y.o. male with PMH of hypertension, type 2 diabetes, stage III CKD, GERD, CVA, hyperlipidemia and obesity who was admitted with Acute CVA.  Patient on admission CT head showed findings suggestive of CVA.  MRI done showed a 1 cm area of acute infarct adjacent to the atrium of the left lateral ventricle.  He was seen by neurology.  Patient is to continue BP control with goal of 140/90.  He was found fit for discharge.    Brief Problem Focused Hospital Course:     Acute CVA   Patient was admitted with acute slurred speech and confusion.  CT head showed findings suggestive of moderate to severe chronic microvascular ischemia.   - Neurology consulted     Type 2 diabetes mellitus-uncontrolled  Patient's last HgbA1c on 2024 = 7.9%.  At home, patient is on semaglutide.     Hyperlipidemia:   At home, patient is on atorvastatin      Essential hypertension  At home, patient is on metoprolol, amlodipine and valsartan    The patient expressed appropriate understanding of, and agreement with the discharge recommendations, medications, and plan.     Consults this admission:  IP CONSULT TO NEUROLOGY  IP CONSULT TO PHYSICAL MEDICINE REHAB    Discharge Instruction:   Primary care physician: Tank Sevilla MD within 2 weeks  Disposition: Discharged to: [x]Home, []C, []SNF, []Acute Rehab, []Hospice []AMA  Condition on discharge: Stable    Physical Exam:   General appearance: No apparent distress, appears stated age and cooperative.  HEENT: Normocephalic, atraumatic, MMM, No sclera icterus/conjuctival palor  Neck: Supple, no thyromegally. No jugular venous distention.   Respiratory:  Normal respiratory effort. Clear to auscultation, no  neck was performed with the administration of intravenous contrast. Multiplanar reformatted images are provided for review.  MIP images are provided for review. Stenosis of the internal carotid arteries measured using NASCET criteria. Automated exposure control, iterative reconstruction, and/or weight based adjustment of the mA/kV was utilized to reduce the radiation dose to as low as reasonably achievable. This scan was analyzed using Viz.ai contact LVO. Identification of suspected findings is not for diagnostic use beyond notification. Viz LVO is limited to analysis of imaging data and should not be used in-lieu of full patient evaluation or relied upon to make or confirm diagnosis. COMPARISON: None. HISTORY: ORDERING SYSTEM PROVIDED HISTORY: dysarthria, unsteady gait FINDINGS: CTA NECK: AORTIC ARCH/ARCH VESSELS: No dissection or arterial injury.  No significant stenosis of the brachiocephalic or subclavian arteries. CAROTID ARTERIES: No dissection, arterial injury, or hemodynamically significant stenosis by NASCET criteria. VERTEBRAL ARTERIES: Age indeterminate occlusion of the right vertebral artery.  The left cervical vertebral artery is patent without significant stenosis. SOFT TISSUES: The lung apices are clear.  No cervical or superior mediastinal lymphadenopathy.  The larynx and pharynx are unremarkable.  No acute abnormality of the salivary and thyroid glands. BONES: No acute osseous abnormality. CTA HEAD: ANTERIOR CIRCULATION: No significant stenosis of the intracranial internal carotid, anterior cerebral, or middle cerebral arteries. No aneurysm. POSTERIOR CIRCULATION: Age indeterminate occlusion of the right vertebral artery.  Moderate stenosis of the left mid V4 segment related to calcified plaque.  No significant stenosis of the basilar, or posterior cerebral arteries. No aneurysm. OTHER: No dural venous sinus thrombosis on this non-dedicated study. BRAIN: No mass effect or midline shift. No

## 2024-05-29 NOTE — DISCHARGE INSTR - COC
PPSV23, PNEUMOVAX 23, (age 2y+), SC/IM, 0.5mL 10/17/2018, 02/28/2022    TDaP, ADACEL (age 10y-64y), BOOSTRIX (age 10y+), IM, 0.5mL 05/08/2015    Zoster Recombinant (Shingrix) 02/28/2022, 09/29/2022       Active Problems:  Patient Active Problem List   Diagnosis Code    Essential hypertension I10    Late effect of cerebrovascular accident (CVA) I69.30    Chronic left shoulder pain M25.512, G89.29    Central sleep apnea due to medical condition G47.37    Abnormal EKG R94.31    Cerebrovascular accident (CVA) due to thrombosis of basilar artery ([facility]) I63.02    Contact dermatitis and other eczema, due to unspecified cause L25.9    Family history of malignant neoplasm of gastrointestinal tract Z80.0    Obesity due to excess calories E66.09    Sciatica M54.30    Type 2 diabetes mellitus with vascular disease (HCC) E11.59    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 ([facility]) E11.3299    Severe obesity (BMI 35.0-39.9) with comorbidity ([facility]) E66.01    Chronic GERD K21.9    Obstructive sleep apnea G47.33    CKD (chronic kidney disease) stage 3, GFR 30-59 ml/min ([facility]) N18.30    Cortical senile cataract, bilateral H25.013    Hypertensive retinopathy, bilateral H35.033    Lattice degeneration, right H35.411    Nuclear sclerotic cataract, bilateral H25.13    Round hole, right H33.321    Serous retinal detachment, right eye H33.21    Fatty liver K76.0    Dyslipidemia associated with type 2 diabetes mellitus (HCC) E11.69, E78.5    EKG abnormalities R94.31    CKD (chronic kidney disease) stage 4, GFR 15-29 ml/min ([facility]) N18.4    S/P laparoscopic sleeve gastrectomy Z98.84    Adult BMI 30.0-30.9 kg/sq m Z68.30    Hyperlipidemia E78.5    Morbid obesity due to excess calories ([facility]) E66.01    Diabetes mellitus type 2 in obese E11.69, E66.9    Anemia D64.9    Renal osteodystrophy N25.0    Complete rotator cuff tear of left shoulder M75.122    Acute CVA (cerebrovascular accident) ([facility]) I63.9       Isolation/Infection:   Isolation   INTERVENTION:[id number]}  Weight Bearing Status/Restrictions: { CC Weight Bearin}  Other Medical Equipment (for information only, NOT a DME order):  {EQUIPMENT:[id number]}  Other Treatments: ***    Patient's personal belongings (please select all that are sent with patient):  {CHP DME Belongings:[id number]}    RN SIGNATURE:  {Esignature:[id number]}    CASE MANAGEMENT/SOCIAL WORK SECTION    Inpatient Status Date: ***    Readmission Risk Assessment Score:  Readmission Risk              Risk of Unplanned Readmission:  15           Discharging to Facility/ Agency   Name:   Address:  Phone:  Fax:    Dialysis Facility (if applicable)   Name:  Address:  Dialysis Schedule:  Phone:  Fax:    / signature: {Esignature:[id number]}    PHYSICIAN SECTION    Prognosis: Good    Condition at Discharge: Stable    Rehab Potential (if transferring to Rehab): Good    Recommended Labs or Other Treatments After Discharge: PT/OT    Physician Certification: I certify the above information and transfer of Juaquin Delaney  is necessary for the continuing treatment of the diagnosis listed and that he requires Home Care for greater 30 days.     Update Admission H&P: No change in H&P    PHYSICIAN SIGNATURE:  Electronically signed by Roger Santos MD on 24 at 2:57 PM EDT

## 2024-05-29 NOTE — CARE COORDINATION
NADINE spoke with ARU admissions at [facility] who stated they saw patient but that patient declined and wants to discharge home at this time.  NADINE informed that [facility] does not accept pt's insurance.  Called [facility] who accepted the referral.  Informed patient of this agency and provided their phone number.  Informed they would be calling him shortly to set up their first visit.  No other needs identified at this time.    Discharge Plan:  Home w/formerly [facility]    Electronically signed by ANNE So, JEFF on 5/29/2024 at 2:37 PM

## 2024-05-29 NOTE — PROGRESS NOTES
[facility] - Inpatient Rehabilitation Department   Phone: [phone]    Physical Therapy    [] Initial Evaluation            [x] Daily Treatment Note         [] Discharge Summary      Patient: Juaquin Delaney   : 1965   MRN: [id number]   Date of Service:  2024  Admitting Diagnosis: Acute CVA (cerebrovascular accident) (HCC)  Current Admission Summary: Per IE, \"This is a 58 y.o. male who was brought in by EMS transportation for dysarthria and unsteady gait that started at 2:30 AM this morning, 24. According to EMS, the patient's symptoms were noticed by his wife.  He had a similar stroke 10 years ago with similar symptoms.\"     *CT head: \"1. Age indeterminate occlusion of the right vertebral artery. 2. Moderate stenosis of the left mid V4 segment related to calcified plaque.\"     *MRI brain pending  Past Medical History:  has a past medical history of Cerebral artery occlusion with cerebral infarction (HCC), CKD (chronic kidney disease), Diabetes mellitus (HCC), GERD (gastroesophageal reflux disease), Helicobacter pylori (H. pylori) infection, Hypertension, and Sleep apnea.  Past Surgical History:  has a past surgical history that includes knee surgery; Upper gastrointestinal endoscopy (N/A, 2019); Sleeve Gastrectomy (N/A, 2019); Cystoscopy (2019); sigmoidoscopy (N/A, 2021); and Colonoscopy (N/A, 2021).    Discharge Recommendations:Juaquin Delaney scored a 20/24 on the AM-PAC short mobility form. Current research shows that an AM-PAC score of 18 or greater is typically associated with a discharge to the patient's home setting. Based on the patient's AM-PAC score and their current functional mobility deficits, it is recommended that the patient have 2-3 sessions per week of Physical Therapy at d/c to increase the patient's independence.  At this time, this patient demonstrates the endurance and safety to discharge home with OPPT and a follow up treatment frequency of  seated EOB with son present in room.    Plan  Frequency: 5-7 x/week  Current Treatment Recommendations: strengthening, balance training, functional mobility training, gait training, stair training, endurance training, neuromuscular re-education, patient/caregiver education, home exercise program, safety education, and equipment evaluation/education    Goals  Patient Goals: To improve walking   Short Term Goals:  Time Frame: By discharge  Patient will complete bed mobility at Independent   Patient will complete transfers at stand by assistance, MET 5/28/24   Patient will ambulate 75 ft with use of no device at contact guard assistance, MET 5/28/24  Patient will ascend/descend 2 stairs without use of HR at minimal assistance    Pt to perform transfers with independent.   Pt to ambulate with no AD and supervision for 300 ft. MET 5/29/24, SBA     Above goals reviewed on 5/29/2024.  All goals are ongoing at this time unless indicated above.    Therapy Session Time      Individual Group Co-treatment   Time In 1210       Time Out 1235       Minutes 25         Timed Code Treatment Minutes: 25 Minutes  Total Treatment Minutes: 25 minutes      Electronically Signed By: MALINDA Menezes  This evaluation/treatment was observed and supervised by Alina Hernandez, PT [id number]

## 2024-05-30 ENCOUNTER — CARE COORDINATION (OUTPATIENT)
Dept: OTHER | Facility: CLINIC | Age: 59
End: 2024-05-30

## 2024-05-30 ENCOUNTER — TELEPHONE (OUTPATIENT)
Dept: INTERNAL MEDICINE CLINIC | Age: 59
End: 2024-05-30

## 2024-05-30 NOTE — TELEPHONE ENCOUNTER
Care Transitions Initial Follow Up Call    Outreach made within 2 business days of discharge: Yes    Patient: Juaquin Delaney Patient : 1965   MRN: [id number]  Reason for Admission: There are no discharge diagnoses documented for the most recent discharge.  Discharge Date: 24       Spoke with: patient    Discharge department/facility: [facility]    TCM Interactive Patient Contact:  Was patient able to fill all prescriptions: Yes  Was patient instructed to bring all medications to the follow-up visit: Yes  Is patient taking all medications as directed in the discharge summary? Yes  Does patient understand their discharge instructions: Yes  Does patient have questions or concerns that need addressed prior to 7-14 day follow up office visit: no    Scheduled appointment with PCP within 7 days    Follow Up  Future Appointments   Date Time Provider Department Center   2024  1:00 PM Kami Parsons DO [facility] [place] [facility]   2024  2:00 PM Kami Parsons DO [facility] [place] [facility]   2024 10:10 AM Mirza Joseph MD  [facility]   2024  9:20 AM Imani Sofia MD [facility]   2024 10:45 AM Mirza Joseph MD  [facility]   7/3/2024  4:00 PM Tank Sevilla MD [facility] [place] [facility]   2025 12:30 PM Misael Ching MD [facility]       Kim Hale RN

## 2024-05-30 NOTE — CARE COORDINATION
Care Transitions Note    Initial Call - Call within 2 business days of discharge: Yes     Attempted to reach patient for transitions of care follow up. Unable to reach patient.    Outreach Attempts:   HIPAA compliant voicemail left for patient.     Patient: Juaquin Delaney    Patient : 1965   MRN: [id number]    Reason for Admission: Acute CVA  Discharge Date: 24  RURS: Readmission Risk Score: 14.2    Last Discharge Facility       Date Complaint Diagnosis Description Type Department Provider    24 Altered Mental Status Dysarthria ... ED to Hosp-Admission (Discharged) (ADMITTED) [facility] Roger Santos MD; [facility]...            Was this an external facility discharge? No    Follow Up Appointment:     Future Appointments         Provider Specialty Dept Phone    2024 1:00 PM Kami Parsons DO Internal Medicine [phone]    2024 2:00 PM Kami Parsons DO Internal Medicine [phone]    2024 10:10 AM Mirza Joseph MD Orthopedic Surgery [phone]    2024 9:20 AM Imani Sofia MD Endocrinology [phone]    2024 10:45 AM Mirza Joseph MD Orthopedic Surgery [phone]    7/3/2024 4:00 PM Tank Sevilla MD Internal Medicine [phone]    2025 12:30 PM Misael Ching MD Bariatrics [phone]            Plan for follow-up on next business day.      Neelima Horn RN

## 2024-05-30 NOTE — PROGRESS NOTES
Juaquin Delaney  5/29/2024  [id number]    Chief Complaint: Acute CVA (cerebrovascular accident) (HCC)    Subjective   No acute events overnight.     Patient feeling well today. Denies any headaches, vision changes, focal weakness/numbness/paresthesia, balance or coordination problems. Feels like he is back to baseline. He would like to return home, as opposed to pursuing inpatient rehab.            Objective   Objective:  Patient Vitals for the past 24 hrs:   BP Temp Temp src Pulse Resp SpO2 Weight   05/29/24 1130 (!) 149/89 98.3 °F (36.8 °C) Oral 71 18 96 % --   05/29/24 0800 (!) 162/108 98.2 °F (36.8 °C) Oral (!) 7 18 95 % --   05/29/24 0749 -- -- -- -- -- -- 109.2 kg (240 lb 11.2 oz)   05/29/24 0527 (!) 161/103 97.5 °F (36.4 °C) Oral 68 16 97 % --   05/29/24 0205 (!) 172/100 -- -- 76 -- -- --   05/28/24 2350 (!) 188/113 -- -- 73 -- -- --   05/28/24 2325 (!) 180/115 98.1 °F (36.7 °C) Oral 81 16 97 % --     Gen: No distress, pleasant.   HEENT: Normocephalic, atraumatic.   CV: No audible murmurs, well perfused extremities  Resp: No respiratory distress. No increased WOB  Abd: Soft, nontender nondistended  Ext: No edema.   Neuro: Alert, oriented, appropriately interactive.     Laboratory data: Available via EMR.     Therapy progress:    PT    Rolling: Level of difficulty - None   Sit to Stand from a Chair: Level of difficulty - A Little  Supine to Sit: Level of difficulty - None     Bed to Chair: Physical Assistance Required - A Little  Ambulate Across Room: Physical Assistance Required - A Little  Ascend 3-5 Steps With HR: Physical Assistance Required - A Little    OT    Eating: Physical Assistance Required - None  Grooming: Physical Assistance Required - None  LB Dressing: Physical Assistance Required - None  UB Dressing: Physical Assistance Required - None  Bathing: Physical Assistance Required - None  Toileting: Physical Assistance Required - None    SLP         Body mass index is 31.76 kg/m².

## 2024-05-30 NOTE — TELEPHONE ENCOUNTER
[facility] for Home PT and homecare.  Pt is to return to work Monday and doesn't need the services.

## 2024-05-31 ENCOUNTER — TELEMEDICINE (OUTPATIENT)
Dept: INTERNAL MEDICINE CLINIC | Age: 59
End: 2024-05-31

## 2024-05-31 ENCOUNTER — CARE COORDINATION (OUTPATIENT)
Dept: OTHER | Facility: CLINIC | Age: 59
End: 2024-05-31

## 2024-05-31 DIAGNOSIS — Z86.73 HISTORY OF STROKE: ICD-10-CM

## 2024-05-31 DIAGNOSIS — I63.532 ACUTE ISCHEMIC LEFT PCA STROKE (HCC): ICD-10-CM

## 2024-05-31 DIAGNOSIS — Z90.3 STATUS POST SLEEVE GASTRECTOMY: ICD-10-CM

## 2024-05-31 DIAGNOSIS — Z09 HOSPITAL DISCHARGE FOLLOW-UP: Primary | ICD-10-CM

## 2024-05-31 DIAGNOSIS — I10 ESSENTIAL HYPERTENSION: ICD-10-CM

## 2024-05-31 DIAGNOSIS — K21.9 GASTROESOPHAGEAL REFLUX DISEASE WITHOUT ESOPHAGITIS: ICD-10-CM

## 2024-05-31 RX ORDER — CLOPIDOGREL BISULFATE 75 MG/1
75 TABLET ORAL DAILY
Qty: 90 TABLET | OUTPATIENT
Start: 2024-05-31 | End: 2024-06-30

## 2024-05-31 RX ORDER — CLOPIDOGREL BISULFATE 75 MG/1
75 TABLET ORAL DAILY
Qty: 30 TABLET | Refills: 0 | Status: SHIPPED | OUTPATIENT
Start: 2024-05-31 | End: 2024-06-30

## 2024-05-31 RX ORDER — FAMOTIDINE 40 MG/1
40 TABLET, FILM COATED ORAL EVERY EVENING
Qty: 90 TABLET | Refills: 1 | Status: SHIPPED | OUTPATIENT
Start: 2024-06-30 | End: 2024-12-27

## 2024-05-31 RX ORDER — CARVEDILOL 6.25 MG/1
6.25 TABLET ORAL 2 TIMES DAILY
Qty: 180 TABLET | Refills: 1 | Status: SHIPPED | OUTPATIENT
Start: 2024-06-30 | End: 2024-12-27

## 2024-05-31 RX ORDER — FAMOTIDINE 40 MG/1
40 TABLET, FILM COATED ORAL EVERY EVENING
Qty: 30 TABLET | Refills: 0 | Status: SHIPPED | OUTPATIENT
Start: 2024-05-31

## 2024-05-31 RX ORDER — CARVEDILOL 6.25 MG/1
6.25 TABLET ORAL 2 TIMES DAILY
Qty: 60 TABLET | Refills: 0 | Status: SHIPPED | OUTPATIENT
Start: 2024-05-31 | End: 2024-06-30

## 2024-05-31 RX ORDER — CLOPIDOGREL BISULFATE 75 MG/1
75 TABLET ORAL DAILY
Qty: 60 TABLET | Refills: 0 | Status: SHIPPED | OUTPATIENT
Start: 2024-06-30 | End: 2024-08-29

## 2024-05-31 ASSESSMENT — PATIENT HEALTH QUESTIONNAIRE - PHQ9
2. FEELING DOWN, DEPRESSED OR HOPELESS: NOT AT ALL
SUM OF ALL RESPONSES TO PHQ QUESTIONS 1-9: 0
SUM OF ALL RESPONSES TO PHQ9 QUESTIONS 1 & 2: 0
SUM OF ALL RESPONSES TO PHQ QUESTIONS 1-9: 0
SUM OF ALL RESPONSES TO PHQ QUESTIONS 1-9: 0
1. LITTLE INTEREST OR PLEASURE IN DOING THINGS: NOT AT ALL
SUM OF ALL RESPONSES TO PHQ QUESTIONS 1-9: 0

## 2024-05-31 NOTE — PROGRESS NOTES
Post-Discharge Transitional Care  Follow Up      Juaquin Delaney   YOB: 1965    Date of Office Visit:  5/31/2024  Date of Hospital Admission: 5/25/24  Date of Hospital Discharge: 5/29/24  Risk of hospital readmission (high >=14%. Medium >=10%) :Readmission Risk Score: 14.2      Care management risk score Rising risk (score 2-5) and Complex Care (Scores >=6): No Risk Score On File     Non face to face  following discharge, date last encounter closed (first attempt may have been earlier): 05/31/2024    Call initiated 2 business days of discharge: Yes    ASSESSMENT/PLAN:   Hospital discharge follow-up  -     [place] DISCHARGE MEDS RECONCILED W/ CURRENT OUTPATIENT MED LIST  -     [place] DISCHARGE MEDS RECONCILED W/ CURRENT OUTPATIENT MED LIST  Acute ischemic left PCA stroke (HCC)  - 90 day plavix + ASA followed by daily ASA  -     clopidogrel (PLAVIX) 75 MG tablet; Take 1 tablet by mouth daily, Disp-30 tablet, R-0Normal  -     clopidogrel (PLAVIX) 75 MG tablet; Take 1 tablet by mouth daily, Disp-60 tablet, R-0Normal  History of stroke  -     clopidogrel (PLAVIX) 75 MG tablet; Take 1 tablet by mouth daily, Disp-60 tablet, R-0Normal  Essential hypertension  - Uncontrolled   - Discontinue metoprolol, add carvedilol for better BP control  -     carvedilol (COREG) 6.25 MG tablet; Take 1 tablet by mouth 2 times daily, Disp-60 tablet, R-0Normal  -     carvedilol (COREG) 6.25 MG tablet; Take 1 tablet by mouth 2 times daily, Disp-180 tablet, R-1Normal  Gastroesophageal reflux disease without esophagitis  - Discontinue ppi. Start h2 blocker   -     famotidine (PEPCID) 40 MG tablet; Take 1 tablet by mouth every evening, Disp-30 tablet, R-0Normal  -     famotidine (PEPCID) 40 MG tablet; Take 1 tablet by mouth every evening, Disp-90 tablet, R-1Normal  Status post sleeve gastrectomy  -     famotidine (PEPCID) 40 MG tablet; Take 1 tablet by mouth every evening, Disp-30 tablet, R-0Normal      Medical Decision Making: high

## 2024-05-31 NOTE — CARE COORDINATION
Care Transitions Note    Initial Call - Call within 2 business days of discharge: Yes     Patient Current Location:  Home: [address] [place] [address]    Care Transition Nurse contacted the patient by telephone to perform post hospital discharge assessment, verified name and  as identifiers. Provided introduction to self, and explanation of the Care Transition Nurse role.     Patient: Juaquin Delaney    Patient : 1965   MRN: [id number]    Reason for Admission: Acute CVA  Discharge Date: 24  RURS: Readmission Risk Score: 14.2      Last Discharge Facility       Date Complaint Diagnosis Description Type Department Provider    24 Altered Mental Status Dysarthria ... ED to Hosp-Admission (Discharged) (ADMITTED) [facility] Roger Santos MD; [facility]...            Was this an external facility discharge? No    Additional needs identified to be addressed with provider   No needs identified             Method of communication with provider: none.    Patients top risk factors for readmission: medical condition-htn, CVA, obesity, DM, CKD    Care Summary Note: ACM contacted patient for introduction to Associate Care Management related to admission for acute cva. Verified name and  with patient as identifiers. Patient declines care management at this time, stating no needs at this time.    Patient declined med review and nursing needs.  Patient states is not receiving home care.  Patient denies need for diabetic education.  Most recent A1C 7.9% per EMR.    [facility] provided contact information for self referral if patient would need care management in the future. ACM will sign off at this time.      Medication Reconciliation:  Medication reconciliation was not performed during this call, patient declined.     Remote Patient Monitoring:  Offered patient enrollment in the Remote Patient Monitoring (RPM) program for in-home monitoring: Patient is not eligible for RPM program because: insurance

## 2024-06-04 ENCOUNTER — OFFICE VISIT (OUTPATIENT)
Dept: INTERNAL MEDICINE CLINIC | Age: 59
End: 2024-06-04
Payer: COMMERCIAL

## 2024-06-04 VITALS
SYSTOLIC BLOOD PRESSURE: 136 MMHG | WEIGHT: 237 LBS | HEART RATE: 72 BPM | OXYGEN SATURATION: 95 % | DIASTOLIC BLOOD PRESSURE: 88 MMHG | BODY MASS INDEX: 31.27 KG/M2

## 2024-06-04 DIAGNOSIS — E11.59 TYPE 2 DIABETES MELLITUS WITH VASCULAR DISEASE (HCC): ICD-10-CM

## 2024-06-04 DIAGNOSIS — Z86.73 HISTORY OF STROKE: ICD-10-CM

## 2024-06-04 DIAGNOSIS — K21.9 GASTROESOPHAGEAL REFLUX DISEASE WITHOUT ESOPHAGITIS: ICD-10-CM

## 2024-06-04 DIAGNOSIS — E78.2 MIXED HYPERLIPIDEMIA: ICD-10-CM

## 2024-06-04 DIAGNOSIS — Z01.818 PRE-OP EVALUATION: Primary | ICD-10-CM

## 2024-06-04 DIAGNOSIS — I10 ESSENTIAL HYPERTENSION: ICD-10-CM

## 2024-06-04 PROBLEM — N18.4 CKD (CHRONIC KIDNEY DISEASE) STAGE 4, GFR 15-29 ML/MIN (HCC): Status: RESOLVED | Noted: 2019-09-25 | Resolved: 2024-06-04

## 2024-06-04 PROCEDURE — 3051F HG A1C>EQUAL 7.0%<8.0%: CPT | Performed by: STUDENT IN AN ORGANIZED HEALTH CARE EDUCATION/TRAINING PROGRAM

## 2024-06-04 PROCEDURE — 3079F DIAST BP 80-89 MM HG: CPT | Performed by: STUDENT IN AN ORGANIZED HEALTH CARE EDUCATION/TRAINING PROGRAM

## 2024-06-04 PROCEDURE — 3075F SYST BP GE 130 - 139MM HG: CPT | Performed by: STUDENT IN AN ORGANIZED HEALTH CARE EDUCATION/TRAINING PROGRAM

## 2024-06-04 PROCEDURE — 99214 OFFICE O/P EST MOD 30 MIN: CPT | Performed by: STUDENT IN AN ORGANIZED HEALTH CARE EDUCATION/TRAINING PROGRAM

## 2024-06-04 RX ORDER — CARVEDILOL 12.5 MG/1
12.5 TABLET ORAL 2 TIMES DAILY
Qty: 180 TABLET | Refills: 1 | Status: SHIPPED | OUTPATIENT
Start: 2024-06-30 | End: 2024-12-27

## 2024-06-04 ASSESSMENT — PATIENT HEALTH QUESTIONNAIRE - PHQ9
SUM OF ALL RESPONSES TO PHQ QUESTIONS 1-9: 0
1. LITTLE INTEREST OR PLEASURE IN DOING THINGS: NOT AT ALL
SUM OF ALL RESPONSES TO PHQ9 QUESTIONS 1 & 2: 0
2. FEELING DOWN, DEPRESSED OR HOPELESS: NOT AT ALL
SUM OF ALL RESPONSES TO PHQ QUESTIONS 1-9: 0

## 2024-06-04 ASSESSMENT — ANXIETY QUESTIONNAIRES
1. FEELING NERVOUS, ANXIOUS, OR ON EDGE: NOT AT ALL
6. BECOMING EASILY ANNOYED OR IRRITABLE: NOT AT ALL
3. WORRYING TOO MUCH ABOUT DIFFERENT THINGS: NOT AT ALL
2. NOT BEING ABLE TO STOP OR CONTROL WORRYING: NOT AT ALL
GAD7 TOTAL SCORE: 0
5. BEING SO RESTLESS THAT IT IS HARD TO SIT STILL: NOT AT ALL
7. FEELING AFRAID AS IF SOMETHING AWFUL MIGHT HAPPEN: NOT AT ALL
4. TROUBLE RELAXING: NOT AT ALL
IF YOU CHECKED OFF ANY PROBLEMS ON THIS QUESTIONNAIRE, HOW DIFFICULT HAVE THESE PROBLEMS MADE IT FOR YOU TO DO YOUR WORK, TAKE CARE OF THINGS AT HOME, OR GET ALONG WITH OTHER PEOPLE: NOT DIFFICULT AT ALL

## 2024-06-04 ASSESSMENT — ENCOUNTER SYMPTOMS
BLOOD IN STOOL: 0
WHEEZING: 0
ABDOMINAL PAIN: 0
SHORTNESS OF BREATH: 0

## 2024-06-04 NOTE — PATIENT INSTRUCTIONS
Thank you for allowing me to care for you!    Patient instructions:  Change in medication regimen before surgery:  - Discontinue Plavix 7 days before surgery, Aspirin 7 days before surgery, Discontinue NSAIDs  7 days before surgery,   - Discontinue metformin 1 days before surgery  - Discontinue semaglutide  7 days before surgery  - Half lantus to 9 units morning of surgery

## 2024-06-04 NOTE — PROGRESS NOTES
Component Value Date    ALT 21 05/29/2024    AST 20 05/29/2024     Lab Results   Component Value Date    HGB 11.6 (L) 05/29/2024     Lab Results   Component Value Date    TSH 1.76 04/06/2021        --Kami Parsons DO on 6/4/2024 at 2:47 PM    An electronic signature was used to authenticate this note.

## 2024-06-05 NOTE — PROGRESS NOTES
Name_______________________________________Printed:____________________  Date and time of surgery_6/11/2024_______________________Arrival Time:_____/per office_@ Main___   1. The instructions given regarding when and if a patient needs to stop oral intake prior to surgery varies.Follow the specific instructions you were given                  __x_Nothing to eat or to drink after Midnight the night before.                   ____Carbo loading or instructions will be given to select patients-if you have been given those instructions -please do the following                           The evening before your surgery after dinner before midnight drink 40 ounces of gatorade.If you are diabetic use sugar free.  The morning of surgery drink 40 ounces of water.This needs to be finished 3 hours prior to your surgery start time.    2. Take the following pills with a small sip of water on the morning of surgery_pepcid, coreg__________________________________________________                  Do not take blood pressure medications ending in pril or sartan the yaima prior to surgery or the morning of surgery. Dr Ching's patient are not to take any medications the AM of surgery.         3. Aspirin, Ibuprofen, Advil, Naproxen, Vitamin E and other Anti-inflammatory products and supplements should be stopped for 5 -7days before surgery or as directed by your physician.   4. Check with your Doctor regarding stopping Plavix, Coumadin,Eliquis, Lovenox,Effient,Pradaxa,Xarelto, Fragmin or other blood thinners and follow their instructions.   5. Do not smoke, and do not drink any alcoholic beverages 24 hours prior to surgery.  This includes NA Beer.Refrain from the usage of any recreational drugs.   6. You may brush your teeth and gargle the morning of surgery.  DO NOT SWALLOW WATER   7. You MUST make arrangements for a responsible adult to stay on site while you are here and take you home after your surgery. You will not be allowed to leave

## 2024-06-10 RX ORDER — TRANEXAMIC ACID 10 MG/ML
1000 INJECTION, SOLUTION INTRAVENOUS
Status: COMPLETED | OUTPATIENT
Start: 2024-06-11 | End: 2024-06-11

## 2024-06-11 ENCOUNTER — HOSPITAL ENCOUNTER (OUTPATIENT)
Age: 59
Setting detail: OUTPATIENT SURGERY
Discharge: HOME | End: 2024-06-11
Attending: ORTHOPAEDIC SURGERY | Admitting: ORTHOPAEDIC SURGERY
Payer: COMMERCIAL

## 2024-06-11 ENCOUNTER — ANESTHESIA EVENT (OUTPATIENT)
Dept: OPERATING ROOM | Age: 59
End: 2024-06-11
Payer: COMMERCIAL

## 2024-06-11 ENCOUNTER — ANESTHESIA (OUTPATIENT)
Dept: OPERATING ROOM | Age: 59
End: 2024-06-11
Payer: COMMERCIAL

## 2024-06-11 VITALS
BODY MASS INDEX: 30.88 KG/M2 | HEART RATE: 70 BPM | DIASTOLIC BLOOD PRESSURE: 97 MMHG | TEMPERATURE: 98 F | SYSTOLIC BLOOD PRESSURE: 156 MMHG | OXYGEN SATURATION: 93 % | HEIGHT: 73 IN | RESPIRATION RATE: 11 BRPM | WEIGHT: 233 LBS

## 2024-06-11 DIAGNOSIS — Z98.890 S/P ARTHROSCOPY: Primary | ICD-10-CM

## 2024-06-11 PROBLEM — S43.432A DEGENERATIVE SUPERIOR LABRAL ANTERIOR-TO-POSTERIOR (SLAP) TEAR OF LEFT SHOULDER: Status: ACTIVE | Noted: 2024-06-11

## 2024-06-11 PROBLEM — M11.212: Status: ACTIVE | Noted: 2024-06-11

## 2024-06-11 LAB
ANION GAP SERPL CALCULATED.3IONS-SCNC: 11 MMOL/L (ref 3–16)
BUN SERPL-MCNC: 21 MG/DL (ref 7–20)
CALCIUM SERPL-MCNC: 9.3 MG/DL (ref 8.3–10.6)
CHLORIDE SERPL-SCNC: 106 MMOL/L (ref 99–110)
CO2 SERPL-SCNC: 24 MMOL/L (ref 21–32)
CREAT SERPL-MCNC: 1.4 MG/DL (ref 0.9–1.3)
GFR SERPLBLD CREATININE-BSD FMLA CKD-EPI: 59 ML/MIN/{1.73_M2}
GLUCOSE BLD-MCNC: 130 MG/DL (ref 70–99)
GLUCOSE BLD-MCNC: 94 MG/DL (ref 70–99)
GLUCOSE SERPL-MCNC: 137 MG/DL (ref 70–99)
PERFORMED ON: ABNORMAL
PERFORMED ON: NORMAL
POTASSIUM SERPL-SCNC: 4.3 MMOL/L (ref 3.5–5.1)
SODIUM SERPL-SCNC: 141 MMOL/L (ref 136–145)

## 2024-06-11 PROCEDURE — 80048 BASIC METABOLIC PNL TOTAL CA: CPT

## 2024-06-11 PROCEDURE — 2500000003 HC RX 250 WO HCPCS: Performed by: ORTHOPAEDIC SURGERY

## 2024-06-11 PROCEDURE — 2709999900 HC NON-CHARGEABLE SUPPLY: Performed by: ORTHOPAEDIC SURGERY

## 2024-06-11 PROCEDURE — 6370000000 HC RX 637 (ALT 250 FOR IP): Performed by: ANESTHESIOLOGY

## 2024-06-11 PROCEDURE — 3700000000 HC ANESTHESIA ATTENDED CARE: Performed by: ORTHOPAEDIC SURGERY

## 2024-06-11 PROCEDURE — 6360000002 HC RX W HCPCS

## 2024-06-11 PROCEDURE — 7100000001 HC PACU RECOVERY - ADDTL 15 MIN: Performed by: ORTHOPAEDIC SURGERY

## 2024-06-11 PROCEDURE — 6360000002 HC RX W HCPCS: Performed by: ANESTHESIOLOGY

## 2024-06-11 PROCEDURE — 2580000003 HC RX 258: Performed by: ORTHOPAEDIC SURGERY

## 2024-06-11 PROCEDURE — 3600000004 HC SURGERY LEVEL 4 BASE: Performed by: ORTHOPAEDIC SURGERY

## 2024-06-11 PROCEDURE — 7100000010 HC PHASE II RECOVERY - FIRST 15 MIN: Performed by: ORTHOPAEDIC SURGERY

## 2024-06-11 PROCEDURE — 6370000000 HC RX 637 (ALT 250 FOR IP): Performed by: ORTHOPAEDIC SURGERY

## 2024-06-11 PROCEDURE — 64415 NJX AA&/STRD BRCH PLXS IMG: CPT | Performed by: ANESTHESIOLOGY

## 2024-06-11 PROCEDURE — 6360000002 HC RX W HCPCS: Performed by: ORTHOPAEDIC SURGERY

## 2024-06-11 PROCEDURE — C1713 ANCHOR/SCREW BN/BN,TIS/BN: HCPCS | Performed by: ORTHOPAEDIC SURGERY

## 2024-06-11 PROCEDURE — 2500000003 HC RX 250 WO HCPCS

## 2024-06-11 PROCEDURE — 36415 COLL VENOUS BLD VENIPUNCTURE: CPT

## 2024-06-11 PROCEDURE — 2580000003 HC RX 258

## 2024-06-11 PROCEDURE — 7100000000 HC PACU RECOVERY - FIRST 15 MIN: Performed by: ORTHOPAEDIC SURGERY

## 2024-06-11 PROCEDURE — 3700000001 HC ADD 15 MINUTES (ANESTHESIA): Performed by: ORTHOPAEDIC SURGERY

## 2024-06-11 PROCEDURE — L3660 SO 8 AB RSTR CAN/WEB PRE OTS: HCPCS | Performed by: ORTHOPAEDIC SURGERY

## 2024-06-11 PROCEDURE — 2720000010 HC SURG SUPPLY STERILE: Performed by: ORTHOPAEDIC SURGERY

## 2024-06-11 PROCEDURE — 3600000014 HC SURGERY LEVEL 4 ADDTL 15MIN: Performed by: ORTHOPAEDIC SURGERY

## 2024-06-11 PROCEDURE — 7100000011 HC PHASE II RECOVERY - ADDTL 15 MIN: Performed by: ORTHOPAEDIC SURGERY

## 2024-06-11 DEVICE — ANCHOR SUTURE L 24.5 MM DIA 4.75 MM BIOCOMP TI TIP PEEK WHT: Type: IMPLANTABLE DEVICE | Site: SHOULDER | Status: FUNCTIONAL

## 2024-06-11 DEVICE — ANCHOR SUT L24MM DIA4.5MM BIOCOMPOSITE KNOTLESS FOR ARTHSCP: Type: IMPLANTABLE DEVICE | Site: SHOULDER | Status: FUNCTIONAL

## 2024-06-11 DEVICE — ANCHOR SUT L14.7MM DIA5.5MM BIOCOMPOSITE FULL THRD W/ 1.3MM: Type: IMPLANTABLE DEVICE | Site: SHOULDER | Status: FUNCTIONAL

## 2024-06-11 RX ORDER — FENTANYL CITRATE 50 UG/ML
50 INJECTION, SOLUTION INTRAMUSCULAR; INTRAVENOUS ONCE
Status: COMPLETED | OUTPATIENT
Start: 2024-06-11 | End: 2024-06-11

## 2024-06-11 RX ORDER — SODIUM CHLORIDE 0.9 % (FLUSH) 0.9 %
5-40 SYRINGE (ML) INJECTION EVERY 12 HOURS SCHEDULED
Status: DISCONTINUED | OUTPATIENT
Start: 2024-06-11 | End: 2024-06-11 | Stop reason: HOSPADM

## 2024-06-11 RX ORDER — OXYCODONE HYDROCHLORIDE AND ACETAMINOPHEN 5; 325 MG/1; MG/1
1 TABLET ORAL EVERY 4 HOURS PRN
Qty: 42 TABLET | Refills: 0 | Status: SHIPPED | OUTPATIENT
Start: 2024-06-11 | End: 2024-06-18

## 2024-06-11 RX ORDER — CYCLOBENZAPRINE HCL 5 MG
5 TABLET ORAL 2 TIMES DAILY PRN
Qty: 20 TABLET | Refills: 0 | Status: SHIPPED | OUTPATIENT
Start: 2024-06-11 | End: 2024-06-21

## 2024-06-11 RX ORDER — LIDOCAINE HYDROCHLORIDE 20 MG/ML
INJECTION, SOLUTION EPIDURAL; INFILTRATION; INTRACAUDAL; PERINEURAL PRN
Status: DISCONTINUED | OUTPATIENT
Start: 2024-06-11 | End: 2024-06-11 | Stop reason: SDUPTHER

## 2024-06-11 RX ORDER — ACETAMINOPHEN 325 MG/1
650 TABLET ORAL ONCE
Status: COMPLETED | OUTPATIENT
Start: 2024-06-11 | End: 2024-06-11

## 2024-06-11 RX ORDER — MIDAZOLAM HYDROCHLORIDE 1 MG/ML
INJECTION INTRAMUSCULAR; INTRAVENOUS
Status: COMPLETED
Start: 2024-06-11 | End: 2024-06-11

## 2024-06-11 RX ORDER — SODIUM CHLORIDE 9 MG/ML
INJECTION, SOLUTION INTRAVENOUS PRN
Status: DISCONTINUED | OUTPATIENT
Start: 2024-06-11 | End: 2024-06-11 | Stop reason: HOSPADM

## 2024-06-11 RX ORDER — OXYCODONE HYDROCHLORIDE 5 MG/1
5 TABLET ORAL
Status: COMPLETED | OUTPATIENT
Start: 2024-06-11 | End: 2024-06-11

## 2024-06-11 RX ORDER — MAGNESIUM HYDROXIDE 1200 MG/15ML
LIQUID ORAL CONTINUOUS PRN
Status: COMPLETED | OUTPATIENT
Start: 2024-06-11 | End: 2024-06-11

## 2024-06-11 RX ORDER — ROCURONIUM BROMIDE 10 MG/ML
INJECTION, SOLUTION INTRAVENOUS PRN
Status: DISCONTINUED | OUTPATIENT
Start: 2024-06-11 | End: 2024-06-11 | Stop reason: SDUPTHER

## 2024-06-11 RX ORDER — SODIUM CHLORIDE 0.9 % (FLUSH) 0.9 %
5-40 SYRINGE (ML) INJECTION PRN
Status: DISCONTINUED | OUTPATIENT
Start: 2024-06-11 | End: 2024-06-11 | Stop reason: HOSPADM

## 2024-06-11 RX ORDER — MIDAZOLAM HYDROCHLORIDE 2 MG/2ML
1 INJECTION, SOLUTION INTRAMUSCULAR; INTRAVENOUS ONCE
Status: COMPLETED | OUTPATIENT
Start: 2024-06-11 | End: 2024-06-11

## 2024-06-11 RX ORDER — SODIUM CHLORIDE 0.9 % (FLUSH) 0.9 %
5-40 SYRINGE (ML) INJECTION EVERY 12 HOURS SCHEDULED
Status: CANCELLED | OUTPATIENT
Start: 2024-06-11

## 2024-06-11 RX ORDER — PROCHLORPERAZINE EDISYLATE 5 MG/ML
5 INJECTION INTRAMUSCULAR; INTRAVENOUS
Status: DISCONTINUED | OUTPATIENT
Start: 2024-06-11 | End: 2024-06-11 | Stop reason: HOSPADM

## 2024-06-11 RX ORDER — FENTANYL CITRATE 50 UG/ML
25 INJECTION, SOLUTION INTRAMUSCULAR; INTRAVENOUS EVERY 5 MIN PRN
Status: DISCONTINUED | OUTPATIENT
Start: 2024-06-11 | End: 2024-06-11 | Stop reason: HOSPADM

## 2024-06-11 RX ORDER — SODIUM CHLORIDE 0.9 % (FLUSH) 0.9 %
5-40 SYRINGE (ML) INJECTION PRN
Status: CANCELLED | OUTPATIENT
Start: 2024-06-11

## 2024-06-11 RX ORDER — KETAMINE HYDROCHLORIDE 10 MG/ML
INJECTION, SOLUTION INTRAMUSCULAR; INTRAVENOUS PRN
Status: DISCONTINUED | OUTPATIENT
Start: 2024-06-11 | End: 2024-06-11 | Stop reason: SDUPTHER

## 2024-06-11 RX ORDER — LABETALOL HYDROCHLORIDE 5 MG/ML
10 INJECTION, SOLUTION INTRAVENOUS
Status: DISCONTINUED | OUTPATIENT
Start: 2024-06-11 | End: 2024-06-11 | Stop reason: HOSPADM

## 2024-06-11 RX ORDER — EPHEDRINE SULFATE 50 MG/ML
INJECTION INTRAVENOUS PRN
Status: DISCONTINUED | OUTPATIENT
Start: 2024-06-11 | End: 2024-06-11 | Stop reason: SDUPTHER

## 2024-06-11 RX ORDER — PROPOFOL 10 MG/ML
INJECTION, EMULSION INTRAVENOUS PRN
Status: DISCONTINUED | OUTPATIENT
Start: 2024-06-11 | End: 2024-06-11 | Stop reason: SDUPTHER

## 2024-06-11 RX ORDER — SODIUM CHLORIDE 9 MG/ML
INJECTION, SOLUTION INTRAVENOUS PRN
Status: CANCELLED | OUTPATIENT
Start: 2024-06-11

## 2024-06-11 RX ORDER — FENTANYL CITRATE 50 UG/ML
INJECTION, SOLUTION INTRAMUSCULAR; INTRAVENOUS
Status: COMPLETED
Start: 2024-06-11 | End: 2024-06-11

## 2024-06-11 RX ORDER — ONDANSETRON 2 MG/ML
INJECTION INTRAMUSCULAR; INTRAVENOUS PRN
Status: DISCONTINUED | OUTPATIENT
Start: 2024-06-11 | End: 2024-06-11 | Stop reason: SDUPTHER

## 2024-06-11 RX ORDER — SODIUM CHLORIDE, SODIUM LACTATE, POTASSIUM CHLORIDE, CALCIUM CHLORIDE 600; 310; 30; 20 MG/100ML; MG/100ML; MG/100ML; MG/100ML
INJECTION, SOLUTION INTRAVENOUS CONTINUOUS
Status: CANCELLED | OUTPATIENT
Start: 2024-06-11

## 2024-06-11 RX ORDER — ONDANSETRON 2 MG/ML
4 INJECTION INTRAMUSCULAR; INTRAVENOUS
Status: COMPLETED | OUTPATIENT
Start: 2024-06-11 | End: 2024-06-11

## 2024-06-11 RX ORDER — HYDRALAZINE HYDROCHLORIDE 20 MG/ML
10 INJECTION INTRAMUSCULAR; INTRAVENOUS
Status: DISCONTINUED | OUTPATIENT
Start: 2024-06-11 | End: 2024-06-11 | Stop reason: HOSPADM

## 2024-06-11 RX ORDER — BUPIVACAINE HYDROCHLORIDE 5 MG/ML
INJECTION, SOLUTION EPIDURAL; INTRACAUDAL
Status: COMPLETED | OUTPATIENT
Start: 2024-06-11 | End: 2024-06-11

## 2024-06-11 RX ORDER — LIDOCAINE HYDROCHLORIDE 10 MG/ML
1 INJECTION, SOLUTION EPIDURAL; INFILTRATION; INTRACAUDAL; PERINEURAL
Status: CANCELLED | OUTPATIENT
Start: 2024-06-11 | End: 2024-06-12

## 2024-06-11 RX ORDER — DEXAMETHASONE SODIUM PHOSPHATE 4 MG/ML
INJECTION, SOLUTION INTRA-ARTICULAR; INTRALESIONAL; INTRAMUSCULAR; INTRAVENOUS; SOFT TISSUE PRN
Status: DISCONTINUED | OUTPATIENT
Start: 2024-06-11 | End: 2024-06-11 | Stop reason: SDUPTHER

## 2024-06-11 RX ORDER — HYDROMORPHONE HYDROCHLORIDE 2 MG/ML
0.5 INJECTION, SOLUTION INTRAMUSCULAR; INTRAVENOUS; SUBCUTANEOUS EVERY 5 MIN PRN
Status: DISCONTINUED | OUTPATIENT
Start: 2024-06-11 | End: 2024-06-11 | Stop reason: HOSPADM

## 2024-06-11 RX ORDER — SODIUM CHLORIDE, SODIUM LACTATE, POTASSIUM CHLORIDE, CALCIUM CHLORIDE 600; 310; 30; 20 MG/100ML; MG/100ML; MG/100ML; MG/100ML
INJECTION, SOLUTION INTRAVENOUS CONTINUOUS PRN
Status: DISCONTINUED | OUTPATIENT
Start: 2024-06-11 | End: 2024-06-11 | Stop reason: SDUPTHER

## 2024-06-11 RX ADMIN — MIDAZOLAM HYDROCHLORIDE 1 MG: 2 INJECTION, SOLUTION INTRAMUSCULAR; INTRAVENOUS at 09:50

## 2024-06-11 RX ADMIN — PHENYLEPHRINE HYDROCHLORIDE 100 MCG: 10 INJECTION INTRAVENOUS at 13:41

## 2024-06-11 RX ADMIN — SUGAMMADEX 200 MG: 100 INJECTION, SOLUTION INTRAVENOUS at 14:20

## 2024-06-11 RX ADMIN — PHENYLEPHRINE HYDROCHLORIDE 100 MCG: 10 INJECTION INTRAVENOUS at 13:22

## 2024-06-11 RX ADMIN — KETAMINE HYDROCHLORIDE 30 MG: 10 INJECTION, SOLUTION INTRAMUSCULAR; INTRAVENOUS at 12:32

## 2024-06-11 RX ADMIN — SODIUM CHLORIDE, POTASSIUM CHLORIDE, SODIUM LACTATE AND CALCIUM CHLORIDE: 600; 310; 30; 20 INJECTION, SOLUTION INTRAVENOUS at 14:19

## 2024-06-11 RX ADMIN — PHENYLEPHRINE HYDROCHLORIDE 100 MCG: 10 INJECTION INTRAVENOUS at 13:14

## 2024-06-11 RX ADMIN — ONDANSETRON 4 MG: 2 INJECTION INTRAMUSCULAR; INTRAVENOUS at 14:13

## 2024-06-11 RX ADMIN — ONDANSETRON 4 MG: 2 INJECTION INTRAMUSCULAR; INTRAVENOUS at 15:18

## 2024-06-11 RX ADMIN — ACETAMINOPHEN 650 MG: 325 TABLET ORAL at 09:27

## 2024-06-11 RX ADMIN — TRANEXAMIC ACID 1000 MG: 10 INJECTION, SOLUTION INTRAVENOUS at 12:39

## 2024-06-11 RX ADMIN — MIDAZOLAM 1 MG: 1 INJECTION INTRAMUSCULAR; INTRAVENOUS at 09:50

## 2024-06-11 RX ADMIN — OXYCODONE 5 MG: 5 TABLET ORAL at 16:29

## 2024-06-11 RX ADMIN — PHENYLEPHRINE HYDROCHLORIDE 100 MCG: 10 INJECTION INTRAVENOUS at 13:04

## 2024-06-11 RX ADMIN — LIDOCAINE HYDROCHLORIDE 100 MG: 20 INJECTION, SOLUTION EPIDURAL; INFILTRATION; INTRACAUDAL; PERINEURAL at 13:54

## 2024-06-11 RX ADMIN — PHENYLEPHRINE HYDROCHLORIDE 100 MCG: 10 INJECTION INTRAVENOUS at 14:02

## 2024-06-11 RX ADMIN — ROCURONIUM BROMIDE 30 MG: 10 INJECTION, SOLUTION INTRAVENOUS at 13:41

## 2024-06-11 RX ADMIN — LIDOCAINE HYDROCHLORIDE 100 MG: 20 INJECTION, SOLUTION EPIDURAL; INFILTRATION; INTRACAUDAL; PERINEURAL at 12:32

## 2024-06-11 RX ADMIN — PHENYLEPHRINE HYDROCHLORIDE 100 MCG: 10 INJECTION INTRAVENOUS at 13:29

## 2024-06-11 RX ADMIN — SODIUM CHLORIDE: 9 INJECTION, SOLUTION INTRAVENOUS at 09:34

## 2024-06-11 RX ADMIN — KETAMINE HYDROCHLORIDE 20 MG: 10 INJECTION, SOLUTION INTRAMUSCULAR; INTRAVENOUS at 13:54

## 2024-06-11 RX ADMIN — FENTANYL CITRATE 50 MCG: 50 INJECTION, SOLUTION INTRAMUSCULAR; INTRAVENOUS at 09:50

## 2024-06-11 RX ADMIN — CEFAZOLIN 2000 MG: 2 INJECTION, POWDER, FOR SOLUTION INTRAMUSCULAR; INTRAVENOUS at 12:37

## 2024-06-11 RX ADMIN — BUPIVACAINE HYDROCHLORIDE 25 ML: 5 INJECTION, SOLUTION EPIDURAL; INTRACAUDAL at 09:51

## 2024-06-11 RX ADMIN — ROCURONIUM BROMIDE 50 MG: 10 INJECTION, SOLUTION INTRAVENOUS at 12:33

## 2024-06-11 RX ADMIN — PHENYLEPHRINE HYDROCHLORIDE 100 MCG: 10 INJECTION INTRAVENOUS at 13:10

## 2024-06-11 RX ADMIN — PROPOFOL 150 MG: 10 INJECTION, EMULSION INTRAVENOUS at 12:32

## 2024-06-11 RX ADMIN — DEXAMETHASONE SODIUM PHOSPHATE 8 MG: 4 INJECTION, SOLUTION INTRAMUSCULAR; INTRAVENOUS at 12:39

## 2024-06-11 RX ADMIN — EPHEDRINE SULFATE 5 MG: 50 INJECTION, SOLUTION INTRAVENOUS at 14:09

## 2024-06-11 RX ADMIN — FENTANYL CITRATE 50 MCG: 0.05 INJECTION, SOLUTION INTRAMUSCULAR; INTRAVENOUS at 09:50

## 2024-06-11 ASSESSMENT — PAIN SCALES - GENERAL
PAINLEVEL_OUTOF10: 0
PAINLEVEL_OUTOF10: 4
PAINLEVEL_OUTOF10: 0

## 2024-06-11 ASSESSMENT — ENCOUNTER SYMPTOMS: SHORTNESS OF BREATH: 0

## 2024-06-11 ASSESSMENT — PAIN DESCRIPTION - ORIENTATION: ORIENTATION: LEFT

## 2024-06-11 ASSESSMENT — PAIN DESCRIPTION - LOCATION: LOCATION: SHOULDER

## 2024-06-11 ASSESSMENT — PAIN - FUNCTIONAL ASSESSMENT: PAIN_FUNCTIONAL_ASSESSMENT: NONE - DENIES PAIN

## 2024-06-11 NOTE — H&P
PLANNED PROCEDURE:  Left shoulder arthroscopy, debridement, rotator cuff repair, possible biceps tenotomy vs tenodesis, indicated procedures    I have reviewed the preoperative clearance and History & Physical in [facility], reviewed my notes and patient's imaging, examined the patient, and no pertinent changes are required.     Past Medical History:   Diagnosis Date    Cerebral artery occlusion with cerebral infarction (HCC)     CKD (chronic kidney disease)     Diabetes mellitus (HCC)     GERD (gastroesophageal reflux disease)     Helicobacter pylori (H. pylori) infection 4/25/2019    Hypertension     Sleep apnea     no cpap       Past Surgical History:   Procedure Laterality Date    COLONOSCOPY N/A 6/24/2021    COLONOSCOPY POLYPECTOMY SNARE/COLD OF 1 DESCENDING COLON POLYP AND 2 RECTAL  POLYPS performed by Camron Garcia MD at [facility] ENDOSCOPY    CYSTOSCOPY  9/25/2019    FLEXIBLE CYSTOSCOPY, DILATION, POTTS CATHETER INSERTION performed by Tank Ortiz MD at [facility] OR    KNEE SURGERY      right knee     SIGMOIDOSCOPY N/A 6/23/2021    SIGMOIDOSCOPY DIAGNOSTIC FLEXIBLE performed by Babak Fontana MD at [facility] ENDOSCOPY    SLEEVE GASTRECTOMY N/A 9/25/2019    LAPAROSCOPIC SLEEVE GASTRECTOMY-ETHICON performed by Misael Ching MD at [facility] OR    UPPER GASTROINTESTINAL ENDOSCOPY N/A 4/12/2019    EGD BIOPSY performed by Misael Ching MD at [facility] ENDOSCOPY       Social History     Tobacco Use    Smoking status: Never    Smokeless tobacco: Never   Vaping Use    Vaping Use: Never used   Substance Use Topics    Alcohol use: Yes     Alcohol/week: 1.0 standard drink of alcohol     Types: 1 Cans of beer per week     Comment: socially     Drug use: No       Allergies   Allergen Reactions    Coconut (Cocos Nucifera)     Hctz [Hydrochlorothiazide]      cramps    Penicillins Hives       Medications Prior to Admission: [START ON 6/30/2024] carvedilol (COREG) 12.5 MG tablet, Take 1 tablet by mouth 2 times daily  clopidogrel

## 2024-06-11 NOTE — PROGRESS NOTES
Patient has transitioned out of phase 1 care. Patient denies pain and having tolerable pain. VSS. On room air. L radial pulse palpable.

## 2024-06-11 NOTE — DISCHARGE INSTRUCTIONS
ROTATOR CUFF REPAIR POSTOPERATIVE INSTRUCTIONS      ACTIVITIES:  Keep your arm in the sling and abduction pillow after surgery.  You should rotate your forearm and move your wrist/hand/fingers freely.  You should loosen the sling two to three times a day to fully extend and flex your elbow to prevent stiffness in the elbow.  No shoulder range of motion for now.  Full time sling use for 6 weeks after surgery.       DRESSING:  After surgery, a bulky dressing, and sometimes an ice cuff will be applied to your shoulder. It is normal to have a moderate amount of blood-tinted fluid drainage on the dressing. Keep the dressing clean, dry, and intact for the first 4 days, after which you may remove the bulky dressing and apply band-aids over each of the small incisions.  You can start showering and getting the incisions wet after 4 days.    ICE/COOLING:  Apply ice to your shoulder if you did not purchase an ice cuff.  Make sure to use a leak proof bag so the dressing does not get wet from the ice.  Make sure the ice or cooling cuff is not directly in contact with your skin.  Prolonged contact can result in frostbite and injury to the skin.  Alternate for 15 minutes on and 15 minutes off.   If you did purchase a cooling cuff, follow the instructions included with the cuff to keep it cold.      DRIVING:  No driving for the foreseeable future.    THERAPY:  Hold off on physical therapy until I see you back for your postoperative appointments.  I will let you know at the appointments when you should start formal therapy.    MEDICATIONS:  Although you have a prescription for a strong narcotic pain medication, many patients are able to handle the discomfort postoperatively with a milder medication such as Extra-Strength Tylenol and/or anti-inflammatories (NSAIDs include ibuprofen, naproxen, diclofenac, meloxicam, etc.). Take the narcotic medication as needed only, and wean off this medication as soon as possible.    Resume Plavix

## 2024-06-11 NOTE — ANESTHESIA PROCEDURE NOTES
Peripheral Block    Patient location during procedure: pre-op  Reason for block: post-op pain management and at surgeon's request  Start time: 6/11/2024 9:51 AM  End time: 6/11/2024 9:54 AM  Staffing  Performed: anesthesiologist   Anesthesiologist: Antonio Estrada MD  Performed by: Antonio Estrada MD  Authorized by: Antoino Estrada MD    Preanesthetic Checklist  Completed: patient identified, IV checked, site marked, risks and benefits discussed, surgical/procedural consents, equipment checked, pre-op evaluation, timeout performed, anesthesia consent given, oxygen available and monitors applied/VS acknowledged  Peripheral Block   Patient position: sitting  Prep: ChloraPrep  Provider prep: mask and sterile gloves  Patient monitoring: cardiac monitor, continuous pulse ox, frequent blood pressure checks and IV access  Block type: Brachial plexus  Interscalene  Laterality: left  Injection technique: single-shot  Guidance: nerve stimulator and ultrasound guided  Local infiltration: lidocaine  Infiltration strength: 1 %  Local infiltration: lidocaine  Dose: 3 mL    Needle   Needle type: short-bevel   Needle gauge: 20 G  Needle localization: ultrasound guidance and nerve stimulator  Needle length: 10 cm  Assessment   Injection assessment: negative aspiration for heme, no paresthesia on injection and local visualized surrounding nerve on ultrasound  Paresthesia pain: none  Slow fractionated injection: yes  Hemodynamics: stable  Outcomes: uncomplicated and patient tolerated procedure well    Additional Notes  U/S [id number].  (1) Under ultrasound guidance, a 20 gauge needle was inserted and placed in close proximity to the BP nerve.  (2) Ultrasound was also used to visualize the spread of the anesthetic in close proximity to the nerve being blocked. (3) The nerve appeared anatomically normal, and (4 there were no apparent abnormal pathological findings on the image that were readily visible and

## 2024-06-11 NOTE — PROGRESS NOTES
Discharge instructions reviewed with pt and family.  Pt and family v/u.  Questions answered and pt denies concerns.  Left shoulder dressing remains CD&I.  Pt reports small amount sensation to LUE.  Radial pulse palpable.  Pt reports dewayne is tolerable.  Pt discharged home with wife in stable condition

## 2024-06-11 NOTE — ANESTHESIA PRE PROCEDURE
Department of Anesthesiology  Preprocedure Note       Name:  Juaquin Delaney   Age:  58 y.o.  :  1965                                          MRN:  [id number]         Date:  2024      Surgeon: Surgeon(s):  Mirza Joseph MD    Procedure: Procedure(s):  LEFT SHOULDER ARTHROSCOPY, DEBRIDEMENT, ROTATOR CUFF REPAIR, POSSIBLE BICEPS TENOTOMY VERSUS TENODESIS, INDICATED PROCEDURES-REGIONAL BLOCK-ARTHREX    Medications prior to admission:   Prior to Admission medications    Medication Sig Start Date End Date Taking? Authorizing Provider   carvedilol (COREG) 12.5 MG tablet Take 1 tablet by mouth 2 times daily 24  Kami Parsons DO   clopidogrel (PLAVIX) 75 MG tablet Take 1 tablet by mouth daily 24  Kami Parsons DO   famotidine (PEPCID) 40 MG tablet Take 1 tablet by mouth every evening 24   Kami Parsons DO   famotidine (PEPCID) 40 MG tablet Take 1 tablet by mouth every evening 24  Kami Parsons DO   clopidogrel (PLAVIX) 75 MG tablet Take 1 tablet by mouth daily 24  Kami Parsons DO   LANTUS SOLOSTAR 100 UNIT/ML injection pen Inject 18 Units into the skin nightly 24  Imani Sofia MD   Omega 3 1000 MG CAPS Take 1 capsule by mouth in the morning and at bedtime 4/3/24   Tank Sevilla MD   semaglutide, 2 MG/DOSE, (OZEMPIC) 8 MG/3ML SOPN sc injection 2mg weekly  Patient taking differently: 2mg weekly, Lane 3/4/24   Imani Sofia MD   amLODIPine-valsartan (EXFORGE) 5-160 MG per tablet Take 1 tablet by mouth daily 24   Tank Sevilla MD   atorvastatin (LIPITOR) 80 MG tablet TAKE ONE TABLET BY MOUTH ONCE A DAY 24   Imani Sofia MD   furosemide (LASIX) 20 MG tablet TAKE ONE TABLET BY MOUTH ONCE A DAY AS NEEDED ( PEDAL EDEMA) 23   Tank Sevilla MD   metFORMIN (GLUCOPHAGE-XR) 500 MG extended release tablet TAKE TWO TABLETS BY MOUTH TWICE A DAY WITH MEALS 23

## 2024-06-11 NOTE — PROGRESS NOTES
Patient to PACU from OR. VSS. Oral airway in. Left shoulder incisions w/ sutures, xerofoam, 4x4, ABD, tape, and sling. L radial pulse palpable.

## 2024-06-11 NOTE — ANESTHESIA POSTPROCEDURE EVALUATION
Department of Anesthesiology  Postprocedure Note    Patient: Juaquin Delaney  MRN: [id number]  YOB: 1965  Date of evaluation: 6/11/2024    Procedure Summary       Date: 06/11/24 Room / Location: [address]    Anesthesia Start: 1222 Anesthesia Stop: 1446    Procedure: LEFT SHOULDER ARTHROSCOPY, DEBRIDEMENT, ROTATOR CUFF REPAIR, BICEPS TENODESIS-REGIONAL BLOCK-ARTHREX (Left: Shoulder) Diagnosis:       Complete rotator cuff tear of left shoulder      (Complete rotator cuff tear of left shoulder [M75.122])    Surgeons: Mirza Joseph MD Responsible Provider: Willow Ding MD    Anesthesia Type: general, regional ASA Status: 3            Anesthesia Type: No value filed.    Herberth Phase I: Herberth Score: 4    Herberth Phase II:      Anesthesia Post Evaluation    Patient location during evaluation: PACU  Patient participation: complete - patient participated  Level of consciousness: awake  Airway patency: patent  Nausea & Vomiting: no vomiting  Cardiovascular status: hemodynamically stable  Respiratory status: acceptable  Hydration status: euvolemic  Multimodal analgesia pain management approach    No notable events documented.

## 2024-06-12 NOTE — OP NOTE
[address] [address]                            OPERATIVE REPORT      PATIENT NAME: ROXIE FLORES                 : 1965  MED REC NO: [id number]                      ROOM: OR  ACCOUNT NO: [id number]                       ADMIT DATE: 2024  PROVIDER: Mirza Joseph MD      DATE OF PROCEDURE:  2024    SURGEON:  Mirza Joseph MD    PREOPERATIVE DIAGNOSES:    1. Chronic left shoulder pain.  2. Full-thickness tear of the supraspinatus.  3. Labral degeneration.    POSTOPERATIVE DIAGNOSES:    1. Chronic left shoulder pain.  2. Full-thickness tear of the supraspinatus and anterior infraspinatus.  3. Labral degeneration.    PROCEDURES PERFORMED:  Left shoulder arthroscopy, debridement, rotator cuff repair, biceps tenodesis.    ANESTHESIA:  General endotracheal with preoperative interscalene nerve block.    FIRST ASSISTANTS:  Kevin Gill.    BLOOD LOSS:  Minimal.    COMPLICATIONS:  None immediately apparent.    OPERATIVE FINDINGS:    1. Minimal chondromalacia involving the glenoid and humeral head.  There was circumferential labral degeneration, diffuse, but worse over the superior labral region involving the long head of the biceps anchor.  2. The long head of the biceps tendon itself looked intact and of good quality, but the anchor at the superior labrum/supraglenoid tubercle was unstable related to the degenerative SLAP tear.  3. Intact subscapularis.  4. Large supraspinatus tear, with extension to involve the anterior infraspinatus.  5. Small calcifications within the glenohumeral joint (superior glenoid articular cartilage) as well as in the subacromial space involving the frayed coracoacromial ligament and deep cuff fibers, likely small pseudogout deposits.    DESCRIPTION OF PROCEDURE:  The patient was brought back to the OR and transferred onto the operating room table.  General anesthesia was administered.  The patient

## 2024-06-14 ENCOUNTER — TELEPHONE (OUTPATIENT)
Dept: ORTHOPEDIC SURGERY | Age: 59
End: 2024-06-14

## 2024-06-24 ENCOUNTER — OFFICE VISIT (OUTPATIENT)
Dept: ORTHOPEDIC SURGERY | Age: 59
End: 2024-06-24

## 2024-06-24 VITALS — BODY MASS INDEX: 30.88 KG/M2 | HEIGHT: 73 IN | WEIGHT: 233 LBS | RESPIRATION RATE: 16 BRPM

## 2024-06-24 DIAGNOSIS — Z98.890 S/P LEFT ROTATOR CUFF REPAIR: Primary | ICD-10-CM

## 2024-06-24 PROCEDURE — 99024 POSTOP FOLLOW-UP VISIT: CPT | Performed by: ORTHOPAEDIC SURGERY

## 2024-06-24 NOTE — PROGRESS NOTES
Physician Progress Note      PATIENT:               ROXIE FLORES  CSN #:                  [id number]  :                       1965  ADMIT DATE:       2024 7:36 AM  DISCH DATE:        2024 4:30 PM  RESPONDING  PROVIDER #:        Jacinto Cruz MD          QUERY TEXT:    Pt admitted with CVA and has encephalopathy documented in Neurology consult   note, . If possible, please document in progress notes and discharge   summary further specificity regarding the type of encephalopathy:    The medical record reflects the following:  Risk Factors: CVA, DM    Clinical Indicators:  Neurology PN,  \"Acute encephalopathy with speech   impairment.  Possible new ischemic stroke\".    DS,  \"MRI done showed a 1 cm area of acute infarct adjacent to the atrium   of the left lateral ventricle.  He was seen by neurology.  Patient is to   continue BP control with goal of 140/90.  He was found fit for discharge\".    Treatment: Atorvastatin tab, Metoprolol tartrate tablet, Amlodipine tab,   Neurology consult    Thank you  EDUIN Kenney CDS  Options provided:  -- Hypertensive encephalopathy  -- Encephalopathy due to CVA  -- Other - I will add my own diagnosis  -- Disagree - Not applicable / Not valid  -- Disagree - Clinically unable to determine / Unknown  -- Refer to Clinical Documentation Reviewer    PROVIDER RESPONSE TEXT:    This patient has hypertensive encephalopathy.    Query created by: Teodoro Ross on 2024 12:13 AM      Electronically signed by:  Jacinto Cruz MD 2024 6:27   AM

## 2024-06-24 NOTE — PROGRESS NOTES
ORTHOPAEDIC OFFICE NOTE    Chief Complaint   Patient presents with    Follow-up     Post op left shoulder          HPI  6/24/24  Postop check left shoulder  He reports the left shoulder is overall doing well  No major issues, pain controlled  No incisional issues  No numbness or tingling      6/11/24  PROCEDURES PERFORMED: Left shoulder arthroscopy, debridement, rotator cuff repair, biceps tenodesis.   OPERATIVE FINDINGS:    1. Minimal chondromalacia involving the glenoid and humeral head.  There was circumferential labral degeneration, diffuse, but worse over the superior labral region involving the long head of the biceps anchor.  2. The long head of the biceps tendon itself looked intact and of good quality, but the anchor at the superior labrum/supraglenoid tubercle was unstable related to the degenerative SLAP tear.  3. Intact subscapularis.  4. Large supraspinatus tear, with extension to involve the anterior infraspinatus.  5. Small calcifications within the glenohumeral joint (superior glenoid articular cartilage) as well as in the subacromial space involving the frayed coracoacromial ligament and deep cuff fibers, likely small pseudogout deposits.      4/19/24  Juaquin returns to clinic today for follow-up of his left shoulder  He reports no significant change  He is a diabetic, he reports his sugars are well-controlled  He does not smoke  He is not on any blood thinners  No history of VTE  He does not take any narcotic pain medications  He does office work      4/5/24  58 y.o. male RHD seen in consultation at the request of Tank Sevilla MD for evaluation of left shoulder pain:  I previously saw him for the same shoulder back in 2018, and he was diagnosed with adhesive capsulitis and rotator cuff tendinitis  He reports he did physical therapy back then  He reports he has had persistent pain in the left shoulder since  The pain is described superior and lateral  He does describe intermittent radiation

## 2024-06-26 ENCOUNTER — TELEPHONE (OUTPATIENT)
Dept: ORTHOPEDIC SURGERY | Age: 59
End: 2024-06-26

## 2024-06-26 NOTE — TELEPHONE ENCOUNTER
Other PATIENT IS CALLING IN REQUESTING A CALL BACK WANTS TO KNOW IF THE OFFICE RECEIVED HIS DISABILITY FORMS.  [phone]

## 2024-06-26 NOTE — TELEPHONE ENCOUNTER
Called and told him that I would check and if I dont have the paper yet.  I will check and call him in the am

## 2024-06-26 NOTE — TELEPHONE ENCOUNTER
Called and he was looking for a form for a physican statement.  Told him that I would check the faxes and let him know .      Called patient and told him that I did not receive any faxes for him.  Gave him the fax # and he will call and have them re fax this information

## 2024-06-27 NOTE — TELEPHONE ENCOUNTER
Called and spoke with patient -- informed them we still do do not form -- confirmed fax number with patient -- informed him once we receive we will complete form and fax back --- patient will contact him to have form faxed again

## 2024-07-03 ENCOUNTER — OFFICE VISIT (OUTPATIENT)
Dept: INTERNAL MEDICINE CLINIC | Age: 59
End: 2024-07-03

## 2024-07-03 VITALS
DIASTOLIC BLOOD PRESSURE: 88 MMHG | HEIGHT: 73 IN | OXYGEN SATURATION: 98 % | WEIGHT: 226 LBS | HEART RATE: 98 BPM | RESPIRATION RATE: 18 BRPM | SYSTOLIC BLOOD PRESSURE: 136 MMHG | TEMPERATURE: 97.5 F | BODY MASS INDEX: 29.95 KG/M2

## 2024-07-03 DIAGNOSIS — I10 ESSENTIAL HYPERTENSION: Primary | ICD-10-CM

## 2024-07-03 DIAGNOSIS — R10.13 DYSPEPSIA: ICD-10-CM

## 2024-07-03 RX ORDER — AMLODIPINE AND VALSARTAN 10; 320 MG/1; MG/1
1 TABLET ORAL DAILY
Qty: 90 TABLET | Refills: 3 | Status: SHIPPED | OUTPATIENT
Start: 2024-07-03

## 2024-07-03 RX ORDER — PANTOPRAZOLE SODIUM 40 MG/1
40 TABLET, DELAYED RELEASE ORAL
Qty: 90 TABLET | Refills: 1 | Status: SHIPPED | OUTPATIENT
Start: 2024-07-03

## 2024-07-03 ASSESSMENT — ANXIETY QUESTIONNAIRES
GAD7 TOTAL SCORE: 10
7. FEELING AFRAID AS IF SOMETHING AWFUL MIGHT HAPPEN: MORE THAN HALF THE DAYS
IF YOU CHECKED OFF ANY PROBLEMS ON THIS QUESTIONNAIRE, HOW DIFFICULT HAVE THESE PROBLEMS MADE IT FOR YOU TO DO YOUR WORK, TAKE CARE OF THINGS AT HOME, OR GET ALONG WITH OTHER PEOPLE: NOT DIFFICULT AT ALL
4. TROUBLE RELAXING: MORE THAN HALF THE DAYS
5. BEING SO RESTLESS THAT IT IS HARD TO SIT STILL: NOT AT ALL
6. BECOMING EASILY ANNOYED OR IRRITABLE: MORE THAN HALF THE DAYS
1. FEELING NERVOUS, ANXIOUS, OR ON EDGE: NOT AT ALL
2. NOT BEING ABLE TO STOP OR CONTROL WORRYING: MORE THAN HALF THE DAYS
3. WORRYING TOO MUCH ABOUT DIFFERENT THINGS: MORE THAN HALF THE DAYS

## 2024-07-03 ASSESSMENT — PATIENT HEALTH QUESTIONNAIRE - PHQ9
SUM OF ALL RESPONSES TO PHQ QUESTIONS 1-9: 18
SUM OF ALL RESPONSES TO PHQ QUESTIONS 1-9: 18
8. MOVING OR SPEAKING SO SLOWLY THAT OTHER PEOPLE COULD HAVE NOTICED. OR THE OPPOSITE, BEING SO FIGETY OR RESTLESS THAT YOU HAVE BEEN MOVING AROUND A LOT MORE THAN USUAL: MORE THAN HALF THE DAYS
6. FEELING BAD ABOUT YOURSELF - OR THAT YOU ARE A FAILURE OR HAVE LET YOURSELF OR YOUR FAMILY DOWN: MORE THAN HALF THE DAYS
SUM OF ALL RESPONSES TO PHQ QUESTIONS 1-9: 16
3. TROUBLE FALLING OR STAYING ASLEEP: MORE THAN HALF THE DAYS
SUM OF ALL RESPONSES TO PHQ9 QUESTIONS 1 & 2: 4
4. FEELING TIRED OR HAVING LITTLE ENERGY: MORE THAN HALF THE DAYS
9. THOUGHTS THAT YOU WOULD BE BETTER OFF DEAD, OR OF HURTING YOURSELF: MORE THAN HALF THE DAYS
10. IF YOU CHECKED OFF ANY PROBLEMS, HOW DIFFICULT HAVE THESE PROBLEMS MADE IT FOR YOU TO DO YOUR WORK, TAKE CARE OF THINGS AT HOME, OR GET ALONG WITH OTHER PEOPLE: NOT DIFFICULT AT ALL
1. LITTLE INTEREST OR PLEASURE IN DOING THINGS: MORE THAN HALF THE DAYS
7. TROUBLE CONCENTRATING ON THINGS, SUCH AS READING THE NEWSPAPER OR WATCHING TELEVISION: MORE THAN HALF THE DAYS
5. POOR APPETITE OR OVEREATING: MORE THAN HALF THE DAYS
2. FEELING DOWN, DEPRESSED OR HOPELESS: MORE THAN HALF THE DAYS
SUM OF ALL RESPONSES TO PHQ QUESTIONS 1-9: 18

## 2024-07-03 ASSESSMENT — COLUMBIA-SUICIDE SEVERITY RATING SCALE - C-SSRS
2. HAVE YOU ACTUALLY HAD ANY THOUGHTS OF KILLING YOURSELF?: NO
1. WITHIN THE PAST MONTH, HAVE YOU WISHED YOU WERE DEAD OR WISHED YOU COULD GO TO SLEEP AND NOT WAKE UP?: NO
6. HAVE YOU EVER DONE ANYTHING, STARTED TO DO ANYTHING, OR PREPARED TO DO ANYTHING TO END YOUR LIFE?: NO

## 2024-07-03 NOTE — PROGRESS NOTES
Juaquin Delaney (:  1965) is a 58 y.o. male,Established patient, here for evaluation of the following chief complaint(s):  Shoulder Pain (left) and Abdominal Pain      Assessment & Plan   1. Essential hypertension  Stable  -  increase exforge    2. Dyspepsia  -     pantoprazole (PROTONIX) 40 MG tablet; Take 1 tablet by mouth every morning (before breakfast), Disp-90 tablet, R-1Normal      Return in about 2 weeks (around 2024) for hypertension.       Subjective   HPI  GI Disorder:  Patient presents for follow-up of GERD- chronic.  Current symptoms include hoarseness, cough, and heartburn.  Symptoms are better since last visit.  Patient denies any other symptoms.  Current treatment includes: proton pump inhibitor- pantoprazole .  Medication side effects:  none.  Recent diagnostic testing: none.    Hypertension:  Home blood pressure monitoring: No.  He is adherent to a low sodium diet. Patient denies chest pain, shortness of breath, and headache.  Antihypertensive medication side effects: no medication side effects noted.  Use of agents associated with hypertension: none.                                        Sodium (mmol/L)   Date Value   2024 141    BUN (mg/dL)   Date Value   2024 21 (H)    Glucose (mg/dL)   Date Value   2024 137 (H)      Potassium (mmol/L)   Date Value   2024 4.3     Potassium reflex Magnesium (mmol/L)   Date Value   2024 4.3    Creatinine (mg/dL)   Date Value   2024 1.4 (H)            Review of Systems       Objective   Vitals:    24 1544   BP: 136/88   Pulse: 98   Resp: 18   Temp: 97.5 °F (36.4 °C)   SpO2: 98%   Weight: 102.5 kg (226 lb)   Height: 1.854 m (6' 1\")      Wt Readings from Last 3 Encounters:   24 102.5 kg (226 lb)   24 105.7 kg (233 lb)   24 105.7 kg (233 lb)     BP Readings from Last 3 Encounters:   24 136/88   24 (!) 156/97   24 136/88     Body mass index is 29.82 kg/m². Facility age limit

## 2024-07-15 DIAGNOSIS — E11.59 TYPE 2 DIABETES MELLITUS WITH VASCULAR DISEASE (HCC): ICD-10-CM

## 2024-07-15 DIAGNOSIS — E11.3299 TYPE 2 DIABETES MELLITUS WITH BACKGROUND RETINOPATHY (HCC): ICD-10-CM

## 2024-07-15 RX ORDER — METFORMIN HYDROCHLORIDE 500 MG/1
1000 TABLET, EXTENDED RELEASE ORAL 2 TIMES DAILY WITH MEALS
Qty: 120 TABLET | Refills: 0 | Status: SHIPPED | OUTPATIENT
Start: 2024-07-15

## 2024-07-15 NOTE — TELEPHONE ENCOUNTER
Medication:   Requested Prescriptions     Pending Prescriptions Disp Refills    metFORMIN (GLUCOPHAGE-XR) 500 MG extended release tablet 360 tablet 0     Sig: Take 2 tablets by mouth 2 times daily (with meals)       Last Filled:      Patient Phone Number: [phone] (home)     Last appt: 12/14/2023   Next appt: 7/18/2024    Last Labs DM:   Lab Results   Component Value Date/Time    LABA1C 7.9 05/26/2024 06:09 AM

## 2024-07-15 NOTE — TELEPHONE ENCOUNTER
Patient called requesting a refill     Rx- metFORMIN (GLUCOPHAGE-XR) 500 MG extended release tablet    Pharmacy- [facility]    LOV-  NOV- 7/18/24    Please advise

## 2024-07-17 ENCOUNTER — OFFICE VISIT (OUTPATIENT)
Dept: INTERNAL MEDICINE CLINIC | Age: 59
End: 2024-07-17
Payer: COMMERCIAL

## 2024-07-17 VITALS
HEART RATE: 98 BPM | TEMPERATURE: 97.8 F | OXYGEN SATURATION: 99 % | BODY MASS INDEX: 29.48 KG/M2 | WEIGHT: 222.4 LBS | RESPIRATION RATE: 18 BRPM | SYSTOLIC BLOOD PRESSURE: 130 MMHG | DIASTOLIC BLOOD PRESSURE: 80 MMHG | HEIGHT: 73 IN

## 2024-07-17 DIAGNOSIS — E11.69 DYSLIPIDEMIA ASSOCIATED WITH TYPE 2 DIABETES MELLITUS (HCC): ICD-10-CM

## 2024-07-17 DIAGNOSIS — I10 ESSENTIAL HYPERTENSION: Primary | ICD-10-CM

## 2024-07-17 DIAGNOSIS — E11.59 TYPE 2 DIABETES MELLITUS WITH VASCULAR DISEASE (HCC): ICD-10-CM

## 2024-07-17 DIAGNOSIS — E78.5 DYSLIPIDEMIA ASSOCIATED WITH TYPE 2 DIABETES MELLITUS (HCC): ICD-10-CM

## 2024-07-17 DIAGNOSIS — E11.3299 TYPE 2 DIABETES MELLITUS WITH BACKGROUND RETINOPATHY (HCC): ICD-10-CM

## 2024-07-17 PROCEDURE — G2211 COMPLEX E/M VISIT ADD ON: HCPCS | Performed by: INTERNAL MEDICINE

## 2024-07-17 PROCEDURE — 3079F DIAST BP 80-89 MM HG: CPT | Performed by: INTERNAL MEDICINE

## 2024-07-17 PROCEDURE — 3051F HG A1C>EQUAL 7.0%<8.0%: CPT | Performed by: INTERNAL MEDICINE

## 2024-07-17 PROCEDURE — 99214 OFFICE O/P EST MOD 30 MIN: CPT | Performed by: INTERNAL MEDICINE

## 2024-07-17 PROCEDURE — 3075F SYST BP GE 130 - 139MM HG: CPT | Performed by: INTERNAL MEDICINE

## 2024-07-17 RX ORDER — ATORVASTATIN CALCIUM 80 MG/1
TABLET, FILM COATED ORAL
Qty: 90 TABLET | Refills: 1 | Status: SHIPPED | OUTPATIENT
Start: 2024-07-17

## 2024-07-17 ASSESSMENT — PATIENT HEALTH QUESTIONNAIRE - PHQ9
SUM OF ALL RESPONSES TO PHQ9 QUESTIONS 1 & 2: 0
SUM OF ALL RESPONSES TO PHQ QUESTIONS 1-9: 0
5. POOR APPETITE OR OVEREATING: NOT AT ALL
10. IF YOU CHECKED OFF ANY PROBLEMS, HOW DIFFICULT HAVE THESE PROBLEMS MADE IT FOR YOU TO DO YOUR WORK, TAKE CARE OF THINGS AT HOME, OR GET ALONG WITH OTHER PEOPLE: NOT DIFFICULT AT ALL
SUM OF ALL RESPONSES TO PHQ QUESTIONS 1-9: 0
6. FEELING BAD ABOUT YOURSELF - OR THAT YOU ARE A FAILURE OR HAVE LET YOURSELF OR YOUR FAMILY DOWN: NOT AT ALL
7. TROUBLE CONCENTRATING ON THINGS, SUCH AS READING THE NEWSPAPER OR WATCHING TELEVISION: NOT AT ALL
8. MOVING OR SPEAKING SO SLOWLY THAT OTHER PEOPLE COULD HAVE NOTICED. OR THE OPPOSITE, BEING SO FIGETY OR RESTLESS THAT YOU HAVE BEEN MOVING AROUND A LOT MORE THAN USUAL: NOT AT ALL
SUM OF ALL RESPONSES TO PHQ QUESTIONS 1-9: 0
3. TROUBLE FALLING OR STAYING ASLEEP: NOT AT ALL
2. FEELING DOWN, DEPRESSED OR HOPELESS: NOT AT ALL
9. THOUGHTS THAT YOU WOULD BE BETTER OFF DEAD, OR OF HURTING YOURSELF: NOT AT ALL
SUM OF ALL RESPONSES TO PHQ QUESTIONS 1-9: 0
1. LITTLE INTEREST OR PLEASURE IN DOING THINGS: NOT AT ALL
4. FEELING TIRED OR HAVING LITTLE ENERGY: NOT AT ALL

## 2024-07-17 ASSESSMENT — ANXIETY QUESTIONNAIRES
7. FEELING AFRAID AS IF SOMETHING AWFUL MIGHT HAPPEN: NOT AT ALL
GAD7 TOTAL SCORE: 0
5. BEING SO RESTLESS THAT IT IS HARD TO SIT STILL: NOT AT ALL
4. TROUBLE RELAXING: NOT AT ALL
3. WORRYING TOO MUCH ABOUT DIFFERENT THINGS: NOT AT ALL
1. FEELING NERVOUS, ANXIOUS, OR ON EDGE: NOT AT ALL
IF YOU CHECKED OFF ANY PROBLEMS ON THIS QUESTIONNAIRE, HOW DIFFICULT HAVE THESE PROBLEMS MADE IT FOR YOU TO DO YOUR WORK, TAKE CARE OF THINGS AT HOME, OR GET ALONG WITH OTHER PEOPLE: NOT DIFFICULT AT ALL
2. NOT BEING ABLE TO STOP OR CONTROL WORRYING: NOT AT ALL
6. BECOMING EASILY ANNOYED OR IRRITABLE: NOT AT ALL

## 2024-07-17 NOTE — PROGRESS NOTES
Juaquin Delaney (:  1965) is a 58 y.o. male,Established patient, here for evaluation of the following chief complaint(s):  Follow-up and Hypertension      Assessment & Plan   1. Essential hypertension  Improved  -  continue higher dose of exforge    2. Type 2 diabetes mellitus with vascular disease (HCC)  stable  -     atorvastatin (LIPITOR) 80 MG tablet; TAKE ONE TABLET BY MOUTH ONCE A DAY, Disp-90 tablet, R-1Normal  -     continue lantus and ozempic    3. 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 (HCC)  stable  -     atorvastatin (LIPITOR) 80 MG tablet; TAKE ONE TABLET BY MOUTH ONCE A DAY, Disp-90 tablet, R-1Normal  -     continue lantus and ozempic    4. Dyslipidemia associated with type 2 diabetes mellitus (HCC)  -     atorvastatin (LIPITOR) 80 MG tablet; TAKE ONE TABLET BY MOUTH ONCE A DAY, Disp-90 tablet, R-1Normal      Return in about 2 months (around 2024) for hypertension.       Subjective   HPI  Hypertension:  Home blood pressure monitoring: No.  He is adherent to a low sodium diet. Patient denies chest pain, shortness of breath, and headache.  Antihypertensive medication side effects: no medication side effects noted.  Use of agents associated with hypertension: none.                                        Sodium (mmol/L)   Date Value   2024 141    BUN (mg/dL)   Date Value   2024 21 (H)    Glucose (mg/dL)   Date Value   2024 137 (H)      Potassium (mmol/L)   Date Value   2024 4.3     Potassium reflex Magnesium (mmol/L)   Date Value   2024 4.3    Creatinine (mg/dL)   Date Value   2024 1.4 (H)             Review of Systems       Objective   Vitals:    24 1651   BP: 130/80   Pulse: 98   Resp: 18   Temp: 97.8 °F (36.6 °C)   SpO2: 99%   Weight: 100.9 kg (222 lb 6.4 oz)   Height: 1.854 m (6' 1\")      Wt Readings from Last 3 Encounters:   24 100.9 kg (222 lb 6.4 oz)   24 102.5 kg (226 lb)   24 105.7 kg (233 lb)     BP Readings

## 2024-07-18 ENCOUNTER — OFFICE VISIT (OUTPATIENT)
Dept: ENDOCRINOLOGY | Age: 59
End: 2024-07-18
Payer: COMMERCIAL

## 2024-07-18 VITALS
DIASTOLIC BLOOD PRESSURE: 100 MMHG | BODY MASS INDEX: 29.42 KG/M2 | HEART RATE: 90 BPM | HEIGHT: 73 IN | SYSTOLIC BLOOD PRESSURE: 145 MMHG | TEMPERATURE: 98 F | OXYGEN SATURATION: 98 % | WEIGHT: 222 LBS

## 2024-07-18 DIAGNOSIS — E11.3299 TYPE 2 DIABETES MELLITUS WITH BACKGROUND RETINOPATHY (HCC): ICD-10-CM

## 2024-07-18 DIAGNOSIS — E11.69 DYSLIPIDEMIA ASSOCIATED WITH TYPE 2 DIABETES MELLITUS (HCC): ICD-10-CM

## 2024-07-18 DIAGNOSIS — I10 ESSENTIAL HYPERTENSION: ICD-10-CM

## 2024-07-18 DIAGNOSIS — E11.59 TYPE 2 DIABETES MELLITUS WITH VASCULAR DISEASE (HCC): Primary | ICD-10-CM

## 2024-07-18 DIAGNOSIS — E78.5 DYSLIPIDEMIA ASSOCIATED WITH TYPE 2 DIABETES MELLITUS (HCC): ICD-10-CM

## 2024-07-18 PROCEDURE — 3051F HG A1C>EQUAL 7.0%<8.0%: CPT | Performed by: INTERNAL MEDICINE

## 2024-07-18 PROCEDURE — 3077F SYST BP >= 140 MM HG: CPT | Performed by: INTERNAL MEDICINE

## 2024-07-18 PROCEDURE — 3080F DIAST BP >= 90 MM HG: CPT | Performed by: INTERNAL MEDICINE

## 2024-07-18 PROCEDURE — 99214 OFFICE O/P EST MOD 30 MIN: CPT | Performed by: INTERNAL MEDICINE

## 2024-07-18 NOTE — PATIENT INSTRUCTIONS
C/w Metformin Er 500mg two twice a day   C/w Ozempic 2 mg weekly   Change Lantus to 15 units in the morning . If sugars drop 90 or under cut down insulin to 12 units.       Change Folic acid 4 mg, one tab three days a week

## 2024-07-18 NOTE — PROGRESS NOTES
Patient ID:   Juaquin Delaney is a 58 y.o. male    Chief Complaint:   Juaquin Delaney presents for an evaluation of Type 2 Diabetes Mellitus , Hyperlipidemia and hypertension.        Subjective:   Type 2 Diabetes Mellitus diagnosed in 1998  On insulin since 2016     Previously followed by  endocrine and physician moved out. Records reviewed from care everywhere   Pioglitazone 30mg daily - stopped in Feb 2018   Jardiance stopped in summer 2018 for worsening CKD     Interval history:   CVA - May 2024 . No deficits     S/p gastric sleeve in Sep 2019.  presurgery weight 280 lbs.   Down another 15 lbs. Now 222 lbs. It went up recently with his travelling as grandson is playing football      Metformin ER 500mg, two tabs twice a day.     Ozempic 2.0 mg weekly on Sunday   Lantus 18 units in am      Forgot the meter     Checks blood sugars 1 times per day. Reviewed     AM:    Supper:    HS:      Hypoglycemias: Morning hypoglycemias have resolved since moving lantus in am. No episodes now. Awareness present in 70's    Meals: Three, dinner is big. 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 Lifting weights for toning up. Walking and cardio     Denies chest pain, exertional dyspnea.     CVA in Aug 2016. No residual deficits.  Denies smoking.     Currently on ASA 81 mg daily     The following portions of the patient's history were reviewed and updated as appropriate:       Family History   Problem Relation Age of Onset    Heart Attack Mother     Diabetes Mother     High Blood Pressure Mother     Colon Cancer Sister          Social History     Socioeconomic History    Marital status:      Spouse name: Not on file    Number of children: Not on file    Years of education: Not on file    Highest education level: Not on file   Occupational History    Not on file   Tobacco Use    Smoking status: Never    Smokeless tobacco: Never   Vaping Use    Vaping Use: Never used

## 2024-07-25 ENCOUNTER — OFFICE VISIT (OUTPATIENT)
Dept: ORTHOPEDIC SURGERY | Age: 59
End: 2024-07-25

## 2024-07-25 VITALS — HEIGHT: 73 IN | BODY MASS INDEX: 29.42 KG/M2 | WEIGHT: 222 LBS

## 2024-07-25 DIAGNOSIS — Z98.890 S/P LEFT ROTATOR CUFF REPAIR: Primary | ICD-10-CM

## 2024-07-25 PROCEDURE — 99024 POSTOP FOLLOW-UP VISIT: CPT | Performed by: ORTHOPAEDIC SURGERY

## 2024-07-25 NOTE — PROGRESS NOTES
ORTHOPAEDIC OFFICE NOTE    Chief Complaint   Patient presents with    Post-Op Check     Left shoulder, DOS 6/11/24       HPI  7/25/24  Second postop check  He reports the left shoulder is doing good  Pain controlled  No numbness or tingling  He stopped using the sling few days ago      6/24/24  Postop check left shoulder  He reports the left shoulder is overall doing well  No major issues, pain controlled  No incisional issues  No numbness or tingling      6/11/24  PROCEDURES PERFORMED: Left shoulder arthroscopy, debridement, rotator cuff repair, biceps tenodesis.   OPERATIVE FINDINGS:    1. Minimal chondromalacia involving the glenoid and humeral head.  There was circumferential labral degeneration, diffuse, but worse over the superior labral region involving the long head of the biceps anchor.  2. The long head of the biceps tendon itself looked intact and of good quality, but the anchor at the superior labrum/supraglenoid tubercle was unstable related to the degenerative SLAP tear.  3. Intact subscapularis.  4. Large supraspinatus tear, with extension to involve the anterior infraspinatus.  5. Small calcifications within the glenohumeral joint (superior glenoid articular cartilage) as well as in the subacromial space involving the frayed coracoacromial ligament and deep cuff fibers, likely small pseudogout deposits.      4/19/24  Juaquin returns to clinic today for follow-up of his left shoulder  He reports no significant change  He is a diabetic, he reports his sugars are well-controlled  He does not smoke  He is not on any blood thinners  No history of VTE  He does not take any narcotic pain medications  He does office work      4/5/24  58 y.o. male RHD seen in consultation at the request of Tank Sevilla MD for evaluation of left shoulder pain:  I previously saw him for the same shoulder back in 2018, and he was diagnosed with adhesive capsulitis and rotator cuff tendinitis  He reports he did physical

## 2024-07-30 ENCOUNTER — HOSPITAL ENCOUNTER (OUTPATIENT)
Dept: PHYSICAL THERAPY | Age: 59
Setting detail: THERAPIES SERIES
Discharge: HOME | End: 2024-07-30
Attending: ORTHOPAEDIC SURGERY
Payer: COMMERCIAL

## 2024-07-30 DIAGNOSIS — M25.612 DECREASED RANGE OF MOTION OF LEFT SHOULDER: Primary | ICD-10-CM

## 2024-07-30 DIAGNOSIS — R29.3 POSTURE ABNORMALITY: ICD-10-CM

## 2024-07-30 DIAGNOSIS — R29.898 SHOULDER WEAKNESS: ICD-10-CM

## 2024-07-30 PROCEDURE — 97110 THERAPEUTIC EXERCISES: CPT

## 2024-07-30 PROCEDURE — 97161 PT EVAL LOW COMPLEX 20 MIN: CPT

## 2024-07-30 PROCEDURE — 97140 MANUAL THERAPY 1/> REGIONS: CPT

## 2024-07-30 NOTE — PLAN OF CARE
[facility] - Outpatient Rehabilitation and Therapy [address], [place] [address] office: [phone] fax: [phone]     Physical Therapy Initial Evaluation Certification      Dear Mirza Joseph MD,    We had the pleasure of evaluating the following patient for physical therapy services at [facility].  A summary of our findings can be found in the initial assessment below.  This includes our plan of care.  If you have any questions or concerns regarding these findings, please do not hesitate to contact me at the office phone number listed above.  Thank you for the referral.     Physician Signature:_______________________________Date:__________________  By signing above (or electronic signature), therapist’s plan is approved by physician       Physical Therapy: TREATMENT/PROGRESS NOTE   Patient: Juaquin Delaney (58 y.o. male)   Examination Date: 2024   :  1965 MRN: [id number]   Visit #: 1   Insurance Allowable Auth Needed   mn []Yes    [x]No    Insurance: Payor: [facility] / Plan: [facility] EMPLOYEES / Product Type: *No Product type* /   Insurance ID: [id number] - (Commercial)  Secondary Insurance (if applicable):    Treatment Diagnosis:     ICD-10-CM    1. Decreased range of motion of left shoulder  M25.612       2. Posture abnormality  R29.3       3. Shoulder weakness  R29.898          Medical Diagnosis:  S/P left rotator cuff repair [Z98.890]   Referring Physician: Mirza Joseph MD  PCP: Tank Sevilla MD     Plan of care signed (Y/N):     Date of Patient follow up with Physician:      Plan of Care Report: EVAL today  POC update due: (10 visits /OR AUTH LIMITS, whichever is less)  2024                                             Medical History:  Comorbidities:  Diabetes (Type I or II)  Hypertension  Relevant Medical History:                                          Precautions/ Contra-indications:           Latex allergy:

## 2024-08-05 DIAGNOSIS — I10 ESSENTIAL HYPERTENSION: ICD-10-CM

## 2024-08-05 RX ORDER — FUROSEMIDE 20 MG/1
TABLET ORAL
Qty: 90 TABLET | Refills: 2 | Status: SHIPPED | OUTPATIENT
Start: 2024-08-05

## 2024-08-07 ENCOUNTER — APPOINTMENT (OUTPATIENT)
Dept: PHYSICAL THERAPY | Age: 59
End: 2024-08-07
Attending: ORTHOPAEDIC SURGERY
Payer: COMMERCIAL

## 2024-08-08 ENCOUNTER — CLINICAL DOCUMENTATION (OUTPATIENT)
Dept: PHARMACY | Facility: CLINIC | Age: 59
End: 2024-08-08

## 2024-08-08 ENCOUNTER — PATIENT MESSAGE (OUTPATIENT)
Dept: INTERNAL MEDICINE CLINIC | Age: 59
End: 2024-08-08

## 2024-08-08 DIAGNOSIS — I10 ESSENTIAL HYPERTENSION: ICD-10-CM

## 2024-08-08 NOTE — PROGRESS NOTES
2nd Quarterly Reminder sent to patient for the DM Program - See Mychart message or Letter for more information.    Kalyey Wyatt CphT  [facility]  Clinical Pharmacy   Department, toll free: [phone] Option #3    For Pharmacy Admin Tracking Only    Program: [employer]  CPA in place:  No  Gap Closed?: Yes   Time Spent (min): 5

## 2024-08-08 NOTE — TELEPHONE ENCOUNTER
From: Juaquin Delaney  To: Dr. Tank Sevilla  Sent: 8/8/2024 1:15 PM EDT  Subject: Blood pressure medication     Hello.... I am concerned about my diastolic blood pressure.. it is still running mid 90's to 116 while my systolic is ranging 120's to 130's with some in the 150's. Today a blood vessel in my eye raptured (see attached ). @1301 my pressure was 130/101 medical taken at 0615.     I await additional direction.   Thank you.

## 2024-08-09 ENCOUNTER — APPOINTMENT (OUTPATIENT)
Dept: PHYSICAL THERAPY | Age: 59
End: 2024-08-09
Attending: ORTHOPAEDIC SURGERY
Payer: COMMERCIAL

## 2024-08-09 RX ORDER — CARVEDILOL 25 MG/1
25 TABLET ORAL 2 TIMES DAILY
Qty: 180 TABLET | Refills: 1 | Status: SHIPPED | OUTPATIENT
Start: 2024-08-09 | End: 2025-02-05

## 2024-08-14 ENCOUNTER — HOSPITAL ENCOUNTER (OUTPATIENT)
Dept: PHYSICAL THERAPY | Age: 59
Setting detail: THERAPIES SERIES
Discharge: HOME | End: 2024-08-14
Attending: ORTHOPAEDIC SURGERY
Payer: COMMERCIAL

## 2024-08-14 ENCOUNTER — TELEPHONE (OUTPATIENT)
Dept: ENDOCRINOLOGY | Age: 59
End: 2024-08-14

## 2024-08-14 DIAGNOSIS — E11.59 TYPE 2 DIABETES MELLITUS WITH VASCULAR DISEASE (HCC): ICD-10-CM

## 2024-08-14 DIAGNOSIS — E11.3299 TYPE 2 DIABETES MELLITUS WITH BACKGROUND RETINOPATHY (HCC): ICD-10-CM

## 2024-08-14 PROCEDURE — 97140 MANUAL THERAPY 1/> REGIONS: CPT

## 2024-08-14 PROCEDURE — 97112 NEUROMUSCULAR REEDUCATION: CPT

## 2024-08-14 PROCEDURE — 97110 THERAPEUTIC EXERCISES: CPT

## 2024-08-14 RX ORDER — METFORMIN HCL 500 MG
1000 TABLET, EXTENDED RELEASE 24 HR ORAL 2 TIMES DAILY WITH MEALS
Qty: 120 TABLET | Refills: 0 | OUTPATIENT
Start: 2024-08-14

## 2024-08-14 RX ORDER — METFORMIN HYDROCHLORIDE 500 MG/1
1000 TABLET, EXTENDED RELEASE ORAL 2 TIMES DAILY WITH MEALS
Qty: 360 TABLET | Refills: 0 | Status: SHIPPED | OUTPATIENT
Start: 2024-08-14

## 2024-08-14 NOTE — FLOWSHEET NOTE
[facility] - Outpatient Rehabilitation and Therapy [address], [place] [address] office: [phone] fax: [phone]     Physical Therapy: TREATMENT/PROGRESS NOTE   Patient: Juaquin Delaney (58 y.o. male)   Examination Date: 2024   :  1965 MRN: [id number]   Visit #: 2   Insurance Allowable Auth Needed   mn []Yes    [x]No    Insurance: Payor: [facility] / Plan: [facility] EMPLOYEES / Product Type: *No Product type* /   Insurance ID: [id number] - (Commercial)  Secondary Insurance (if applicable):    Treatment Diagnosis:     ICD-10-CM    1. Decreased range of motion of left shoulder  M25.612       2. Posture abnormality  R29.3       3. Shoulder weakness  R29.898          Medical Diagnosis:  S/P left rotator cuff repair [Z98.890]   Referring Physician: Mirza Joseph MD  PCP: Tank Sevilla MD     Plan of care signed (Y/N): Y    Date of Patient follow up with Physician:      Plan of Care Report: NO  POC update due: (10 visits /OR AUTH LIMITS, whichever is less)  2024                                             Medical History:  Comorbidities:  Diabetes (Type I or II)  Hypertension  Relevant Medical History:                                          Precautions/ Contra-indications:           Latex allergy:  NO  Pacemaker:    NO  Contraindications for Manipulation: None  Date of Surgery: 24 L RTC repair and bicep tenodesis . Ok for full P/AA/AROM. Start strength when ROM is restored.   Other:    Red Flags:  None    Suicide Screening:   The patient did not verbalize a primary behavioral concern, suicidal ideation, suicidal intent, or demonstrate suicidal behaviors.    Preferred Language for Healthcare:   [x] English       [] other:    SUBJECTIVE EXAMINATION     Patient stated complaint: Pt reports good response to initial visit and notes good compliance with HEP. Pt reports 1-2/10 L SHLD pain currently and states that pain is not \"unbearable.\"         Test used Initial

## 2024-08-14 NOTE — TELEPHONE ENCOUNTER
Medication:   Requested Prescriptions     Pending Prescriptions Disp Refills    metFORMIN (GLUCOPHAGE-XR) 500 MG extended release tablet 360 tablet 0     Sig: Take 2 tablets by mouth 2 times daily (with meals)       Last Filled:      Patient Phone Number: [phone] (home)     Last appt: 7/18/2024   Next appt: 10/22/2024    Last Labs DM:   Lab Results   Component Value Date/Time    LABA1C 7.9 05/26/2024 06:09 AM

## 2024-08-14 NOTE — TELEPHONE ENCOUNTER
Pt asking for a refill        metFORMIN (GLUCOPHAGE-XR) 500 MG extended release tablet        [facility] - [facility] - [place], [place] - [address], [address] - P [phone] - F [phone]  [address], [address], [place] [address]  Phone: [phone]  Fax: [phone]

## 2024-08-16 ENCOUNTER — HOSPITAL ENCOUNTER (OUTPATIENT)
Dept: PHYSICAL THERAPY | Age: 59
Setting detail: THERAPIES SERIES
Discharge: HOME | End: 2024-08-16
Attending: ORTHOPAEDIC SURGERY
Payer: COMMERCIAL

## 2024-08-16 PROCEDURE — 97112 NEUROMUSCULAR REEDUCATION: CPT

## 2024-08-16 PROCEDURE — 97140 MANUAL THERAPY 1/> REGIONS: CPT

## 2024-08-16 PROCEDURE — 97110 THERAPEUTIC EXERCISES: CPT

## 2024-08-16 NOTE — FLOWSHEET NOTE
complexities/Impairments listed.  [] Progression has been slowed due to co-morbidities.  [x] Plan just implemented, too soon (<30days) to assess goals progression   [] Goals require adjustment due to lack of progress  [] Patient is not progressing as expected and requires additional follow up with physician  [] Other:     TREATMENT PLAN     Frequency/Duration: 2x/week for 6 weeks for the following treatment interventions:    Interventions:  Therapeutic Exercise (00492) including: strength training, ROM, and functional mobility  Therapeutic Activities (45741) including: functional mobility training and education.  Neuromuscular Re-education (65544) activation and proprioception, including postural re-education.    Manual Therapy (38967) as indicated to include: Passive Range of Motion, Gr I-IV mobilizations, Soft Tissue Mobilization, and Trigger Point Release    Plan: POC initiated as per evaluation    Electronically Signed by HUSSEIN OTT PT, DPT, OMT-C  Date: 08/16/2024     Note: Portions of this note have been templated and/or copied from initial evaluation, reassessments and prior notes for documentation efficiency.    Note: If patient does not return for scheduled/recommended follow up visits, this note will serve as a discharge from care along with the most recent update on progress.    Ortho Evaluation

## 2024-08-21 ENCOUNTER — APPOINTMENT (OUTPATIENT)
Dept: PHYSICAL THERAPY | Age: 59
End: 2024-08-21
Attending: ORTHOPAEDIC SURGERY
Payer: COMMERCIAL

## 2024-08-26 DIAGNOSIS — I63.532 ACUTE ISCHEMIC LEFT PCA STROKE (HCC): ICD-10-CM

## 2024-08-26 DIAGNOSIS — Z86.73 HISTORY OF STROKE: ICD-10-CM

## 2024-08-27 DIAGNOSIS — I63.532 ACUTE ISCHEMIC LEFT PCA STROKE (HCC): ICD-10-CM

## 2024-08-27 DIAGNOSIS — Z86.73 HISTORY OF STROKE: ICD-10-CM

## 2024-08-27 RX ORDER — CLOPIDOGREL BISULFATE 75 MG/1
75 TABLET ORAL DAILY
Qty: 60 TABLET | Refills: 0 | Status: SHIPPED | OUTPATIENT
Start: 2024-08-27 | End: 2024-08-27 | Stop reason: SDUPTHER

## 2024-08-27 RX ORDER — CLOPIDOGREL BISULFATE 75 MG/1
75 TABLET ORAL DAILY
Qty: 60 TABLET | Refills: 0 | Status: SHIPPED | OUTPATIENT
Start: 2024-08-27

## 2024-08-27 NOTE — TELEPHONE ENCOUNTER
clopidogrel (PLAVIX) 75 MG tablet     Can Dr Sevilla resend?  They are having a problem trying to fill it  Send to [facility]    Thank you

## 2024-08-27 NOTE — TELEPHONE ENCOUNTER
Medication:   Requested Prescriptions     Pending Prescriptions Disp Refills    clopidogrel (PLAVIX) 75 MG tablet [Pharmacy Med Name: CLOPIDOGREL BISULFATE 75MG TABS] 60 tablet 0     Sig: TAKE ONE TABLET BY MOUTH ONCE A DAY     Last Filled:  6/30/24    Last appt: 7/17/2024   Next appt: 9/17/2024    Last OARRS:        No data to display

## 2024-08-28 ENCOUNTER — APPOINTMENT (OUTPATIENT)
Dept: PHYSICAL THERAPY | Age: 59
End: 2024-08-28
Attending: ORTHOPAEDIC SURGERY
Payer: COMMERCIAL

## 2024-08-30 ENCOUNTER — HOSPITAL ENCOUNTER (OUTPATIENT)
Dept: PHYSICAL THERAPY | Age: 59
Setting detail: THERAPIES SERIES
Discharge: HOME | End: 2024-08-30
Attending: ORTHOPAEDIC SURGERY
Payer: COMMERCIAL

## 2024-08-30 PROCEDURE — 97112 NEUROMUSCULAR REEDUCATION: CPT

## 2024-08-30 PROCEDURE — 97110 THERAPEUTIC EXERCISES: CPT

## 2024-08-30 PROCEDURE — 97140 MANUAL THERAPY 1/> REGIONS: CPT

## 2024-08-30 NOTE — FLOWSHEET NOTE
[facility] - Outpatient Rehabilitation and Therapy [address], [place] [address] office: [phone] fax: [phone]     Physical Therapy: TREATMENT/PROGRESS NOTE   Patient: Juaquin Delaney (58 y.o. male)   Examination Date: 2024   :  1965 MRN: [id number]   Visit #: 4   Insurance Allowable Auth Needed   mn []Yes    [x]No    Insurance: Payor: [facility] / Plan: [facility] EMPLOYEES / Product Type: *No Product type* /   Insurance ID: [id number] - (Commercial)  Secondary Insurance (if applicable):    Treatment Diagnosis:     ICD-10-CM    1. Decreased range of motion of left shoulder  M25.612       2. Posture abnormality  R29.3       3. Shoulder weakness  R29.898          Medical Diagnosis:  S/P left rotator cuff repair [Z98.890]   Referring Physician: Mirza Joseph MD  PCP: Tank Sevilla MD     Plan of care signed (Y/N): Y    Date of Patient follow up with Physician:      Plan of Care Report: NO  POC update due: (10 visits /OR AUTH LIMITS, whichever is less)  2024                                             Medical History:  Comorbidities:  Diabetes (Type I or II)  Hypertension  Relevant Medical History:                                          Precautions/ Contra-indications:           Latex allergy:  NO  Pacemaker:    NO  Contraindications for Manipulation: None  Date of Surgery: 24 L RTC repair and bicep tenodesis . Ok for full P/AA/AROM. Start strength when ROM is restored.   Other:    Red Flags:  None    Suicide Screening:   The patient did not verbalize a primary behavioral concern, suicidal ideation, suicidal intent, or demonstrate suicidal behaviors.    Preferred Language for Healthcare:   [x] English       [] other:    SUBJECTIVE EXAMINATION     Patient stated complaint: Pt states things are going well, pain is low. Tired from work.           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 Independent in HEP and progression per patient tolerance, in order to prevent re-injury.   [] Progressing: [] Met: [] Not Met: [] Adjusted  2. Patient will have a decrease in pain to <2/10 to facilitate improvement in movement, function, and ADLs as indicated by Functional Deficits.  [] Progressing: [] Met: [] Not Met: [] Adjusted    Long Term Goals: To be achieved in: 6 weeks  1. Disability index score of 10% or less for the Quick DASH to assist with reaching prior level of function with activities such as ADLs.  [] Progressing: [] Met: [] Not Met: [] Adjusted  2. Patient will demonstrate increased AROM of flex/abd/er/ir  to 140/130/ T1/T12 without pain to allow for proper joint functioning to enable patient to perform ADLs and work tasks.   [] Progressing: [] Met: [] Not Met: [] Adjusted  3. Patient will demonstrate increased Strength of L shoulder to at least 4+/5 throughout without pain to allow for proper functional mobility to enable patient to return to reach above head.   [] Progressing: [] Met: [] Not Met: [] Adjusted  4. 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   [] Progressing: [] Met: [] Not Met: [] Adjusted      Overall Progression Towards Functional goals/ Treatment Progress Update:  [] Patient is progressing

## 2024-09-03 ENCOUNTER — TELEPHONE (OUTPATIENT)
Dept: ENDOCRINOLOGY | Age: 59
End: 2024-09-03

## 2024-09-03 NOTE — TELEPHONE ENCOUNTER
Submitted PA for Ozempic  Via [facility] Key: [id number] STATUS: PENDING.    Follow up done daily; if no decision with in three days we will refax.  If another three days goes by with no decision will call the insurance for status.

## 2024-09-04 ENCOUNTER — HOSPITAL ENCOUNTER (OUTPATIENT)
Dept: PHYSICAL THERAPY | Age: 59
Setting detail: THERAPIES SERIES
Discharge: HOME | End: 2024-09-04
Attending: ORTHOPAEDIC SURGERY
Payer: COMMERCIAL

## 2024-09-04 PROCEDURE — 97110 THERAPEUTIC EXERCISES: CPT

## 2024-09-04 PROCEDURE — 97140 MANUAL THERAPY 1/> REGIONS: CPT

## 2024-09-04 PROCEDURE — 97112 NEUROMUSCULAR REEDUCATION: CPT

## 2024-09-04 NOTE — TELEPHONE ENCOUNTER
Approved on September 3   The request has been approved. The authorization is effective from 09/13/2024 to 09/12/2025, as long as the member is enrolled in their current health plan. The request was approved as submitted. A prior authorization [id number] has been entered for OZEMPIC 0.25-0.5 MG/DOSE PEN, this request is effective from 09/13/2024 and is valid until 09/12/2025.A prior authorization [id number] has been entered for OZEMPIC 1 MG/DOSE (4 MG/3 ML), this request is effective from 09/13/2024 and is valid until 09/12/2025.    If this requires a response please respond to the pool ( P MHCX PSC MEDICATION PRE-AUTH).      Thank you please advise patient.

## 2024-09-04 NOTE — FLOWSHEET NOTE
I certify that this patient meets the below criteria necessary for medical necessity for care and/or justification of therapy services:  The patient has functional impairments and/or activity limitations and would benefit from continued outpatient therapy services to address the deficits outlined in the patients goals  The patient has a musculoskeletal condition(s) with a corresponding ICD-10 code that is of complexity and severity that require skilled therapeutic intervention. This has a direct and significant impact on the need for therapy and significantly impacts the rate of recovery.     Return to Play: NA    Prognosis for POC: [x] Good [] Fair  [] Poor    Patient requires continued skilled intervention: [x] Yes  [] No      CHARGE CAPTURE     PT CHARGE GRID   CPT Code (TIMED) minutes # CPT Code (UNTIMED) #     Therex (63951)  11 1  EVAL:LOW (58532 - Typically 20 minutes face-to-face)     Neuromusc. Re-ed (06328) 17 1  Re-Eval (46636)     Manual (49191) 10 1  Estim Unattended (84661)     Ther. Act (21230)    Mech. Traction (10751)     Gait (31068)    Dry Needle 1-2 muscle (20560)     Aquatic Therex (07080)    Dry Needle 3+ muscle (20561)     Iontophoresis (38765)    VASO (19787)     Ultrasound (00449)    Group Therapy (01908)     Estim Attended (72279)    Canalith Repositioning (65141)     Other:    Other:    Total Timed Code Tx Minutes 38 3       Total Treatment Minutes 38        Charge Justification:  (59835) THERAPEUTIC EXERCISE - Provided verbal/tactile cueing for activities related to strengthening, flexibility, endurance, ROM performed to prevent loss of range of motion, maintain or improve muscular strength or increase flexibility, following either an injury or surgery.   (65308) NEUROMUSCULAR RE-EDUCATION - Therapeutic procedure, 1 or more areas, each 15 minutes; neuromuscular reeducation of movement, balance, coordination, kinesthetic sense, posture, and/or proprioception for sitting and/or standing

## 2024-09-09 ENCOUNTER — HOSPITAL ENCOUNTER (OUTPATIENT)
Dept: PHYSICAL THERAPY | Age: 59
Setting detail: THERAPIES SERIES
Discharge: HOME | End: 2024-09-09
Attending: ORTHOPAEDIC SURGERY
Payer: COMMERCIAL

## 2024-09-09 PROCEDURE — 97530 THERAPEUTIC ACTIVITIES: CPT

## 2024-09-09 PROCEDURE — 97110 THERAPEUTIC EXERCISES: CPT

## 2024-09-09 PROCEDURE — 97112 NEUROMUSCULAR REEDUCATION: CPT

## 2024-09-13 ENCOUNTER — HOSPITAL ENCOUNTER (OUTPATIENT)
Dept: PHYSICAL THERAPY | Age: 59
Setting detail: THERAPIES SERIES
Discharge: HOME | End: 2024-09-13
Attending: ORTHOPAEDIC SURGERY
Payer: COMMERCIAL

## 2024-09-13 PROCEDURE — 97140 MANUAL THERAPY 1/> REGIONS: CPT

## 2024-09-13 PROCEDURE — 97110 THERAPEUTIC EXERCISES: CPT

## 2024-09-13 PROCEDURE — 97112 NEUROMUSCULAR REEDUCATION: CPT

## 2024-09-16 ENCOUNTER — HOSPITAL ENCOUNTER (OUTPATIENT)
Dept: PHYSICAL THERAPY | Age: 59
Setting detail: THERAPIES SERIES
Discharge: HOME | End: 2024-09-16
Attending: ORTHOPAEDIC SURGERY
Payer: COMMERCIAL

## 2024-09-16 PROCEDURE — 97112 NEUROMUSCULAR REEDUCATION: CPT

## 2024-09-16 PROCEDURE — 97530 THERAPEUTIC ACTIVITIES: CPT

## 2024-09-16 PROCEDURE — 97110 THERAPEUTIC EXERCISES: CPT

## 2024-09-17 ENCOUNTER — OFFICE VISIT (OUTPATIENT)
Dept: INTERNAL MEDICINE CLINIC | Age: 59
End: 2024-09-17

## 2024-09-17 VITALS
SYSTOLIC BLOOD PRESSURE: 134 MMHG | OXYGEN SATURATION: 97 % | RESPIRATION RATE: 18 BRPM | DIASTOLIC BLOOD PRESSURE: 80 MMHG | WEIGHT: 229 LBS | BODY MASS INDEX: 30.35 KG/M2 | TEMPERATURE: 97.6 F | HEART RATE: 73 BPM | HEIGHT: 73 IN

## 2024-09-17 DIAGNOSIS — E11.3299 TYPE 2 DIABETES MELLITUS WITH BACKGROUND RETINOPATHY (HCC): ICD-10-CM

## 2024-09-17 DIAGNOSIS — I10 ESSENTIAL HYPERTENSION: ICD-10-CM

## 2024-09-17 DIAGNOSIS — Z23 NEED FOR VACCINATION: ICD-10-CM

## 2024-09-17 DIAGNOSIS — I10 ESSENTIAL HYPERTENSION: Primary | ICD-10-CM

## 2024-09-17 LAB
ALBUMIN SERPL-MCNC: 4.3 G/DL (ref 3.4–5)
ALBUMIN/GLOB SERPL: 1.8 {RATIO} (ref 1.1–2.2)
ALP SERPL-CCNC: 73 U/L (ref 40–129)
ALT SERPL-CCNC: 23 U/L (ref 10–40)
ANION GAP SERPL CALCULATED.3IONS-SCNC: 12 MMOL/L (ref 3–16)
AST SERPL-CCNC: 29 U/L (ref 15–37)
BILIRUB SERPL-MCNC: 0.8 MG/DL (ref 0–1)
BUN SERPL-MCNC: 18 MG/DL (ref 7–20)
CALCIUM SERPL-MCNC: 9.6 MG/DL (ref 8.3–10.6)
CHLORIDE SERPL-SCNC: 103 MMOL/L (ref 99–110)
CHOLEST SERPL-MCNC: 97 MG/DL (ref 0–199)
CO2 SERPL-SCNC: 25 MMOL/L (ref 21–32)
CREAT SERPL-MCNC: 1.3 MG/DL (ref 0.9–1.3)
CREAT UR-MCNC: 67.1 MG/DL (ref 39–259)
GFR SERPLBLD CREATININE-BSD FMLA CKD-EPI: 64 ML/MIN/{1.73_M2}
GLUCOSE SERPL-MCNC: 70 MG/DL (ref 70–99)
HDLC SERPL-MCNC: 42 MG/DL (ref 40–60)
LDLC SERPL CALC-MCNC: 42 MG/DL
MICROALBUMIN UR DL<=1MG/L-MCNC: <1.2 MG/DL
MICROALBUMIN/CREAT UR: NORMAL MG/G (ref 0–30)
POTASSIUM SERPL-SCNC: 4.2 MMOL/L (ref 3.5–5.1)
PROT SERPL-MCNC: 6.7 G/DL (ref 6.4–8.2)
SODIUM SERPL-SCNC: 140 MMOL/L (ref 136–145)
TRIGL SERPL-MCNC: 64 MG/DL (ref 0–150)
VLDLC SERPL CALC-MCNC: 13 MG/DL

## 2024-09-17 ASSESSMENT — PATIENT HEALTH QUESTIONNAIRE - PHQ9
SUM OF ALL RESPONSES TO PHQ QUESTIONS 1-9: 0
SUM OF ALL RESPONSES TO PHQ QUESTIONS 1-9: 0
2. FEELING DOWN, DEPRESSED OR HOPELESS: NOT AT ALL
1. LITTLE INTEREST OR PLEASURE IN DOING THINGS: NOT AT ALL
SUM OF ALL RESPONSES TO PHQ QUESTIONS 1-9: 0
SUM OF ALL RESPONSES TO PHQ QUESTIONS 1-9: 0
SUM OF ALL RESPONSES TO PHQ9 QUESTIONS 1 & 2: 0

## 2024-09-17 ASSESSMENT — ANXIETY QUESTIONNAIRES
IF YOU CHECKED OFF ANY PROBLEMS ON THIS QUESTIONNAIRE, HOW DIFFICULT HAVE THESE PROBLEMS MADE IT FOR YOU TO DO YOUR WORK, TAKE CARE OF THINGS AT HOME, OR GET ALONG WITH OTHER PEOPLE: NOT DIFFICULT AT ALL
2. NOT BEING ABLE TO STOP OR CONTROL WORRYING: NOT AT ALL
GAD7 TOTAL SCORE: 0
4. TROUBLE RELAXING: NOT AT ALL
3. WORRYING TOO MUCH ABOUT DIFFERENT THINGS: NOT AT ALL
7. FEELING AFRAID AS IF SOMETHING AWFUL MIGHT HAPPEN: NOT AT ALL
1. FEELING NERVOUS, ANXIOUS, OR ON EDGE: NOT AT ALL
6. BECOMING EASILY ANNOYED OR IRRITABLE: NOT AT ALL
5. BEING SO RESTLESS THAT IT IS HARD TO SIT STILL: NOT AT ALL

## 2024-09-18 LAB
EST. AVERAGE GLUCOSE BLD GHB EST-MCNC: 151.3 MG/DL
HBA1C MFR BLD: 6.9 %

## 2024-09-20 ENCOUNTER — HOSPITAL ENCOUNTER (OUTPATIENT)
Dept: PHYSICAL THERAPY | Age: 59
Setting detail: THERAPIES SERIES
Discharge: HOME | End: 2024-09-20
Attending: ORTHOPAEDIC SURGERY
Payer: COMMERCIAL

## 2024-09-20 PROCEDURE — 97110 THERAPEUTIC EXERCISES: CPT

## 2024-09-20 PROCEDURE — 97140 MANUAL THERAPY 1/> REGIONS: CPT

## 2024-09-20 PROCEDURE — 97112 NEUROMUSCULAR REEDUCATION: CPT

## 2024-09-23 ENCOUNTER — HOSPITAL ENCOUNTER (OUTPATIENT)
Dept: PHYSICAL THERAPY | Age: 59
Setting detail: THERAPIES SERIES
Discharge: HOME | End: 2024-09-23
Attending: ORTHOPAEDIC SURGERY
Payer: COMMERCIAL

## 2024-09-23 PROCEDURE — 97110 THERAPEUTIC EXERCISES: CPT

## 2024-09-26 ENCOUNTER — TELEPHONE (OUTPATIENT)
Dept: INTERNAL MEDICINE CLINIC | Age: 59
End: 2024-09-26

## 2024-09-27 ENCOUNTER — HOSPITAL ENCOUNTER (OUTPATIENT)
Dept: PHYSICAL THERAPY | Age: 59
Setting detail: THERAPIES SERIES
Discharge: HOME | End: 2024-09-27
Attending: ORTHOPAEDIC SURGERY
Payer: COMMERCIAL

## 2024-09-27 PROCEDURE — 97110 THERAPEUTIC EXERCISES: CPT

## 2024-10-04 ENCOUNTER — APPOINTMENT (OUTPATIENT)
Dept: PHYSICAL THERAPY | Age: 59
End: 2024-10-04
Attending: ORTHOPAEDIC SURGERY
Payer: COMMERCIAL

## 2024-10-11 ENCOUNTER — HOSPITAL ENCOUNTER (OUTPATIENT)
Dept: PHYSICAL THERAPY | Age: 59
Setting detail: THERAPIES SERIES
Discharge: HOME | End: 2024-10-11
Attending: ORTHOPAEDIC SURGERY
Payer: COMMERCIAL

## 2024-10-11 PROCEDURE — 97530 THERAPEUTIC ACTIVITIES: CPT

## 2024-10-11 PROCEDURE — 97112 NEUROMUSCULAR REEDUCATION: CPT

## 2024-10-11 PROCEDURE — 97110 THERAPEUTIC EXERCISES: CPT

## 2024-10-11 NOTE — PLAN OF CARE
[facility] - Outpatient Rehabilitation and Therapy [address], [place] [address] office: [phone] fax: [phone]    Physical Therapy Re-Certification Plan of Care    Dear Mirza Joseph MD  ,    We had the pleasure of treating the following patient for physical therapy services at [facility]. A summary of our findings can be found in the updated assessment below.  This includes our plan of care.  If you have any questions or concerns regarding these findings, please do not hesitate to contact me at the office phone number checked above.  Thank you for the referral.     Physician Signature:________________________________Date:__________________  By signing above (or electronic signature), therapist's plan is approved by physician      Functional Outcome: 9% on QDash currently   Juaquin Delaney 1965 continues to present with functional deficits in ROM and strength symmetry  limiting ability with reaching overhead and lifting items .  During therapy this date, patient required visual cueing, verbal cueing, tactile cueing, muscle facilitation, modification of technique, and progression of exercises and program for exercise progression, improving proper muscle recruitment and activation/motor control patterns, and increasing ROM. Patient will continue to benefit from ongoing evaluation and advanced clinical decision from a Physical Therapist to improve ROM and muscle strength to safely return to PLOF, ADLs/IADLs, work/work related tasks, and recreational activity without symptoms or restrictions.    Overall Response to Treatment:  Patient should continue to improve in reasonable time if they continue HEP . Pt did have some no shows and cancels that could have limited his progress with PT over the past month , however he has made some nice improvements as noted below in ROM and strength. He is very functional right now as seen by 9% disability on

## 2024-10-14 ENCOUNTER — OFFICE VISIT (OUTPATIENT)
Dept: CARDIOLOGY CLINIC | Age: 59
End: 2024-10-14
Payer: COMMERCIAL

## 2024-10-14 ENCOUNTER — TELEPHONE (OUTPATIENT)
Dept: CARDIOLOGY CLINIC | Age: 59
End: 2024-10-14

## 2024-10-14 VITALS
HEIGHT: 73 IN | BODY MASS INDEX: 30.35 KG/M2 | HEART RATE: 87 BPM | SYSTOLIC BLOOD PRESSURE: 110 MMHG | WEIGHT: 229 LBS | DIASTOLIC BLOOD PRESSURE: 80 MMHG

## 2024-10-14 DIAGNOSIS — G45.9 TIA (TRANSIENT ISCHEMIC ATTACK): Primary | ICD-10-CM

## 2024-10-14 DIAGNOSIS — R94.31 ABNORMAL EKG: ICD-10-CM

## 2024-10-14 DIAGNOSIS — R00.2 PALPITATIONS: ICD-10-CM

## 2024-10-14 PROCEDURE — 99204 OFFICE O/P NEW MOD 45 MIN: CPT | Performed by: INTERNAL MEDICINE

## 2024-10-14 PROCEDURE — 3079F DIAST BP 80-89 MM HG: CPT | Performed by: INTERNAL MEDICINE

## 2024-10-14 PROCEDURE — 93000 ELECTROCARDIOGRAM COMPLETE: CPT | Performed by: INTERNAL MEDICINE

## 2024-10-14 PROCEDURE — 3074F SYST BP LT 130 MM HG: CPT | Performed by: INTERNAL MEDICINE

## 2024-10-14 NOTE — PROGRESS NOTES
[facility]     Outpatient Cardiology Consult  Consulting Cardiologist Kaleb Díaz MD, M.D., [facility]  Referring Provider:  Tank Sevilla MD    10/14/2024,1:19 PM    Chief Complaint   Patient presents with    New Patient     Hasn't been seen since 2019           Asked by No admitting provider for patient encounter./Tank Sevilla MD  to evaluate and assess this patient's palpitations and recent TIA    History of Present Illness: Juaquin Delaney is a 58 y.o. male is here for evaluation of his cardiac status particularly regarding palpitations.  Feels that he is drowsy and less energetic than before any more easy fatigue.  He was in hospital back in May with what appeared to be a TIA characterized by slurring of the speech and by unsteadiness in his gait.  No paresis.  He is better and back to normal now on Plavix.  Also he takes hyperlipidemia Lipitor 80 and his LDL was 42.  Chronic kidney disease currently at 1.3.  Today he looks fine and feeling fine and no current issues at this moment.    Did recently have labs and his CBC was okay.  LDL was 42.  Hemoglobin A1c is down to 6.9 from 10    TIA May 28, 2024  Hypertension  Hyperlipidemia  CKD current creatinine is 1.3 has been as high as 2.4  History of lap band surgery 2019 and weight is down to 229.          Past Medical History:   has a past medical history of Cerebral artery occlusion with cerebral infarction (HCC), CKD (chronic kidney disease), Diabetes mellitus (HCC), GERD (gastroesophageal reflux disease), Helicobacter pylori (H. pylori) infection, Hypertension, and Sleep apnea.    Surgical History:   has a past surgical history that includes knee surgery; Upper gastrointestinal endoscopy (N/A, 4/12/2019); Sleeve Gastrectomy (N/A, 9/25/2019); Cystoscopy (9/25/2019); sigmoidoscopy (N/A, 6/23/2021); Colonoscopy (N/A, 6/24/2021); and Shoulder arthroscopy (Left, 6/11/2024).     Social History:   reports that he

## 2024-10-21 ENCOUNTER — PATIENT MESSAGE (OUTPATIENT)
Dept: CARDIOLOGY CLINIC | Age: 59
End: 2024-10-21

## 2024-10-22 ENCOUNTER — OFFICE VISIT (OUTPATIENT)
Dept: ENDOCRINOLOGY | Age: 59
End: 2024-10-22
Payer: COMMERCIAL

## 2024-10-22 VITALS
HEART RATE: 60 BPM | WEIGHT: 225.8 LBS | SYSTOLIC BLOOD PRESSURE: 144 MMHG | HEIGHT: 73 IN | OXYGEN SATURATION: 100 % | DIASTOLIC BLOOD PRESSURE: 100 MMHG | BODY MASS INDEX: 29.93 KG/M2

## 2024-10-22 DIAGNOSIS — E78.5 DYSLIPIDEMIA ASSOCIATED WITH TYPE 2 DIABETES MELLITUS (HCC): ICD-10-CM

## 2024-10-22 DIAGNOSIS — E11.69 DYSLIPIDEMIA ASSOCIATED WITH TYPE 2 DIABETES MELLITUS (HCC): ICD-10-CM

## 2024-10-22 DIAGNOSIS — E11.3299 TYPE 2 DIABETES MELLITUS WITH BACKGROUND RETINOPATHY (HCC): ICD-10-CM

## 2024-10-22 DIAGNOSIS — E11.59 TYPE 2 DIABETES MELLITUS WITH VASCULAR DISEASE (HCC): Primary | ICD-10-CM

## 2024-10-22 DIAGNOSIS — I10 ESSENTIAL HYPERTENSION: ICD-10-CM

## 2024-10-22 PROCEDURE — 99214 OFFICE O/P EST MOD 30 MIN: CPT | Performed by: INTERNAL MEDICINE

## 2024-10-22 PROCEDURE — 3080F DIAST BP >= 90 MM HG: CPT | Performed by: INTERNAL MEDICINE

## 2024-10-22 PROCEDURE — 3077F SYST BP >= 140 MM HG: CPT | Performed by: INTERNAL MEDICINE

## 2024-10-22 PROCEDURE — 3044F HG A1C LEVEL LT 7.0%: CPT | Performed by: INTERNAL MEDICINE

## 2024-10-22 RX ORDER — METOPROLOL TARTRATE 50 MG
TABLET ORAL
Qty: 2 TABLET | Refills: 0 | Status: SHIPPED | OUTPATIENT
Start: 2024-10-22

## 2024-10-22 NOTE — PROGRESS NOTES
Patient ID:   Juaquin Delaney is a 58 y.o. male    Chief Complaint:   Juaquin Delaney presents for an evaluation of Type 2 Diabetes Mellitus , Hyperlipidemia and hypertension.        Subjective:   Type 2 Diabetes Mellitus diagnosed in 1998  On insulin since 2016     Previously followed by  endocrine and physician moved out. Records reviewed from care everywhere   Pioglitazone 30mg daily - stopped in Feb 2018   Jardiance stopped in summer 2018 for worsening CKD     Interval history:   CVA - May 2024 . No deficits     S/p gastric sleeve in Sep 2019.  presurgery weight 280 lbs.   Stable weight , today 225 lbs. Now 222 lbs. It went up recently with his travelling as grandson is playing football . His grandson lives with him      Metformin ER 500mg, two tabs twice a day.     Ozempic 2.0 mg weekly on Sunday   Lantus 18 units in am      Forgot the meter     Checks blood sugars 1 times per day. Reportedly in am , 150-180 in the afternoon    AM:    Supper:    HS:      Hypoglycemias: Morning hypoglycemias have resolved since moving lantus in am. No episodes now. Awareness present in 70's    Meals: Three, dinner is big. 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 Lifting weights for toning up. Walking and cardio     Denies chest pain, exertional dyspnea.     CVA in Aug 2016. No residual deficits.  Denies smoking.     Currently on ASA 81 mg daily     The following portions of the patient's history were reviewed and updated as appropriate:       Family History   Problem Relation Age of Onset    Heart Attack Mother     Diabetes Mother     High Blood Pressure Mother     Colon Cancer Sister          Social History     Socioeconomic History    Marital status:      Spouse name: Not on file    Number of children: Not on file    Years of education: Not on file    Highest education level: Not on file   Occupational History    Not on file   Tobacco Use    Smoking status: Never

## 2024-10-22 NOTE — PATIENT INSTRUCTIONS
C/w Metformin Er 500mg two twice a day   Change Ozempic 2 mg weekly to Mounjaro 7.5 mg weekly    Lantus 18 units in the morning . If sugars drop 90 or under cut down insulin by 2 units.

## 2024-11-06 DIAGNOSIS — Z86.73 HISTORY OF STROKE: ICD-10-CM

## 2024-11-06 DIAGNOSIS — I63.532 ACUTE ISCHEMIC LEFT PCA STROKE (HCC): ICD-10-CM

## 2024-11-06 RX ORDER — CLOPIDOGREL BISULFATE 75 MG/1
75 TABLET ORAL DAILY
Qty: 60 TABLET | Refills: 0 | Status: SHIPPED | OUTPATIENT
Start: 2024-11-06

## 2024-11-10 DIAGNOSIS — E11.3299 TYPE 2 DIABETES MELLITUS WITH BACKGROUND RETINOPATHY (HCC): ICD-10-CM

## 2024-11-10 DIAGNOSIS — E11.59 TYPE 2 DIABETES MELLITUS WITH VASCULAR DISEASE (HCC): ICD-10-CM

## 2024-11-11 RX ORDER — LANOLIN ALCOHOL/MO/W.PET/CERES
400 CREAM (GRAM) TOPICAL DAILY
Qty: 90 TABLET | Refills: 0 | Status: SHIPPED | OUTPATIENT
Start: 2024-11-11

## 2024-11-11 RX ORDER — METFORMIN HYDROCHLORIDE 500 MG/1
1000 TABLET, EXTENDED RELEASE ORAL 2 TIMES DAILY WITH MEALS
Qty: 360 TABLET | Refills: 0 | Status: SHIPPED | OUTPATIENT
Start: 2024-11-11

## 2024-11-11 NOTE — TELEPHONE ENCOUNTER
Medication:   Requested Prescriptions     Pending Prescriptions Disp Refills    metFORMIN (GLUCOPHAGE-XR) 500 MG extended release tablet [Pharmacy Med Name: METFORMIN HCL ER 500MG TB24] 360 tablet 0     Sig: TAKE TWO TABLETS BY MOUTH TWICE A DAY WITH MEALS    folic acid (FOLVITE) 400 MCG tablet [Pharmacy Med Name: FOLIC ACID 400MCG TABS] 90 tablet 0     Sig: Take 1 tablet by mouth daily       Last Filled:  8/14/24    Patient Phone Number: [phone] (home)     Last appt: 10/22/2024   Next appt: 2/28/2025    Last Labs DM:   Lab Results   Component Value Date/Time    LABA1C 6.9 09/17/2024 10:13 AM     Last Lipid:   Lab Results   Component Value Date/Time    CHOL 97 09/17/2024 10:13 AM    TRIG 64 09/17/2024 10:13 AM    HDL 42 09/17/2024 10:13 AM     Last PSA:   Lab Results   Component Value Date/Time    PSA 1.64 04/22/2021 03:22 PM     Last Thyroid:   Lab Results   Component Value Date/Time    TSH 1.76 04/06/2021 01:34 PM

## 2024-11-13 ENCOUNTER — PATIENT MESSAGE (OUTPATIENT)
Dept: CARDIOLOGY CLINIC | Age: 59
End: 2024-11-13

## 2024-11-13 RX ORDER — METOPROLOL TARTRATE 50 MG
TABLET ORAL
Qty: 2 TABLET | Refills: 0 | Status: SHIPPED | OUTPATIENT
Start: 2024-11-13 | End: 2024-11-15 | Stop reason: SDUPTHER

## 2024-11-13 NOTE — TELEPHONE ENCOUNTER
Called patient to let him know that his Metoprolol was called into [facility] back on October 22,2024 and all he had to do was call them and ask for it to be filled.

## 2024-11-15 DIAGNOSIS — I63.532 ACUTE ISCHEMIC LEFT PCA STROKE (HCC): ICD-10-CM

## 2024-11-15 DIAGNOSIS — E11.59 TYPE 2 DIABETES MELLITUS WITH VASCULAR DISEASE (HCC): ICD-10-CM

## 2024-11-15 DIAGNOSIS — Z86.73 HISTORY OF STROKE: ICD-10-CM

## 2024-11-15 DIAGNOSIS — E11.3299 TYPE 2 DIABETES MELLITUS WITH BACKGROUND RETINOPATHY (HCC): ICD-10-CM

## 2024-11-15 RX ORDER — METOPROLOL TARTRATE 50 MG
TABLET ORAL
Qty: 2 TABLET | Refills: 0 | Status: SHIPPED | OUTPATIENT
Start: 2024-11-15

## 2024-11-18 ENCOUNTER — HOSPITAL ENCOUNTER (OUTPATIENT)
Dept: CT IMAGING | Age: 59
Discharge: HOME | End: 2024-11-18
Attending: INTERNAL MEDICINE
Payer: COMMERCIAL

## 2024-11-18 ENCOUNTER — HOSPITAL ENCOUNTER (OUTPATIENT)
Dept: VASCULAR LAB | Age: 59
Discharge: HOME | End: 2024-11-20
Attending: INTERNAL MEDICINE
Payer: COMMERCIAL

## 2024-11-18 VITALS — OXYGEN SATURATION: 96 % | DIASTOLIC BLOOD PRESSURE: 102 MMHG | SYSTOLIC BLOOD PRESSURE: 129 MMHG | HEART RATE: 72 BPM

## 2024-11-18 DIAGNOSIS — R94.31 ABNORMAL EKG: ICD-10-CM

## 2024-11-18 DIAGNOSIS — G45.9 TIA (TRANSIENT ISCHEMIC ATTACK): ICD-10-CM

## 2024-11-18 DIAGNOSIS — R00.2 PALPITATIONS: ICD-10-CM

## 2024-11-18 LAB
PERFORMED ON: NORMAL
POC CREATININE: 1.1 MG/DL (ref 0.9–1.3)
POC SAMPLE TYPE: NORMAL
VAS LEFT CCA DIST EDV: 16 CM/S
VAS LEFT CCA DIST PSV: 49 CM/S
VAS LEFT CCA MID EDV: 16 CM/S
VAS LEFT CCA MID PSV: 52.8 CM/S
VAS LEFT CCA PROX EDV: 18.8 CM/S
VAS LEFT CCA PROX PSV: 66 CM/S
VAS LEFT ECA EDV: 7.5 CM/S
VAS LEFT ECA PSV: 53.7 CM/S
VAS LEFT ICA DIST EDV: 36.7 CM/S
VAS LEFT ICA DIST PSV: 67.9 CM/S
VAS LEFT ICA MID EDV: 33.9 CM/S
VAS LEFT ICA MID PSV: 67.9 CM/S
VAS LEFT ICA PROX EDV: 23.5 CM/S
VAS LEFT ICA PROX PSV: 55.6 CM/S
VAS LEFT ICA/CCA PSV: 1.4 NO UNITS
VAS LEFT VERTEBRAL EDV: 28.2 CM/S
VAS LEFT VERTEBRAL PSV: 61.2 CM/S
VAS RIGHT CCA DIST EDV: 19.4 CM/S
VAS RIGHT CCA DIST PSV: 53.3 CM/S
VAS RIGHT CCA MID EDV: 14.2 CM/S
VAS RIGHT CCA MID PSV: 66.4 CM/S
VAS RIGHT CCA PROX EDV: 11.6 CM/S
VAS RIGHT CCA PROX PSV: 49.4 CM/S
VAS RIGHT ECA EDV: 11.6 CM/S
VAS RIGHT ECA PSV: 70.3 CM/S
VAS RIGHT ICA DIST EDV: 29.2 CM/S
VAS RIGHT ICA DIST PSV: 60.4 CM/S
VAS RIGHT ICA MID EDV: 17.9 CM/S
VAS RIGHT ICA MID PSV: 51.9 CM/S
VAS RIGHT ICA PROX EDV: 18.8 CM/S
VAS RIGHT ICA PROX PSV: 41.5 CM/S
VAS RIGHT ICA/CCA PSV: 1.1 NO UNITS
VAS RIGHT VERTEBRAL EDV: 0 CM/S
VAS RIGHT VERTEBRAL PSV: 0 CM/S

## 2024-11-18 PROCEDURE — 6370000000 HC RX 637 (ALT 250 FOR IP): Performed by: INTERNAL MEDICINE

## 2024-11-18 PROCEDURE — 2500000003 HC RX 250 WO HCPCS: Performed by: INTERNAL MEDICINE

## 2024-11-18 PROCEDURE — 75574 CT ANGIO HRT W/3D IMAGE: CPT

## 2024-11-18 PROCEDURE — 93880 EXTRACRANIAL BILAT STUDY: CPT

## 2024-11-18 PROCEDURE — 82565 ASSAY OF CREATININE: CPT

## 2024-11-18 PROCEDURE — 93880 EXTRACRANIAL BILAT STUDY: CPT | Performed by: SURGERY

## 2024-11-18 RX ORDER — NITROGLYCERIN 0.4 MG/1
0.8 TABLET SUBLINGUAL ONCE
Status: COMPLETED | OUTPATIENT
Start: 2024-11-18 | End: 2024-11-18

## 2024-11-18 RX ORDER — METOPROLOL TARTRATE 1 MG/ML
5 INJECTION, SOLUTION INTRAVENOUS EVERY 5 MIN PRN
Status: DISCONTINUED | OUTPATIENT
Start: 2024-11-18 | End: 2024-11-19 | Stop reason: HOSPADM

## 2024-11-18 RX ORDER — IOPAMIDOL 755 MG/ML
170 INJECTION, SOLUTION INTRAVASCULAR
Status: COMPLETED | OUTPATIENT
Start: 2024-11-18 | End: 2024-11-18

## 2024-11-18 RX ADMIN — IOPAMIDOL 170 ML: 755 INJECTION, SOLUTION INTRAVASCULAR at 09:16

## 2024-11-18 RX ADMIN — NITROGLYCERIN 0.8 MG: 0.4 TABLET SUBLINGUAL at 09:07

## 2024-11-18 RX ADMIN — METOPROLOL TARTRATE 5 MG: 5 INJECTION, SOLUTION INTRAVENOUS at 08:53

## 2024-11-18 RX ADMIN — METOPROLOL TARTRATE 5 MG: 5 INJECTION, SOLUTION INTRAVENOUS at 09:01

## 2024-11-18 NOTE — DISCHARGE INSTRUCTIONS
INTERVENTIONAL RADIOLOGY DEPARTMENT  [facility]  [address], [place] [address]  Telephone: [phone]      PATIENT NAME: Juaquin Delaney  MEDICAL RECORD NUMBER:  [id number]  TODAY'S DATE: @ED@      POST CORONARY  CTA  INSTRUCTIONS:    Today you had an imaging study with contrast injected to visualize your coronary/heart arteries.    Any questions about your procedure can be directed to your doctor or the radiology department at #[phone].  Below are your discharge instructions.        1.  Drink 4-6 glasses of fluid the rest of today to help flush the contrast from your body.  2.  You should contact Kaleb Matthews MD  for the results of your study.  3.  If you take an oral diabetic medication, you may resume it 11-21-24 .  4.  If you received a medication called Metoprolol today, you may experience:                  * anxiety, sweating, dizziness or a slowed heart rate.                   * If these persist after tomorrow or the severity increases, contact Kaleb Matthews MD .                   * If it is after hours you may go to the nearest Emergency Room.  5.  If you take Viagra, or a medication similar to this - you must stay OFF this medication for 96 hours.

## 2024-11-21 DIAGNOSIS — I77.9 VERTEBRAL ARTERY DISEASE (HCC): Primary | ICD-10-CM

## 2024-11-21 DIAGNOSIS — G45.0 VERTEBRAL ARTERY INSUFFICIENCY: ICD-10-CM

## 2024-12-13 ENCOUNTER — OFFICE VISIT (OUTPATIENT)
Dept: CARDIOLOGY CLINIC | Age: 59
End: 2024-12-13
Payer: COMMERCIAL

## 2024-12-13 VITALS
WEIGHT: 233 LBS | DIASTOLIC BLOOD PRESSURE: 70 MMHG | BODY MASS INDEX: 30.88 KG/M2 | SYSTOLIC BLOOD PRESSURE: 102 MMHG | HEIGHT: 73 IN | HEART RATE: 70 BPM

## 2024-12-13 DIAGNOSIS — I10 ESSENTIAL HYPERTENSION: ICD-10-CM

## 2024-12-13 DIAGNOSIS — E78.5 HYPERLIPIDEMIA LDL GOAL <70: ICD-10-CM

## 2024-12-13 DIAGNOSIS — I63.02 CEREBROVASCULAR ACCIDENT (CVA) DUE TO THROMBOSIS OF BASILAR ARTERY (HCC): Primary | ICD-10-CM

## 2024-12-13 PROCEDURE — 3074F SYST BP LT 130 MM HG: CPT | Performed by: INTERNAL MEDICINE

## 2024-12-13 PROCEDURE — 99214 OFFICE O/P EST MOD 30 MIN: CPT | Performed by: INTERNAL MEDICINE

## 2024-12-13 PROCEDURE — 3078F DIAST BP <80 MM HG: CPT | Performed by: INTERNAL MEDICINE

## 2024-12-13 RX ORDER — LANOLIN ALCOHOL/MO/W.PET/CERES
400 CREAM (GRAM) TOPICAL DAILY
Qty: 90 TABLET | Refills: 3 | Status: SHIPPED | OUTPATIENT
Start: 2024-12-13

## 2024-12-13 RX ORDER — LANOLIN ALCOHOL/MO/W.PET/CERES
400 CREAM (GRAM) TOPICAL DAILY
COMMUNITY
End: 2024-12-13 | Stop reason: SDUPTHER

## 2024-12-13 NOTE — PROGRESS NOTES
[facility] [place]   Dr Kaleb Díaz MD, [facility]    Outpatient Follow Up Note    12/13/2024,3:23 PM  Subjective:   CHIEF COMPLAINT / HPI:  Follow Up secondary to hypertension, hyperlipidemia, and coronary artery disease     Juaquin Delaney is 59 y.o. male who presents today on 12/13/2024 for follow-up and review of his labs.  He did have a negative magnesium level of 1.5.  Creatinine was 1.3.  No chest pains no more dizziness no blackout spells but his Holter monitor did show short run x 2 of ventricular tachycardia the longest of which was 7 beats.  Also short runs of SVT.  I believe we need to increase his magnesium level.  Carotid Doppler shows that there is some mild plaquing in both carotids and complete occlusion of the right vertebral artery.  He is due to see vascular surgery in the next few days regarding this.  In the meantime I think we need to be very aggressive with lipid management and found that his LDL cholesterol was 42 on current therapy which includes Lipitor at 80 mg.    History of TIAs.  Vertebral artery occlusion.  Run of ventricular tachycardia on monitor.  Hypertension.  Hyperlipidemia with LDL 42 currently on Lipitor 80  Hypomagnesemia  CKD creatinine 1.3         Past Medical History:    Past Medical History:   Diagnosis Date    Cerebral artery occlusion with cerebral infarction (HCC)     CKD (chronic kidney disease)     Diabetes mellitus (HCC)     GERD (gastroesophageal reflux disease)     Helicobacter pylori (H. pylori) infection 4/25/2019    Hypertension     Sleep apnea     no cpap     Past Surgical History  Past Surgical History:   Procedure Laterality Date    COLONOSCOPY N/A 6/24/2021    COLONOSCOPY POLYPECTOMY SNARE/COLD OF 1 DESCENDING COLON POLYP AND 2 RECTAL  POLYPS performed by Camron Garcia MD at [facility] ENDOSCOPY    CYSTOSCOPY  9/25/2019    FLEXIBLE CYSTOSCOPY, DILATION, POTTS CATHETER INSERTION performed by Tank Ortiz MD at [facility] OR    KNEE

## 2024-12-19 ENCOUNTER — OFFICE VISIT (OUTPATIENT)
Dept: VASCULAR SURGERY | Age: 59
End: 2024-12-19
Payer: COMMERCIAL

## 2024-12-19 VITALS
BODY MASS INDEX: 31.14 KG/M2 | HEIGHT: 73 IN | OXYGEN SATURATION: 99 % | WEIGHT: 235 LBS | TEMPERATURE: 98 F | SYSTOLIC BLOOD PRESSURE: 142 MMHG | DIASTOLIC BLOOD PRESSURE: 91 MMHG | HEART RATE: 70 BPM

## 2024-12-19 DIAGNOSIS — G45.0 VERTEBRAL ARTERY INSUFFICIENCY: Primary | ICD-10-CM

## 2024-12-19 DIAGNOSIS — I65.01 OCCLUSION OF RIGHT VERTEBRAL ARTERY: Primary | ICD-10-CM

## 2024-12-19 PROCEDURE — 3077F SYST BP >= 140 MM HG: CPT | Performed by: SURGERY

## 2024-12-19 PROCEDURE — 99204 OFFICE O/P NEW MOD 45 MIN: CPT | Performed by: SURGERY

## 2024-12-19 PROCEDURE — 3080F DIAST BP >= 90 MM HG: CPT | Performed by: SURGERY

## 2024-12-19 NOTE — PROGRESS NOTES
[facility] Vascular Surgery  Angela Smith MD, FACS, [facility]  [phone]    OUTPATIENT CONSULTATION    Date of Consultation:  12/19/2024    PCP:  Tank Sevilla MD       Chief Complaint: right vertebral artery occlusion    History of Present Illness:   Juaquin Delaney is a 59 y.o. male who presents today for right vertebral artery occlusion.  He presents today with his wife.  Reports a history of stroke in 2016 affecting walking and speech and requiring rehab.  He then had another event in May and reports that he lost his ability to walk and balance.  He is now recovering for this.  During his workup, he was found to have chronic right vertebral artery occlusion on CTA head and neck.  He was also found to have cardiac arrhythmias on Holter monitoring.   He has had no further dizziness or symptoms of stroke.  He specifically denies any numbness, tingling, weakness or paralysis of any extremity.  He denies visual changes or slurred speech.  He is still working.     He is a lifelong non-smoker.  He is diabetic with hemoglobin A1c 6.9.  He is on high-dose aspirin and dual antiplatelet therapy.    I personally reviewed images of the following study:  Vascular US Duplex Carotid Bilateral 11/18/2024  MRI brain without contrast 5/258/2024  CTA HEAD NECK W CONTRAST 5/25/2024    Past Medical History:   Past Medical History:   Diagnosis Date    Cerebral artery occlusion with cerebral infarction (HCC)     CKD (chronic kidney disease)     Diabetes mellitus (HCC)     GERD (gastroesophageal reflux disease)     Helicobacter pylori (H. pylori) infection 4/25/2019    Hypertension     Sleep apnea     no cpap       Past Surgical History:  Past Surgical History:   Procedure Laterality Date    COLONOSCOPY N/A 6/24/2021    COLONOSCOPY POLYPECTOMY SNARE/COLD OF 1 DESCENDING COLON POLYP AND 2 RECTAL  POLYPS performed by Camron Garcia MD at [facility] ENDOSCOPY    CYSTOSCOPY  9/25/2019    FLEXIBLE CYSTOSCOPY, DILATION,

## 2024-12-31 DIAGNOSIS — R10.13 DYSPEPSIA: ICD-10-CM

## 2024-12-31 RX ORDER — PANTOPRAZOLE SODIUM 40 MG/1
40 TABLET, DELAYED RELEASE ORAL
Qty: 90 TABLET | Refills: 1 | Status: SHIPPED | OUTPATIENT
Start: 2024-12-31

## 2025-01-07 DIAGNOSIS — Z86.73 HISTORY OF STROKE: ICD-10-CM

## 2025-01-07 DIAGNOSIS — I63.532 ACUTE ISCHEMIC LEFT PCA STROKE (HCC): ICD-10-CM

## 2025-01-07 RX ORDER — CLOPIDOGREL BISULFATE 75 MG/1
75 TABLET ORAL DAILY
Qty: 60 TABLET | Refills: 0 | Status: SHIPPED | OUTPATIENT
Start: 2025-01-07

## 2025-01-13 ENCOUNTER — TELEPHONE (OUTPATIENT)
Dept: PHARMACY | Facility: CLINIC | Age: 60
End: 2025-01-13

## 2025-01-13 NOTE — TELEPHONE ENCOUNTER
**Patient is [facility]**     2025 Annual Pharmacist Visit    Called patient to schedule 2025 yearly pharmacist appointment to discuss medications for Diabetes Management Program.    Spoke to patient and appointment scheduled for 1/20/25 at 10:00am.            Herbert Betancourt [facility]  Clinical Pharmacy   Phone: [phone], option #3       For Pharmacy Admin Tracking Only    Program: That{img}  CPA in place:  No  Recommendation Provided To: Patient/Caregiver: 1 via Telephone  Intervention Detail: Scheduled Appointment  Intervention Accepted By: Patient/Caregiver: 1  Gap Closed?: Yes   Time Spent (min): 5

## 2025-01-16 DIAGNOSIS — E11.3299 TYPE 2 DIABETES MELLITUS WITH BACKGROUND RETINOPATHY (HCC): ICD-10-CM

## 2025-01-16 DIAGNOSIS — E11.59 TYPE 2 DIABETES MELLITUS WITH VASCULAR DISEASE (HCC): ICD-10-CM

## 2025-01-16 DIAGNOSIS — E78.5 DYSLIPIDEMIA ASSOCIATED WITH TYPE 2 DIABETES MELLITUS (HCC): ICD-10-CM

## 2025-01-16 DIAGNOSIS — E11.69 DYSLIPIDEMIA ASSOCIATED WITH TYPE 2 DIABETES MELLITUS (HCC): ICD-10-CM

## 2025-01-16 RX ORDER — ATORVASTATIN CALCIUM 80 MG/1
TABLET, FILM COATED ORAL
Qty: 90 TABLET | Refills: 1 | Status: SHIPPED | OUTPATIENT
Start: 2025-01-16

## 2025-01-20 ENCOUNTER — TELEPHONE (OUTPATIENT)
Dept: PHARMACY | Facility: CLINIC | Age: 60
End: 2025-01-20

## 2025-01-22 ENCOUNTER — PATIENT MESSAGE (OUTPATIENT)
Dept: INTERNAL MEDICINE CLINIC | Age: 60
End: 2025-01-22

## 2025-01-22 DIAGNOSIS — E11.59 TYPE 2 DIABETES MELLITUS WITH VASCULAR DISEASE (HCC): ICD-10-CM

## 2025-01-22 DIAGNOSIS — E11.3299 TYPE 2 DIABETES MELLITUS WITH BACKGROUND RETINOPATHY (HCC): ICD-10-CM

## 2025-01-22 RX ORDER — BLOOD SUGAR DIAGNOSTIC
STRIP MISCELLANEOUS
Qty: 200 STRIP | Refills: 3 | Status: SHIPPED | OUTPATIENT
Start: 2025-01-22

## 2025-01-27 DIAGNOSIS — E11.69 DYSLIPIDEMIA ASSOCIATED WITH TYPE 2 DIABETES MELLITUS (HCC): ICD-10-CM

## 2025-01-27 DIAGNOSIS — E78.5 DYSLIPIDEMIA ASSOCIATED WITH TYPE 2 DIABETES MELLITUS (HCC): ICD-10-CM

## 2025-01-27 DIAGNOSIS — E11.59 TYPE 2 DIABETES MELLITUS WITH VASCULAR DISEASE (HCC): ICD-10-CM

## 2025-01-27 DIAGNOSIS — E11.3299 TYPE 2 DIABETES MELLITUS WITH BACKGROUND RETINOPATHY (HCC): ICD-10-CM

## 2025-01-27 DIAGNOSIS — I10 ESSENTIAL HYPERTENSION: ICD-10-CM

## 2025-01-27 LAB
EST. AVERAGE GLUCOSE BLD GHB EST-MCNC: 145.6 MG/DL
HBA1C MFR BLD: 6.7 %

## 2025-02-04 ENCOUNTER — TELEPHONE (OUTPATIENT)
Dept: ORTHOPEDIC SURGERY | Age: 60
End: 2025-02-04

## 2025-02-10 DIAGNOSIS — E11.3299 TYPE 2 DIABETES MELLITUS WITH BACKGROUND RETINOPATHY (HCC): ICD-10-CM

## 2025-02-10 DIAGNOSIS — E11.59 TYPE 2 DIABETES MELLITUS WITH VASCULAR DISEASE (HCC): ICD-10-CM

## 2025-02-10 RX ORDER — METFORMIN HYDROCHLORIDE 500 MG/1
1000 TABLET, EXTENDED RELEASE ORAL 2 TIMES DAILY WITH MEALS
Qty: 360 TABLET | Refills: 0 | Status: SHIPPED | OUTPATIENT
Start: 2025-02-10

## 2025-02-10 NOTE — TELEPHONE ENCOUNTER
Requested Prescriptions     Pending Prescriptions Disp Refills    metFORMIN (GLUCOPHAGE-XR) 500 MG extended release tablet [Pharmacy Med Name: METFORMIN HCL ER 500MG TB24] 360 tablet 0     Sig: TAKE TWO TABLETS BY MOUTH TWICE A DAY WITH MEALS     Last refilled: 11/11/2024 # 360 no refills     Lov: 10/22/2024  Nov: 2/28/2025

## 2025-02-26 DIAGNOSIS — I63.532 ACUTE ISCHEMIC LEFT PCA STROKE (HCC): ICD-10-CM

## 2025-02-26 DIAGNOSIS — Z86.73 HISTORY OF STROKE: ICD-10-CM

## 2025-02-26 RX ORDER — CLOPIDOGREL BISULFATE 75 MG/1
75 TABLET ORAL DAILY
Qty: 60 TABLET | Refills: 0 | Status: SHIPPED | OUTPATIENT
Start: 2025-02-26

## 2025-02-28 ENCOUNTER — TELEMEDICINE (OUTPATIENT)
Dept: ENDOCRINOLOGY | Age: 60
End: 2025-02-28
Payer: COMMERCIAL

## 2025-02-28 DIAGNOSIS — E11.69 DYSLIPIDEMIA ASSOCIATED WITH TYPE 2 DIABETES MELLITUS (HCC): ICD-10-CM

## 2025-02-28 DIAGNOSIS — E78.5 DYSLIPIDEMIA ASSOCIATED WITH TYPE 2 DIABETES MELLITUS (HCC): ICD-10-CM

## 2025-02-28 DIAGNOSIS — I10 ESSENTIAL HYPERTENSION: ICD-10-CM

## 2025-02-28 DIAGNOSIS — E11.59 TYPE 2 DIABETES MELLITUS WITH VASCULAR DISEASE (HCC): Primary | ICD-10-CM

## 2025-02-28 DIAGNOSIS — N18.30 STAGE 3 CHRONIC KIDNEY DISEASE, UNSPECIFIED WHETHER STAGE 3A OR 3B CKD (HCC): ICD-10-CM

## 2025-02-28 PROCEDURE — 3044F HG A1C LEVEL LT 7.0%: CPT | Performed by: INTERNAL MEDICINE

## 2025-02-28 PROCEDURE — 99214 OFFICE O/P EST MOD 30 MIN: CPT | Performed by: INTERNAL MEDICINE

## 2025-02-28 RX ORDER — TIRZEPATIDE 10 MG/.5ML
10 INJECTION, SOLUTION SUBCUTANEOUS WEEKLY
Qty: 6 ML | Refills: 1 | Status: SHIPPED | OUTPATIENT
Start: 2025-02-28

## 2025-02-28 NOTE — PROGRESS NOTES
BS Readings:    2/28  Am-120    2/27  Am-102    2/26  Am-97    2/25  Am-103    2/24  Am-102    2/23  Am-97    2/22  Am-117    2/21  Am-94    2/20  Am-123      
logging, proper diabetes management, diabetic complications with poor management and the importance of glycemic control in order to avoid the complications of diabetes.    Risks and potential complications of diabetes were reviewed with the patient.  Diabetes health maintenance plan and follow-up were discussed and understood by the patient.  We reviewed the importance of medication compliance and regular follow-up.   Aggressive lifestyle modification was encouraged.     Exercise Counselling:  This patient is a candidate for regular physical exercise. Instructions to perform the following types of exercise:  Swimming or water aerobic exercise  Brisk walking  Playing tennis  Stationary bicycle or elliptical indoor  Low impact aerobic exercise    Instructions given to exercise for the following duration:  30 minutes a day for five-seven days per week.    Following instructions for being active throughout the day in addition to formal exercise:  Walk instead of drive whenever possible  Take the stairs instead of the elevator  Work in the garden  Park to the far end of the parking lot to add more walking steps to destination      Electronically signed by NO HUANG MD on 2/28/2025 at 11:40 AM

## 2025-03-25 ENCOUNTER — CLINICAL DOCUMENTATION (OUTPATIENT)
Dept: PHARMACY | Facility: CLINIC | Age: 60
End: 2025-03-25

## 2025-03-25 ENCOUNTER — PATIENT MESSAGE (OUTPATIENT)
Dept: PHARMACY | Facility: CLINIC | Age: 60
End: 2025-03-25

## 2025-03-25 NOTE — PROGRESS NOTES
your [facility]**    If the missing requirements(s) are not met by the date listed above, you will be disqualified from the program and will not receive program incentive in 2026 and must wait until the following calendar year to be eligible for incentive. Please reach out to our team ASAP if there are any questions or concerns.    Mohit Santos [facility]   Phone: [phone] Option #3  Email: [email or web address]  Fax Number: [phone]

## 2025-04-02 NOTE — TELEPHONE ENCOUNTER
[employer] message not read by patient.  Letter mailed to patient with the information from the [employer] message.    Charbel Rojas [facility]  Clinical Pharmacy   Department, toll free: [phone] Option #3    For Pharmacy Admin Tracking Only    Program: [employer]  CPA in place:  No  Gap Closed?: No   Time Spent (min): 5

## 2025-05-13 DIAGNOSIS — I63.532 ACUTE ISCHEMIC LEFT PCA STROKE (HCC): ICD-10-CM

## 2025-05-13 DIAGNOSIS — I10 ESSENTIAL HYPERTENSION: ICD-10-CM

## 2025-05-13 DIAGNOSIS — Z86.73 HISTORY OF STROKE: ICD-10-CM

## 2025-05-14 RX ORDER — CLOPIDOGREL BISULFATE 75 MG/1
75 TABLET ORAL DAILY
Qty: 60 TABLET | Refills: 0 | Status: SHIPPED | OUTPATIENT
Start: 2025-05-14

## 2025-05-14 RX ORDER — FUROSEMIDE 20 MG/1
TABLET ORAL
Qty: 90 TABLET | Refills: 2 | Status: SHIPPED | OUTPATIENT
Start: 2025-05-14

## 2025-05-14 NOTE — TELEPHONE ENCOUNTER
Pt needs to sched appt.  He was to return in March with Dr Sevilla      Recent Visits  Date Type Provider Dept   09/17/24 Office Visit Tank Sevilla MD [facility]   07/17/24 Office Visit Tank Sevilla MD [facility]   07/03/24 Office Visit Tank Sevilla MD [facility]   06/04/24 Office Visit Kami Parsons DO [facility]   04/03/24 Office Visit Tank Sevilla MD [facility]   Showing recent visits within past 540 days with a meds authorizing provider and meeting all other requirements  Future Appointments  No visits were found meeting these conditions.  Showing future appointments within next 150 days with a meds authorizing provider and meeting all other requirements     9/17/2024

## 2025-05-14 NOTE — TELEPHONE ENCOUNTER
Spoke with patient. An appointment has been scheduled with Dr. Sevilla for Thursday, 6/19/25 at 8:30 am

## 2025-05-19 DIAGNOSIS — E11.59 TYPE 2 DIABETES MELLITUS WITH VASCULAR DISEASE (HCC): ICD-10-CM

## 2025-05-19 DIAGNOSIS — E11.3299 TYPE 2 DIABETES MELLITUS WITH BACKGROUND RETINOPATHY (HCC): ICD-10-CM

## 2025-05-19 RX ORDER — METFORMIN HYDROCHLORIDE 500 MG/1
1000 TABLET, EXTENDED RELEASE ORAL 2 TIMES DAILY WITH MEALS
Qty: 360 TABLET | Refills: 0 | Status: SHIPPED | OUTPATIENT
Start: 2025-05-19

## 2025-05-19 NOTE — TELEPHONE ENCOUNTER
Requested Prescriptions     Pending Prescriptions Disp Refills    metFORMIN (GLUCOPHAGE-XR) 500 MG extended release tablet 360 tablet 0     Sig: Take 2 tablets by mouth 2 times daily (with meals)     Last refilled; 2/10/2025 # 360 with no refills    Lov: VV 2/28/2025  Nov; 6/27/2025

## 2025-06-19 ENCOUNTER — OFFICE VISIT (OUTPATIENT)
Dept: INTERNAL MEDICINE CLINIC | Age: 60
End: 2025-06-19
Payer: COMMERCIAL

## 2025-06-19 VITALS
HEART RATE: 83 BPM | RESPIRATION RATE: 18 BRPM | WEIGHT: 246 LBS | DIASTOLIC BLOOD PRESSURE: 84 MMHG | HEIGHT: 73 IN | OXYGEN SATURATION: 98 % | SYSTOLIC BLOOD PRESSURE: 122 MMHG | TEMPERATURE: 97.4 F | BODY MASS INDEX: 32.6 KG/M2

## 2025-06-19 DIAGNOSIS — R10.13 DYSPEPSIA: ICD-10-CM

## 2025-06-19 DIAGNOSIS — E11.3299 TYPE 2 DIABETES MELLITUS WITH BACKGROUND RETINOPATHY (HCC): ICD-10-CM

## 2025-06-19 DIAGNOSIS — E78.2 MIXED HYPERLIPIDEMIA: ICD-10-CM

## 2025-06-19 DIAGNOSIS — Z00.00 WELL ADULT EXAM: Primary | ICD-10-CM

## 2025-06-19 DIAGNOSIS — Z23 NEED FOR TETANUS, DIPHTHERIA, AND ACELLULAR PERTUSSIS (TDAP) VACCINE IN PATIENT OF ADOLESCENT AGE OR OLDER: ICD-10-CM

## 2025-06-19 DIAGNOSIS — N18.31 STAGE 3A CHRONIC KIDNEY DISEASE (HCC): ICD-10-CM

## 2025-06-19 DIAGNOSIS — K21.9 CHRONIC GERD: ICD-10-CM

## 2025-06-19 DIAGNOSIS — Z00.00 WELL ADULT EXAM: ICD-10-CM

## 2025-06-19 DIAGNOSIS — Z86.73 HISTORY OF STROKE: ICD-10-CM

## 2025-06-19 DIAGNOSIS — Z23 NEED FOR VACCINATION AGAINST STREPTOCOCCUS PNEUMONIAE: ICD-10-CM

## 2025-06-19 DIAGNOSIS — I63.532 ACUTE ISCHEMIC LEFT PCA STROKE (HCC): ICD-10-CM

## 2025-06-19 LAB
25(OH)D3 SERPL-MCNC: 59.4 NG/ML
ALBUMIN SERPL-MCNC: 4.3 G/DL (ref 3.4–5)
ALBUMIN/GLOB SERPL: 1.8 {RATIO} (ref 1.1–2.2)
ALP SERPL-CCNC: 76 U/L (ref 40–129)
ALT SERPL-CCNC: 25 U/L (ref 10–40)
ANION GAP SERPL CALCULATED.3IONS-SCNC: 10 MMOL/L (ref 3–16)
AST SERPL-CCNC: 25 U/L (ref 15–37)
BASOPHILS # BLD: 0.1 K/UL (ref 0–0.2)
BASOPHILS NFR BLD: 1.2 %
BILIRUB SERPL-MCNC: 0.6 MG/DL (ref 0–1)
BUN SERPL-MCNC: 18 MG/DL (ref 7–20)
CALCIUM SERPL-MCNC: 9.7 MG/DL (ref 8.3–10.6)
CHLORIDE SERPL-SCNC: 105 MMOL/L (ref 99–110)
CHOLEST SERPL-MCNC: 104 MG/DL (ref 0–199)
CO2 SERPL-SCNC: 27 MMOL/L (ref 21–32)
CREAT SERPL-MCNC: 1.4 MG/DL (ref 0.9–1.3)
CREAT UR-MCNC: 142 MG/DL (ref 39–259)
DEPRECATED RDW RBC AUTO: 17.5 % (ref 12.4–15.4)
EOSINOPHIL # BLD: 0.1 K/UL (ref 0–0.6)
EOSINOPHIL NFR BLD: 2 %
EST. AVERAGE GLUCOSE BLD GHB EST-MCNC: 168.6 MG/DL
FOLATE SERPL-MCNC: >40 NG/ML (ref 4.78–24.2)
GFR SERPLBLD CREATININE-BSD FMLA CKD-EPI: 58 ML/MIN/{1.73_M2}
GLUCOSE SERPL-MCNC: 146 MG/DL (ref 70–99)
HBA1C MFR BLD: 7.5 %
HCT VFR BLD AUTO: 34.4 % (ref 40.5–52.5)
HDLC SERPL-MCNC: 49 MG/DL (ref 40–60)
HGB BLD-MCNC: 11.1 G/DL (ref 13.5–17.5)
LDLC SERPL CALC-MCNC: 46 MG/DL
LYMPHOCYTES # BLD: 2.5 K/UL (ref 1–5.1)
LYMPHOCYTES NFR BLD: 54.5 %
MCH RBC QN AUTO: 24.7 PG (ref 26–34)
MCHC RBC AUTO-ENTMCNC: 32.2 G/DL (ref 31–36)
MCV RBC AUTO: 76.7 FL (ref 80–100)
MICROALBUMIN UR DL<=1MG/L-MCNC: <1.2 MG/DL
MICROALBUMIN/CREAT UR: NORMAL MG/G (ref 0–30)
MONOCYTES # BLD: 0.3 K/UL (ref 0–1.3)
MONOCYTES NFR BLD: 7.3 %
NEUTROPHILS # BLD: 1.6 K/UL (ref 1.7–7.7)
NEUTROPHILS NFR BLD: 35 %
PLATELET # BLD AUTO: 253 K/UL (ref 135–450)
PMV BLD AUTO: 9 FL (ref 5–10.5)
POTASSIUM SERPL-SCNC: 4.7 MMOL/L (ref 3.5–5.1)
PROT SERPL-MCNC: 6.7 G/DL (ref 6.4–8.2)
PSA SERPL DL<=0.01 NG/ML-MCNC: 1.63 NG/ML (ref 0–4)
RBC # BLD AUTO: 4.48 M/UL (ref 4.2–5.9)
SODIUM SERPL-SCNC: 142 MMOL/L (ref 136–145)
TRIGL SERPL-MCNC: 46 MG/DL (ref 0–150)
VIT B12 SERPL-MCNC: 1534 PG/ML (ref 211–911)
VLDLC SERPL CALC-MCNC: 9 MG/DL
WBC # BLD AUTO: 4.7 K/UL (ref 4–11)

## 2025-06-19 PROCEDURE — 90472 IMMUNIZATION ADMIN EACH ADD: CPT | Performed by: INTERNAL MEDICINE

## 2025-06-19 PROCEDURE — 90471 IMMUNIZATION ADMIN: CPT | Performed by: INTERNAL MEDICINE

## 2025-06-19 PROCEDURE — 90715 TDAP VACCINE 7 YRS/> IM: CPT | Performed by: INTERNAL MEDICINE

## 2025-06-19 PROCEDURE — 90677 PCV20 VACCINE IM: CPT | Performed by: INTERNAL MEDICINE

## 2025-06-19 PROCEDURE — 99396 PREV VISIT EST AGE 40-64: CPT | Performed by: INTERNAL MEDICINE

## 2025-06-19 PROCEDURE — 3074F SYST BP LT 130 MM HG: CPT | Performed by: INTERNAL MEDICINE

## 2025-06-19 PROCEDURE — 3079F DIAST BP 80-89 MM HG: CPT | Performed by: INTERNAL MEDICINE

## 2025-06-19 RX ORDER — CLOPIDOGREL BISULFATE 75 MG/1
75 TABLET ORAL DAILY
Qty: 60 TABLET | Refills: 0 | Status: SHIPPED | OUTPATIENT
Start: 2025-06-19

## 2025-06-19 RX ORDER — PANTOPRAZOLE SODIUM 40 MG/1
40 TABLET, DELAYED RELEASE ORAL 2 TIMES DAILY
Qty: 180 TABLET | Refills: 1 | Status: SHIPPED | OUTPATIENT
Start: 2025-06-19

## 2025-06-19 SDOH — ECONOMIC STABILITY: FOOD INSECURITY: WITHIN THE PAST 12 MONTHS, THE FOOD YOU BOUGHT JUST DIDN'T LAST AND YOU DIDN'T HAVE MONEY TO GET MORE.: NEVER TRUE

## 2025-06-19 SDOH — ECONOMIC STABILITY: FOOD INSECURITY: WITHIN THE PAST 12 MONTHS, YOU WORRIED THAT YOUR FOOD WOULD RUN OUT BEFORE YOU GOT MONEY TO BUY MORE.: NEVER TRUE

## 2025-06-19 ASSESSMENT — PATIENT HEALTH QUESTIONNAIRE - PHQ9
SUM OF ALL RESPONSES TO PHQ QUESTIONS 1-9: 0
1. LITTLE INTEREST OR PLEASURE IN DOING THINGS: NOT AT ALL
2. FEELING DOWN, DEPRESSED OR HOPELESS: NOT AT ALL

## 2025-06-19 NOTE — ASSESSMENT & PLAN NOTE
Chronic, at goal (stable), continue current treatment plan    Orders:    Vitamin D 25 Hydroxy; Future    Lipid Panel; Future

## 2025-06-19 NOTE — PROGRESS NOTES
Vaccine  Discontinued           Assessment & Plan  Well adult exam   Continue to exercise    Orders:    PSA, Prostatic Specific Antigen ([place]); Future    Acute ischemic left PCA stroke (HCC)   Monitored by specialist- no acute findings meriting change in the plan    Orders:    clopidogrel (PLAVIX) 75 MG tablet; Take 1 tablet by mouth daily    History of stroke   Chronic, at goal (stable), continue current treatment plan    Orders:    clopidogrel (PLAVIX) 75 MG tablet; Take 1 tablet by mouth daily    Need for vaccination against Streptococcus pneumoniae   Chronic, at goal (stable), continue current treatment plan    Orders:    Pneumococcal, PCV20, PREVNAR 20, (age 6w+), IM, PF    Need for tetanus, diphtheria, and acellular pertussis (Tdap) vaccine in patient of adolescent age or older   Chronic, at goal (stable), continue current treatment plan    Orders:    Tdap, BOOSTRIX, (age 10 yrs+), IM    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 (HCC)   Monitored by specialist- no acute findings meriting change in the plan    Orders:    Hemoglobin A1C; Future    Albumin/Creatinine Ratio, Urine; Future    CBC with Auto Differential; Future    Vitamin B12 & Folate; Future    Stage 3a chronic kidney disease (HCC)   Chronic, at goal (stable), continue current treatment plan    Orders:    Comprehensive Metabolic Panel; Future    Mixed hyperlipidemia   Chronic, at goal (stable), continue current treatment plan    Orders:    Vitamin D 25 Hydroxy; Future    Lipid Panel; Future    Chronic GERD   Worsening, continue protonix         Dyspepsia   Chronic, at goal (stable), worsening, continue protonix    Orders:    pantoprazole (PROTONIX) 40 MG tablet; Take 1 tablet by mouth in the morning and at bedtime      Return in about 6 months (around 12/19/2025) for hypertension 30 min.           --Tank Sevilla MD

## 2025-06-19 NOTE — ASSESSMENT & PLAN NOTE
Monitored by specialist- no acute findings meriting change in the plan    Orders:    Hemoglobin A1C; Future    Albumin/Creatinine Ratio, Urine; Future    CBC with Auto Differential; Future    Vitamin B12 & Folate; Future

## 2025-06-21 ENCOUNTER — RESULTS FOLLOW-UP (OUTPATIENT)
Dept: INTERNAL MEDICINE CLINIC | Age: 60
End: 2025-06-21

## 2025-06-23 DIAGNOSIS — I10 ESSENTIAL HYPERTENSION: ICD-10-CM

## 2025-06-23 RX ORDER — CARVEDILOL 25 MG/1
25 TABLET ORAL 2 TIMES DAILY
Qty: 180 TABLET | Refills: 1 | Status: SHIPPED | OUTPATIENT
Start: 2025-06-23 | End: 2025-12-20

## 2025-06-24 ENCOUNTER — OFFICE VISIT (OUTPATIENT)
Dept: VASCULAR SURGERY | Age: 60
End: 2025-06-24
Payer: COMMERCIAL

## 2025-06-24 VITALS
TEMPERATURE: 96.6 F | SYSTOLIC BLOOD PRESSURE: 126 MMHG | DIASTOLIC BLOOD PRESSURE: 84 MMHG | BODY MASS INDEX: 32.74 KG/M2 | WEIGHT: 247 LBS | HEART RATE: 77 BPM | OXYGEN SATURATION: 94 % | HEIGHT: 73 IN

## 2025-06-24 DIAGNOSIS — I65.01 OCCLUSION OF RIGHT VERTEBRAL ARTERY: Primary | ICD-10-CM

## 2025-06-24 PROCEDURE — 3079F DIAST BP 80-89 MM HG: CPT | Performed by: SURGERY

## 2025-06-24 PROCEDURE — 3074F SYST BP LT 130 MM HG: CPT | Performed by: SURGERY

## 2025-06-24 PROCEDURE — 99214 OFFICE O/P EST MOD 30 MIN: CPT | Performed by: SURGERY

## 2025-06-24 NOTE — PROGRESS NOTES
2025    Juaquin Delaney (:  1965) is a 59 y.o. male,Established patient, here for evaluation of the following chief complaint(s):  Follow-up ( 6m f/u carotid duplex prior w/keira, vertebral artery insuff/)        ASSESSMENT/PLAN:  1. Occlusion of right vertebral artery    Repeat carotid duplex in one year and I will call with results.  Continue high dose statin and anti-platelet.   No follow-ups on file.       SUBJECTIVE/OBJECTIVE:  Juaquin returns today in follow up of carotid and vertebral artery duplex.   He feels well.  Balance is a \"little off\" since his stroke, but no worsening.    He is back to gym--weight lifting and cardio.  No dizziness.  No basilar-vertebral insufficiency symptoms.   Duplex is stable with mild carotid disease bilaterally and chronic right vertebral artery stenosis.    I personally reviewed images of the following study:  Vascular US Duplex Carotid Bilateral 2025      Physical Exam  Vitals:    25 1302   BP: 126/84   BP Site: Right Upper Arm   Patient Position: Sitting   Pulse: 77   Temp: (!) 96.6 °F (35.9 °C)   TempSrc: Infrared   SpO2: 94%   Weight: 112 kg (247 lb)   Height: 1.854 m (6' 1\")     General:  AAO x 3.  NAD  WD/WN.  Normal speech and affect.  Heart:  RRR no m/r/g  Lungs:  CTA B  HEENT:  neck supple.  Carotids 2+ without bruit B.  No JVD  Neuro:  CN 2-12 grossly intact.  Motor and sensory 5/5 and symmetrical bilaterally.       An electronic signature was used to authenticate this note.    --Angela Smith MD

## 2025-06-27 ENCOUNTER — OFFICE VISIT (OUTPATIENT)
Dept: ENDOCRINOLOGY | Age: 60
End: 2025-06-27
Payer: COMMERCIAL

## 2025-06-27 VITALS
SYSTOLIC BLOOD PRESSURE: 136 MMHG | BODY MASS INDEX: 32.2 KG/M2 | OXYGEN SATURATION: 96 % | WEIGHT: 243 LBS | HEIGHT: 73 IN | DIASTOLIC BLOOD PRESSURE: 82 MMHG | HEART RATE: 76 BPM

## 2025-06-27 DIAGNOSIS — E78.5 DYSLIPIDEMIA ASSOCIATED WITH TYPE 2 DIABETES MELLITUS (HCC): ICD-10-CM

## 2025-06-27 DIAGNOSIS — N18.30 STAGE 3 CHRONIC KIDNEY DISEASE, UNSPECIFIED WHETHER STAGE 3A OR 3B CKD (HCC): ICD-10-CM

## 2025-06-27 DIAGNOSIS — E11.59 TYPE 2 DIABETES MELLITUS WITH VASCULAR DISEASE (HCC): Primary | ICD-10-CM

## 2025-06-27 DIAGNOSIS — Z79.4 TYPE 2 DIABETES MELLITUS WITH DIABETIC NEUROPATHY, WITH LONG-TERM CURRENT USE OF INSULIN (HCC): ICD-10-CM

## 2025-06-27 DIAGNOSIS — E11.3299 TYPE 2 DIABETES MELLITUS WITH BACKGROUND RETINOPATHY (HCC): ICD-10-CM

## 2025-06-27 DIAGNOSIS — Z79.4 TYPE 2 DIABETES MELLITUS WITH HYPERGLYCEMIA, WITH LONG-TERM CURRENT USE OF INSULIN (HCC): ICD-10-CM

## 2025-06-27 DIAGNOSIS — E11.40 TYPE 2 DIABETES MELLITUS WITH DIABETIC NEUROPATHY, WITH LONG-TERM CURRENT USE OF INSULIN (HCC): ICD-10-CM

## 2025-06-27 DIAGNOSIS — E11.69 DYSLIPIDEMIA ASSOCIATED WITH TYPE 2 DIABETES MELLITUS (HCC): ICD-10-CM

## 2025-06-27 DIAGNOSIS — E11.65 TYPE 2 DIABETES MELLITUS WITH HYPERGLYCEMIA, WITH LONG-TERM CURRENT USE OF INSULIN (HCC): ICD-10-CM

## 2025-06-27 DIAGNOSIS — I10 ESSENTIAL HYPERTENSION: ICD-10-CM

## 2025-06-27 PROCEDURE — 3051F HG A1C>EQUAL 7.0%<8.0%: CPT | Performed by: INTERNAL MEDICINE

## 2025-06-27 PROCEDURE — 3075F SYST BP GE 130 - 139MM HG: CPT | Performed by: INTERNAL MEDICINE

## 2025-06-27 PROCEDURE — 99214 OFFICE O/P EST MOD 30 MIN: CPT | Performed by: INTERNAL MEDICINE

## 2025-06-27 PROCEDURE — 3079F DIAST BP 80-89 MM HG: CPT | Performed by: INTERNAL MEDICINE

## 2025-06-27 RX ORDER — HYDROCHLOROTHIAZIDE 12.5 MG/1
CAPSULE ORAL
Qty: 2 EACH | Refills: 5 | Status: SHIPPED | OUTPATIENT
Start: 2025-06-27

## 2025-06-27 RX ORDER — BLOOD-GLUCOSE METER
EACH MISCELLANEOUS
Qty: 1 KIT | Refills: 1 | Status: SHIPPED | OUTPATIENT
Start: 2025-06-27

## 2025-06-27 RX ORDER — TIRZEPATIDE 12.5 MG/.5ML
INJECTION, SOLUTION SUBCUTANEOUS
Qty: 6 ML | Refills: 1 | Status: SHIPPED | OUTPATIENT
Start: 2025-06-27

## 2025-06-27 NOTE — PROGRESS NOTES
Patient ID:   Juaquin Delaney is a 59 y.o. male    Chief Complaint:   Juaquin Delaney presents for an evaluation of Type 2 Diabetes Mellitus , Hyperlipidemia and hypertension.         Subjective:   Type 2 Diabetes Mellitus diagnosed in 1998  On insulin since 2016     Previously followed by  endocrine and physician moved out. Records reviewed from care everywhere   Pioglitazone 30mg daily - stopped in Feb 2018   Jardiance stopped in summer 2018 for worsening CKD      CVA - May 2024 . No deficits     S/p gastric sleeve in Sep 2019.  presurgery weight 280 lbs.     Stable weight , today 243 lbs. Recently came from [facility]. His grandson lives with him      Metformin ER 500mg, two tabs twice a day.     Mounjaro 10 mg weekly on Sunday. Mild GI symptoms   Lantus 18 units in am       Checks blood sugars 1 times per day. Reportedly in am ,    AM:    Supper:    HS:      Hypoglycemias: Morning hypoglycemias have resolved since moving lantus in am. No episodes now. Awareness present in 70's    Meals: Three, dinner is big. 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 Lifting weights for toning up. Walking and cardio     Denies chest pain, exertional dyspnea.     CVA in Aug 2016. No residual deficits.  Denies smoking.     Currently on ASA 81 mg daily     The following portions of the patient's history were reviewed and updated as appropriate:       Family History   Problem Relation Age of Onset    Heart Attack Mother     Diabetes Mother     High Blood Pressure Mother     Colon Cancer Sister          Social History     Socioeconomic History    Marital status:      Spouse name: Not on file    Number of children: Not on file    Years of education: Not on file    Highest education level: Not on file   Occupational History    Not on file   Tobacco Use    Smoking status: Never    Smokeless tobacco: Never   Vaping Use    Vaping status: Never Used   Substance and

## 2025-06-27 NOTE — PATIENT INSTRUCTIONS
C/w Metformin Er 500mg two twice a day   Change Mounjaro 10 to 12.5mg weekly . He has 3 weeks supply of 10 mg   Lantus 18 units in the morning . Cut down to 15 units when increasing mounjaro to 12.5 mg .    If sugars drop 90 or under cut down insulin by 2 units.        Change Folic acid 4 mg one tab three days a week to 0ne tab once a week

## 2025-07-08 NOTE — TELEPHONE ENCOUNTER
Recent Visits  Date Type Provider Dept   06/19/25 Office Visit Tank Sevilla MD [facility]   09/17/24 Office Visit Tank Sevilla MD [facility]   07/17/24 Office Visit Tank Sevilla MD [facility]   07/03/24 Office Visit Tank Sevilla MD [facility]   06/04/24 Office Visit Kami Parsons DO [facility]   04/03/24 Office Visit Tank Sevilla MD [facility]   Showing recent visits within past 540 days with a meds authorizing provider and meeting all other requirements  Future Appointments  No visits were found meeting these conditions.  Showing future appointments within next 150 days with a meds authorizing provider and meeting all other requirements              Requested Prescriptions     Pending Prescriptions Disp Refills    famotidine (PEPCID) 40 MG tablet [Pharmacy Med Name: FAMOTIDINE 40 MG TABLET] 90 tablet 0     Sig: TAKE 1 TABLET BY MOUTH EVERY EVENING

## 2025-07-09 RX ORDER — FAMOTIDINE 40 MG/1
40 TABLET, FILM COATED ORAL NIGHTLY
Qty: 90 TABLET | Refills: 0 | Status: SHIPPED | OUTPATIENT
Start: 2025-07-09

## 2025-07-11 RX ORDER — AMLODIPINE AND VALSARTAN 10; 320 MG/1; MG/1
1 TABLET ORAL DAILY
Qty: 90 TABLET | Refills: 1 | Status: SHIPPED | OUTPATIENT
Start: 2025-07-11

## 2025-07-11 NOTE — TELEPHONE ENCOUNTER
Recent Visits  Date Type Provider Dept   06/19/25 Office Visit Tank Sevilla MD [facility]   09/17/24 Office Visit Tank Sevilla MD [facility]   07/17/24 Office Visit Tank Sevilla MD [facility]   07/03/24 Office Visit Tank Sevilla MD [facility]   06/04/24 Office Visit Kami Parsons DO [facility]   04/03/24 Office Visit Tank Sevilla MD [facility]   Showing recent visits within past 540 days with a meds authorizing provider and meeting all other requirements  Future Appointments  No visits were found meeting these conditions.  Showing future appointments within next 150 days with a meds authorizing provider and meeting all other requirements              Requested Prescriptions     Pending Prescriptions Disp Refills    amLODIPine-valsartan (EXFORGE)  MG per tablet 90 tablet 3     Sig: Take 1 tablet by mouth daily

## 2025-07-13 DIAGNOSIS — E11.3299 TYPE 2 DIABETES MELLITUS WITH BACKGROUND RETINOPATHY (HCC): ICD-10-CM

## 2025-07-13 DIAGNOSIS — E11.59 TYPE 2 DIABETES MELLITUS WITH VASCULAR DISEASE (HCC): ICD-10-CM

## 2025-07-13 RX ORDER — INSULIN GLARGINE 100 [IU]/ML
18 INJECTION, SOLUTION SUBCUTANEOUS NIGHTLY
Qty: 20 ML | Refills: 1 | Status: CANCELLED | OUTPATIENT
Start: 2025-07-13 | End: 2026-07-13

## 2025-07-14 RX ORDER — INSULIN GLARGINE 100 [IU]/ML
18 INJECTION, SOLUTION SUBCUTANEOUS EVERY MORNING
Qty: 20 ML | Refills: 1 | Status: SHIPPED | OUTPATIENT
Start: 2025-07-14

## 2025-07-14 NOTE — TELEPHONE ENCOUNTER
Requested Prescriptions     Pending Prescriptions Disp Refills    LANTUS SOLOSTAR 100 UNIT/ML injection pen [Pharmacy Med Name: LANTUS SOLOSTAR 100 UNIT/ML] 18 mL      Sig: INJECT 18 UNITS UNDER THE SKIN NIGHTLY     Last refilled: 5/19/2024 # 20 ml with 1 refill    Lov: 6/27/2025   Nov:10/31/2025

## 2025-07-27 DIAGNOSIS — E78.5 DYSLIPIDEMIA ASSOCIATED WITH TYPE 2 DIABETES MELLITUS (HCC): ICD-10-CM

## 2025-07-27 DIAGNOSIS — E11.69 DYSLIPIDEMIA ASSOCIATED WITH TYPE 2 DIABETES MELLITUS (HCC): ICD-10-CM

## 2025-07-27 DIAGNOSIS — E11.59 TYPE 2 DIABETES MELLITUS WITH VASCULAR DISEASE (HCC): ICD-10-CM

## 2025-07-27 DIAGNOSIS — E11.3299 TYPE 2 DIABETES MELLITUS WITH BACKGROUND RETINOPATHY (HCC): ICD-10-CM

## 2025-07-28 RX ORDER — ATORVASTATIN CALCIUM 80 MG/1
TABLET, FILM COATED ORAL
Qty: 90 TABLET | Refills: 1 | Status: SHIPPED | OUTPATIENT
Start: 2025-07-28

## 2025-08-01 ENCOUNTER — CLINICAL DOCUMENTATION (OUTPATIENT)
Dept: PHARMACY | Facility: CLINIC | Age: 60
End: 2025-08-01

## 2025-08-01 NOTE — PROGRESS NOTES
Pharmacy Pop Care Documentation:     Juaquin Delaney is being removed from the diabetes management program for the following reason(s): Loss of Benefits: [employer]; Per [employer] Benefits Ended: 2/28/25    Kayley Wyatt    For Pharmacy Admin Tracking Only    Program: Pop Health  CPA in place:  No  Gap Closed?: Yes   Time Spent (min): 5

## 2025-08-14 DIAGNOSIS — E11.3299 TYPE 2 DIABETES MELLITUS WITH BACKGROUND RETINOPATHY (HCC): ICD-10-CM

## 2025-08-14 DIAGNOSIS — E11.59 TYPE 2 DIABETES MELLITUS WITH VASCULAR DISEASE (HCC): ICD-10-CM

## 2025-08-15 RX ORDER — METFORMIN HYDROCHLORIDE 500 MG/1
TABLET, EXTENDED RELEASE ORAL
Qty: 360 TABLET | Refills: 0 | Status: SHIPPED | OUTPATIENT
Start: 2025-08-15

## 2025-08-28 DIAGNOSIS — I63.532 ACUTE ISCHEMIC LEFT PCA STROKE (HCC): ICD-10-CM

## 2025-08-28 DIAGNOSIS — Z86.73 HISTORY OF STROKE: ICD-10-CM

## 2025-08-30 RX ORDER — CLOPIDOGREL BISULFATE 75 MG/1
75 TABLET ORAL DAILY
Qty: 30 TABLET | Refills: 5 | Status: SHIPPED | OUTPATIENT
Start: 2025-08-30

## (undated) DEVICE — BW-412T DISP COMBO CLEANING BRUSH: Brand: SINGLE USE COMBINATION CLEANING BRUSH

## (undated) DEVICE — PACK PROCEDURE SURG SHLDR MFFOP CUST

## (undated) DEVICE — PROCEDURE KIT ENDOSCP CUST

## (undated) DEVICE — LAPAROSCOPIC SCISSORS: Brand: EPIX LAPAROSCOPIC SCISSORS

## (undated) DEVICE — SHEARS ENDOSCP L36CM DIA5MM ULTRASONIC CRV TIP HARM

## (undated) DEVICE — DRESSING,GAUZE,XEROFORM,CURAD,5"X9",ST: Brand: CURAD

## (undated) DEVICE — PMI DISPOSABLE PUNCTURE CLOSURE DEVICE / SUTURE GRASPER: Brand: PMI

## (undated) DEVICE — SUTURE ETHILON SZ 3-0 L18IN NONABSORBABLE BLK PS-2 L19MM 3/8 1669H

## (undated) DEVICE — DEVICE SUT W/ SZ 0 L48IN VLT POLYSRB SUT DISP ES-9 ENDO

## (undated) DEVICE — TUBING, SUCTION, 1/4" X 10', STRAIGHT: Brand: MEDLINE

## (undated) DEVICE — SLING ARM L W2XL17.5IN D8.5IN BLU POLY/COTTON FOAM STRP

## (undated) DEVICE — SOLUTION IV IRRIG WATER 500ML POUR BRL ST 2F7113

## (undated) DEVICE — DEVICE SUT SHFT L34CM DIA 10MM 2 JAW LD UNIT ENDOSTCH

## (undated) DEVICE — AMBIENT SUPER TURBOVAC 90: Brand: COBLATION

## (undated) DEVICE — BLADE SHV L13CM DIA4MM BNE CUT AGG DEB COOLCUT

## (undated) DEVICE — CATHETER URETH 16FR BLLN 5CC STD LTX 2 W F TWO OPP DRNGE

## (undated) DEVICE — TUBING PMP L16FT MAIN DISP FOR AR-6400 AR-6475

## (undated) DEVICE — SOLUTION ANTIFOG VIS SYS CLEARIFY LAPSCP

## (undated) DEVICE — APPLICATOR MEDICATED 26 CC SOLUTION HI LT ORNG CHLORAPREP

## (undated) DEVICE — TROCARS: Brand: KII® OPTICAL ACCESS SYSTEM

## (undated) DEVICE — SHEET,DRAPE,53X77,STERILE: Brand: MEDLINE

## (undated) DEVICE — SYRINGE, LUER LOCK, 10ML: Brand: MEDLINE

## (undated) DEVICE — SYRINGE MED 50ML LUERLOCK TIP

## (undated) DEVICE — CANNULA ARTHSCP L7CM ID8.25MM TRNSLUC THRD FLX W/ NO SQUIRT

## (undated) DEVICE — SPONGE,LAP,4"X18",XR,ST,5/PK,40PK/CS: Brand: MEDLINE INDUSTRIES, INC.

## (undated) DEVICE — CONTROL SYRINGE LUER-LOCK TIP: Brand: MONOJECT

## (undated) DEVICE — PASSIVE LAPSCP FLTR W/ 1/4INX24 TBNG AND M LUER LCK FIT - U

## (undated) DEVICE — STAPLER SKIN L440MM 32MM LNG 12 FIRING B FRM PWR + GRIPPING

## (undated) DEVICE — APPLICATOR PREP 26ML 0.7% IOD POVACRYLEX 74% ISO ALC ST

## (undated) DEVICE — DYONICS 4.0 MM ELITE                                    ACROMIOBLASTER STRAIGHT DISPOSABLE                                    BURRS, SAGE GREEN, 10000 MAXIMUM                                    RPM, PACKAGED 6 PER BOX, STERILE

## (undated) DEVICE — SUTURE SUTTAPE L40IN DIA1.3MM NONABSORBABLE WHT BLU L26.5MM AR7500

## (undated) DEVICE — NEEDLE HYPO 22GA L1.5IN BLK POLYPR HUB S STL REG BVL STR

## (undated) DEVICE — TRAP SPEC RETRV CLR PLAS POLYP IN LN SUCT QUIK CTCH

## (undated) DEVICE — FORCEPS BX L240CM WRK CHN 2.8MM STD CAP W/ NDL MIC MESH

## (undated) DEVICE — RELOAD STPL H4.1X2MM DIA60MM THCK TISS GRN 6 ROW PWR GST B

## (undated) DEVICE — SOLUTION IV IRRIG POUR BRL 0.9% SODIUM CHL 2F7124

## (undated) DEVICE — GUIDEWIRE ENDOSCP L150CM DIA0.035IN TIP 3CM PTFE NIT

## (undated) DEVICE — MOUTHPIECE ENDOSCP L CTRL OPN AND SIDE PORTS DISP

## (undated) DEVICE — GLOVE ORANGE PI 7 1/2   MSG9075

## (undated) DEVICE — ADHESIVE SKIN CLSR 0.7ML TOP DERMBND ADV

## (undated) DEVICE — Device

## (undated) DEVICE — GAUZE,SPONGE,4"X4",8PLY,STRL,LF,10/TRAY: Brand: MEDLINE

## (undated) DEVICE — SET VLV 3 PC AWS DISPOSABLE GRDIAN SCOPEVALET

## (undated) DEVICE — CANNULA ARTHSCP L7CM DIA7MM TRNSLUC THRD FLX W/ NO SQUIRT

## (undated) DEVICE — CANNULA ARTHSCP L7CM ID575MM CRYS SMOOTH W OBT

## (undated) DEVICE — ARM CRADLE: Brand: DEVON

## (undated) DEVICE — NEEDLE SUT PASS FOR ROT CUF LABRAL REP SUREFIRE SCORPION

## (undated) DEVICE — STERILE POLYISOPRENE POWDER-FREE SURGICAL GLOVES: Brand: PROTEXIS

## (undated) DEVICE — 3M™ STERI-DRAPE™ U-DRAPE 1067 1067 5/BX 4BX/CS/CTN&#X20;: Brand: STERI-DRAPE™

## (undated) DEVICE — SOLUTION IRRIG 3000ML 0.9% SOD CHL USP UROMATIC PLAS CONT

## (undated) DEVICE — AIR SHEET,LAT,COMFORT GLIDE, BLEND 40X80: Brand: MEDLINE

## (undated) DEVICE — SLING ARM M FOR 11-13IN UNIV PREM QUAL BRTH EXTRA PD FAB W/

## (undated) DEVICE — SET TBNG GRAV FMS

## (undated) DEVICE — SUTURE VCRL SZ 0 L54IN ABSRB UD POLYGLACTIN 910 COAT BRAID J608H

## (undated) DEVICE — LAPAROSCOPY PK

## (undated) DEVICE — SUTURE VCRL SZ 4-0 L18IN ABSRB UD L19MM PS-2 3/8 CIR PRIM J496H

## (undated) DEVICE — S-CURVE URETHRAL DILATOR SET WITH AQ, HYDROPHILIC COATING: Brand: S~CURVE

## (undated) DEVICE — SHEET,DRAPE,40X58,STERILE: Brand: MEDLINE

## (undated) DEVICE — TROCAR: Brand: KII FIOS FIRST ENTRY

## (undated) DEVICE — BOWL MED L 32OZ PLAS W/ MOLD GRAD EZ OPN PEEL PCH

## (undated) DEVICE — TROCAR: Brand: KII OPTICAL ACCESS SYSTEM

## (undated) DEVICE — HYPODERMIC SAFETY NEEDLE: Brand: MAGELLAN

## (undated) DEVICE — RELOAD STPL H35XL60MM BLU REG B FORM NAT ARTC ECHELON

## (undated) DEVICE — DRAPE,LAP,CHOLE,W/TROUGHS,STERILE: Brand: MEDLINE

## (undated) DEVICE — 3M™ IOBAN™ 2 ANTIMICROBIAL INCISE DRAPE 6650EZ: Brand: IOBAN™ 2

## (undated) DEVICE — DRAPE,U/ SHT,SPLIT,PLAS,STERIL: Brand: MEDLINE

## (undated) DEVICE — RELOAD STPL H1.8-3.8MM REG THCK TISS G 6 ROW GRIPPING SURF

## (undated) DEVICE — GOWN,AURORA,NONREINF,RAGLAN,XXL,STERILE: Brand: MEDLINE

## (undated) DEVICE — NEEDLE INSUF L150MM DIA2MM DISP FOR PNEUMOPERI ENDOPATH

## (undated) DEVICE — 3M™ WARMING BLANKET, UPPER BODY, 10 PER CASE, 42268: Brand: BAIR HUGGER™

## (undated) DEVICE — CATHETER TRAY 16 FR 5 CC FOL ANTIREFLX SAMPLING PRT DOVER

## (undated) DEVICE — CRADLE ANK AND FT ELEV FLAT END POLY FOAM W/ VENT H NEUT

## (undated) DEVICE — LOTION PREP REMV 5OZ IODO CLR TINC OF BENZ DURAPREP

## (undated) DEVICE — KIT OR ROOM TURNOVER W/STRAP

## (undated) DEVICE — SLEEVE TRAC SPANDEX LAT W/ 4IN COBAN SUPERFICIAL RAD NRV PD

## (undated) DEVICE — MAT FLR W28XL40IN STD GRN 60% RECYCL MAT LO ABSRB SGL LAYR

## (undated) DEVICE — PROBE ABLAT XL 90DEG ASPIR BPLR RF 1 PC ELECTRD ERGO HNDL

## (undated) DEVICE — TRUE CONTENT TO BE POPULATED AS PART OF REBRANDING: Brand: ARGYLE

## (undated) DEVICE — CATHETER URETH 16FR BLLN 5CC 2 W F SPEC M OLV COUDE TIP